# Patient Record
Sex: MALE | Race: WHITE | HISPANIC OR LATINO | Employment: OTHER | URBAN - METROPOLITAN AREA
[De-identification: names, ages, dates, MRNs, and addresses within clinical notes are randomized per-mention and may not be internally consistent; named-entity substitution may affect disease eponyms.]

---

## 2018-01-09 NOTE — PSYCH
Behavioral Health Outpatient Intake    Referred By: INPT  Intake Questions: there are no developmental disabilities  the patient does not have a hearing impairment  the patient does not have an ICM or CTT  patient is not taking injectable psychiatric medications  Employment: The patient is employed full time at self  Emergency Contact Information:   Emergency Contact: Manisha Bryant   Relationship to Patient: WIFE   Phone Number: [phone]   Previous Psychiatric Treatment:   He has not been previously seen by a therapist     History: no  service  He has not had combat service  He was not activated into federal active duty as a member of the [employer] or [employer]  Insurance Subscriber: Manisha Bryant   : [birth year]   Employer: [employer]    Primary Insurance: Sabina Finger   ID number: [id number]   Group number: [id number]         Presenting Problem (in patient's words): DEPRESSION  Substance Abuse: NONE  Previous Treatment: The patient has been seen here in the past  He was seen as an inpatient  CURRENTLY  Accepted as Patient   IR[address]  16 @ 8:00 & DR Tez Ellington 16 @ 2:00     Primary Care Physician: DR Casandra Montes   PCP Telephone Number: [phone]      Signatures   Electronically signed by : Lucy Hu, ; May  3 2016  3:22PM EST                       (Author)

## 2018-01-10 NOTE — MISCELLANEOUS
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)   6  Benign essential hypertension (401 1) (I10)    Plan  Acute renal failure    · (1) COMPREHENSIVE METABOLIC PANEL; Status:Active; Requested for:08Apr2016;    Perform:[facility]; JLZ:[id number];JGNJKDB; For:Acute renal failure; Ordered By:Dari Corbett;  Benign essential hypertension    · (1) CBC/PLT/DIFF; Status:Active; Requested for:08Apr2016;    Perform:[facility]; ZGC:69TCB4490;ICAHZWO; For:Benign essential hypertension; Ordered By:Dari Corbett;   · Follow-up visit in 2 weeks Evaluation and Treatment  Follow-up  Status: Hold For -  Scheduling  Requested for: 08Apr2016   Ordered; For: Benign essential hypertension; Ordered By: Rojelio Brennan Performed:  Due: 13Apr2016  Headache    · * MRI BRAIN WO CONTRAST; Status:Need Information - Financial Authorization; Requested for:08Apr2016;    Perform:[facility]; XHA:[id number];YPHYLBW;  For:Headache; Ordered By:Dari Corbett;  Tremor of right hand    · (1) FOLATE; Status:Active; Requested for:08Apr2016;    Perform:[facility]; HMZ:27SAI7228;HCQIDUK; For:Tremor of right hand; Ordered By:Dari Corbett;   · (1) TSH WITH FT4 REFLEX; Status:Active; Requested for:08Apr2016;    Perform:[facility]; FQY:60DJQ3987;FTXBKQA; For:Tremor of right hand; Ordered By:Dari Corbett;   · (1) VITAMIN B12; Status:Active; Requested for:08Apr2016;    Perform:[facility]; CGO:80FPM1997;KKKLEEA; For:Tremor of right hand; Ordered By:Joy Corbett;    Discussion/Summary  Discussion Summary:   Will get MRI brain for weakness, tremor, headaches  Update labs  Take Amlodipine 5 mg per day  Recheck in 2 wks for BP  He is adamant that he is not needing treatment for depression   He states he is not suicidal    Er precautions discussed  Chief Complaint  Chief Complaint Free Text Note Form: Patient is here for a follow up from [facility]  He stated that they told him he has hypertension  History of Present Illness  TCM Communication [place]: The patient is being contacted for follow-up after hospitalization  Hospital records were reviewed  He was hospitalized at [facility]  The date of admission: 4/2/2016, date of discharge: 04/5/2016  Diagnosis: poisoning/ exposed to antifreeze  He was discharged to home  Medications reviewed and updated today  Symptoms: no fever, no weakness, no dizziness, no headache, no fatigue, no cough, no shortness of breath, no chest pain, no back pain on left side, no back pain on right side, no arm pain left side, no arm pain on right side, no leg pain on left side, no leg pain on right side, no upper abdominal pain, no middle abdominal pain, no lower abdominal pain, no rash:, no anorexia, no nausea, no vomiting, no loose stools, no constipation, no pain with urinating, no incisional pain, no wound drainage and no swelling  The patient is currently asymptomatic  Communication performed and completed by   HPI: Pt was admitted to [facility] for drinking polyethylene glycol  He received urgent hemodialysis and was admitted to the ICU  He states he was working on a car and accidentally consumed the anti-freeze  He denies doing this intentionally  He denies any suicidal or homicidal thoughts currently or at that time  In the hospital he was noted to have elevated BP and was discharged on 5 mg amlodipine  He has this medication at home but he has not been taking it  Currently he complains of headaches and difficulty concentrating  He also has a tremor in the R hand and he feels weakness in his RUE  Started a couple of weeks ago        Review of Systems  Complete-Male:   Constitutional: No fever or chills, feels well, no tiredness, no recent weight gain or weight loss    Eyes: No complaints of eye pain, no red eyes, no discharge from eyes, no itchy eyes  ENT: no complaints of earache, no hearing loss, no nosebleeds, no nasal discharge, no sore throat, no hoarseness  Cardiovascular: No complaints of slow heart rate, no fast heart rate, no chest pain, no palpitations, no leg claudication, no lower extremity  Respiratory: No complaints of shortness of breath, no wheezing, no cough, no SOB on exertion, no orthopnea or PND  Gastrointestinal: No complaints of abdominal pain, no constipation, no nausea or vomiting, no diarrhea or bloody stools  Genitourinary: No complaints of dysuria, no incontinence, no hesitancy, no nocturia, no genital lesion, no testicular pain  Musculoskeletal: No complaints of arthralgia, no myalgias, no joint swelling or stiffness, no limb pain or swelling  Integumentary: No complaints of skin rash or skin lesions, no itching, no skin wound, no dry skin  Neurological: as noted in HPI  Psychiatric: not suicidal, no anxiety and no depression  Endocrine: No complaints of proptosis, no hot flashes, no muscle weakness, no erectile dysfunction, no deepening of the voice, no feelings of weakness  Hematologic/Lymphatic: No complaints of swollen glands, no swollen glands in the neck, does not bleed easily, no easy bruising  Active Problems    1  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)   2  Erectile dysfunction (607 84) (N52 9)   3  Esophageal reflux (530 81) (K21 9)   4  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)   5  S/P colostomy (V44 3) (Z93 3)   6  Screening for skin condition (V82 0) (Z13 89)   7  Stress at home (V61 9) (F43 9)   8  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)    Past Medical History    1  History of Acute viral pharyngitis (462) (J02 8,B97 89)   2  Erectile dysfunction (607 84) (N52 9)   3  Need for Tdap vaccination (V06 1) (Z23)   4  History of Postoperative examination (V67 00) (Z09)   5  History of Sciatica (724 3) (M54 30)   6   History of Seroma Complicating A Procedure (998 13)   7  History of Sore throat (462) (J02 9)   8  History of Visit for pre-operative examination (V72 84) (S90 620)    Surgical History    1  History of Colostomy   2  History of Colostomy Revision   3  History of Exploratory Laparotomy   4  History of Laparoscopic Lysis Of Intestinal Adhesions  Surgical History Reviewed: The surgical history was reviewed and updated today  Family History    1  Family history of Ischemic Stroke (V17 1)    2  Family history of Diabetes Mellitus (V18 0)    Social History    · Alcohol Use (History)   · Caffeine Use   · Stress at home (V61 9) (F43 9)   · Using Marijuana  Social History Reviewed: The social history was reviewed and updated today  Current Meds   1  AmLODIPine Besylate 5 MG Oral Tablet; Therapy: (Recorded:08Apr2016) to Recorded   2  PredniSONE 20 MG Oral Tablet; TAKE 1 TABLET DAILY; Therapy: 97DDI5748 to (Toshia Stein)  Requested for: 31HEC6555; Last   Rx:23Mar2016 Ordered   3  Viagra 100 MG Oral Tablet; TAKE AS DIRECTED; Therapy: 01SIQ1135 to (Last Rx:30Szt4520) Ordered  Medication List Reviewed: The medication list was reviewed and updated today  Allergies    1  No Known Drug Allergies    Vitals  Signs [Data Includes: Current Encounter]   Recorded: 50FDP7839 38:33LO   Systolic: 087  Diastolic: 98  Recorded: 52UPD4706 01:17PM   Temperature: 97 8 F  Heart Rate: 92  Systolic: 458  Diastolic: 98  Height: 5 ft 11 in  Weight: 258 lb 6 08 oz  BMI Calculated: 36 04  BSA Calculated: 2 35  O2 Saturation: 96    Physical Exam    Constitutional   General appearance: No acute distress, well appearing and well nourished  Ears, Nose, Mouth, and Throat   Oropharynx: Normal with no erythema, edema, exudate or lesions  Pulmonary   Respiratory effort: No increased work of breathing or signs of respiratory distress      Auscultation of lungs: Clear to auscultation, equal breath sounds bilaterally, no wheezes, no rales, no rhonci  Cardiovascular   Auscultation of heart: Normal rate and rhythm, normal S1 and S2, without murmurs  Examination of extremities for edema and/or varicosities: Normal     Musculoskeletal   Gait and station: Normal     Neurologic   Cranial nerves: Cranial nerves 2-12 intact  Reflexes: 2+ and symmetric  Deep tendon reflexes: 1+ right biceps and 1+ left biceps  Sensation: No sensory loss  Motor RUE weakness 4/5 shoulders, elbows, wrists  LUE normal strength  LOWER EXT STRENGTH 5/5 bilaterally   Psychiatric   Orientation to person, place and time: Normal     Mood and affect: Normal          Health Management  History of Colon cancer screening   COLONOSCOPY; every 5 years; Last 94AXS1898; Next Due: 96TMY2665;  Active    Future Appointments    Date/Time Provider Specialty Site   04/12/2016 03:20 PM Charmayne Siva, [facility] [address]     Signatures   Electronically signed by : Bia Marin MD; Apr 8 2016  1:42PM EST                       (Author)

## 2018-01-11 NOTE — MISCELLANEOUS
Dear Darron Machado : We missed you for your originally scheduled Neurological Consultation requested by Dr Pao Jhaveri    Please call at your earliest convenience to reschedule this appointment      Sincerely,   Severino Menezes 104     cc:  Dr Pao Jhaveri      Electronically signed by:Pamela Cheung   May  5 2016 10:37AM EST Co-author

## 2018-01-11 NOTE — MISCELLANEOUS
Provider Comments  Provider Comments:   Patient did not show for 4/12/16 appt  Signatures   Electronically signed by : Imtiaz Hansen PAC;  Apr 12 2016  4:46PM EST                       (Author)    Electronically signed by : KERA Martinez ; Apr 12 2016  5:22PM EST                       (Author)

## 2018-01-13 NOTE — MISCELLANEOUS
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  He states he is still having moderate headaches and a tremor in his hand  i advised him to see neurology  Referral placed  Plan  Tremor of right hand    · 1 Mahsa Bello MD, [facility] ([facility]  (Neurology) Physician Referral  Consult  Status: Hold For -  Scheduling  Requested for: 48KHP5471  Care Summary provided  : Yes    Results/Data  Results    * MRI BRAIN WO CONTRAST   MRI BRAIN WO CONTRAST: MRI BRAIN WITHOUT CONTRAST     INDICATION:  Headaches and tremors  COMPARISON:   None  TECHNIQUE:  Sagittal T1, axial T2, axial FLAIR, axial T1, axial [place] and axial diffusion  imaging  Sagittal FLAIR  IMAGE QUALITY:  Diagnostic  FINDINGS:     BRAIN PARENCHYMA:  Scattered small white matter lesions are noted within the frontal  lobes and to a lesser degree within the remainder of the brain parenchyma with no  associated diffusion abnormality  No abnormal signal on gradient imaging  Normal    corpus callosum and hypothalamus  Brainstem and cerebellum demonstrate normal  signal   No evidence of acute ischemia, mass or hemorrhage  VENTRICLES:  The ventricles are normal in size and contour  SELLA AND PITUITARY GLAND:  Normal      ORBITS:  Normal      PARANASAL SINUSES:  Small retention cysts within the inferior left maxillary sinus  Mild mucosal thickening of the right maxillary sinus  Frontal sinuses are hypoplastic  VASCULATURE:  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  CALVARIUM AND SKULL BASE:  Normal      EXTRACRANIAL SOFT TISSUES:  Normal        IMPRESSION:     Scattered small white matter lesions within the brain parenchyma as described above  without associated diffusion abnormality, 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

## 2018-01-15 NOTE — PROGRESS NOTES
Chief Complaint  CC: NEW PATIENT FOR SKIN CANCER SCREENING  REFERRED BY DR Farzad Vaughn FOR PROVEN ATYPICAL LESION  History of Present Illness  53-year-old male presents for further followup and management of any atypical nevus removed from his abdomen  Patient with recent history of diverticulitis and colostomy presents because of concerns regarding a mole was noted on his left abdomen patient noted that the area had become darker this was removed by Dr George Curtis who found that was called a dysplastic nevus with moderate to severe atypia recommendation for complete excision was given      Assessment  Assessed    1  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)   2  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)   3  Screening for skin condition (V82 0) (Z13 89)    Active Problems  Problems    1  Abdominal pain, LLQ (left lower quadrant) (789 04) (R10 32)   2  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)   3  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)   4  Bloody stools (578 1) (K92 1)   5  Diverticulitis of colon (562 11) (K57 32)   6  Erectile dysfunction (607 84) (N52 9)   7  Esophageal reflux (530 81) (K21 9)   8  Hyperpigmented skin lesion (709 00) (L81 9)   9  Lump in neck (784 2) (R22 1)   10  S/P colostomy (V44 3) (Z93 3)    Past Medical History  Problems    1  Acute bronchitis (466 0) (J20 9)   2  History of Acute viral pharyngitis (462) (J02 9)   3  Erectile dysfunction (607 84) (N52 9)   4  Need for Tdap vaccination (V06 1) (Z23)   5  History of Postoperative examination (V67 00) (Z09)   6  History of Sciatica (724 3) (M54 30)   7  History of Seroma Complicating A Procedure (998 13)   8  History of Sore throat (462) (J02 9)   9  History of Visit for pre-operative examination (V72 84) (U84 970)    Surgical History  Problems    1  History of Colostomy   2  History of Colostomy Revision   3  History of Exploratory Laparotomy   4   History of Laparoscopic Lysis Of Intestinal Adhesions    Surgical History reviewed      Family History  Mother 1  Family history of Ischemic Stroke (V17 1)  Family History    2  Family history of Diabetes Mellitus (V18 0)    Family History was reviewed      Social History  Problems    · Alcohol Use (History)   · Caffeine Use   · Never A Smoker   · Using Marijuana  The social history was reviewed      Current Meds   1  Omeprazole 20 MG Oral Tablet Delayed Release; Therapy: (Recorded:59Fma5130) to Recorded   2  Pepcid AC TABS; Therapy: (Recorded:65Fgv6570) to Recorded   3  Percocet TABS; Therapy: (Recorded:13Jan2016) to Recorded   4  Viagra 100 MG Oral Tablet; TAKE AS DIRECTED; Therapy: 60KYG1929 to (Last Rx:77Ibz8428) Ordered    Review of Systems    Constitutional: Denies constitutional symptoms  Eyes: Denies eye symptoms  ENT:  denies ear symptoms, nasal symptoms, mouth or throat symptoms  Cardiovascular: Denies cardiovascular symptoms  Respiratory: Denies respiratory symptoms  Gastrointestinal: Denies gastrointestinal symptoms  Musculoskeletal: Denies musculoskeletal symptoms  Integumentary: Denies symptoms other than stated above  Neurological: Denies neurologic symptoms  Psychiatric: Denies psychiatric symptoms  Endocrine: Denies endocrine symptoms  Hematologic/Lymphatic: Denies hematologic symptoms  Allergies  Medication    1  No Known Drug Allergies    Physical Exam    Constitutional   General appearance: Appears healthy and well developed  Lymphatic   No visible disturbance  Musculoskeletal   Digits and nails: No clubbing, cyanosis or edema  Cutaneous and nail exam normal     Skin   Scalp skin texture and hair distribution: Normal skin texture on scalp, normal hair distribution  Head: Normal turgor, no rashes, no lesions  Neck: Normal turgor, no rashes, no lesions  Chest: Normal turgor, no rashes, no lesions  Abdomen: Abnormal     Back: Normal turgor, no rashes, no lesions  Right upper extremity: Normal turgor, no rashes, no lesions      Left upper extremity: Normal turgor, no rashes, no lesions  Right lower extremity: Normal turgor, no rashes, no lesions  Left lower extremity: Normal turgor, no rashes, no lesions  Neuro/Psych   Alert and oriented x 3  Displays comfort and cooperation during encounterl  Affect is normal     Finding Postinflammatory hyperpigmented area noted on the left abdomen at the previous biopsy site large scar from his recent surgery numerous pigmented lesions all pretty much with regular shape and color nothing else markedly atypical noted on complete exam       Plan  Screening for skin condition    · Use a sun block product with an SPF of 15 or more ; Status:Complete;   Done:  50EJK4033 09:32AM  Suspicious nevus    · Schedule Surgery Treatment  Procedure  Status: Hold For - Scheduling  Requested for:  28Jan2016   · Atypical Nevus education given today; Status:Complete;   Done: 99NTN6789 09:32AM  Unlinked    · Omeprazole 20 MG Oral Tablet Delayed Release   · Percocet TABS (Oxycodone-Acetaminophen)    Discussion/Summary    Assessment #1: Atypical nevus  Care Plan:   We discussed the concept of dysplastic nevus since the previously biopsied lesion was moderately to severe with atypia we'll go ahead and plan complete excision in the near future advised the patient that this is not a cancer but is an indicator of someone who may have greater risk for developing melanoma  Assessment #2: Nevi  Care Plan:   Review the concept of ABCDs and ugly duckling nothing else atypical noted on exam    Assessment #3: Screening for dermatologic disorders  Care Plan:   Nothing else of concern noted exam followup in one year        Signatures   Electronically signed by : KERA Santa ; Jan 28 2016  9:35AM EST                       (Author)

## 2018-01-15 NOTE — PROCEDURES
Procedures by Maryanne Doss MD at  5/3/2016  2:40 PM      Author:  Maryanne Doss MD Service:  Neurology Author Type:  Physician     Filed:  5/3/2016  2:44 PM Date of Service:  5/3/2016  2:40 PM Status:  Signed     :  aMryanne Doss MD (Physician)            ELECTROENCEPHALOGRAM (EEG)      Patient Name:  Suad Larose  MRN: [id number]   :  1965 File #: [id number]   Age: 44 y o  Encounter #: [id number]   Date performed: 16           Report date: 5/3/2016          Study type: Routine EEG    ICD 10 diagnosis: Spells/Fit NOS R56 9    Start time: 14:25:23 End time: 15:21:47     -------------------------------------------------------------------------------------------------------------------   Patient History:  44year-old male admitted to [facility] with high blood pressure and chest pain  KOKO hernández suffered chest pain during a follow-up appointment with his nephrologist  His hypertention was accelerated and was brought to hospital via ambulance  Chest pain subsided while at hospital but koko hernández was very tremorous  Patient states he has right hand tremor episodes for hours every day which is associated with right hand weakness  KOKO hernández states he always has headaches on left side of the head which started after an incident he had when he drank anti-freeze as a dare  NO neurologic symptoms before this  EEG ordered to rule out seizures they are questioning TIA's but  are doubtful         Medications include: None    -------------------------------------------------------------------------------------------------------------------   Description of Procedure:  ·  32 channel digital recording with electrodes placed according to the International 10-20 system with additional  T1/T2 electrodes, EOG, EKG, and simultaneous  video  The recording was technically satisfactory      ------------------------------------------------------------------------------------------------------------------- Results:  Background Activity:  The recording was performed with the patient awake, drowsy, and asleep  During wakefulness, there were long runs of well regulated, mid amplitude, posteriorly  dominant, symmetric 10-11 Hz activity that  did attenuate with eye opening  Drowsiness is characterized by attenuation and irregularity of the alpha rhythm, and the development of irregular intermixed theta slowing  Stage 2 sleep is characterized by symmetric sleep spindles and K-complexes  -------------------------------------------------------------------------------------------------------------------   Activation Procedures:  Hyperventilation was not performed  Stepped photic stimulation between 1-20 cps was performed and did not induce  any changes  -------------------------------------------------------------------------------------------------------------------   Abnormal Findings:  None    -------------------------------------------------------------------------------------------------------------------  Other Findings: The single lead ECG demonstrated a regular sinus rhythm     -------------------------------------------------------------------------------------------------------------------  Events:  No patient push button events  -------------------------------------------------------------------------------------------------------------------   EEG Interpretation:  Normal 30 minute EEG  Scribe Attestation:  Documented by Fernando Meeks, acting as a scribe for Dr Kory Lee on 5/3/16 at 2:44 PM     Physician Attestation:  All documentation recorded by the scribe accurately reflects the service I personally performed and the decisions made by me  I was present during the time the encounter was recorded and have reviewed all the documentation and confirm that it is all accurate  and representative of the encounter        Cali Perez MD   Medical Director  [address] [place] [address]  [facility]  Pager # [phone]               Lori CIFUENTES    May  3 2016  2:44PM [facility] Standard Time

## 2018-01-16 NOTE — PROCEDURES
Procedures by Emilee Hicks DO  at 4/29/2016  7:09 PM      Author:  Emilee Hicks DO Service:  Neurology Author Type:  Physician     Filed:  4/29/2016  7:10 PM Date of Service:  4/29/2016  7:09 PM Status:  Signed     :  Emilee Hicks DO (Physician)         Procedures:       1  LUMBAR PUNCTURE [id number] (Custom)]                   Informed consent obtained  Area sterilized with Iodine sol  1% lidocaine sol administered for local anesthetic  L3-4 interspace identified and entered with 22G 3 5 spinal needle  6-8cc Clear CSF collected  No complications  Patient instructed to lay flat for >1hr               Received for:Provider  EPIC   May  2 2016  1:01PM [facility] Standard Time

## 2018-01-18 NOTE — RESULT NOTES
Verified Results  (1) COMPREHENSIVE METABOLIC PANEL [id number] 83:18HM Tavon Rodgers Order Number: [id number]      National Kidney Disease Education Program recommendations are as follows:  GFR calculation is accurate only with a steady state creatinine  Chronic Kidney disease less than 60 ml/min/1 73 sq  meters  Kidney failure less than 15 ml/min/1 73 sq  meters  Test Name Result Flag Reference   GLUCOSE,RANDM 91 mg/dL     If the patient is fasting, the ADA then defines impaired fasting glucose as > 100 mg/dL and diabetes as > or equal to 123 mg/dL     SODIUM 139 mmol/L  136-145   POTASSIUM 4 3 mmol/L  3 5-5 3   CHLORIDE 106 mmol/L  100-108   CARBON DIOXIDE 28 mmol/L  21-32   ANION GAP (CALC) 5 mmol/L  4-13   BLOOD UREA NITROGEN 25 mg/dL  5-25   CREATININE 0 87 mg/dL  0 60-1 30   Standardized to IDMS reference method   CALCIUM 8 6 mg/dL  8 3-10 1   BILI, TOTAL 0 31 mg/dL  0 20-1 00   ALK PHOSPHATAS 120 U/L H    ALT (SGPT) 38 U/L  12-78   AST(SGOT) 13 U/L  5-45   ALBUMIN 3 4 g/dL L 3 5-5 0   TOTAL PROTEIN 8 0 g/dL  6 4-8 2   eGFR Non-African American > ml/min/1 73sq m       (1) CBC/PLT/DIFF [id number] 04:01PM Tavon Rodgers Order Number: [id number]     Order Number: [id number]     Test Name Result Flag Reference   WBC COUNT 8 01 Thousand/uL  4 31-10 16   RBC COUNT 4 55 Million/uL  3 88-5 62   HEMOGLOBIN 13 2 g/dL  12 0-17 0   HEMATOCRIT 41 1 %  36 5-49 3   MCV 90 3 fL  82 0-98 0   MCH 29 0 pg  26 8-34 3   MCHC 32 1 g/dL  31 4-37 4   RDW 12 9 %  11 6-15 1   MPV 9 1 fL  8 9-12 7   PLATELET COUNT 446 Thousands/uL H 149-390   nRBC AUTOMATED 0 /100 WBCs     NEUTROPHILS RELATIVE PERCENT 74 %  43-75   LYMPHOCYTES RELATIVE PERCENT 17 %  14-44   MONOCYTES RELATIVE PERCENT 8 %  4-12   EOSINOPHILS RELATIVE PERCENT 1 %  0-6   BASOPHILS RELATIVE PERCENT 0 %  0-1   NEUTROPHILS ABSOLUTE COUNT 5 87 Thousands/µL  1 85-7 62   LYMPHOCYTES ABSOLUTE COUNT 1 32 Thousands/µL  0 60-4 47   MONOCYTES ABSOLUTE COUNT 0 66 Thousand/µL  0 17-1 22   EOSINOPHILS ABSOLUTE COUNT 0 11 Thousand/µL  0 00-0 61   BASOPHILS ABSOLUTE COUNT 0 03 Thousands/µL  0 00-0 10

## 2018-02-01 DIAGNOSIS — R52 MILD PAIN: Primary | ICD-10-CM

## 2018-02-01 RX ORDER — IBUPROFEN 600 MG/1
600 TABLET ORAL EVERY 6 HOURS PRN
Qty: 30 TABLET | Refills: 0 | Status: SHIPPED | OUTPATIENT
Start: 2018-02-01 | End: 2019-05-11

## 2019-01-21 ENCOUNTER — TELEPHONE (OUTPATIENT)
Dept: GASTROENTEROLOGY | Facility: CLINIC | Age: 54
End: 2019-01-21

## 2019-01-21 PROBLEM — K22.719 BARRETT'S ESOPHAGUS WITH DYSPLASIA: Status: ACTIVE | Noted: 2019-01-21

## 2019-01-21 NOTE — TELEPHONE ENCOUNTER
PT WAS DUE FOR EGD IN October   PT BOOKED FOR 3/2/19, MAILING H&P ASKED FOR IT TO BE MAILED BACK ASAP ALONG WITH INS CARD

## 2019-01-21 NOTE — TELEPHONE ENCOUNTER
Patient's wife called stating she got a recall letter from [facility] office, I checked allscripts and patient had egd/colon with Dr Romy Whaley 10/2015 with a recall of 5 years  The wife stated her  had some kind of reversal surgery and thought that was why she got the letter can you please look into this and reach out and schedule if needed  Thank you

## 2019-03-08 ENCOUNTER — ANESTHESIA EVENT (OUTPATIENT)
Dept: PERIOP | Facility: HOSPITAL | Age: 54
End: 2019-03-08
Payer: COMMERCIAL

## 2019-03-08 RX ORDER — SODIUM CHLORIDE, SODIUM LACTATE, POTASSIUM CHLORIDE, CALCIUM CHLORIDE 600; 310; 30; 20 MG/100ML; MG/100ML; MG/100ML; MG/100ML
125 INJECTION, SOLUTION INTRAVENOUS CONTINUOUS
Status: CANCELLED | OUTPATIENT
Start: 2019-03-08

## 2019-03-08 NOTE — ANESTHESIA PREPROCEDURE EVALUATION
Review of Systems/Medical History  Patient summary reviewed        Cardiovascular  Hypertension ,    Pulmonary  Negative pulmonary ROS        GI/Hepatic    GERD ,        Negative  ROS        Endo/Other  Negative endo/other ROS      GYN  Negative gynecology ROS          Hematology  Negative hematology ROS      Musculoskeletal  Negative musculoskeletal ROS        Neurology  Negative neurology ROS      Psychology   Psychiatric history, Anxiety, Depression ,     Comment: Suicidal ideation  Psychosis ([address] )  Visual hallucinations  Auditory hallucinations  Mood disorder (HCC)             Physical Exam    Airway    Mallampati score: III  TM Distance: >3 FB  Neck ROM: full     Dental       Cardiovascular  Cardiovascular exam normal    Pulmonary  Pulmonary exam normal     Other Findings        Anesthesia Plan  ASA Score- 2     Anesthesia Type- IV sedation with anesthesia with ASA Monitors  Additional Monitors:   Airway Plan:         Plan Factors-    Induction- intravenous  Postoperative Plan-     Informed Consent- Anesthetic plan and risks discussed with patient  I personally reviewed this patient with the CRNA  Discussed and agreed on the Anesthesia Plan with the CRNA  Aileen Urbina

## 2019-03-09 ENCOUNTER — ANESTHESIA (OUTPATIENT)
Dept: PERIOP | Facility: HOSPITAL | Age: 54
End: 2019-03-09
Payer: COMMERCIAL

## 2019-03-09 ENCOUNTER — HOSPITAL ENCOUNTER (OUTPATIENT)
Facility: HOSPITAL | Age: 54
Setting detail: OUTPATIENT SURGERY
Discharge: HOME | End: 2019-03-09
Attending: INTERNAL MEDICINE | Admitting: INTERNAL MEDICINE
Payer: COMMERCIAL

## 2019-03-09 VITALS
SYSTOLIC BLOOD PRESSURE: 104 MMHG | HEART RATE: 79 BPM | OXYGEN SATURATION: 97 % | BODY MASS INDEX: 38.27 KG/M2 | TEMPERATURE: 97.8 F | RESPIRATION RATE: 18 BRPM | HEIGHT: 71 IN | DIASTOLIC BLOOD PRESSURE: 75 MMHG | WEIGHT: 273.37 LBS

## 2019-03-09 DIAGNOSIS — K22.719 BARRETT'S ESOPHAGUS WITH DYSPLASIA: ICD-10-CM

## 2019-03-09 PROCEDURE — 43239 EGD BIOPSY SINGLE/MULTIPLE: CPT | Performed by: INTERNAL MEDICINE

## 2019-03-09 PROCEDURE — 88305 TISSUE EXAM BY PATHOLOGIST: CPT | Performed by: PATHOLOGY

## 2019-03-09 PROCEDURE — 45385 COLONOSCOPY W/LESION REMOVAL: CPT | Performed by: INTERNAL MEDICINE

## 2019-03-09 RX ORDER — PROPOFOL 10 MG/ML
INJECTION, EMULSION INTRAVENOUS AS NEEDED
Status: DISCONTINUED | OUTPATIENT
Start: 2019-03-09 | End: 2019-03-09 | Stop reason: SURG

## 2019-03-09 RX ORDER — SODIUM CHLORIDE, SODIUM LACTATE, POTASSIUM CHLORIDE, CALCIUM CHLORIDE 600; 310; 30; 20 MG/100ML; MG/100ML; MG/100ML; MG/100ML
INJECTION, SOLUTION INTRAVENOUS CONTINUOUS PRN
Status: DISCONTINUED | OUTPATIENT
Start: 2019-03-09 | End: 2019-03-09 | Stop reason: SURG

## 2019-03-09 RX ADMIN — PROPOFOL 100 MG: 10 INJECTION, EMULSION INTRAVENOUS at 07:19

## 2019-03-09 RX ADMIN — PROPOFOL 100 MG: 10 INJECTION, EMULSION INTRAVENOUS at 07:21

## 2019-03-09 RX ADMIN — PROPOFOL 100 MG: 10 INJECTION, EMULSION INTRAVENOUS at 07:26

## 2019-03-09 RX ADMIN — SODIUM CHLORIDE, SODIUM LACTATE, POTASSIUM CHLORIDE, AND CALCIUM CHLORIDE: .6; .31; .03; .02 INJECTION, SOLUTION INTRAVENOUS at 07:15

## 2019-03-09 RX ADMIN — PROPOFOL 50 MG: 10 INJECTION, EMULSION INTRAVENOUS at 07:31

## 2019-03-09 NOTE — DISCHARGE INSTRUCTIONS
Upper Endoscopy   WHAT YOU NEED TO KNOW:   An upper endoscopy is also called an upper gastrointestinal (GI) endoscopy, or an esophagogastroduodenoscopy (EGD)  You may feel bloated, gassy, or have some abdominal discomfort after your procedure  Your throat may be sore for 24 to 36 hours  You may burp or pass gas from air that is still inside your body  DISCHARGE INSTRUCTIONS:   Call 911 for any of the following:   · You have sudden chest pain or trouble breathing  Seek care immediately if:   · You feel dizzy or faint  · You have trouble swallowing  · Your bowel movements are very dark or black  · Your abdomen is hard and firm and you have severe pain  · You vomit blood  Contact your healthcare provider if:   · You feel full or bloated and cannot burp or pass gas  · You have not had a bowel movement for 3 days after your procedure  · You have neck pain  · You have a fever or chills  · You have nausea or are vomiting  · You have a rash or hives  · You have questions or concerns about your endoscopy  Relieve a sore throat:  Suck on throat lozenges or crushed ice  Gargle with a small amount of warm salt water  Mix 1 teaspoon of salt and 1 cup of warm water to make salt water  Relieve gas and discomfort from bloating:  Lie on your right side with a heating pad on your abdomen  Take short walks to help pass gas  Eat small meals until bloating is relieved  Rest after your procedure: You have been given medicine to relax you  Do not  drive or make important decisions until the day after your procedure  Return to your normal activity as directed  You can usually return to work the day after your procedure  Follow up with your healthcare provider as directed:  Write down your questions so you remember to ask them during your visits     © 2017 [address] is for End User's use only and may not be sold, redistributed or otherwise used for commercial purposes  All illustrations and images included in CareNotes® are the copyrighted property of [employer]  or [employer]  The above information is an  only  It is not intended as medical advice for individual conditions or treatments  Talk to your doctor, nurse or pharmacist before following any medical regimen to see if it is safe and effective for you  Colonoscopy   WHAT YOU NEED TO KNOW:   A colonoscopy is a procedure to examine the inside of your colon (intestine) with a scope  Polyps or tissue growths may have been removed during your colonoscopy  It is normal to feel bloated and to have some abdominal discomfort  You should be passing gas  If you have hemorrhoids or you had polyps removed, you may have a small amount of bleeding  DISCHARGE INSTRUCTIONS:   Seek care immediately if:   · You have a large amount of bright red blood in your bowel movements  · Your abdomen is hard and firm and you have severe pain  · You have sudden trouble breathing  Contact your healthcare provider if:   · You develop a rash or hives  · You have a fever within 24 hours of your procedure       · You have not had a bowel movement for 3 days after your procedure  · You have questions or concerns about your condition or care  Activity:   · Do not lift, strain, or run  for 3 days after your procedure  · Rest after your procedure  You have been given medicine to relax you  Do not  drive or make important decisions until the day after your procedure  Return to your normal activity as directed  · Relieve gas and discomfort from bloating  by lying on your right side with a heating pad on your abdomen  You may need to take short walks to help the gas move out  Eat small meals until bloating is relieved  If you had polyps removed: For 7 days after your procedure:  · Do not  take aspirin  · Do not  go on long car rides    Follow up with your healthcare provider as directed: Write down your questions so you remember to ask them during your visits  © 2017 [address] is for End User's use only and may not be sold, redistributed or otherwise used for commercial purposes  All illustrations and images included in CareNotes® are the copyrighted property of [employer]  or [employer]o Curry  The above information is an  only  It is not intended as medical advice for individual conditions or treatments  Talk to your doctor, nurse or pharmacist before following any medical regimen to see if it is safe and effective for you

## 2019-03-09 NOTE — ANESTHESIA POSTPROCEDURE EVALUATION
Post-Op Assessment Note    CV Status:  Stable    Pain management: adequate     Mental Status:  Arousable   Hydration Status:  Stable   PONV Controlled:  None   Airway Patency:  Patent   Post Op Vitals Reviewed: Yes      Staff: Anesthesiologist, CRNA           BP      Temp      Pulse     Resp      SpO2

## 2019-03-09 NOTE — OP NOTE
Postoperative Note  PATIENT NAME: Andrea Negro  : 1965  MRN: [id number]  [facility] 01    Surgery Date: 3/9/2019    POST-OP DIAGNOSIS: See the impression below    SEDATION: Monitored anesthesia care, check anesthesia records    PHYSICAL EXAM:  Vitals:    19 0637   BP: 127/76   Pulse: 81   Resp: 18   Temp: 97 9 °F (36 6 °C)   SpO2: 98%     Body mass index is 38 13 kg/m²  General: NAD  Heart: S1 & S2 normal, RRR  Lungs: CTA, No rales or rhonchi  Abdomen: Soft, nontender, nondistended, good bowel sounds    CONSENT:  Informed consent was obtained for the procedure, including sedation after explaining the risks and benefits of the procedure  Risks including but not limited to bleeding, perforation, infection, aspiration were discussed in detail  Also explained about less than 100%$ sensitivity with the exam and other alternatives  DESCRIPTION:   Procedure:  Esophagogastroduodenoscopy with biopsy    Indications:  52Year old male with a history of Finney's esophagus    ASA 2     Estimated blood loss: Insignificant    Premedicated with mac anesthesia    Patient is identified by me  He is examined prior to the procedure found to be in stable condition  He is attached to the cardiac monitor and pulse oximeter and placed in left lateral position  After adequate intravenous sedation the Olympus video gastroscope was inserted under direct vision into the esophagus  The esophageal mucosa is normal until the Z-line is reached at 38 cm  Here extending proximally as a single tongue of Finney's esophagus  The stomach is entered  Body and antrum normal   Pylorus is normal   Duodenum was cannulated into the 3rd portion and is normal   Retroversion reveals a normal GE junction fundus and cardia  Biopsies taken of the Finney's esophagus with excellent hemostasis  He tolerated the procedure well was stable throughout       My impression:  Short-segment Finney's esophagus, status post biopsy    RECOMMENDATIONS:  Follow up biopsy results in 1 week  Continue current medical management  Follow-up EGD in 3 years    COMPLICATIONS:  None; patient tolerated the procedure well  DISPOSITION: PACU  CONDITION: Stable    Eliana Holloway MD  3/9/2019,7:24 AM        Portions of the record may have been created with voice recognition software   Occasional wrong word or "sound a like" substitutions may have occurred due to the inherent limitations of voice recognition software   Read the chart carefully and recognize, using context, where substitutions have occurred

## 2019-03-09 NOTE — DISCHARGE INSTR - AVS FIRST PAGE
Postoperative Note  PATIENT NAME: Warden Pressley  : 1965  MRN: [id number]  [facility]    Surgery Date: 3/9/2019    POST-OP DIAGNOSIS: See the impression below    SEDATION: Monitored anesthesia care, check anesthesia records    PHYSICAL EXAM:  Vitals:    19 0637   BP: 127/76   Pulse: 81   Resp: 18   Temp: 97 9 °F (36 6 °C)   SpO2: 98%     Body mass index is 38 13 kg/m²  General: NAD  Heart: S1 & S2 normal, RRR  Lungs: CTA, No rales or rhonchi  Abdomen: Soft, nontender, nondistended, good bowel sounds    CONSENT:  Informed consent was obtained for the procedure, including sedation after explaining the risks and benefits of the procedure  Risks including but not limited to bleeding, perforation, infection, aspiration were discussed in detail  Also explained about less than 100%$ sensitivity with the exam and other alternatives  DESCRIPTION:   Procedure:  Esophagogastroduodenoscopy with biopsy    Indications:  52Year old male with a history of Finney's esophagus    ASA 2     Estimated blood loss: Insignificant    Premedicated with mac anesthesia    Patient is identified by me  He is examined prior to the procedure found to be in stable condition  He is attached to the cardiac monitor and pulse oximeter and placed in left lateral position  After adequate intravenous sedation the Olympus video gastroscope was inserted under direct vision into the esophagus  The esophageal mucosa is normal until the Z-line is reached at 38 cm  Here extending proximally as a single tongue of Finney's esophagus  The stomach is entered  Body and antrum normal   Pylorus is normal   Duodenum was cannulated into the 3rd portion and is normal   Retroversion reveals a normal GE junction fundus and cardia  Biopsies taken of the Finney's esophagus with excellent hemostasis  He tolerated the procedure well was stable throughout       My impression:  Short-segment Finney's esophagus, status post biopsy    RECOMMENDATIONS:  Follow up biopsy results in 1 week  Continue current medical management  Follow-up EGD in 3 years    COMPLICATIONS:  None; patient tolerated the procedure well  DISPOSITION: PACU  CONDITION: Stable    Rosa Ramirez MD  3/9/2019,7:24 AM        Portions of the record may have been created with voice recognition software   Occasional wrong word or "sound a like" substitutions may have occurred due to the inherent limitations of voice recognition software   Read the chart carefully and recognize, using context, where substitutions have occurred  Postoperative Note  PATIENT NAME: Sana Zhu  : 1965  MRN: [id number]  [facility]    Surgery Date: 3/9/2019    POST-OP DIAGNOSIS: See the impression below    SEDATION: Monitored anesthesia care, check anesthesia records    PHYSICAL EXAM:  Vitals:    19 0637   BP: 127/76   Pulse: 81   Resp: 18   Temp: 97 9 °F (36 6 °C)   SpO2: 98%     Body mass index is 38 13 kg/m²  General: NAD  Heart: S1 & S2 normal, RRR  Lungs: CTA, No rales or rhonchi  Abdomen: Soft, nontender, nondistended, good bowel sounds    CONSENT:  Informed consent was obtained for the procedure, including sedation after explaining the risks and benefits of the procedure  Risks including but not limited to bleeding, perforation, infection, aspiration were discussed in detail  Also explained about less than 100%$ sensitivity with the exam and other alternatives  DESCRIPTION:   Procedure:  Fiberoptic colonoscopy with hot snare polypectomy and polyp removal with hot biopsy forceps    Indications:  57-year-old male with a history of colonoscopic polypectomy  Patient had reversal of colostomy placed for acute diverticulitis    ASA 2    Estimated blood loss:  None    Premedicated with mac anesthesia    Patient is identified by me  He is examined prior to the procedure found to be in stable condition    He is attached to the cardiac monitor and pulse oximeter and placed in left lateral position  After adequate intravenous sedation the Olympus video colonoscope was inserted and passed easily to the cecum  The ileocecal valve and the appendiceal orifice are identified and the scope slowly withdrawn with excellent circumferential visualization of the mucosa  The preparation is excellent  The ascending colon there is a diminutive polyp removed with hot biopsy technique with excellent cammy  Polyp was retrieved and sent to pathology  In the midtransverse there is a diminutive polyp removed with hot biopsy technique with excellent cammy  The polyp was retrieved and sent to pathology  At the anastomosis at 20 cm there is a 7 mm sessile polyp removed with hot snare polypectomy with excellent cammy  Polyp was retrieved and sent to pathology  No other lesions are noted  Retroversion is normal   He tolerated the procedure well and was stable throughout  My impression:  1  Ascending and transverse polyps, status post hot biopsy polypectomy  2  Anastomotic polyp, status post hot snare polypectomy    RECOMMENDATIONS:  Follow up biopsy results in 1 week  Follow-up colonoscopy in 5 years    COMPLICATIONS:  None; patient tolerated the procedure well  DISPOSITION: PACU  CONDITION: Stable    Uma Huang MD  3/9/2019,7:38 AM        Portions of the record may have been created with voice recognition software   Occasional wrong word or "sound a like" substitutions may have occurred due to the inherent limitations of voice recognition software   Read the chart carefully and recognize, using context, where substitutions have occurred

## 2019-03-09 NOTE — OP NOTE
Postoperative Note  PATIENT NAME: Alanna Rodríguez  : 1965  MRN: [id number]  [facility]    Surgery Date: 3/9/2019    POST-OP DIAGNOSIS: See the impression below    SEDATION: Monitored anesthesia care, check anesthesia records    PHYSICAL EXAM:  Vitals:    19 0637   BP: 127/76   Pulse: 81   Resp: 18   Temp: 97 9 °F (36 6 °C)   SpO2: 98%     Body mass index is 38 13 kg/m²  General: NAD  Heart: S1 & S2 normal, RRR  Lungs: CTA, No rales or rhonchi  Abdomen: Soft, nontender, nondistended, good bowel sounds    CONSENT:  Informed consent was obtained for the procedure, including sedation after explaining the risks and benefits of the procedure  Risks including but not limited to bleeding, perforation, infection, aspiration were discussed in detail  Also explained about less than 100%$ sensitivity with the exam and other alternatives  DESCRIPTION:   Procedure:  Fiberoptic colonoscopy with hot snare polypectomy and polyp removal with hot biopsy forceps    Indications:  49-year-old male with a history of colonoscopic polypectomy  Patient had reversal of colostomy placed for acute diverticulitis    ASA 2    Estimated blood loss:  None    Premedicated with mac anesthesia    Patient is identified by me  He is examined prior to the procedure found to be in stable condition  He is attached to the cardiac monitor and pulse oximeter and placed in left lateral position  After adequate intravenous sedation the Olympus video colonoscope was inserted and passed easily to the cecum  The ileocecal valve and the appendiceal orifice are identified and the scope slowly withdrawn with excellent circumferential visualization of the mucosa  The preparation is excellent  The ascending colon there is a diminutive polyp removed with hot biopsy technique with excellent cammy  Polyp was retrieved and sent to pathology    In the midtransverse there is a diminutive polyp removed with hot biopsy technique with excellent cammy   The polyp was retrieved and sent to pathology  At the anastomosis at 20 cm there is a 7 mm sessile polyp removed with hot snare polypectomy with excellent cammy  Polyp was retrieved and sent to pathology  No other lesions are noted  Retroversion is normal   He tolerated the procedure well and was stable throughout  My impression:  1  Ascending and transverse polyps, status post hot biopsy polypectomy  2  Anastomotic polyp, status post hot snare polypectomy    RECOMMENDATIONS:  Follow up biopsy results in 1 week  Follow-up colonoscopy in 5 years    COMPLICATIONS:  None; patient tolerated the procedure well  DISPOSITION: PACU  CONDITION: Stable    Eleazar Ricardo MD  3/9/2019,7:38 AM        Portions of the record may have been created with voice recognition software   Occasional wrong word or "sound a like" substitutions may have occurred due to the inherent limitations of voice recognition software   Read the chart carefully and recognize, using context, where substitutions have occurred

## 2019-04-06 ENCOUNTER — HOSPITAL ENCOUNTER (EMERGENCY)
Facility: HOSPITAL | Age: 54
Discharge: HOME | End: 2019-04-06
Attending: EMERGENCY MEDICINE | Admitting: EMERGENCY MEDICINE
Payer: COMMERCIAL

## 2019-04-06 ENCOUNTER — APPOINTMENT (EMERGENCY)
Dept: RADIOLOGY | Facility: HOSPITAL | Age: 54
End: 2019-04-06
Payer: COMMERCIAL

## 2019-04-06 VITALS
BODY MASS INDEX: 40.19 KG/M2 | TEMPERATURE: 97.9 F | WEIGHT: 288.14 LBS | DIASTOLIC BLOOD PRESSURE: 79 MMHG | OXYGEN SATURATION: 97 % | HEART RATE: 99 BPM | RESPIRATION RATE: 18 BRPM | SYSTOLIC BLOOD PRESSURE: 150 MMHG

## 2019-04-06 DIAGNOSIS — M54.50 LOW BACK PAIN: ICD-10-CM

## 2019-04-06 DIAGNOSIS — S13.4XXA WHIPLASH INJURY TO NECK, INITIAL ENCOUNTER: ICD-10-CM

## 2019-04-06 DIAGNOSIS — M54.2 NECK PAIN: Primary | ICD-10-CM

## 2019-04-06 PROCEDURE — 72040 X-RAY EXAM NECK SPINE 2-3 VW: CPT

## 2019-04-06 PROCEDURE — 72100 X-RAY EXAM L-S SPINE 2/3 VWS: CPT

## 2019-04-06 PROCEDURE — 99283 EMERGENCY DEPT VISIT LOW MDM: CPT | Performed by: PHYSICIAN ASSISTANT

## 2019-04-06 PROCEDURE — 99284 EMERGENCY DEPT VISIT MOD MDM: CPT

## 2019-04-06 RX ORDER — IBUPROFEN 400 MG/1
400 TABLET ORAL EVERY 6 HOURS PRN
Qty: 30 TABLET | Refills: 0 | Status: SHIPPED | OUTPATIENT
Start: 2019-04-06 | End: 2019-05-11

## 2019-04-06 RX ORDER — OXYCODONE HYDROCHLORIDE AND ACETAMINOPHEN 5; 325 MG/1; MG/1
1 TABLET ORAL EVERY 6 HOURS PRN
Qty: 12 TABLET | Refills: 0 | Status: SHIPPED | OUTPATIENT
Start: 2019-04-06 | End: 2019-04-09

## 2019-04-06 RX ORDER — IBUPROFEN 400 MG/1
400 TABLET ORAL ONCE
Status: COMPLETED | OUTPATIENT
Start: 2019-04-06 | End: 2019-04-06

## 2019-04-06 RX ORDER — HYDROCODONE BITARTRATE AND ACETAMINOPHEN 5; 325 MG/1; MG/1
1 TABLET ORAL ONCE
Status: COMPLETED | OUTPATIENT
Start: 2019-04-06 | End: 2019-04-06

## 2019-04-06 RX ORDER — METHOCARBAMOL 500 MG/1
500 TABLET, FILM COATED ORAL ONCE
Status: COMPLETED | OUTPATIENT
Start: 2019-04-06 | End: 2019-04-06

## 2019-04-06 RX ORDER — METHOCARBAMOL 750 MG/1
750 TABLET, FILM COATED ORAL EVERY 6 HOURS PRN
Qty: 20 TABLET | Refills: 0 | Status: SHIPPED | OUTPATIENT
Start: 2019-04-06 | End: 2019-05-11

## 2019-04-06 RX ADMIN — HYDROCODONE BITARTRATE AND ACETAMINOPHEN 1 TABLET: 5; 325 TABLET ORAL at 15:21

## 2019-04-06 RX ADMIN — IBUPROFEN 400 MG: 400 TABLET ORAL at 15:21

## 2019-04-06 RX ADMIN — METHOCARBAMOL TABLETS 500 MG: 500 TABLET, COATED ORAL at 15:21

## 2019-05-10 ENCOUNTER — HOSPITAL ENCOUNTER (EMERGENCY)
Facility: HOSPITAL | Age: 54
Discharge: HOME | End: 2019-05-11
Attending: EMERGENCY MEDICINE | Admitting: EMERGENCY MEDICINE
Payer: COMMERCIAL

## 2019-05-10 DIAGNOSIS — R11.2 NAUSEA AND VOMITING: ICD-10-CM

## 2019-05-10 DIAGNOSIS — R10.9 ABDOMINAL PAIN, UNSPECIFIED ABDOMINAL LOCATION: Primary | ICD-10-CM

## 2019-05-10 LAB
BASOPHILS # BLD AUTO: 0.05 THOUSANDS/ΜL (ref 0–0.1)
BASOPHILS NFR BLD AUTO: 0 % (ref 0–1)
EOSINOPHIL # BLD AUTO: 0.13 THOUSAND/ΜL (ref 0–0.61)
EOSINOPHIL NFR BLD AUTO: 1 % (ref 0–6)
ERYTHROCYTE [DISTWIDTH] IN BLOOD BY AUTOMATED COUNT: 13.8 % (ref 11.6–15.1)
HCT VFR BLD AUTO: 52.9 % (ref 36.5–49.3)
HGB BLD-MCNC: 16.9 G/DL (ref 12–17)
IMM GRANULOCYTES # BLD AUTO: 0.05 THOUSAND/UL (ref 0–0.2)
IMM GRANULOCYTES NFR BLD AUTO: 0 % (ref 0–2)
LYMPHOCYTES # BLD AUTO: 1.89 THOUSANDS/ΜL (ref 0.6–4.47)
LYMPHOCYTES NFR BLD AUTO: 16 % (ref 14–44)
MCH RBC QN AUTO: 29.7 PG (ref 26.8–34.3)
MCHC RBC AUTO-ENTMCNC: 31.9 G/DL (ref 31.4–37.4)
MCV RBC AUTO: 93 FL (ref 82–98)
MONOCYTES # BLD AUTO: 0.81 THOUSAND/ΜL (ref 0.17–1.22)
MONOCYTES NFR BLD AUTO: 7 % (ref 4–12)
NEUTROPHILS # BLD AUTO: 8.59 THOUSANDS/ΜL (ref 1.85–7.62)
NEUTS SEG NFR BLD AUTO: 76 % (ref 43–75)
NRBC BLD AUTO-RTO: 0 /100 WBCS
PLATELET # BLD AUTO: 284 THOUSANDS/UL (ref 149–390)
PMV BLD AUTO: 10.1 FL (ref 8.9–12.7)
RBC # BLD AUTO: 5.69 MILLION/UL (ref 3.88–5.62)
WBC # BLD AUTO: 11.52 THOUSAND/UL (ref 4.31–10.16)

## 2019-05-10 PROCEDURE — 85025 COMPLETE CBC W/AUTO DIFF WBC: CPT | Performed by: EMERGENCY MEDICINE

## 2019-05-10 PROCEDURE — 99285 EMERGENCY DEPT VISIT HI MDM: CPT

## 2019-05-10 PROCEDURE — 36415 COLL VENOUS BLD VENIPUNCTURE: CPT

## 2019-05-10 RX ORDER — ONDANSETRON 2 MG/ML
1 INJECTION INTRAMUSCULAR; INTRAVENOUS ONCE
Status: COMPLETED | OUTPATIENT
Start: 2019-05-10 | End: 2019-05-10

## 2019-05-11 ENCOUNTER — APPOINTMENT (EMERGENCY)
Dept: CT IMAGING | Facility: HOSPITAL | Age: 54
End: 2019-05-11
Payer: COMMERCIAL

## 2019-05-11 VITALS
TEMPERATURE: 97.7 F | DIASTOLIC BLOOD PRESSURE: 68 MMHG | BODY MASS INDEX: 40.62 KG/M2 | SYSTOLIC BLOOD PRESSURE: 111 MMHG | HEART RATE: 87 BPM | HEIGHT: 71 IN | OXYGEN SATURATION: 99 % | WEIGHT: 290.13 LBS | RESPIRATION RATE: 19 BRPM

## 2019-05-11 LAB
ALBUMIN SERPL BCP-MCNC: 3.8 G/DL (ref 3.5–5)
ALP SERPL-CCNC: 82 U/L (ref 46–116)
ALT SERPL W P-5'-P-CCNC: 33 U/L (ref 12–78)
ANION GAP SERPL CALCULATED.3IONS-SCNC: 8 MMOL/L (ref 4–13)
AST SERPL W P-5'-P-CCNC: 18 U/L (ref 5–45)
ATRIAL RATE: 87 BPM
BACTERIA UR QL AUTO: ABNORMAL /HPF
BILIRUB SERPL-MCNC: 0.1 MG/DL (ref 0.2–1)
BILIRUB UR QL STRIP: NEGATIVE
BUN SERPL-MCNC: 23 MG/DL (ref 5–25)
CALCIUM SERPL-MCNC: 8.5 MG/DL (ref 8.3–10.1)
CHLORIDE SERPL-SCNC: 103 MMOL/L (ref 100–108)
CLARITY UR: CLEAR
CO2 SERPL-SCNC: 28 MMOL/L (ref 21–32)
COLOR UR: YELLOW
CREAT SERPL-MCNC: 1.04 MG/DL (ref 0.6–1.3)
GFR SERPL CREATININE-BSD FRML MDRD: 80 ML/MIN/1.73SQ M
GLUCOSE SERPL-MCNC: 132 MG/DL (ref 65–140)
GLUCOSE UR STRIP-MCNC: NEGATIVE MG/DL
HGB UR QL STRIP.AUTO: NEGATIVE
KETONES UR STRIP-MCNC: ABNORMAL MG/DL
LEUKOCYTE ESTERASE UR QL STRIP: NEGATIVE
LIPASE SERPL-CCNC: 111 U/L (ref 73–393)
NITRITE UR QL STRIP: NEGATIVE
NON-SQ EPI CELLS URNS QL MICRO: ABNORMAL /HPF
P AXIS: 66 DEGREES
PH UR STRIP.AUTO: 5.5 [PH]
POTASSIUM SERPL-SCNC: 3.9 MMOL/L (ref 3.5–5.3)
PR INTERVAL: 170 MS
PROT SERPL-MCNC: 7.6 G/DL (ref 6.4–8.2)
PROT UR STRIP-MCNC: ABNORMAL MG/DL
QRS AXIS: 58 DEGREES
QRSD INTERVAL: 82 MS
QT INTERVAL: 376 MS
QTC INTERVAL: 452 MS
RBC #/AREA URNS AUTO: ABNORMAL /HPF
SODIUM SERPL-SCNC: 139 MMOL/L (ref 136–145)
SP GR UR STRIP.AUTO: >=1.03 (ref 1–1.03)
T WAVE AXIS: 44 DEGREES
TROPONIN I SERPL-MCNC: <0.02 NG/ML
UROBILINOGEN UR QL STRIP.AUTO: 0.2 E.U./DL
VENTRICULAR RATE: 87 BPM
WBC #/AREA URNS AUTO: ABNORMAL /HPF

## 2019-05-11 PROCEDURE — 93010 ELECTROCARDIOGRAM REPORT: CPT | Performed by: INTERNAL MEDICINE

## 2019-05-11 PROCEDURE — 74177 CT ABD & PELVIS W/CONTRAST: CPT

## 2019-05-11 PROCEDURE — 81001 URINALYSIS AUTO W/SCOPE: CPT | Performed by: PHYSICIAN ASSISTANT

## 2019-05-11 PROCEDURE — 96374 THER/PROPH/DIAG INJ IV PUSH: CPT

## 2019-05-11 PROCEDURE — 36415 COLL VENOUS BLD VENIPUNCTURE: CPT

## 2019-05-11 PROCEDURE — 96361 HYDRATE IV INFUSION ADD-ON: CPT

## 2019-05-11 PROCEDURE — 84484 ASSAY OF TROPONIN QUANT: CPT | Performed by: PHYSICIAN ASSISTANT

## 2019-05-11 PROCEDURE — 93005 ELECTROCARDIOGRAM TRACING: CPT

## 2019-05-11 PROCEDURE — 96375 TX/PRO/DX INJ NEW DRUG ADDON: CPT

## 2019-05-11 PROCEDURE — 83690 ASSAY OF LIPASE: CPT | Performed by: EMERGENCY MEDICINE

## 2019-05-11 PROCEDURE — 99284 EMERGENCY DEPT VISIT MOD MDM: CPT | Performed by: PHYSICIAN ASSISTANT

## 2019-05-11 PROCEDURE — 80053 COMPREHEN METABOLIC PANEL: CPT | Performed by: EMERGENCY MEDICINE

## 2019-05-11 RX ORDER — FAMOTIDINE 20 MG/1
20 TABLET, FILM COATED ORAL 2 TIMES DAILY
Qty: 10 TABLET | Refills: 0 | Status: SHIPPED | OUTPATIENT
Start: 2019-05-11 | End: 2019-10-04

## 2019-05-11 RX ORDER — METOCLOPRAMIDE HYDROCHLORIDE 5 MG/ML
10 INJECTION INTRAMUSCULAR; INTRAVENOUS ONCE
Status: COMPLETED | OUTPATIENT
Start: 2019-05-11 | End: 2019-05-11

## 2019-05-11 RX ORDER — QUETIAPINE FUMARATE 200 MG/1
1 TABLET, FILM COATED ORAL
COMMUNITY
Start: 2016-07-05 | End: 2019-10-04

## 2019-05-11 RX ORDER — AMLODIPINE BESYLATE 5 MG/1
1 TABLET ORAL DAILY
COMMUNITY
End: 2019-05-13 | Stop reason: ALTCHOICE

## 2019-05-11 RX ORDER — OXYCODONE HYDROCHLORIDE AND ACETAMINOPHEN 5; 325 MG/1; MG/1
TABLET ORAL
Refills: 0 | COMMUNITY
Start: 2019-04-11 | End: 2019-05-13 | Stop reason: ALTCHOICE

## 2019-05-11 RX ORDER — ONDANSETRON 4 MG/1
4 TABLET, FILM COATED ORAL EVERY 6 HOURS
Qty: 20 TABLET | Refills: 0 | Status: SHIPPED | OUTPATIENT
Start: 2019-05-11 | End: 2019-05-13 | Stop reason: ALTCHOICE

## 2019-05-11 RX ORDER — DICYCLOMINE HCL 20 MG
20 TABLET ORAL 2 TIMES DAILY
Qty: 10 TABLET | Refills: 0 | Status: SHIPPED | OUTPATIENT
Start: 2019-05-11 | End: 2019-05-13 | Stop reason: ALTCHOICE

## 2019-05-11 RX ADMIN — SODIUM CHLORIDE 1000 ML: 0.9 INJECTION, SOLUTION INTRAVENOUS at 01:01

## 2019-05-11 RX ADMIN — METOCLOPRAMIDE 10 MG: 5 INJECTION, SOLUTION INTRAMUSCULAR; INTRAVENOUS at 01:08

## 2019-05-11 RX ADMIN — FAMOTIDINE 20 MG: 10 INJECTION, SOLUTION INTRAVENOUS at 01:08

## 2019-05-11 RX ADMIN — IOHEXOL 100 ML: 350 INJECTION, SOLUTION INTRAVENOUS at 02:20

## 2019-05-13 ENCOUNTER — OFFICE VISIT (OUTPATIENT)
Dept: FAMILY MEDICINE CLINIC | Facility: CLINIC | Age: 54
End: 2019-05-13
Payer: COMMERCIAL

## 2019-05-13 VITALS
BODY MASS INDEX: 40.74 KG/M2 | WEIGHT: 291 LBS | SYSTOLIC BLOOD PRESSURE: 134 MMHG | OXYGEN SATURATION: 96 % | HEART RATE: 84 BPM | HEIGHT: 71 IN | TEMPERATURE: 97.7 F | DIASTOLIC BLOOD PRESSURE: 84 MMHG

## 2019-05-13 DIAGNOSIS — M54.16 LUMBAR RADICULOPATHY: Primary | ICD-10-CM

## 2019-05-13 DIAGNOSIS — J02.9 ACUTE PHARYNGITIS, UNSPECIFIED ETIOLOGY: ICD-10-CM

## 2019-05-13 PROCEDURE — 99214 OFFICE O/P EST MOD 30 MIN: CPT | Performed by: FAMILY MEDICINE

## 2019-05-13 PROCEDURE — 1036F TOBACCO NON-USER: CPT | Performed by: FAMILY MEDICINE

## 2019-05-13 PROCEDURE — 3008F BODY MASS INDEX DOCD: CPT | Performed by: FAMILY MEDICINE

## 2019-05-13 RX ORDER — MELOXICAM 15 MG/1
15 TABLET ORAL DAILY
Qty: 30 TABLET | Refills: 0 | Status: SHIPPED | OUTPATIENT
Start: 2019-05-13 | End: 2019-05-13 | Stop reason: ALTCHOICE

## 2019-05-13 RX ORDER — AMOXICILLIN AND CLAVULANATE POTASSIUM 875; 125 MG/1; MG/1
1 TABLET, FILM COATED ORAL EVERY 12 HOURS SCHEDULED
Qty: 10 TABLET | Refills: 0 | Status: SHIPPED | OUTPATIENT
Start: 2019-05-13 | End: 2019-05-18

## 2019-05-13 RX ORDER — DEXTROMETHORPHAN HYDROBROMIDE AND PROMETHAZINE HYDROCHLORIDE 15; 6.25 MG/5ML; MG/5ML
5 SYRUP ORAL 4 TIMES DAILY PRN
Qty: 118 ML | Refills: 1 | Status: SHIPPED | OUTPATIENT
Start: 2019-05-13 | End: 2019-10-04

## 2019-05-13 RX ORDER — SENNOSIDES 8.6 MG
650 CAPSULE ORAL EVERY 8 HOURS PRN
Qty: 30 TABLET | Refills: 1 | Status: SHIPPED | OUTPATIENT
Start: 2019-05-13

## 2019-05-13 RX ORDER — METHOCARBAMOL 500 MG/1
500 TABLET, FILM COATED ORAL 3 TIMES DAILY
Qty: 30 TABLET | Refills: 1 | Status: SHIPPED | OUTPATIENT
Start: 2019-05-13 | End: 2020-04-02

## 2019-05-15 ENCOUNTER — TELEPHONE (OUTPATIENT)
Dept: FAMILY MEDICINE CLINIC | Facility: CLINIC | Age: 54
End: 2019-05-15

## 2019-05-15 DIAGNOSIS — J02.9 ACUTE PHARYNGITIS, UNSPECIFIED ETIOLOGY: Primary | ICD-10-CM

## 2019-05-15 RX ORDER — AZITHROMYCIN 250 MG/1
250 TABLET, FILM COATED ORAL EVERY 24 HOURS
Qty: 5 TABLET | Refills: 0 | Status: SHIPPED | OUTPATIENT
Start: 2019-05-15 | End: 2019-05-20

## 2019-10-04 ENCOUNTER — TELEPHONE (OUTPATIENT)
Dept: PHYSICAL THERAPY | Facility: OTHER | Age: 54
End: 2019-10-04

## 2019-10-04 ENCOUNTER — OFFICE VISIT (OUTPATIENT)
Dept: FAMILY MEDICINE CLINIC | Facility: CLINIC | Age: 54
End: 2019-10-04
Payer: COMMERCIAL

## 2019-10-04 VITALS
DIASTOLIC BLOOD PRESSURE: 82 MMHG | HEIGHT: 71 IN | BODY MASS INDEX: 39.76 KG/M2 | HEART RATE: 84 BPM | WEIGHT: 284 LBS | TEMPERATURE: 97.7 F | OXYGEN SATURATION: 98 % | SYSTOLIC BLOOD PRESSURE: 116 MMHG

## 2019-10-04 DIAGNOSIS — E66.9 OBESITY (BMI 30-39.9): ICD-10-CM

## 2019-10-04 DIAGNOSIS — K92.1 BLOOD IN STOOL: ICD-10-CM
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
## 2019-10-04 DIAGNOSIS — M54.16 LUMBAR RADICULOPATHY: Primary | ICD-10-CM

## 2019-10-04 DIAGNOSIS — K22.719 BARRETT'S ESOPHAGUS WITH DYSPLASIA: ICD-10-CM

## 2019-10-04 PROBLEM — J02.9 ACUTE PHARYNGITIS: Status: RESOLVED | Noted: 2019-05-13 | Resolved: 2019-10-04

## 2019-10-04 PROCEDURE — 99214 OFFICE O/P EST MOD 30 MIN: CPT | Performed by: NURSE PRACTITIONER

## 2019-10-04 PROCEDURE — 3008F BODY MASS INDEX DOCD: CPT | Performed by: NURSE PRACTITIONER

## 2019-10-04 RX ORDER — PANTOPRAZOLE SODIUM 40 MG/1
40 TABLET, DELAYED RELEASE ORAL
Qty: 30 TABLET | Refills: 5 | Status: SHIPPED | OUTPATIENT
Start: 2019-10-04 | End: 2021-01-29 | Stop reason: ALTCHOICE

## 2019-10-04 RX ORDER — GABAPENTIN 100 MG/1
100 CAPSULE ORAL 3 TIMES DAILY
Qty: 90 CAPSULE | Refills: 2 | Status: SHIPPED | OUTPATIENT
Start: 2019-10-04 | End: 2021-02-17
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
## 2019-10-10 ENCOUNTER — TELEPHONE (OUTPATIENT)
Dept: PHYSICAL THERAPY | Facility: OTHER | Age: 54
End: 2019-10-10

## 2019-10-10 NOTE — TELEPHONE ENCOUNTER
RN informed patient that their auto insurance does not allow you to participate in this program and the patient must visit your PCP for a referral    Pt was grateful for the return phone call and will be following up with his auto insurance and PCP

## 2019-10-21 ENCOUNTER — TELEPHONE (OUTPATIENT)
Dept: PHYSICAL THERAPY | Facility: OTHER | Age: 54
End: 2019-10-21

## 2019-10-21 NOTE — TELEPHONE ENCOUNTER
This RN returned the patients voice message  RN informed the patient that their auto insurance does not allow them to participate in this program and they would need to obtain a referral from their PCP  Informed the patient that the referral is already placed and that he just needs to call the PT facility and schedule an evaluation  Patient give phone number for PT facility in [place]  Patient appreciative for call and information  No further questions and/or concerns were voiced by the patient at this time

## 2019-10-28 ENCOUNTER — EVALUATION (OUTPATIENT)
Dept: PHYSICAL THERAPY | Facility: CLINIC | Age: 54
End: 2019-10-28
Payer: COMMERCIAL

## 2019-10-28 DIAGNOSIS — M54.16 LUMBAR RADICULOPATHY: Primary | ICD-10-CM

## 2019-10-28 PROCEDURE — 97162 PT EVAL MOD COMPLEX 30 MIN: CPT | Performed by: PHYSICAL THERAPIST

## 2019-10-28 PROCEDURE — 97014 ELECTRIC STIMULATION THERAPY: CPT | Performed by: PHYSICAL THERAPIST

## 2019-10-28 PROCEDURE — 97112 NEUROMUSCULAR REEDUCATION: CPT | Performed by: PHYSICAL THERAPIST
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
## 2019-10-30 ENCOUNTER — OFFICE VISIT (OUTPATIENT)
Dept: PHYSICAL THERAPY | Facility: CLINIC | Age: 54
End: 2019-10-30
Payer: COMMERCIAL

## 2019-10-30 DIAGNOSIS — M54.16 LUMBAR RADICULOPATHY: Primary | ICD-10-CM

## 2019-10-30 PROCEDURE — 97110 THERAPEUTIC EXERCISES: CPT

## 2019-10-30 PROCEDURE — 97112 NEUROMUSCULAR REEDUCATION: CPT

## 2019-10-30 NOTE — PROGRESS NOTES
Daily Note     Today's date: 10/30/2019  Patient name: Azar Stanford  : 1965  MRN: [id number]  Referring provider: OG Sandhu  Dx:   Encounter Diagnosis     ICD-10-CM    1  Lumbar radiculopathy M54 16        Start Time:   Stop Time: 430  Total time in clinic (min): 43 minutes      1 on 1 8:10am to 8:48am , 8:48am to 8:53am independent exercise  Subjective: Pt reported some pain in his lower back today currently prior to treatment session no radicular symptoms present  Pt reported he had some radicular symptoms early this morning prior to therapy that went away  Objective: See treatment diary below      Assessment:  Initiated treatment session,  Performed all exercises with verbal cues for proper form and technique to engage core to stabilize and decrease pain on his back  Pt educated he may have increased muscle sorenss post treatment session is normal due to the progressions of exercises  Pt reported pain with NIKKI, added a pillow under pelvis to neutralize spine, patient reported minimal to no difference  with the pillow  Tolerated treatment fair  Patient demonstrated fatigue post treatment, exhibited good technique with therapeutic exercises and would benefit from continued PT      Plan: Continue per plan of care  , Give patient an updated HEP along with a TB for posture and core strengthening  Precautions: Psychosis, Mood Disorder, Mild cognitive impairments, major depression, HTN, Anxiety, Alcohol Abuse    Manual   10/30           PA mobs L3-5 gentle  np                                                                    Exercise Diary  10/28 10/30           Pt education L-spine anatomy  Centralization  DOMS            Nustep with UE  L5 10 min with UE            Webslide rows H,M,L  GTB 2x 10            Standing HS stretch maday  30x 4            Supine bridges  10x with TA            TA   10x 10"            Supine PPT  10" x 10  VC           PPT with march  2x 10 ea  t-band hip abd with ab bracing  NV           Prone on elbows x5' x5' with pillow under stomach  Prone quad stretch with strap maday    NV                                                                                                                                                 Modalities  10/28            Premod e-stim right and left lower back seated with MHP x10'

## 2019-10-31 ENCOUNTER — APPOINTMENT (OUTPATIENT)
Dept: PHYSICAL THERAPY | Facility: CLINIC | Age: 54
End: 2019-10-31
Payer: COMMERCIAL

## 2019-11-04 ENCOUNTER — OFFICE VISIT (OUTPATIENT)
Dept: PHYSICAL THERAPY | Facility: CLINIC | Age: 54
End: 2019-11-04
Payer: COMMERCIAL

## 2019-11-04 DIAGNOSIS — M54.16 LUMBAR RADICULOPATHY: Primary | ICD-10-CM

## 2019-11-04 PROCEDURE — 97112 NEUROMUSCULAR REEDUCATION: CPT

## 2019-11-04 PROCEDURE — 97110 THERAPEUTIC EXERCISES: CPT

## 2019-11-04 NOTE — PROGRESS NOTES
Daily Note     Today's date: 2019  Patient name: Peewee Dueñas  : 1965  MRN: [id number]  Referring provider: OG Kevin  Dx:   Encounter Diagnosis     ICD-10-CM    1  Lumbar radiculopathy M54 16        Start Time: 845  Stop Time: 930  Total time in clinic (min): 45 minutes    Subjective: Pt reported L sided radicular symptoms down to his L knee  Pain level 5/10 this morning  Pt reported no change after first treatment session, no soreness  Objective: See treatment diary below      Assessment: Continued with treatment session activating core stabilization and strength to decrease pressure on lower back, Pt reported some discomfort in his lower back while performing PPT and march, patient educated to complete the core stabilization exercises within his pain free/ pain tolerated ranges of motion to not force the motion into pain  Pt highly encouraged to continue to perform the exercises on a daily basis at home as well as in therapy  Tolerated treatment fair  Patient demonstrated fatigue post treatment, exhibited good technique with therapeutic exercises and would benefit from continued PT  Post treatment session patient reported no pain in his LLE and only in his back  Plan: Continue per plan of care  Precautions: Psychosis, Mood Disorder, Mild cognitive impairments, major depression, HTN, Anxiety, Alcohol Abuse    Manual   10/30 11/4          PA mobs L3-5 gentle  np                                                                    Exercise Diary  10/28 10/30 11/4          Pt education L-spine anatomy  Centralization  DOMS  DOMS and soreness          Nustep with UE  L5 10 min with UE  L5 10 min with UE           TB anti- rotation     10" x 10 GTB           Webslide rows H,M,L  GTB 2x 10  BTB 2x 10           Standing HS stretch maday    30"x 4  30" x 4           Supine bridges  10x with TA  10x TA          TA   10x 10"  10" x 10           Supine PPT  10" x 10  VC 10" x 10 T ball press down crunch in supine    NV Trial today ? PPT with march  2x 10 ea  2x 10 ea            t-band hip abd with ab bracing  NV hooklying 2x 10 GTB           Prone on elbows x5' x5' with pillow under stomach  resume NV           Prone quad stretch with strap maday    NV Trial NV                                                                                                                                                 Modalities  10/28            Premod e-stim right and left lower back seated with MHP x10'

## 2019-11-07 ENCOUNTER — OFFICE VISIT (OUTPATIENT)
Dept: PHYSICAL THERAPY | Facility: CLINIC | Age: 54
End: 2019-11-07
Payer: COMMERCIAL

## 2019-11-07 DIAGNOSIS — M54.16 LUMBAR RADICULOPATHY: Primary | ICD-10-CM

## 2019-11-07 PROCEDURE — 97110 THERAPEUTIC EXERCISES: CPT

## 2019-11-07 PROCEDURE — 97112 NEUROMUSCULAR REEDUCATION: CPT

## 2019-11-07 NOTE — PROGRESS NOTES
Daily Note     Today's date: 2019  Patient name: Ally Chaudhry  : 1965  MRN: [id number]  Referring provider: OG Ramírez  Dx:   Encounter Diagnosis     ICD-10-CM    1  Lumbar radiculopathy M54 16                   Subjective: Patient states he is feeling moderate 7/10 pain arriving to therapy, which he states is the norm  He also reports soreness from last visit  Objective: See treatment diary below      Assessment: Tolerated treatment well  Continued with current POC due to progressions made last visit and soreness/pain today  Provided postural cues for correct technique with theraband  Complained of pain into bridge and PPT, but was able to complete  Resume with prone on elbows next visit  Patient demonstrated fatigue post treatment, exhibited good technique with therapeutic exercises and would benefit from continued PT      Plan: Continue per plan of care  Progress treatment as tolerated  Precautions: Psychosis, Mood Disorder, Mild cognitive impairments, major depression, HTN, Anxiety, Alcohol Abuse    Manual   10/30 11/4 11/7         PA mobs L3-5 gentle  np                                                                    Exercise Diary  10/28 10/30 11/4 11/7         Pt education L-spine anatomy  Centralization  DOMS  DOMS and soreness          Nustep with UE  L5 10 min with UE  L5 10 min with UE  L5 10 min  With UE         TB anti- rotation     10" x 10 GTB  10x10"  GTB         Webslide rows H,M,L  GTB 2x 10  BTB 2x 10  BTB 2x10         Standing HS stretch maday  30"x 4  30" x 4  30"x4  ea         Supine bridges  10x with TA  10x TA 10x         TA   10x 10"  10" x 10  10x10"         Supine PPT  10" x 10  VC 10" x 10  10"x10         T ball press down crunch in supine    NV          PPT with march  2x 10 ea  2x 10 ea  2x10  ea         t-band hip abd with ab bracing  NV hooklying 2x 10 GTB  Hook  2x10  gtb         Prone on elbows x5' x5' with pillow under stomach   resume NV NV         Prone quad stretch with strap maday    NV Trial NV  NV                                                                                                                                               Modalities  10/28            Premod e-stim right and left lower back seated with MHP x10'

## 2019-11-11 ENCOUNTER — OFFICE VISIT (OUTPATIENT)
Dept: PHYSICAL THERAPY | Facility: CLINIC | Age: 54
End: 2019-11-11
Payer: COMMERCIAL

## 2019-11-11 DIAGNOSIS — M54.16 LUMBAR RADICULOPATHY: Primary | ICD-10-CM

## 2019-11-11 PROCEDURE — 97110 THERAPEUTIC EXERCISES: CPT | Performed by: PHYSICAL THERAPIST

## 2019-11-11 PROCEDURE — 97112 NEUROMUSCULAR REEDUCATION: CPT | Performed by: PHYSICAL THERAPIST

## 2019-11-11 NOTE — PROGRESS NOTES
Daily Note     Today's date: 2019  Patient name: Issa Harper  : 1965  MRN: [id number]  Referring provider: OG Pimentel  Dx:   Encounter Diagnosis     ICD-10-CM    1  Lumbar radiculopathy M54 16                   Subjective: Patient reports no increase in pain or adverse reactions following previous session  Pt states his radicular symptoms are coming on slightly less frequently since initiating PT; however, he does not notice a large improvement since starting PT  Pt reports compliance with HEP  Objective: See treatment diary below      Assessment: Progressed exercises this session as charted  Pt was able to tolerate exercise progressions with no reports of increase in pain/discomfort  Tolerated treatment well  Patient demonstrated fatigue post treatment, exhibited good technique with therapeutic exercises and would benefit from continued PT      Plan: Progress treatment as tolerated  Precautions: Psychosis, Mood Disorder, Mild cognitive impairments, major depression, HTN, Anxiety, Alcohol Abuse    Manual   10/30 11/4 11/7 11-11        PA mobs L3-5 gentle  np    JG Grade II                                                                Exercise Diary  10/28 10/30 11/4 11/7 11-11        Pt education L-spine anatomy  Centralization  DOMS  DOMS and soreness          Nustep with UE  L5 10 min with UE  L5 10 min with UE  L5 10 min  With UE L5 10 min  With UE        TB anti- rotation     10" x 10 GTB  10x10"  GTB 10x10"  GTB        Webslide rows H,M,L  GTB 2x 10  BTB 2x 10  BTB 2x10 BTB 3 x10        Standing HS stretch maday  30"x 4  30" x 4  30"x4  ea 30" x 4 ea        Supine bridges  10x with TA  10x TA 10x         TA   10x 10"  10" x 10  10x10"         Supine PPT  10" x 10  VC 10" x 10  10"x10         T ball press down crunch in supine    NV  10 x 5"        PPT with march  2x 10 ea  2x 10 ea    2x10  ea         t-band hip abd with ab bracing  NV hooklying 2x 10 GTB Hook  2x10  gtb         Prone on elbows x5' x5' with pillow under stomach  resume NV  NV         Prone quad stretch with strap maday    NV Trial NV  NV 2 x 30" ea                                                                                                                                              Modalities  10/28            Premod e-stim right and left lower back seated with MHP x10'

## 2019-11-14 ENCOUNTER — APPOINTMENT (OUTPATIENT)
Dept: PHYSICAL THERAPY | Facility: CLINIC | Age: 54
End: 2019-11-14
Payer: COMMERCIAL

## 2019-11-18 ENCOUNTER — OFFICE VISIT (OUTPATIENT)
Dept: PHYSICAL THERAPY | Facility: CLINIC | Age: 54
End: 2019-11-18
Payer: COMMERCIAL

## 2019-11-18 DIAGNOSIS — M54.16 LUMBAR RADICULOPATHY: Primary | ICD-10-CM

## 2019-11-18 PROCEDURE — 97140 MANUAL THERAPY 1/> REGIONS: CPT | Performed by: PHYSICAL THERAPIST

## 2019-11-18 PROCEDURE — 97112 NEUROMUSCULAR REEDUCATION: CPT | Performed by: PHYSICAL THERAPIST

## 2019-11-18 PROCEDURE — 97110 THERAPEUTIC EXERCISES: CPT | Performed by: PHYSICAL THERAPIST

## 2019-11-18 NOTE — PROGRESS NOTES
Daily Note     Today's date: 2019  Patient name: Doris Logan  : 1965  MRN: [id number]  Referring provider: OG Emreson  Dx:   Encounter Diagnosis     ICD-10-CM    1  Lumbar radiculopathy M54 16                   Subjective: Patient reports increased muscle soreness for a few days after last session as well as increased pain in his lower back  Objective: See treatment diary below      Assessment: Held on joint mobilization techniques this date as they may have been the source of the increase in pain following previous session  Initiated STM this date due to increased tension noted to palpation  Pt reported decreased pain following STM  Pt required minimal verbal cues with TB exercises this date avoid lumbar extension and maintain neutral spine posture  Tolerated treatment well  Patient would benefit from continued PT      Plan: Progress treatment as tolerated  Precautions: Psychosis, Mood Disorder, Mild cognitive impairments, major depression, HTN, Anxiety, Alcohol Abuse    Manual   10/30 11/4 11/7 11-11 11-18       PA mobs L3-5 gentle  np    JG Grade II        STM To B lumbar PVMs      JG                                                  Exercise Diary  10/28 10/30 11/4 11/7 11-11 11-18       Pt education L-spine anatomy  Centralization  DOMS  DOMS and soreness          Nustep with UE  L5 10 min with UE  L5 10 min with UE  L5 10 min  With UE L5 10 min  With UE L5 10 min  With UE       TB anti- rotation     10" x 10 GTB  10x10"  GTB 10x10"  GTB 10x10" ea  GTB       Webslide rows H,M,L  GTB 2x 10  BTB 2x 10  BTB 2x10 BTB 3 x10 BTB 3 x10       Standing HS stretch maday  30"x 4  30" x 4  30"x4  ea 30" x 4 ea 30" x 4 ea       Supine bridges  10x with TA  10x TA 10x         TA   10x 10"  10" x 10  10x10"         Supine PPT  10" x 10  VC 10" x 10  10"x10         T ball press down crunch in supine    NV  10 x 5" 10 x 10"       PPT with march  2x 10 ea  2x 10 ea    2x10  ea t-band hip abd with ab bracing  NV hooklying 2x 10 GTB  Hook  2x10  gtb         Prone on elbows x5' x5' with pillow under stomach  resume NV  NV         Prone quad stretch with strap maday    NV Trial NV  NV 2 x 30" ea 2 x 30" ea                                                                                                                                             Modalities  10/28            Premod e-stim right and left lower back seated with MHP x10'

## 2019-11-21 ENCOUNTER — OFFICE VISIT (OUTPATIENT)
Dept: PHYSICAL THERAPY | Facility: CLINIC | Age: 54
End: 2019-11-21
Payer: COMMERCIAL

## 2019-11-21 DIAGNOSIS — M54.16 LUMBAR RADICULOPATHY: Primary | ICD-10-CM

## 2019-11-21 PROCEDURE — 97112 NEUROMUSCULAR REEDUCATION: CPT

## 2019-11-21 PROCEDURE — 97110 THERAPEUTIC EXERCISES: CPT

## 2019-11-21 NOTE — PROGRESS NOTES
Daily Note     Today's date: 2019  Patient name: Sherice Hathaway  : 1965  MRN: [id number]  Referring provider: OG Mathews  Dx:   Encounter Diagnosis     ICD-10-CM    1  Lumbar radiculopathy M54 16        Start Time: 0800  Stop Time: 0845  Total time in clinic (min): 45 minutes    Subjective: Pt reported that has he only has pain in his lower back today 5-6/10 pain in his back, patient reported getting out of the car this morning he felt a lot of the pain  Pt reported radicular symptoms have been away for about 3 days  Patient reported sometimes throughout the day he extends his back and it helps release some of the pressure that he has in his back  Objective: See treatment diary below      Assessment:  Continued with treatment session, progressed TB with PTB and reported no increase in pain and some difficulty, trailed some Damon extension techniques while performing prone press ups patient reported no change in symptoms in back, but reported that he has more pain with standing extensions held after 4 reps  Tolerated treatment well  Patient demonstrated fatigue post treatment, exhibited good technique with therapeutic exercises and would benefit from continued PT  Post treatment session patient educated he may have some increase in muscle soreness  Post treatment patient reported about a 6/10 only in his lower back no radicular symptoms present  Plan: Continue per plan of care  Precautions: Psychosis, Mood Disorder, Mild cognitive impairments, major depression, HTN, Anxiety, Alcohol Abuse    Manual   10/30 11/4 11/7 11-11 11-18 11/21   PA mobs L3-5 gentle  np    JG Grade II     STM To B lumbar PVMs      JG                                      Exercise Diary  10/28 10/30 11/4 11/7 11-11 11-18 11/21   Pt education L-spine anatomy  Centralization   DOMS  DOMS and soreness       Nustep with UE  L5 10 min with UE  L5 10 min with UE  L5 10 min  With UE L5 10 min  With UE L5 10 min  With UE L2 10 min with UE's    TB anti- rotation     10" x 10 GTB  10x10"  GTB 10x10"  GTB 10x10" ea  GTB 10" 10 PTB    Webslide rows H,M,L  GTB 2x 10  BTB 2x 10  BTB 2x10 BTB 3 x10 BTB 3 x10 PTB 3x 10    Standing HS stretch maday  30"x 4  30" x 4  30"x4  ea 30" x 4 ea 30" x 4 ea 30" x 4    Supine bridges  10x with TA  10x TA 10x      TA   10x 10"  10" x 10  10x10"   10" x 10    Supine PPT  10" x 10  VC 10" x 10  10"x10      T ball press down crunch in supine    NV  10 x 5" 10 x 10" 10" x10    PPT with march  2x 10 ea  2x 10 ea  2x10  ea      t-band hip abd with ab bracing  NV hooklying 2x 10 GTB  Hook  2x10  gtb      Prone on elbows x5' x5' with pillow under stomach  resume NV  NV   np   Prone quad stretch with strap maday  NV Trial NV  NV 2 x 30" ea 2 x 30" ea 2x 30'    Prone press ups        2x 5,    Standing extension        4x increase pain held    Hip flexor stetch                               Education        education on centeralizing symptoms is good                                                  Modalities  10/28            Premod e-stim right and left lower back seated with MHP x10'

## 2019-11-25 ENCOUNTER — OFFICE VISIT (OUTPATIENT)
Dept: PHYSICAL THERAPY | Facility: CLINIC | Age: 54
End: 2019-11-25
Payer: COMMERCIAL

## 2019-11-25 DIAGNOSIS — M54.16 LUMBAR RADICULOPATHY: Primary | ICD-10-CM

## 2019-11-25 PROCEDURE — 97112 NEUROMUSCULAR REEDUCATION: CPT | Performed by: PHYSICAL THERAPIST

## 2019-11-25 PROCEDURE — 97110 THERAPEUTIC EXERCISES: CPT | Performed by: PHYSICAL THERAPIST

## 2019-11-25 NOTE — PROGRESS NOTES
Daily Note     Today's date: 2019  Patient name: Meri Smith  : 1965  MRN: [id number]  Referring provider: OG Lake  Dx:   Encounter Diagnosis     ICD-10-CM    1  Lumbar radiculopathy M54 16                   Subjective: Patient states he still has pain, but it is slightly less intense than when he first started PT  Objective: See treatment diary below      Assessment: Tolerated treatment fair  Patient would benefit from continued PT  Increased pain after session today  No significant changes in pain levels since starting PT  Plan: Continue per plan of care  Precautions: Psychosis, Mood Disorder, Mild cognitive impairments, major depression, HTN, Anxiety, Alcohol Abuse    Manual  11/25 10/30 11/4 11/7 11-11 11-18 11/21   PA mobs L3-5 gentle JF np    JG Grade II     STM To B lumbar PVMs      JG                                      Exercise Diary  11/25 10/30 11/4 11/7 11-11 11-18 11/21   Pt education L-spine anatomy  Centralization  DOMS  DOMS and soreness       Nustep with UE UBE stand L1x10' L5 10 min with UE  L5 10 min with UE  L5 10 min  With UE L5 10 min  With UE L5 10 min  With UE L2 10 min with UE's    TB anti- rotation   10" 10 PTB   10" x 10 GTB  10x10"  GTB 10x10"  GTB 10x10" ea  GTB 10" 10 PTB    Webslide rows H,M,L PTB 3x 10  GTB 2x 10  BTB 2x 10  BTB 2x10 BTB 3 x10 BTB 3 x10 PTB 3x 10    Standing HS stretch maday  4x30" 30"x 4  30" x 4  30"x4  ea 30" x 4 ea 30" x 4 ea 30" x 4    Supine bridges  10x with TA  10x TA 10x      TA  10" x 10  10x 10"  10" x 10  10x10"   10" x 10    Supine PPT  10" x 10  VC 10" x 10  10"x10      T ball press down crunch in supine  10" x10   NV  10 x 5" 10 x 10" 10" x10    PPT with march  2x 10 ea  2x 10 ea  2x10  ea      t-band hip abd with ab bracing  NV hooklying 2x 10 GTB  Hook  2x10  gtb      Prone on elbows x5' x5' with pillow under stomach  resume NV  NV   np   Prone quad stretch with strap maday   3x30" NV Trial NV  NV 2 x 30" ea 2 x 30" ea 2x 30'    Prone press ups  np      2x 5,    Standing extension        4x increase pain held    Hip flexor stetch                               Education        education on centeralizing symptoms is good      Victor Hugo Wilkins 55 feet on ball 5"x 20         Clamshells sidelying GTB not tolerated                                 Modalities  10/28            Premod e-stim right and left lower back seated with MHP x10'

## 2019-11-27 ENCOUNTER — APPOINTMENT (OUTPATIENT)
Dept: PHYSICAL THERAPY | Facility: CLINIC | Age: 54
End: 2019-11-27
Payer: COMMERCIAL

## 2019-12-02 ENCOUNTER — EVALUATION (OUTPATIENT)
Dept: PHYSICAL THERAPY | Facility: CLINIC | Age: 54
End: 2019-12-02
Payer: COMMERCIAL

## 2019-12-02 DIAGNOSIS — M54.16 LUMBAR RADICULOPATHY: Primary | ICD-10-CM

## 2019-12-02 PROCEDURE — 97112 NEUROMUSCULAR REEDUCATION: CPT | Performed by: PHYSICAL THERAPIST

## 2019-12-02 PROCEDURE — 97110 THERAPEUTIC EXERCISES: CPT | Performed by: PHYSICAL THERAPIST
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
## 2020-02-07 ENCOUNTER — OFFICE VISIT (OUTPATIENT)
Dept: URGENT CARE | Facility: CLINIC | Age: 55
End: 2020-02-07
Payer: COMMERCIAL

## 2020-02-07 VITALS
BODY MASS INDEX: 39.2 KG/M2 | HEART RATE: 95 BPM | HEIGHT: 71 IN | RESPIRATION RATE: 18 BRPM | TEMPERATURE: 97.6 F | SYSTOLIC BLOOD PRESSURE: 128 MMHG | OXYGEN SATURATION: 97 % | WEIGHT: 280 LBS | DIASTOLIC BLOOD PRESSURE: 78 MMHG

## 2020-02-07 DIAGNOSIS — M54.40 ACUTE BILATERAL LOW BACK PAIN WITH SCIATICA, SCIATICA LATERALITY UNSPECIFIED: Primary | ICD-10-CM

## 2020-02-07 PROCEDURE — 96372 THER/PROPH/DIAG INJ SC/IM: CPT | Performed by: PHYSICIAN ASSISTANT

## 2020-02-07 PROCEDURE — G0382 LEV 3 HOSP TYPE B ED VISIT: HCPCS | Performed by: PHYSICIAN ASSISTANT

## 2020-02-07 RX ORDER — KETOROLAC TROMETHAMINE 30 MG/ML
30 INJECTION, SOLUTION INTRAMUSCULAR; INTRAVENOUS ONCE
Status: COMPLETED | OUTPATIENT
Start: 2020-02-07 | End: 2020-02-07

## 2020-02-07 RX ORDER — IBUPROFEN 200 MG
TABLET ORAL EVERY 6 HOURS PRN
COMMUNITY

## 2020-02-07 RX ORDER — METHYLPREDNISOLONE 4 MG/1
TABLET ORAL
Qty: 1 EACH | Refills: 0 | Status: SHIPPED | OUTPATIENT
Start: 2020-02-07 | End: 2020-05-22 | Stop reason: ALTCHOICE

## 2020-02-07 RX ORDER — METHOCARBAMOL 500 MG/1
500 TABLET, FILM COATED ORAL 4 TIMES DAILY
Qty: 21 TABLET | Refills: 0 | Status: SHIPPED | OUTPATIENT
Start: 2020-02-07 | End: 2021-01-29 | Stop reason: ALTCHOICE

## 2020-02-07 RX ADMIN — KETOROLAC TROMETHAMINE 30 MG: 30 INJECTION, SOLUTION INTRAMUSCULAR; INTRAVENOUS at 12:16
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
## 2020-02-07 NOTE — PATIENT INSTRUCTIONS
No ibuprofen products for the next 8 hours  Follow up with comprehensive spine  Do not take muscle relaxer while driving

## 2020-02-10 ENCOUNTER — NURSE TRIAGE (OUTPATIENT)
Dept: PHYSICAL THERAPY | Facility: OTHER | Age: 55
End: 2020-02-10

## 2020-02-10 DIAGNOSIS — M54.50 ACUTE EXACERBATION OF CHRONIC LOW BACK PAIN: Primary | ICD-10-CM

## 2020-02-10 DIAGNOSIS — G89.29 ACUTE EXACERBATION OF CHRONIC LOW BACK PAIN: Primary | ICD-10-CM
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
## 2020-02-12 ENCOUNTER — OFFICE VISIT (OUTPATIENT)
Dept: PAIN MEDICINE | Facility: CLINIC | Age: 55
End: 2020-02-12
Payer: COMMERCIAL

## 2020-02-12 VITALS
WEIGHT: 303 LBS | SYSTOLIC BLOOD PRESSURE: 150 MMHG | DIASTOLIC BLOOD PRESSURE: 98 MMHG | HEIGHT: 71 IN | HEART RATE: 80 BPM | BODY MASS INDEX: 42.42 KG/M2

## 2020-02-12 DIAGNOSIS — G89.29 CHRONIC BILATERAL LOW BACK PAIN WITH BILATERAL SCIATICA: ICD-10-CM

## 2020-02-12 DIAGNOSIS — M54.16 LUMBAR RADICULOPATHY: Primary | ICD-10-CM

## 2020-02-12 DIAGNOSIS — M54.41 CHRONIC BILATERAL LOW BACK PAIN WITH BILATERAL SCIATICA: ICD-10-CM

## 2020-02-12 DIAGNOSIS — M54.42 CHRONIC BILATERAL LOW BACK PAIN WITH BILATERAL SCIATICA: ICD-10-CM

## 2020-02-12 PROCEDURE — 3080F DIAST BP >= 90 MM HG: CPT | Performed by: PHYSICIAN ASSISTANT

## 2020-02-12 PROCEDURE — 3077F SYST BP >= 140 MM HG: CPT | Performed by: PHYSICIAN ASSISTANT

## 2020-02-12 PROCEDURE — 3008F BODY MASS INDEX DOCD: CPT | Performed by: PHYSICIAN ASSISTANT

## 2020-02-12 PROCEDURE — 99204 OFFICE O/P NEW MOD 45 MIN: CPT | Performed by: PHYSICIAN ASSISTANT

## 2020-02-12 PROCEDURE — 1036F TOBACCO NON-USER: CPT | Performed by: PHYSICIAN ASSISTANT

## 2020-02-24 ENCOUNTER — HOSPITAL ENCOUNTER (OUTPATIENT)
Dept: MRI IMAGING | Facility: HOSPITAL | Age: 55
Discharge: HOME | End: 2020-02-24
Payer: COMMERCIAL

## 2020-02-24 DIAGNOSIS — M54.41 CHRONIC BILATERAL LOW BACK PAIN WITH BILATERAL SCIATICA: ICD-10-CM

## 2020-02-24 DIAGNOSIS — M54.16 LUMBAR RADICULOPATHY: ICD-10-CM

## 2020-02-24 DIAGNOSIS — G89.29 CHRONIC BILATERAL LOW BACK PAIN WITH BILATERAL SCIATICA: ICD-10-CM

## 2020-02-24 DIAGNOSIS — M54.42 CHRONIC BILATERAL LOW BACK PAIN WITH BILATERAL SCIATICA: ICD-10-CM

## 2020-02-24 PROCEDURE — 72148 MRI LUMBAR SPINE W/O DYE: CPT

## 2020-02-27 DIAGNOSIS — M54.42 CHRONIC BILATERAL LOW BACK PAIN WITH BILATERAL SCIATICA: Primary | ICD-10-CM

## 2020-02-27 DIAGNOSIS — M54.16 LUMBAR RADICULOPATHY: ICD-10-CM

## 2020-02-27 DIAGNOSIS — M54.41 CHRONIC BILATERAL LOW BACK PAIN WITH BILATERAL SCIATICA: Primary | ICD-10-CM

## 2020-02-27 DIAGNOSIS — G89.29 CHRONIC BILATERAL LOW BACK PAIN WITH BILATERAL SCIATICA: Primary | ICD-10-CM

## 2020-04-02 ENCOUNTER — CONSULT (OUTPATIENT)
Dept: PAIN MEDICINE | Facility: CLINIC | Age: 55
End: 2020-04-02
Payer: COMMERCIAL

## 2020-04-02 VITALS
BODY MASS INDEX: 42.11 KG/M2 | RESPIRATION RATE: 18 BRPM | HEART RATE: 80 BPM | HEIGHT: 71 IN | DIASTOLIC BLOOD PRESSURE: 96 MMHG | WEIGHT: 300.8 LBS | SYSTOLIC BLOOD PRESSURE: 141 MMHG

## 2020-04-02 DIAGNOSIS — M51.16 LUMBAR DISC DISEASE WITH RADICULOPATHY: ICD-10-CM

## 2020-04-02 DIAGNOSIS — M54.16 LUMBAR RADICULOPATHY: Primary | ICD-10-CM

## 2020-04-02 PROCEDURE — 99244 OFF/OP CNSLTJ NEW/EST MOD 40: CPT | Performed by: ANESTHESIOLOGY

## 2020-05-19 ENCOUNTER — APPOINTMENT (OUTPATIENT)
Dept: LAB | Facility: HOSPITAL | Age: 55
End: 2020-05-19
Payer: COMMERCIAL

## 2020-05-19 DIAGNOSIS — K22.719 BARRETT'S ESOPHAGUS WITH DYSPLASIA: ICD-10-CM

## 2020-05-19 DIAGNOSIS — K92.1 BLOOD IN STOOL: ICD-10-CM

## 2020-05-19 LAB
ALBUMIN SERPL BCP-MCNC: 3.9 G/DL (ref 3.5–5)
ALP SERPL-CCNC: 72 U/L (ref 46–116)
ALT SERPL W P-5'-P-CCNC: 39 U/L (ref 12–78)
ANION GAP SERPL CALCULATED.3IONS-SCNC: 8 MMOL/L (ref 4–13)
AST SERPL W P-5'-P-CCNC: 18 U/L (ref 5–45)
BASOPHILS # BLD AUTO: 0.03 THOUSANDS/ΜL (ref 0–0.1)
BASOPHILS NFR BLD AUTO: 1 % (ref 0–1)
BILIRUB SERPL-MCNC: 0.4 MG/DL (ref 0.2–1)
BUN SERPL-MCNC: 23 MG/DL (ref 5–25)
CALCIUM SERPL-MCNC: 8.9 MG/DL (ref 8.3–10.1)
CHLORIDE SERPL-SCNC: 106 MMOL/L (ref 100–108)
CO2 SERPL-SCNC: 26 MMOL/L (ref 21–32)
CREAT SERPL-MCNC: 0.92 MG/DL (ref 0.6–1.3)
EOSINOPHIL # BLD AUTO: 0.19 THOUSAND/ΜL (ref 0–0.61)
EOSINOPHIL NFR BLD AUTO: 4 % (ref 0–6)
ERYTHROCYTE [DISTWIDTH] IN BLOOD BY AUTOMATED COUNT: 13.3 % (ref 11.6–15.1)
GFR SERPL CREATININE-BSD FRML MDRD: 92 ML/MIN/1.73SQ M
GLUCOSE P FAST SERPL-MCNC: 85 MG/DL (ref 65–99)
HCT VFR BLD AUTO: 50.2 % (ref 36.5–49.3)
HGB BLD-MCNC: 15.8 G/DL (ref 12–17)
IMM GRANULOCYTES # BLD AUTO: 0.02 THOUSAND/UL (ref 0–0.2)
IMM GRANULOCYTES NFR BLD AUTO: 0 % (ref 0–2)
LYMPHOCYTES # BLD AUTO: 1.36 THOUSANDS/ΜL (ref 0.6–4.47)
LYMPHOCYTES NFR BLD AUTO: 26 % (ref 14–44)
MCH RBC QN AUTO: 28.3 PG (ref 26.8–34.3)
MCHC RBC AUTO-ENTMCNC: 31.5 G/DL (ref 31.4–37.4)
MCV RBC AUTO: 90 FL (ref 82–98)
MONOCYTES # BLD AUTO: 0.52 THOUSAND/ΜL (ref 0.17–1.22)
MONOCYTES NFR BLD AUTO: 10 % (ref 4–12)
NEUTROPHILS # BLD AUTO: 3.07 THOUSANDS/ΜL (ref 1.85–7.62)
NEUTS SEG NFR BLD AUTO: 59 % (ref 43–75)
NRBC BLD AUTO-RTO: 0 /100 WBCS
PLATELET # BLD AUTO: 276 THOUSANDS/UL (ref 149–390)
PMV BLD AUTO: 9.9 FL (ref 8.9–12.7)
POTASSIUM SERPL-SCNC: 4.5 MMOL/L (ref 3.5–5.3)
PROT SERPL-MCNC: 7.6 G/DL (ref 6.4–8.2)
RBC # BLD AUTO: 5.58 MILLION/UL (ref 3.88–5.62)
SODIUM SERPL-SCNC: 140 MMOL/L (ref 136–145)
WBC # BLD AUTO: 5.19 THOUSAND/UL (ref 4.31–10.16)

## 2020-05-19 PROCEDURE — 85025 COMPLETE CBC W/AUTO DIFF WBC: CPT

## 2020-05-19 PROCEDURE — 80053 COMPREHEN METABOLIC PANEL: CPT

## 2020-05-19 PROCEDURE — 36415 COLL VENOUS BLD VENIPUNCTURE: CPT

## 2020-05-22 ENCOUNTER — OFFICE VISIT (OUTPATIENT)
Dept: FAMILY MEDICINE CLINIC | Facility: CLINIC | Age: 55
End: 2020-05-22
Payer: COMMERCIAL

## 2020-05-22 VITALS
OXYGEN SATURATION: 98 % | BODY MASS INDEX: 41.86 KG/M2 | WEIGHT: 299 LBS | TEMPERATURE: 98.2 F | DIASTOLIC BLOOD PRESSURE: 88 MMHG | SYSTOLIC BLOOD PRESSURE: 138 MMHG | HEART RATE: 88 BPM | HEIGHT: 71 IN

## 2020-05-22 DIAGNOSIS — I10 ESSENTIAL HYPERTENSION: Primary | ICD-10-CM

## 2020-05-22 DIAGNOSIS — H25.012 CORTICAL AGE-RELATED CATARACT OF LEFT EYE: ICD-10-CM

## 2020-05-22 PROBLEM — K92.1 BLOOD IN STOOL: Status: RESOLVED | Noted: 2019-10-04 | Resolved: 2020-05-22
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
## 2020-05-22 PROCEDURE — 3008F BODY MASS INDEX DOCD: CPT | Performed by: NURSE PRACTITIONER

## 2020-05-22 PROCEDURE — 1036F TOBACCO NON-USER: CPT | Performed by: NURSE PRACTITIONER

## 2020-05-22 PROCEDURE — 3075F SYST BP GE 130 - 139MM HG: CPT | Performed by: NURSE PRACTITIONER

## 2020-05-22 PROCEDURE — 99213 OFFICE O/P EST LOW 20 MIN: CPT | Performed by: NURSE PRACTITIONER

## 2020-05-22 PROCEDURE — 3079F DIAST BP 80-89 MM HG: CPT | Performed by: NURSE PRACTITIONER

## 2020-06-04 ENCOUNTER — HOSPITAL ENCOUNTER (OUTPATIENT)
Dept: RADIOLOGY | Facility: CLINIC | Age: 55
Discharge: HOME | End: 2020-06-04
Attending: ANESTHESIOLOGY | Admitting: ANESTHESIOLOGY
Payer: COMMERCIAL

## 2020-06-04 VITALS
SYSTOLIC BLOOD PRESSURE: 120 MMHG | TEMPERATURE: 97.6 F | OXYGEN SATURATION: 96 % | RESPIRATION RATE: 20 BRPM | DIASTOLIC BLOOD PRESSURE: 87 MMHG | HEART RATE: 88 BPM

## 2020-06-04 DIAGNOSIS — M51.16 LUMBAR DISC DISEASE WITH RADICULOPATHY: ICD-10-CM

## 2020-06-04 DIAGNOSIS — M54.16 LUMBAR RADICULOPATHY: ICD-10-CM

## 2020-06-04 PROCEDURE — 62323 NJX INTERLAMINAR LMBR/SAC: CPT | Performed by: ANESTHESIOLOGY

## 2020-06-04 RX ORDER — 0.9 % SODIUM CHLORIDE 0.9 %
5 VIAL (ML) INJECTION ONCE
Status: COMPLETED | OUTPATIENT
Start: 2020-06-04 | End: 2020-06-04

## 2020-06-04 RX ORDER — METHYLPREDNISOLONE ACETATE 80 MG/ML
80 INJECTION, SUSPENSION INTRA-ARTICULAR; INTRALESIONAL; INTRAMUSCULAR; PARENTERAL; SOFT TISSUE ONCE
Status: COMPLETED | OUTPATIENT
Start: 2020-06-04 | End: 2020-06-04

## 2020-06-04 RX ORDER — LIDOCAINE HYDROCHLORIDE 10 MG/ML
5 INJECTION, SOLUTION EPIDURAL; INFILTRATION; INTRACAUDAL; PERINEURAL ONCE
Status: COMPLETED | OUTPATIENT
Start: 2020-06-04 | End: 2020-06-04

## 2020-06-04 RX ADMIN — IOHEXOL 1 ML: 300 INJECTION, SOLUTION INTRAVENOUS at 09:50

## 2020-06-04 RX ADMIN — LIDOCAINE HYDROCHLORIDE 5 ML: 10 INJECTION, SOLUTION EPIDURAL; INFILTRATION; INTRACAUDAL; PERINEURAL at 09:51

## 2020-06-04 RX ADMIN — METHYLPREDNISOLONE ACETATE 80 MG: 80 INJECTION, SUSPENSION INTRA-ARTICULAR; INTRALESIONAL; INTRAMUSCULAR; PARENTERAL; SOFT TISSUE at 09:51

## 2020-06-04 RX ADMIN — Medication 5 ML: at 09:50

## 2020-06-11 ENCOUNTER — TELEPHONE (OUTPATIENT)
Dept: PAIN MEDICINE | Facility: CLINIC | Age: 55
End: 2020-06-11

## 2020-06-29 ENCOUNTER — TELEPHONE (OUTPATIENT)
Dept: FAMILY MEDICINE CLINIC | Facility: CLINIC | Age: 55
End: 2020-06-29

## 2020-06-30 ENCOUNTER — OFFICE VISIT (OUTPATIENT)
Dept: FAMILY MEDICINE CLINIC | Facility: CLINIC | Age: 55
End: 2020-06-30
Payer: COMMERCIAL

## 2020-06-30 VITALS
BODY MASS INDEX: 41.83 KG/M2 | SYSTOLIC BLOOD PRESSURE: 124 MMHG | TEMPERATURE: 97.1 F | DIASTOLIC BLOOD PRESSURE: 82 MMHG | HEIGHT: 71 IN | WEIGHT: 298.8 LBS | HEART RATE: 86 BPM | OXYGEN SATURATION: 97 %

## 2020-06-30 DIAGNOSIS — Z01.818 PRE-OPERATIVE EXAMINATION: ICD-10-CM

## 2020-06-30 DIAGNOSIS — H25.012 CORTICAL AGE-RELATED CATARACT OF LEFT EYE: Primary | ICD-10-CM

## 2020-06-30 PROCEDURE — 99244 OFF/OP CNSLTJ NEW/EST MOD 40: CPT | Performed by: PHYSICIAN ASSISTANT

## 2020-09-24 ENCOUNTER — TELEPHONE (OUTPATIENT)
Dept: GASTROENTEROLOGY | Facility: CLINIC | Age: 55
End: 2020-09-24

## 2021-01-19 PROBLEM — Z01.818 PRE-OPERATIVE EXAMINATION: Status: RESOLVED | Noted: 2020-06-30 | Resolved: 2021-01-19

## 2021-01-19 PROBLEM — H25.012 CORTICAL AGE-RELATED CATARACT OF LEFT EYE: Status: RESOLVED | Noted: 2020-05-22 | Resolved: 2021-01-19

## 2021-01-29 ENCOUNTER — OFFICE VISIT (OUTPATIENT)
Dept: FAMILY MEDICINE CLINIC | Facility: CLINIC | Age: 56
End: 2021-01-29
Payer: COMMERCIAL

## 2021-01-29 DIAGNOSIS — B34.9 VIRAL INFECTION, UNSPECIFIED: ICD-10-CM

## 2021-01-29 DIAGNOSIS — Z03.818 ENCOUNTER FOR OBSERVATION FOR SUSPECTED EXPOSURE TO OTHER BIOLOGICAL AGENTS RULED OUT: ICD-10-CM

## 2021-01-29 DIAGNOSIS — M54.16 LUMBAR RADICULOPATHY: ICD-10-CM

## 2021-01-29 DIAGNOSIS — B34.9 VIRAL INFECTION, UNSPECIFIED: Primary | ICD-10-CM

## 2021-01-29 PROCEDURE — 99214 OFFICE O/P EST MOD 30 MIN: CPT | Performed by: NURSE PRACTITIONER

## 2021-01-29 PROCEDURE — 87635 SARS-COV-2 COVID-19 AMP PRB: CPT | Performed by: NURSE PRACTITIONER

## 2021-01-29 NOTE — PROGRESS NOTES
COVID-19 Virtual Visit     Assessment/Plan:    Problem List Items Addressed This Visit        Nervous and Auditory    Lumbar radiculopathy    Relevant Orders    Ambulatory referral to Pain Management       Other    Viral infection, unspecified - Primary    Relevant Orders    Novel Coronavirus (Covid-19),PCR [facility] - Collected at [facility] or [facility]    Encounter for observation for suspected exposure to other biological agents ruled out    Relevant Orders    Novel Coronavirus (Covid-19),PCR [facility] - Collected at [facility] or [facility]         Disposition:     I referred patient to one of our centralized sites for a COVID-19 swab  I have spent 10 minutes directly with the patient  Greater than 50% of this time was spent in counseling/coordination of care regarding: risks and benefits of treatment options, instructions for management, patient and family education, importance of treatment compliance and risk factor reductions  Encounter provider OG Khan    Provider located at [place] [address]  [address] [place] [id number]    Recent Visits  No visits were found meeting these conditions  Showing recent visits within past 7 days and meeting all other requirements     Today's Visits  Date Type Provider Dept   01/29/21 Office Visit OG Khan [facility]   Showing today's visits and meeting all other requirements     Future Appointments  No visits were found meeting these conditions  Showing future appointments within next 150 days and meeting all other requirements      This virtual check-in was done via [employer] and patient was informed that this is not a secure, HIPAA-compliant platform  He agrees to proceed  Patient agrees to participate in a virtual check in via telephone or video visit instead of presenting to the office to address urgent/immediate medical needs  Patient is aware this is a billable service      After connecting through [facility], the patient was identified by name and date of birth  Alek Mcqueen was informed that this was a telemedicine visit and that the exam was being conducted confidentially over secure lines  My office door was closed  No one else was in the room  Alek Mcqueen acknowledged consent and understanding of privacy and security of the telemedicine visit  I informed the patient that I have reviewed his record in Epic and presented the opportunity for him to ask any questions regarding the visit today  The patient agreed to participate  Subjective:   Alek Mcqueen is a 60 y o  male who is concerned about COVID-19  Patient's symptoms include fever, chills, fatigue, nasal congestion, cough, chest tightness, diarrhea and headache  Patient denies malaise, rhinorrhea, sore throat, anosmia, loss of taste, shortness of breath, abdominal pain, nausea, vomiting and myalgias       Date of symptom onset: 1/27/2021    Exposure:   Contact with a person who is under investigation (PUI) for or who is positive for COVID-19 within the last 14 days?: No    Hospitalized recently for fever and/or lower respiratory symptoms?: No      Currently a healthcare worker that is involved in direct patient care?: No      Works in a special setting where the risk of COVID-19 transmission may be high? (this may include long-term care, correctional and [facility] facilities; homeless shelters; assisted-living facilities and group homes ): No      Resident in a special setting where the risk of COVID-19 transmission may be high? (this may include long-term care, correctional and [facility] facilities; homeless shelters; assisted-living facilities and group homes ): No      Patient report that he returned from [place] 1/25/2021 and reports that his symptoms started and wife is also working at [facility]     No results found for: Seda Curry, [address] [facility], [address], [place] [address]  Past Medical History:   Diagnosis Date    Acute kidney injury (nontraumatic) (HCC)     Alcohol abuse     Anxiety     Finney esophagus     Benign essential hypertension     Diverticulitis     Diverticulitis     Erectile dysfunction     Esophageal reflux     Essential hypertension     Lumbar radiculopathy 5/13/2019    Major depression     Mild cognitive disorder 5/6/2016    Mood disorder ([address] ) 5/2/2016    Multiple nevi     Psychosis (HCC)     Suspicious nevus     Tremor of right hand     Weakness of right upper extremity      Past Surgical History:   Procedure Laterality Date    COLONOSCOPY N/A 3/9/2019    Procedure: COLONOSCOPY;  Surgeon: Breanna Dejesus MD;  [facility]; Service: Gastroenterology    LIPOMA RESECTION      back    RI ESOPHAGOGASTRODUODENOSCOPY TRANSORAL DIAGNOSTIC N/A 3/9/2019    Procedure: ESOPHAGOGASTRODUODENOSCOPY (EGD); Surgeon: Breanna Dejesus MD;  [facility]; Service: Gastroenterology    REVISION COLOSTOMY       Current Outpatient Medications   Medication Sig Dispense Refill    ibuprofen (MOTRIN) 200 mg tablet Take by mouth every 6 (six) hours as needed for mild pain      acetaminophen (TYLENOL) 650 mg CR tablet Take 1 tablet (650 mg total) by mouth every 8 (eight) hours as needed for mild pain (Patient not taking: Reported on 1/29/2021) 30 tablet 1    gabapentin (NEURONTIN) 100 mg capsule Take 1 capsule (100 mg total) by mouth 3 (three) times a day (Patient not taking: Reported on 1/29/2021) 90 capsule 2    Naproxen Sodium (ALEVE PO) Take by mouth       No current facility-administered medications for this visit  Allergies   Allergen Reactions    Amoxicillin GI Intolerance       Review of Systems   Constitutional: Positive for chills, fatigue and fever  HENT: Positive for congestion  Negative for rhinorrhea and sore throat  Respiratory: Positive for cough and chest tightness  Negative for shortness of breath  Gastrointestinal: Positive for diarrhea   Negative for abdominal pain, nausea and vomiting  Musculoskeletal: Negative for myalgias  Neurological: Positive for headaches  Objective:    Vitals:       Physical Exam  Constitutional:       Appearance: Normal appearance  HENT:      Head: Normocephalic and atraumatic  Nose: Congestion present  Mouth/Throat:      Mouth: Mucous membranes are moist    Neck:      Musculoskeletal: Normal range of motion  Pulmonary:      Effort: Pulmonary effort is normal       Breath sounds: Normal breath sounds  Abdominal:      Palpations: Abdomen is soft  Skin:     General: Skin is warm and dry  Neurological:      General: No focal deficit present  Mental Status: He is alert  Psychiatric:         Mood and Affect: Mood normal        VIRTUAL VISIT DISCLAIMER    Josselyn Santacruz acknowledges that he has consented to an online visit or consultation  He understands that the online visit is based solely on information provided by him, and that, in the absence of a face-to-face physical evaluation by the physician, the diagnosis he receives is both limited and provisional in terms of accuracy and completeness  This is not intended to replace a full medical face-to-face evaluation by the physician  Josselyn Santacruz understands and accepts these terms

## 2021-01-30 LAB — SARS-COV-2 RNA RESP QL NAA+PROBE: NEGATIVE

## 2021-02-17 ENCOUNTER — OFFICE VISIT (OUTPATIENT)
Dept: PAIN MEDICINE | Facility: CLINIC | Age: 56
End: 2021-02-17
Payer: COMMERCIAL

## 2021-02-17 VITALS
WEIGHT: 299 LBS | HEART RATE: 98 BPM | HEIGHT: 71 IN | RESPIRATION RATE: 18 BRPM | BODY MASS INDEX: 41.86 KG/M2 | DIASTOLIC BLOOD PRESSURE: 89 MMHG | SYSTOLIC BLOOD PRESSURE: 133 MMHG

## 2021-02-17 DIAGNOSIS — M51.16 LUMBAR DISC HERNIATION WITH RADICULOPATHY: ICD-10-CM

## 2021-02-17 DIAGNOSIS — G89.29 CHRONIC RIGHT-SIDED LOW BACK PAIN WITH RIGHT-SIDED SCIATICA: ICD-10-CM

## 2021-02-17 DIAGNOSIS — M54.41 CHRONIC RIGHT-SIDED LOW BACK PAIN WITH RIGHT-SIDED SCIATICA: ICD-10-CM

## 2021-02-17 DIAGNOSIS — G89.4 CHRONIC PAIN SYNDROME: Primary | ICD-10-CM

## 2021-02-17 PROCEDURE — 99214 OFFICE O/P EST MOD 30 MIN: CPT | Performed by: ANESTHESIOLOGY

## 2021-02-17 RX ORDER — GABAPENTIN 300 MG/1
300 CAPSULE ORAL 3 TIMES DAILY
Qty: 90 CAPSULE | Refills: 0 | Status: SHIPPED | OUTPATIENT
Start: 2021-02-17 | End: 2021-05-04 | Stop reason: ALTCHOICE

## 2021-02-17 NOTE — H&P (VIEW-ONLY)
Pain Medicine Follow-Up Note    Assessment:  1  Chronic pain syndrome    2  Chronic right-sided low back pain with right-sided sciatica    3  Lumbar disc herniation with radiculopathy        Plan:  Orders Placed This Encounter   Procedures    FL spine and pain procedure     4 week follow-up after injection     Standing Status:   Future     Standing Expiration Date:   2/17/2025     Order Specific Question:   Reason for Exam:     Answer:   Right L2 and L3 transforaminal epidural steroid injection ([id number])     Order Specific Question:   Anticoagulant hold needed? Answer:   unknown       New Medications Ordered This Visit   Medications    gabapentin (NEURONTIN) 300 mg capsule     Sig: Take 1 capsule (300 mg total) by mouth 3 (three) times a day     Dispense:  90 capsule     Refill:  0     My impressions and treatment recommendations were discussed in detail with the patient who verbalized understanding and had no further questions  The patient is reporting 1 week worth of relief following the L5-S1 lumbar epidural steroid injection performed on June 4, 2020 by Dr Lg Stringer  His symptoms appear to be primarily in the low back and right lower extremity in what appears to be the right L2 and L3 distribution  He does have a lumbar disc herniation at this level based on his previous MRI of the lumbar spine  At this point, I do feel it reasonable to have the patient undergo a right L2 and L3 transforaminal epidural steroid injection since this could be potentially therapeutic  The procedures, its risks, and benefits were explained in detail to the patient  Risks include but are not limited to bleeding, infection, hematoma formation, abscess formation, weakness, headache, failure the pain to improve, nerve irritation or damage, and potential worsening of the pain  The patient verbalized understanding and wished to proceed with the procedure      Follow-up is planned in   4 weeks time or sooner as warranted  Discharge instructions were provided  I personally saw and examined the patient and I agree with the above discussed plan of care  History of Present Illness:    Lashell Almazan is a 60 y o  male who presents to [facility] for interval re-evaluation of the above stated pain complaints  The patient has a past medical and chronic pain history as outlined in the assessment section  He was last seen on  June 4, 2020 at which time he underwent a L5-S1 lumbar epidural steroid injection  At today's office visit, the patient's pain score is 10/10 on the verbal numerical pain rating scale  The patient states that his pain is primarily in the low back and right lower extremity in what appears to be the right L2 and L3 distribution  He describes his pain as worse in the morning, evening, and night  His pain is intermittent in nature and reports the quality of his pain as pressure-like  He does state that his symptoms are primarily in the low back and right lower extremity  He did state that the L5-S1 lumbar epidural steroid injection on June 4, 2020 performed by Dr Vicki Gonzalez gave him about 1 weeks worth of relief  He did not return to follow-up after that injection until now due to [place]  Other than as stated above, the patient denies any interval changes in medications, medical condition, mental condition, symptoms, or allergies since the last office visit  Review of Systems:    Review of Systems   Respiratory: Negative for shortness of breath  Cardiovascular: Negative for chest pain  Gastrointestinal: Negative for constipation, diarrhea, nausea and vomiting  Musculoskeletal: Positive for gait problem  Negative for arthralgias, joint swelling and myalgias  Skin: Negative for rash  Neurological: Negative for dizziness, seizures and weakness  All other systems reviewed and are negative          Patient Active Problem List   Diagnosis    Major depression    Essential hypertension    Mood disorder (HCC)    Mild cognitive disorder    Finney's esophagus with dysplasia    Lumbar radiculopathy    Obesity (BMI 30-39  9)    Viral infection, unspecified    Encounter for observation for suspected exposure to other biological agents ruled out    Chronic pain syndrome    Chronic right-sided low back pain with right-sided sciatica       Past Medical History:   Diagnosis Date    Acute kidney injury (nontraumatic) (HCC)     Alcohol abuse     Anxiety     Finney esophagus     Benign essential hypertension     Chronic pain syndrome 2021    Diverticulitis     Diverticulitis     Erectile dysfunction     Esophageal reflux     Essential hypertension     Lumbar radiculopathy 2019    Major depression     Mild cognitive disorder 2016    Mood disorder ([facility] [address] ) 2016    Multiple nevi     Psychosis ([facility] )     Suspicious nevus     Tremor of right hand     Weakness of right upper extremity        Past Surgical History:   Procedure Laterality Date    COLONOSCOPY N/A 3/9/2019    Procedure: COLONOSCOPY;  Surgeon: Ana Montano MD;  Location: [facility]; Service: Gastroenterology    LIPOMA RESECTION      back    RI ESOPHAGOGASTRODUODENOSCOPY TRANSORAL DIAGNOSTIC N/A 3/9/2019    Procedure: ESOPHAGOGASTRODUODENOSCOPY (EGD); Surgeon: Ana Montano MD;  Location: [facility]; Service: Gastroenterology    REVISION COLOSTOMY         Family History   Problem Relation Age of Onset    Stroke Mother     Heart disease Father        Social History     Occupational History    Occupation: CONSTRUCTION   Tobacco Use    Smoking status: Former Smoker     Types: Cigarettes     Quit date: 2015     Years since quittin 8    Smokeless tobacco: Never Used   Substance and Sexual Activity    Alcohol use:  Yes     Alcohol/week: 12 0 standard drinks     Types: 12 Cans of beer per week     Frequency: 2-3 times a week     Drinks per session: 1 or 2     Comment: drinks a 6pack 2 x weekly    Drug use: No    Sexual activity: Yes     Partners: Female         Current Outpatient Medications:     acetaminophen (TYLENOL) 650 mg CR tablet, Take 1 tablet (650 mg total) by mouth every 8 (eight) hours as needed for mild pain, Disp: 30 tablet, Rfl: 1    ibuprofen (MOTRIN) 200 mg tablet, Take by mouth every 6 (six) hours as needed for mild pain, Disp: , Rfl:     Naproxen Sodium (ALEVE PO), Take by mouth, Disp: , Rfl:     gabapentin (NEURONTIN) 300 mg capsule, Take 1 capsule (300 mg total) by mouth 3 (three) times a day, Disp: 90 capsule, Rfl: 0    Allergies   Allergen Reactions    Amoxicillin GI Intolerance       Physical Exam:    /89   Pulse 98   Resp 18   Ht 5' 11" (1 803 m)   Wt 136 kg (299 lb)   BMI 41 70 kg/m²     Constitutional:obese  Eyes:anicteric  HEENT:grossly intact  Neck:supple, symmetric, trachea midline and no masses   Pulmonary:even and unlabored  Cardiovascular:No edema or pitting edema present  Skin:Normal without rashes or lesions and well hydrated  Psychiatric:Mood and affect appropriate  Neurologic:Cranial Nerves II-XII grossly intact  Musculoskeletal:antalgic     Lumbar Spine Exam    Appearance:  Normal lordosis  Palpation/Tenderness:  no tenderness or spasm  Sensory:  no sensory deficits noted  Range of Motion:  Flexion:   Moderately limited  with pain  Extension:  Moderately limited  with pain  Lateral Flexion - Left:  Moderately limited  with pain  Lateral Flexion - Right:  Moderately limited  with pain  Rotation - Left:  Moderately limited  with pain  Rotation - Right:  Moderately limited  with pain     Lumbar facet loading is negative bilaterally  Motor Strength:  Left hip flexion:  5/5  Left hip extension:  5/5  Right hip flexion:  5/5  Right hip extension:  5/5  Left knee flexion:  5/5  Left knee extension:  5/5  Right knee flexion:  5/5  Right knee extension:  5/5  Left foot dorsiflexion:  5/5  Left foot plantar flexion: 5/5  Right foot dorsiflexion:  5/5  Right foot plantar flexion:  5/5  Reflexes:  Left Patellar:  2+   Right Patellar:  2+   Left Achilles:  2+   Right Achilles:  2+   Special Tests:  Left Straight Leg Test:  negative  Right Straight Leg Test:  negative  Left Robin's Maneuver:  negative  Right Robin's Maneuver:  negative      Imaging    MRI LUMBAR SPINE WITHOUT CONTRAST     INDICATION: M54 16: Radiculopathy, lumbar region  M54 42: Lumbago with sciatica, left side  M54 41: Lumbago with sciatica, right side  G89 29: Other chronic pain      COMPARISON:  None      TECHNIQUE:  Sagittal T1, sagittal T2, sagittal inversion recovery, axial T1 and axial T2, coronal T2     IMAGE QUALITY:  Diagnostic     FINDINGS:     VERTEBRAL BODIES:  There are 5 lumbar type vertebral bodies  Normal alignment of the lumbar spine  No spondylolysis or spondylolisthesis  No scoliosis  No compression fracture  There is a small chronic Schmorl's node within the superior endplate of   L5  No associated marrow edema      SACRUM:  Normal signal within the sacrum  No evidence of insufficiency or stress fracture      DISTAL CORD AND CONUS:  Normal size and signal within the distal cord and conus      PARASPINAL SOFT TISSUES:  Paraspinal soft tissues are unremarkable      LOWER THORACIC DISC SPACES:  Normal disc height and signal   No disc herniation, canal stenosis or foraminal narrowing      LUMBAR DISC SPACES:     L1-L2:  Normal      L2-L3:  Disc desiccation and loss of disc height is mild  Mild annular bulging with a broad-based left foraminal and extraforaminal disc protrusion  No canal stenosis  Mild left foraminal narrowing      L3-L4:  Disc desiccation and loss of disc height with mild diffuse annular bulging  No canal stenosis  Mild foraminal narrowing without nerve impingement      L4-L5:  Slight disc desiccation and loss of disc height with mild annular bulging  No canal stenosis    No foraminal nerve impingement      L5-S1: Normal disc height and signal   Mild facet degenerative change    No canal stenosis or foraminal narrowing      IMPRESSION:     Mild lumbar degenerative disc disease including a broad-based left foraminal and extraforaminal disc protrusion at the L2-3 level resulting in mild foraminal narrowing      FL spine and pain procedure    (Results Pending)         Orders Placed This Encounter   Procedures    FL spine and pain procedure

## 2021-02-17 NOTE — PROGRESS NOTES
Pain Medicine Follow-Up Note    Assessment:  1  Chronic pain syndrome    2  Chronic right-sided low back pain with right-sided sciatica    3  Lumbar disc herniation with radiculopathy        Plan:  Orders Placed This Encounter   Procedures    FL spine and pain procedure     4 week follow-up after injection     Standing Status:   Future     Standing Expiration Date:   2/17/2025     Order Specific Question:   Reason for Exam:     Answer:   Right L2 and L3 transforaminal epidural steroid injection ([id number])     Order Specific Question:   Anticoagulant hold needed? Answer:   unknown       New Medications Ordered This Visit   Medications    gabapentin (NEURONTIN) 300 mg capsule     Sig: Take 1 capsule (300 mg total) by mouth 3 (three) times a day     Dispense:  90 capsule     Refill:  0     My impressions and treatment recommendations were discussed in detail with the patient who verbalized understanding and had no further questions  The patient is reporting 1 week worth of relief following the L5-S1 lumbar epidural steroid injection performed on June 4, 2020 by Dr Vita Thomas  His symptoms appear to be primarily in the low back and right lower extremity in what appears to be the right L2 and L3 distribution  He does have a lumbar disc herniation at this level based on his previous MRI of the lumbar spine  At this point, I do feel it reasonable to have the patient undergo a right L2 and L3 transforaminal epidural steroid injection since this could be potentially therapeutic  The procedures, its risks, and benefits were explained in detail to the patient  Risks include but are not limited to bleeding, infection, hematoma formation, abscess formation, weakness, headache, failure the pain to improve, nerve irritation or damage, and potential worsening of the pain  The patient verbalized understanding and wished to proceed with the procedure      Follow-up is planned in   4 weeks time or sooner as warranted  Discharge instructions were provided  I personally saw and examined the patient and I agree with the above discussed plan of care  History of Present Illness:    Ally Chaudhry is a 60 y o  male who presents to [facility] for interval re-evaluation of the above stated pain complaints  The patient has a past medical and chronic pain history as outlined in the assessment section  He was last seen on  June 4, 2020 at which time he underwent a L5-S1 lumbar epidural steroid injection  At today's office visit, the patient's pain score is 10/10 on the verbal numerical pain rating scale  The patient states that his pain is primarily in the low back and right lower extremity in what appears to be the right L2 and L3 distribution  He describes his pain as worse in the morning, evening, and night  His pain is intermittent in nature and reports the quality of his pain as pressure-like  He does state that his symptoms are primarily in the low back and right lower extremity  He did state that the L5-S1 lumbar epidural steroid injection on June 4, 2020 performed by Dr Bob Liu gave him about 1 weeks worth of relief  He did not return to follow-up after that injection until now due to [place]  Other than as stated above, the patient denies any interval changes in medications, medical condition, mental condition, symptoms, or allergies since the last office visit  Review of Systems:    Review of Systems   Respiratory: Negative for shortness of breath  Cardiovascular: Negative for chest pain  Gastrointestinal: Negative for constipation, diarrhea, nausea and vomiting  Musculoskeletal: Positive for gait problem  Negative for arthralgias, joint swelling and myalgias  Skin: Negative for rash  Neurological: Negative for dizziness, seizures and weakness  All other systems reviewed and are negative          Patient Active Problem List   Diagnosis    Major depression    Essential hypertension    Mood disorder (HCC)    Mild cognitive disorder    Finney's esophagus with dysplasia    Lumbar radiculopathy    Obesity (BMI 30-39  9)    Viral infection, unspecified    Encounter for observation for suspected exposure to other biological agents ruled out    Chronic pain syndrome    Chronic right-sided low back pain with right-sided sciatica       Past Medical History:   Diagnosis Date    Acute kidney injury (nontraumatic) (HCC)     Alcohol abuse     Anxiety     Finney esophagus     Benign essential hypertension     Chronic pain syndrome 2021    Diverticulitis     Diverticulitis     Erectile dysfunction     Esophageal reflux     Essential hypertension     Lumbar radiculopathy 2019    Major depression     Mild cognitive disorder 2016    Mood disorder ([facility] [address] ) 2016    Multiple nevi     [facility]     Suspicious nevus     Tremor of right hand     Weakness of right upper extremity        Past Surgical History:   Procedure Laterality Date    COLONOSCOPY N/A 3/9/2019    Procedure: COLONOSCOPY;  Surgeon: Eliceo Denny MD;  Location: [facility]; Service: Gastroenterology    LIPOMA RESECTION      back    FL ESOPHAGOGASTRODUODENOSCOPY TRANSORAL DIAGNOSTIC N/A 3/9/2019    Procedure: ESOPHAGOGASTRODUODENOSCOPY (EGD); Surgeon: Eliceo Denny MD;  Location: [facility]; Service: Gastroenterology    REVISION COLOSTOMY         Family History   Problem Relation Age of Onset    Stroke Mother     Heart disease Father        Social History     Occupational History    Occupation: CONSTRUCTION   Tobacco Use    Smoking status: Former Smoker     Types: Cigarettes     Quit date: 2015     Years since quittin 8    Smokeless tobacco: Never Used   Substance and Sexual Activity    Alcohol use:  Yes     Alcohol/week: 12 0 standard drinks     Types: 12 Cans of beer per week     Frequency: 2-3 times a week     Drinks per session: 1 or 2     Comment: drinks a 6pack 2 x weekly    Drug use: No    Sexual activity: Yes     Partners: Female         Current Outpatient Medications:     acetaminophen (TYLENOL) 650 mg CR tablet, Take 1 tablet (650 mg total) by mouth every 8 (eight) hours as needed for mild pain, Disp: 30 tablet, Rfl: 1    ibuprofen (MOTRIN) 200 mg tablet, Take by mouth every 6 (six) hours as needed for mild pain, Disp: , Rfl:     Naproxen Sodium (ALEVE PO), Take by mouth, Disp: , Rfl:     gabapentin (NEURONTIN) 300 mg capsule, Take 1 capsule (300 mg total) by mouth 3 (three) times a day, Disp: 90 capsule, Rfl: 0    Allergies   Allergen Reactions    Amoxicillin GI Intolerance       Physical Exam:    /89   Pulse 98   Resp 18   Ht 5' 11" (1 803 m)   Wt 136 kg (299 lb)   BMI 41 70 kg/m²     Constitutional:obese  Eyes:anicteric  HEENT:grossly intact  Neck:supple, symmetric, trachea midline and no masses   Pulmonary:even and unlabored  Cardiovascular:No edema or pitting edema present  Skin:Normal without rashes or lesions and well hydrated  Psychiatric:Mood and affect appropriate  Neurologic:Cranial Nerves II-XII grossly intact  Musculoskeletal:antalgic     Lumbar Spine Exam    Appearance:  Normal lordosis  Palpation/Tenderness:  no tenderness or spasm  Sensory:  no sensory deficits noted  Range of Motion:  Flexion:   Moderately limited  with pain  Extension:  Moderately limited  with pain  Lateral Flexion - Left:  Moderately limited  with pain  Lateral Flexion - Right:  Moderately limited  with pain  Rotation - Left:  Moderately limited  with pain  Rotation - Right:  Moderately limited  with pain     Lumbar facet loading is negative bilaterally  Motor Strength:  Left hip flexion:  5/5  Left hip extension:  5/5  Right hip flexion:  5/5  Right hip extension:  5/5  Left knee flexion:  5/5  Left knee extension:  5/5  Right knee flexion:  5/5  Right knee extension:  5/5  Left foot dorsiflexion:  5/5  Left foot plantar flexion: 5/5  Right foot dorsiflexion:  5/5  Right foot plantar flexion:  5/5  Reflexes:  Left Patellar:  2+   Right Patellar:  2+   Left Achilles:  2+   Right Achilles:  2+   Special Tests:  Left Straight Leg Test:  negative  Right Straight Leg Test:  negative  Left Robin's Maneuver:  negative  Right Robin's Maneuver:  negative      Imaging    MRI LUMBAR SPINE WITHOUT CONTRAST     INDICATION: M54 16: Radiculopathy, lumbar region  M54 42: Lumbago with sciatica, left side  M54 41: Lumbago with sciatica, right side  G89 29: Other chronic pain      COMPARISON:  None      TECHNIQUE:  Sagittal T1, sagittal T2, sagittal inversion recovery, axial T1 and axial T2, coronal T2     IMAGE QUALITY:  Diagnostic     FINDINGS:     VERTEBRAL BODIES:  There are 5 lumbar type vertebral bodies  Normal alignment of the lumbar spine  No spondylolysis or spondylolisthesis  No scoliosis  No compression fracture  There is a small chronic Schmorl's node within the superior endplate of   L5  No associated marrow edema      SACRUM:  Normal signal within the sacrum  No evidence of insufficiency or stress fracture      DISTAL CORD AND CONUS:  Normal size and signal within the distal cord and conus      PARASPINAL SOFT TISSUES:  Paraspinal soft tissues are unremarkable      LOWER THORACIC DISC SPACES:  Normal disc height and signal   No disc herniation, canal stenosis or foraminal narrowing      LUMBAR DISC SPACES:     L1-L2:  Normal      L2-L3:  Disc desiccation and loss of disc height is mild  Mild annular bulging with a broad-based left foraminal and extraforaminal disc protrusion  No canal stenosis  Mild left foraminal narrowing      L3-L4:  Disc desiccation and loss of disc height with mild diffuse annular bulging  No canal stenosis  Mild foraminal narrowing without nerve impingement      L4-L5:  Slight disc desiccation and loss of disc height with mild annular bulging  No canal stenosis    No foraminal nerve impingement      L5-S1: Normal disc height and signal   Mild facet degenerative change    No canal stenosis or foraminal narrowing      IMPRESSION:     Mild lumbar degenerative disc disease including a broad-based left foraminal and extraforaminal disc protrusion at the L2-3 level resulting in mild foraminal narrowing      FL spine and pain procedure    (Results Pending)         Orders Placed This Encounter   Procedures    FL spine and pain procedure

## 2021-03-03 ENCOUNTER — HOSPITAL ENCOUNTER (OUTPATIENT)
Dept: RADIOLOGY | Facility: CLINIC | Age: 56
Discharge: HOME | End: 2021-03-03
Attending: ANESTHESIOLOGY
Payer: COMMERCIAL

## 2021-03-03 VITALS
OXYGEN SATURATION: 97 % | TEMPERATURE: 96.7 F | RESPIRATION RATE: 20 BRPM | DIASTOLIC BLOOD PRESSURE: 91 MMHG | HEART RATE: 81 BPM | SYSTOLIC BLOOD PRESSURE: 141 MMHG

## 2021-03-03 DIAGNOSIS — G89.4 CHRONIC PAIN SYNDROME: ICD-10-CM

## 2021-03-03 DIAGNOSIS — M54.41 CHRONIC RIGHT-SIDED LOW BACK PAIN WITH RIGHT-SIDED SCIATICA: ICD-10-CM

## 2021-03-03 DIAGNOSIS — M51.16 LUMBAR DISC HERNIATION WITH RADICULOPATHY: ICD-10-CM

## 2021-03-03 DIAGNOSIS — G89.29 CHRONIC RIGHT-SIDED LOW BACK PAIN WITH RIGHT-SIDED SCIATICA: ICD-10-CM

## 2021-03-03 PROCEDURE — 64484 NJX AA&/STRD TFRM EPI L/S EA: CPT | Performed by: ANESTHESIOLOGY

## 2021-03-03 PROCEDURE — 64483 NJX AA&/STRD TFRM EPI L/S 1: CPT | Performed by: ANESTHESIOLOGY

## 2021-03-03 RX ORDER — METHYLPREDNISOLONE ACETATE 80 MG/ML
80 INJECTION, SUSPENSION INTRA-ARTICULAR; INTRALESIONAL; INTRAMUSCULAR; PARENTERAL; SOFT TISSUE ONCE
Status: COMPLETED | OUTPATIENT
Start: 2021-03-03 | End: 2021-03-03

## 2021-03-03 RX ORDER — BUPIVACAINE HCL/PF 2.5 MG/ML
5 VIAL (ML) INJECTION ONCE
Status: COMPLETED | OUTPATIENT
Start: 2021-03-03 | End: 2021-03-03

## 2021-03-03 RX ORDER — LIDOCAINE HYDROCHLORIDE 10 MG/ML
5 INJECTION, SOLUTION EPIDURAL; INFILTRATION; INTRACAUDAL; PERINEURAL ONCE
Status: COMPLETED | OUTPATIENT
Start: 2021-03-03 | End: 2021-03-03

## 2021-03-03 RX ADMIN — METHYLPREDNISOLONE ACETATE 80 MG: 80 INJECTION, SUSPENSION INTRA-ARTICULAR; INTRALESIONAL; INTRAMUSCULAR; PARENTERAL; SOFT TISSUE at 09:25

## 2021-03-03 RX ADMIN — Medication 3 ML: at 09:25

## 2021-03-03 RX ADMIN — IOHEXOL 2 ML: 300 INJECTION, SOLUTION INTRAVENOUS at 09:25

## 2021-03-03 RX ADMIN — LIDOCAINE HYDROCHLORIDE 5 ML: 10 INJECTION, SOLUTION EPIDURAL; INFILTRATION; INTRACAUDAL; PERINEURAL at 09:23

## 2021-03-03 NOTE — DISCHARGE INSTR - LAB
Epidural Steroid Injection   WHAT YOU NEED TO KNOW:   An epidural steroid injection (PARAS) is a procedure to inject steroid medicine into the epidural space  The epidural space is between your spinal cord and vertebrae  Steroids reduce inflammation and fluid buildup in your spine that may be causing pain  You may be given pain medicine along with the steroids  ACTIVITY  · Do not drive or operate machinery today  · No strenuous activity today - bending, lifting, etc   · You may resume normal activites starting tomorrow - start slowly and as tolerated  · You may shower today, but no tub baths or hot tubs  · You may have numbness for several hours from the local anesthetic  Please use caution and common sense, especially with weight-bearing activities  CARE OF THE INJECTION SITE  · If you have soreness or pain, apply ice to the area today (20 minutes on/20 minutes off)  · Starting tomorrow, you may use warm, moist heat or ice if needed  · You may have an increase or change in your discomfort for 36-48 hours after your treatment  · Apply ice and continue with any pain medication you have been prescribed  · Notify [facility] if you have any of the following: redness, drainage, swelling, headache, stiff neck or fever above 100°F     SPECIAL INSTRUCTIONS  · Our office will contact you in approximately 7 days for a progress report  MEDICATIONS  · Continue to take all routine medications  · Our office may have instructed you to hold some medications  If you have a problem specifically related to your procedure, please call our office at [phone]  Problems not related to your procedure should be directed to your primary care physician

## 2021-03-03 NOTE — INTERVAL H&P NOTE
Update: (This section must be completed if the H&P was completed greater than 24 hrs to procedure or admission)    H&P reviewed  After examining the patient, I find no changed to the H&P since it had been written  Patient re-evaluated   Accept as history and physical     Ifrah Monroe MD/March 3, 2021/9:14 AM

## 2021-03-10 ENCOUNTER — TELEPHONE (OUTPATIENT)
Dept: PAIN MEDICINE | Facility: CLINIC | Age: 56
End: 2021-03-10

## 2021-03-18 ENCOUNTER — TRANSCRIBE ORDERS (OUTPATIENT)
Dept: ADMINISTRATIVE | Facility: HOSPITAL | Age: 56
End: 2021-03-18

## 2021-03-18 ENCOUNTER — APPOINTMENT (OUTPATIENT)
Dept: LAB | Facility: HOSPITAL | Age: 56
End: 2021-03-18
Payer: COMMERCIAL

## 2021-03-18 DIAGNOSIS — H47.10 EDEMA OF OPTIC NERVE: Primary | ICD-10-CM

## 2021-03-18 DIAGNOSIS — H47.10 EDEMA OF OPTIC NERVE: ICD-10-CM

## 2021-03-18 LAB
BASOPHILS # BLD AUTO: 0.03 THOUSANDS/ΜL (ref 0–0.1)
BASOPHILS NFR BLD AUTO: 1 % (ref 0–1)
EOSINOPHIL # BLD AUTO: 0.14 THOUSAND/ΜL (ref 0–0.61)
EOSINOPHIL NFR BLD AUTO: 3 % (ref 0–6)
ERYTHROCYTE [DISTWIDTH] IN BLOOD BY AUTOMATED COUNT: 13.2 % (ref 11.6–15.1)
ERYTHROCYTE [SEDIMENTATION RATE] IN BLOOD: 32 MM/HOUR (ref 0–19)
FOLATE SERPL-MCNC: 18.7 NG/ML (ref 3.1–17.5)
HCT VFR BLD AUTO: 50.6 % (ref 36.5–49.3)
HGB BLD-MCNC: 16.4 G/DL (ref 12–17)
IMM GRANULOCYTES # BLD AUTO: 0.01 THOUSAND/UL (ref 0–0.2)
IMM GRANULOCYTES NFR BLD AUTO: 0 % (ref 0–2)
LYMPHOCYTES # BLD AUTO: 1.11 THOUSANDS/ΜL (ref 0.6–4.47)
LYMPHOCYTES NFR BLD AUTO: 21 % (ref 14–44)
MCH RBC QN AUTO: 28.6 PG (ref 26.8–34.3)
MCHC RBC AUTO-ENTMCNC: 32.4 G/DL (ref 31.4–37.4)
MCV RBC AUTO: 88 FL (ref 82–98)
MONOCYTES # BLD AUTO: 0.43 THOUSAND/ΜL (ref 0.17–1.22)
MONOCYTES NFR BLD AUTO: 8 % (ref 4–12)
NEUTROPHILS # BLD AUTO: 3.57 THOUSANDS/ΜL (ref 1.85–7.62)
NEUTS SEG NFR BLD AUTO: 67 % (ref 43–75)
NRBC BLD AUTO-RTO: 0 /100 WBCS
PLATELET # BLD AUTO: 269 THOUSANDS/UL (ref 149–390)
PMV BLD AUTO: 9.3 FL (ref 8.9–12.7)
RBC # BLD AUTO: 5.74 MILLION/UL (ref 3.88–5.62)
WBC # BLD AUTO: 5.29 THOUSAND/UL (ref 4.31–10.16)

## 2021-03-18 PROCEDURE — 86255 FLUORESCENT ANTIBODY SCREEN: CPT

## 2021-03-18 PROCEDURE — 36415 COLL VENOUS BLD VENIPUNCTURE: CPT

## 2021-03-18 PROCEDURE — 85652 RBC SED RATE AUTOMATED: CPT

## 2021-03-18 PROCEDURE — 86038 ANTINUCLEAR ANTIBODIES: CPT

## 2021-03-18 PROCEDURE — 86592 SYPHILIS TEST NON-TREP QUAL: CPT

## 2021-03-18 PROCEDURE — 85549 MURAMIDASE: CPT

## 2021-03-18 PROCEDURE — 82746 ASSAY OF FOLIC ACID SERUM: CPT

## 2021-03-18 PROCEDURE — 86618 LYME DISEASE ANTIBODY: CPT

## 2021-03-18 PROCEDURE — 86480 TB TEST CELL IMMUN MEASURE: CPT

## 2021-03-18 PROCEDURE — 82164 ANGIOTENSIN I ENZYME TEST: CPT

## 2021-03-18 PROCEDURE — 86430 RHEUMATOID FACTOR TEST QUAL: CPT

## 2021-03-18 PROCEDURE — 85025 COMPLETE CBC W/AUTO DIFF WBC: CPT

## 2021-03-18 PROCEDURE — 86617 LYME DISEASE ANTIBODY: CPT

## 2021-03-18 PROCEDURE — 86780 TREPONEMA PALLIDUM: CPT

## 2021-03-19 LAB
ACE SERPL-CCNC: 28 U/L (ref 14–82)
B BURGDOR IGG+IGM SER-ACNC: 144
RHEUMATOID FACT SER QL LA: NEGATIVE
RPR SER QL: NORMAL
RYE IGE QN: NEGATIVE
T PALLIDUM AB SER QL IF: NON REACTIVE

## 2021-03-22 LAB
C-ANCA TITR SER IF: NORMAL TITER
GAMMA INTERFERON BACKGROUND BLD IA-ACNC: 0.03 IU/ML
LYSOZYME SERPL-MCNC: 5.9 UG/ML (ref 3–12.8)
M TB IFN-G BLD-IMP: NEGATIVE
M TB IFN-G CD4+ BCKGRND COR BLD-ACNC: 0 IU/ML
M TB IFN-G CD4+ BCKGRND COR BLD-ACNC: 0 IU/ML
MITOGEN IGNF BCKGRD COR BLD-ACNC: >10 IU/ML
MYELOPEROXIDASE AB SER IA-ACNC: <9 U/ML (ref 0–9)
P-ANCA ATYPICAL TITR SER IF: NORMAL TITER
P-ANCA TITR SER IF: NORMAL TITER
PROTEINASE3 AB SER IA-ACNC: <3.5 U/ML (ref 0–3.5)

## 2021-03-29 DIAGNOSIS — Z20.822 EXPOSURE TO COVID-19 VIRUS: ICD-10-CM

## 2021-03-29 DIAGNOSIS — B34.9 VIRAL INFECTION, UNSPECIFIED: ICD-10-CM

## 2021-03-29 PROCEDURE — U0003 INFECTIOUS AGENT DETECTION BY NUCLEIC ACID (DNA OR RNA); SEVERE ACUTE RESPIRATORY SYNDROME CORONAVIRUS 2 (SARS-COV-2) (CORONAVIRUS DISEASE [COVID-19]), AMPLIFIED PROBE TECHNIQUE, MAKING USE OF HIGH THROUGHPUT TECHNOLOGIES AS DESCRIBED BY CMS-2020-01-R: HCPCS | Performed by: NURSE PRACTITIONER

## 2021-03-29 PROCEDURE — U0005 INFEC AGEN DETEC AMPLI PROBE: HCPCS | Performed by: NURSE PRACTITIONER

## 2021-03-30 LAB — SARS-COV-2 RNA RESP QL NAA+PROBE: NEGATIVE

## 2021-04-02 ENCOUNTER — TRANSCRIBE ORDERS (OUTPATIENT)
Dept: ADMINISTRATIVE | Facility: HOSPITAL | Age: 56
End: 2021-04-02

## 2021-04-02 DIAGNOSIS — H47.10 OPTIC NERVE EDEMA: Primary | ICD-10-CM

## 2021-04-12 ENCOUNTER — TELEMEDICINE (OUTPATIENT)
Dept: FAMILY MEDICINE CLINIC | Facility: CLINIC | Age: 56
End: 2021-04-12
Payer: COMMERCIAL

## 2021-04-12 DIAGNOSIS — H47.10 OPTIC NERVE EDEMA: Primary | ICD-10-CM

## 2021-04-12 PROCEDURE — 99213 OFFICE O/P EST LOW 20 MIN: CPT | Performed by: PHYSICIAN ASSISTANT

## 2021-04-12 NOTE — PROGRESS NOTES
Virtual Regular Visit      Assessment/Plan:    Problem List Items Addressed This Visit        Other    Optic nerve edema - Primary      outstanding lyme western blot, no overt sx of lyme and no known bites, will await results for any tx  Reason for visit is   Chief Complaint   Patient presents with    Virtual Brief Visit    Review Labs    Virtual Regular Visit        Encounter provider Jovana Lang PA-C    Provider located at [place] [address]  [address] [place] [id number]      Recent Visits  No visits were found meeting these conditions  Showing recent visits within past 7 days and meeting all other requirements     Today's Visits  Date Type Provider Dept   04/12/21 Telemedicine Leeann Murcia PA-C [facility]   Showing today's visits and meeting all other requirements     Future Appointments  No visits were found meeting these conditions  Showing future appointments within next 150 days and meeting all other requirements        The patient was identified by name and date of birth  Sana Zhu was informed that this is a telemedicine visit and that the visit is being conducted through [address] [place] [address] and patient was informed that this is not a secure, HIPAA-compliant platform  He agrees to proceed     My office door was closed  No one else was in the room  He acknowledged consent and understanding of privacy and security of the video platform  The patient has agreed to participate and understands they can discontinue the visit at any time  Patient is aware this is a billable service  Subjective  Sana Zhu is a 60 y o  male    Pt presents for evaluation of possible lyme disease  He has a dx of left optic nerve edema and follows with ophthalmology  As a part of the workup a lyme test was ordered  Currently, the antibody was borderline and the WB is not yet resulted   Pt denies any bullseye appearing rashes, known tick bites, fevers, joint swelling  Past Medical History:   Diagnosis Date    Acute kidney injury (nontraumatic) (HCC)     Alcohol abuse     Anxiety     Finney esophagus     Benign essential hypertension     Chronic pain syndrome 2/17/2021    Diverticulitis     Diverticulitis     Erectile dysfunction     Esophageal reflux     Essential hypertension     Lumbar radiculopathy 5/13/2019    Major depression     Mild cognitive disorder 5/6/2016    Mood disorder ([address] ) 5/2/2016    Multiple nevi     Psychosis (HCC)     Suspicious nevus     Tremor of right hand     Weakness of right upper extremity        Past Surgical History:   Procedure Laterality Date    COLONOSCOPY N/A 3/9/2019    Procedure: COLONOSCOPY;  Surgeon: Ciarra Miranda MD;  [facility]; Service: Gastroenterology    LIPOMA RESECTION      back    [place] ESOPHAGOGASTRODUODENOSCOPY TRANSORAL DIAGNOSTIC N/A 3/9/2019    Procedure: ESOPHAGOGASTRODUODENOSCOPY (EGD); Surgeon: Ciarra Miranda MD;  [facility]; Service: Gastroenterology    REVISION COLOSTOMY         Current Outpatient Medications   Medication Sig Dispense Refill    acetaminophen (TYLENOL) 650 mg CR tablet Take 1 tablet (650 mg total) by mouth every 8 (eight) hours as needed for mild pain 30 tablet 1    gabapentin (NEURONTIN) 300 mg capsule Take 1 capsule (300 mg total) by mouth 3 (three) times a day 90 capsule 0    ibuprofen (MOTRIN) 200 mg tablet Take by mouth every 6 (six) hours as needed for mild pain      Naproxen Sodium (ALEVE PO) Take by mouth       No current facility-administered medications for this visit  Allergies   Allergen Reactions    Amoxicillin GI Intolerance       Review of Systems   Constitutional: Negative for chills, fatigue and fever  HENT: Negative for congestion, ear pain, hearing loss, nosebleeds, postnasal drip, rhinorrhea, sinus pressure, sinus pain, sneezing and sore throat      Eyes: Negative for pain, discharge, itching and visual disturbance  Respiratory: Negative for cough, chest tightness, shortness of breath and wheezing  Cardiovascular: Negative for chest pain, palpitations and leg swelling  Gastrointestinal: Negative for abdominal pain, blood in stool, constipation, diarrhea, nausea and vomiting  Genitourinary: Negative for frequency and urgency  Musculoskeletal: Negative  Skin: Negative  Neurological: Negative for dizziness, light-headedness and numbness  Video Exam    There were no vitals filed for this visit  Physical Exam  Vitals signs and nursing note reviewed  Constitutional:       General: He is not in acute distress  Pulmonary:      Effort: Pulmonary effort is normal  No respiratory distress  Neurological:      Mental Status: He is alert and oriented to person, place, and time  Psychiatric:         Mood and Affect: Mood normal           I spent 10 minutes directly with the patient during this visit      VIRTUAL VISIT Asad Ng acknowledges that he has consented to an online visit or consultation  He understands that the online visit is based solely on information provided by him, and that, in the absence of a face-to-face physical evaluation by the physician, the diagnosis he receives is both limited and provisional in terms of accuracy and completeness  This is not intended to replace a full medical face-to-face evaluation by the physician  Obed Goodell understands and accepts these terms

## 2021-04-20 ENCOUNTER — HOSPITAL ENCOUNTER (OUTPATIENT)
Dept: MRI IMAGING | Facility: HOSPITAL | Age: 56
Discharge: HOME | End: 2021-04-20
Payer: COMMERCIAL

## 2021-04-20 DIAGNOSIS — H47.10 OPTIC NERVE EDEMA: ICD-10-CM

## 2021-04-20 PROCEDURE — 70543 MRI ORBT/FAC/NCK W/O &W/DYE: CPT

## 2021-04-20 PROCEDURE — A9585 GADOBUTROL INJECTION: HCPCS | Performed by: RADIOLOGY

## 2021-04-20 PROCEDURE — 70553 MRI BRAIN STEM W/O & W/DYE: CPT

## 2021-04-20 RX ADMIN — GADOBUTROL 12 ML: 604.72 INJECTION INTRAVENOUS at 09:51

## 2021-05-04 ENCOUNTER — OFFICE VISIT (OUTPATIENT)
Dept: FAMILY MEDICINE CLINIC | Facility: CLINIC | Age: 56
End: 2021-05-04
Payer: COMMERCIAL

## 2021-05-04 VITALS
TEMPERATURE: 98.5 F | HEART RATE: 88 BPM | WEIGHT: 302.4 LBS | DIASTOLIC BLOOD PRESSURE: 82 MMHG | BODY MASS INDEX: 42.34 KG/M2 | SYSTOLIC BLOOD PRESSURE: 130 MMHG | HEIGHT: 71 IN | OXYGEN SATURATION: 97 %

## 2021-05-04 DIAGNOSIS — F09 MILD COGNITIVE DISORDER: ICD-10-CM

## 2021-05-04 DIAGNOSIS — R06.83 SNORING: ICD-10-CM

## 2021-05-04 DIAGNOSIS — Z13.220 SCREENING FOR LIPID DISORDERS: ICD-10-CM

## 2021-05-04 DIAGNOSIS — Z11.4 SCREENING FOR HIV (HUMAN IMMUNODEFICIENCY VIRUS): ICD-10-CM

## 2021-05-04 DIAGNOSIS — I10 ESSENTIAL HYPERTENSION: ICD-10-CM

## 2021-05-04 DIAGNOSIS — Z12.5 SCREENING FOR PROSTATE CANCER: ICD-10-CM

## 2021-05-04 DIAGNOSIS — Z00.00 ANNUAL PHYSICAL EXAM: Primary | ICD-10-CM

## 2021-05-04 DIAGNOSIS — H47.10 OPTIC NERVE EDEMA: ICD-10-CM

## 2021-05-04 DIAGNOSIS — E66.01 CLASS 3 SEVERE OBESITY DUE TO EXCESS CALORIES WITHOUT SERIOUS COMORBIDITY WITH BODY MASS INDEX (BMI) OF 40.0 TO 44.9 IN ADULT (HCC): ICD-10-CM

## 2021-05-04 DIAGNOSIS — Z11.59 ENCOUNTER FOR HEPATITIS C SCREENING TEST FOR LOW RISK PATIENT: ICD-10-CM

## 2021-05-04 PROBLEM — E66.813 CLASS 3 SEVERE OBESITY DUE TO EXCESS CALORIES WITHOUT SERIOUS COMORBIDITY WITH BODY MASS INDEX (BMI) OF 40.0 TO 44.9 IN ADULT (HCC): Status: ACTIVE | Noted: 2019-10-04

## 2021-05-04 PROBLEM — B34.9 VIRAL INFECTION, UNSPECIFIED: Status: RESOLVED | Noted: 2021-01-29 | Resolved: 2021-05-04

## 2021-05-04 PROBLEM — Z03.818 ENCOUNTER FOR OBSERVATION FOR SUSPECTED EXPOSURE TO OTHER BIOLOGICAL AGENTS RULED OUT: Status: RESOLVED | Noted: 2021-01-29 | Resolved: 2021-05-04

## 2021-05-04 PROCEDURE — 99396 PREV VISIT EST AGE 40-64: CPT | Performed by: NURSE PRACTITIONER

## 2021-05-04 NOTE — PROGRESS NOTES
ADULT ANNUAL [facility]    NAME: Ross Chowdary  AGE: 60 y o  SEX: male  : 1965     DATE: 2021     Assessment and Plan:     Problem List Items Addressed This Visit        Cardiovascular and Mediastinum    Essential hypertension    Relevant Orders    Comprehensive metabolic panel       Nervous and Auditory    Mild cognitive disorder    Relevant Orders    Lyme Antibody Profile with reflex to WB       Other    Class 3 severe obesity due to excess calories without serious comorbidity with body mass index (BMI) of 40 0 to 44 9 in adult [facility])    Relevant Orders    Ambulatory referral to Weight Management    Optic nerve edema    Relevant Orders    Lyme Antibody Profile with reflex to WB    Annual physical exam - Primary    Snoring    Relevant Orders    Ambulatory referral to Sleep Medicine    Encounter for hepatitis C screening test for low risk patient    Relevant Orders    Hepatitis C antibody    Screening for prostate cancer    Relevant Orders    PSA, Total Screen    Screening for lipid disorders    Relevant Orders    Lipid Panel with Direct LDL reflex    Screening for HIV (human immunodeficiency virus)    Relevant Orders    HIV 1/2 ANTIGEN/ANTIBODY (4TH GENERATION) W REFLEX [facility]          Immunizations and preventive care screenings were discussed with patient today  Appropriate education was printed on patient's after visit summary  Counseling:  Injury prevention: discussed safety/seat belts, safety helmets, smoke detectors, carbon dioxide detectors, and smoking near bedding or upholstery  · Exercise: the importance of regular exercise/physical activity was discussed  Recommend exercise 3-5 times per week for at least 30 minutes  BMI Counseling: Body mass index is 42 18 kg/m²   The BMI is above normal  Nutrition recommendations include decreasing portion sizes, encouraging healthy choices of fruits and vegetables, decreasing fast food intake, consuming healthier snacks, limiting drinks that contain sugar, moderation in carbohydrate intake, increasing intake of lean protein, reducing intake of saturated and trans fat and reducing intake of cholesterol  Exercise recommendations include moderate physical activity 150 minutes/week  Patient referred to PCP due to patient being overweight  Return in 1 year (on 2022)  Chief Complaint:     Chief Complaint   Patient presents with    Annual Exam    Snoring     would like a sleep study done      History of Present Illness:     Adult Annual Physical   Patient here for a comprehensive physical exam  The patient reports following with opthamology for his eye edema and is waiting on his lyme testing to be back and is not available   Diet and Physical Activity  · Diet/Nutrition: poor diet  · Exercise: walking  Depression Screening  PHQ-9 Depression Screening    PHQ-9:   Frequency of the following problems over the past two weeks:      Little interest or pleasure in doing things: 0 - not at all  Feeling down, depressed, or hopeless: 0 - not at all  Trouble falling or staying asleep, or sleeping too much: 3 - nearly every day  Feeling tired or having little energy: 0 - not at all  Poor appetite or overeatin - not at all  Feeling bad about yourself - or that you are a failure or have let yourself or your family down: 0 - not at all  Trouble concentrating on things, such as reading the newspaper or watching television: 0 - not at all  Moving or speaking so slowly that other people could have noticed  Or the opposite - being so fidgety or restless that you have been moving around a lot more than usual: 0 - not at all  Thoughts that you would be better off dead, or of hurting yourself in some way: 0 - not at all  PHQ-2 Score: 0  PHQ-9 Score: 3       General Health  · Sleep: gets 7-8 hours of sleep on average, snores loudly and unrefreshing sleep     · Hearing: normal - bilateral   · Vision: goes for regular eye exams, most recent eye exam <1 year ago and wears glasses  · Dental: regular dental visits, brushes teeth twice daily and flosses teeth occasionally   Health  · Symptoms include: none     Review of Systems:     Review of Systems   Constitutional: Negative for activity change, appetite change, chills, diaphoresis, fatigue, fever and unexpected weight change  HENT: Negative for congestion, ear pain, hearing loss, postnasal drip, sinus pressure, sinus pain, sneezing and sore throat  Eyes: Negative for pain, redness and visual disturbance  Respiratory: Negative for cough and shortness of breath  Cardiovascular: Negative for chest pain and leg swelling  Gastrointestinal: Negative for abdominal pain, diarrhea, nausea and vomiting  Endocrine: Negative  Genitourinary: Negative  Musculoskeletal: Negative for arthralgias  Skin: Negative  Allergic/Immunologic: Negative  Neurological: Negative for dizziness and light-headedness  Hematological: Negative  Psychiatric/Behavioral: Negative for behavioral problems and dysphoric mood  Past Medical History:     Past Medical History:   Diagnosis Date    [facility])     Alcohol abuse     Anxiety     Finney esophagus     Benign essential hypertension     Chronic pain syndrome 2/17/2021    Diverticulitis     Diverticulitis     Erectile dysfunction     Esophageal reflux     Essential hypertension     Lumbar radiculopathy 5/13/2019    Major depression     Mild cognitive disorder 5/6/2016    Mood disorder ([address] ) 5/2/2016    Multiple nevi     Psychosis ([facility])     Suspicious nevus     Tremor of right hand     Weakness of right upper extremity       Past Surgical History:     Past Surgical History:   Procedure Laterality Date    COLONOSCOPY N/A 3/9/2019    Procedure: COLONOSCOPY;  Surgeon: Christopher Oliver MD;  [facility];   Service: Gastroenterology    LIPOMA RESECTION      back    [place] ESOPHAGOGASTRODUODENOSCOPY TRANSORAL DIAGNOSTIC N/A 3/9/2019    Procedure: ESOPHAGOGASTRODUODENOSCOPY (EGD); Surgeon: Suri Rhodes MD;  [facility]; Service: Gastroenterology    REVISION COLOSTOMY        Family History:     Family History   Problem Relation Age of Onset    Stroke Mother     Heart disease Father       Social History:     E-Cigarette/Vaping    E-Cigarette Use Former User      E-Cigarette/Vaping Substances    Nicotine No     THC No     CBD No     Flavoring No     Other No     Unknown No      Social History     Socioeconomic History    Marital status: /Civil Union     Spouse name: None    Number of children: None    Years of education: None    Highest education level: None   Occupational History    Occupation: CONSTRUCTION   Social Needs    Financial resource strain: None    Food insecurity     Worry: None     Inability: None    Transportation needs     Medical: None     Non-medical: None   Tobacco Use    Smoking status: Former Smoker     Types: Cigarettes     Quit date: 2015     Years since quittin 0    Smokeless tobacco: Never Used   Substance and Sexual Activity    Alcohol use:  Yes     Alcohol/week: 12 0 standard drinks     Types: 12 Cans of beer per week     Frequency: 2-3 times a week     Drinks per session: 1 or 2     Comment: drinks a 6pack 2 x weekly    Drug use: Yes     Types: Marijuana     Comment: Medical Card    Sexual activity: Yes     Partners: Female   Lifestyle    Physical activity     Days per week: None     Minutes per session: None    Stress: None   Relationships    Social connections     Talks on phone: None     Gets together: None     Attends Nondenominational service: None     Active member of club or organization: None     Attends meetings of clubs or organizations: None     Relationship status: None    Intimate partner violence     Fear of current or ex partner: None Emotionally abused: None     Physically abused: None     Forced sexual activity: None   Other Topics Concern    None   Social History Narrative    None      Current Medications:     Current Outpatient Medications   Medication Sig Dispense Refill    acetaminophen (TYLENOL) 650 mg CR tablet Take 1 tablet (650 mg total) by mouth every 8 (eight) hours as needed for mild pain 30 tablet 1    ibuprofen (MOTRIN) 200 mg tablet Take by mouth every 6 (six) hours as needed for mild pain      Naproxen Sodium (ALEVE PO) Take by mouth       No current facility-administered medications for this visit  Allergies: Allergies   Allergen Reactions    Sulfa Antibiotics GI Intolerance     HA, vomiting, sweating     Amoxicillin GI Intolerance      Physical Exam:     /82 (BP Location: Left arm, Patient Position: Sitting, Cuff Size: Large)   Pulse 88   Temp 98 5 °F (36 9 °C)   Ht 5' 11" (1 803 m)   Wt (!) 137 kg (302 lb 6 4 oz)   SpO2 97%   BMI 42 18 kg/m²     Physical Exam  Vitals signs and nursing note reviewed  Constitutional:       Appearance: He is well-developed  HENT:      Head: Normocephalic and atraumatic  Eyes:      Conjunctiva/sclera: Conjunctivae normal    Neck:      Musculoskeletal: Neck supple  Cardiovascular:      Rate and Rhythm: Normal rate and regular rhythm  Heart sounds: No murmur  Pulmonary:      Effort: Pulmonary effort is normal  No respiratory distress  Breath sounds: Normal breath sounds  Abdominal:      Palpations: Abdomen is soft  Tenderness: There is no abdominal tenderness  Skin:     General: Skin is warm and dry  Capillary Refill: Capillary refill takes less than 2 seconds  Neurological:      General: No focal deficit present  Mental Status: He is alert and oriented to person, place, and time  Psychiatric:         Mood and Affect: Mood normal          Behavior: Behavior normal          Thought Content:  Thought content normal  Judgment: Judgment normal           Malissa Mercedes, [address]

## 2021-05-04 NOTE — PATIENT INSTRUCTIONS

## 2021-05-11 ENCOUNTER — CONSULT (OUTPATIENT)
Dept: PULMONOLOGY | Facility: CLINIC | Age: 56
End: 2021-05-11
Payer: COMMERCIAL

## 2021-05-11 VITALS
TEMPERATURE: 97.2 F | HEART RATE: 82 BPM | WEIGHT: 300.5 LBS | SYSTOLIC BLOOD PRESSURE: 126 MMHG | DIASTOLIC BLOOD PRESSURE: 80 MMHG | OXYGEN SATURATION: 97 % | BODY MASS INDEX: 42.07 KG/M2 | HEIGHT: 71 IN

## 2021-05-11 DIAGNOSIS — E66.01 MORBID OBESITY (HCC): ICD-10-CM

## 2021-05-11 DIAGNOSIS — R06.83 SNORING: ICD-10-CM

## 2021-05-11 DIAGNOSIS — G47.19 EXCESSIVE DAYTIME SLEEPINESS: ICD-10-CM

## 2021-05-11 DIAGNOSIS — G47.33 OSA (OBSTRUCTIVE SLEEP APNEA): Primary | ICD-10-CM

## 2021-05-11 PROCEDURE — 99244 OFF/OP CNSLTJ NEW/EST MOD 40: CPT | Performed by: INTERNAL MEDICINE

## 2021-05-11 NOTE — PROGRESS NOTES
Assessment/Plan:     Diagnoses and all orders for this visit:    ALEXANDRE (obstructive sleep apnea)  -     Diagnostic Sleep Study; Future    Morbid obesity (HCC)    Snoring  -     Ambulatory referral to Sleep Medicine    Excessive daytime sleepiness      obstructive sleep apnea Etiology pathogenesis discussed with the patient in detail  Patient has signs and symptoms of obstructive sleep apnea  His excessive daytime sleepiness likely secondary to his obstructive sleep apnea  He will undergo an all night polysomnogram and if there is evidence of abnormal nocturnal breathing he will undergo a CPAP titration study for maximal benefit  Consequences of untreated sleep apnea including excessive daytime sleepiness and increased risk for myocardial infarction stroke atrial fibrillation discussed with the patient  Testing procedure was discussed in detail  Cautioned against driving when sleepy  Recommend weight loss  Follow-up after the above testing  Return in about 3 months (around 8/11/2021)  All questions are answered to the patient's satisfaction and understanding  He verbalizes understanding  He is encouraged to call with any further questions or concerns  Portions of the record may have been created with voice recognition software  Occasional wrong word or "sound a like" substitutions may have occurred due to the inherent limitations of voice recognition software  Read the chart carefully and recognize, using context, where substitutions have occurred  a    Electronically Signed by Glory Mcburney, MD    ______________________________________________________________________    Chief Complaint:   Chief Complaint   Patient presents with    Sleep Apnea        Patient ID: Judie Cai is a 60 y o  y o  male has a past medical history of Acute kidney injury (nontraumatic) ([facility] ), Alcohol abuse, Anxiety, Finney esophagus, Benign essential hypertension, Chronic pain syndrome (2/17/2021), Diverticulitis, Diverticulitis, Erectile dysfunction, Esophageal reflux, Essential hypertension, Lumbar radiculopathy (5/13/2019), Major depression, Mild cognitive disorder (5/6/2016), [facility] disorder ([facility] ) (5/2/2016), Multiple nevi, ALEXANDRE (obstructive sleep apnea), Psychosis ([facility] ), Snoring, Suspicious nevus, Tremor of right hand, and Weakness of right upper extremity  5/11/2021  Patient presents today for initial visit  Joey Sears is a very pleasant 76-year-old gentleman, who is currently retired states he is here for evaluation for obstructive sleep apnea  He states his wife has complained about very loud snoring witnessed apneic spells and choking and gasping for air  He states he goes to bed at around 10:30 p m  Falls asleep right away and is out of bed at 6:00 a m  Has 3-4 nocturnal awakenings for nocturia, when he is out of the bed he still tired and fatigued and takes a nap during the daytime for at least about an hour and feels well rested  He does have occasional early morning headaches no symptoms related to restless legs  He states he has never smoked cigarettes occasionally does smoke marijuana      Occupational/Exposure history:  Currently retired  Pets/Enviroment:  None  Travel history:  None  Review of Systems   Constitutional: Positive for fatigue  HENT: Negative  Eyes: Negative  Respiratory: Negative  Loud snoring witnessed apneic spells and excessive daytime sleepiness   Cardiovascular: Negative  Gastrointestinal: Negative  Endocrine: Negative  Genitourinary: Negative  Musculoskeletal: Negative  Allergic/Immunologic: Negative  Neurological: Negative  Hematological: Negative  Psychiatric/Behavioral: Positive for sleep disturbance  Social history: He reports that he has been smoking cigarettes  He has never used smokeless tobacco  He reports current alcohol use of about 12 0 standard drinks of alcohol per week  He reports current drug use  Drug: Marijuana      Past surgical history: Past Surgical History:   Procedure Laterality Date    COLONOSCOPY N/A 3/9/2019    Procedure: COLONOSCOPY;  Surgeon: Lin Noland MD;  [facility]; Service: Gastroenterology    LIPOMA RESECTION      back    [place] ESOPHAGOGASTRODUODENOSCOPY TRANSORAL DIAGNOSTIC N/A 3/9/2019    Procedure: ESOPHAGOGASTRODUODENOSCOPY (EGD); Surgeon: Lin Noland MD;  [facility]; Service: Gastroenterology    REVISION COLOSTOMY       Family history:   Family History   Problem Relation Age of Onset    Stroke Mother     Heart disease Father        Immunization History   Administered Date(s) Administered    Tdap 12/16/2015     Current Outpatient Medications   Medication Sig Dispense Refill    acetaminophen (TYLENOL) 650 mg CR tablet Take 1 tablet (650 mg total) by mouth every 8 (eight) hours as needed for mild pain (Patient not taking: Reported on 5/11/2021) 30 tablet 1    ibuprofen (MOTRIN) 200 mg tablet Take by mouth every 6 (six) hours as needed for mild pain      Naproxen Sodium (ALEVE PO) Take by mouth       No current facility-administered medications for this visit  Allergies: Sulfa antibiotics and Amoxicillin    Objective:  Vitals:    05/11/21 1119   BP: 126/80   Pulse: 82   Temp: (!) 97 2 °F (36 2 °C)   SpO2: 97%   Weight: (!) 136 kg (300 lb 8 oz)   Height: 5' 11" (1 803 m)   Oxygen Therapy  SpO2: 97 %    Wt Readings from Last 3 Encounters:   05/11/21 (!) 136 kg (300 lb 8 oz)   05/04/21 (!) 137 kg (302 lb 6 4 oz)   02/17/21 136 kg (299 lb)     Body mass index is 41 91 kg/m²  Physical Exam  Constitutional:       Appearance: He is well-developed  HENT:      Head: Normocephalic and atraumatic  Eyes:      Pupils: Pupils are equal, round, and reactive to light  Neck:      Musculoskeletal: Normal range of motion and neck supple  Comments: Short and wide neck  Cardiovascular:      Rate and Rhythm: Normal rate and regular rhythm  Heart sounds: Normal heart sounds     Pulmonary: Effort: Pulmonary effort is normal       Breath sounds: Normal breath sounds  Abdominal:      General: Bowel sounds are normal       Palpations: Abdomen is soft  Musculoskeletal: Normal range of motion  Skin:     General: Skin is warm and dry  Neurological:      Mental Status: He is alert and oriented to person, place, and time  Psychiatric:         Behavior: Behavior normal              Diagnostics:  I have personally reviewed pertinent reports           ESS: Total score: 18

## 2021-05-12 ENCOUNTER — IMMUNIZATIONS (OUTPATIENT)
Dept: FAMILY MEDICINE CLINIC | Facility: HOSPITAL | Age: 56
End: 2021-05-12

## 2021-05-12 DIAGNOSIS — Z23 ENCOUNTER FOR IMMUNIZATION: Primary | ICD-10-CM

## 2021-05-12 PROCEDURE — 0001A SARS-COV-2 / COVID-19 MRNA VACCINE (PFIZER-BIONTECH) 30 MCG: CPT

## 2021-05-12 PROCEDURE — 91300 SARS-COV-2 / COVID-19 MRNA VACCINE (PFIZER-BIONTECH) 30 MCG: CPT

## 2021-06-01 ENCOUNTER — TELEPHONE (OUTPATIENT)
Dept: FAMILY MEDICINE CLINIC | Facility: CLINIC | Age: 56
End: 2021-06-01

## 2021-06-01 DIAGNOSIS — Z11.52 ENCOUNTER FOR SCREENING FOR COVID-19: Primary | ICD-10-CM

## 2021-06-01 NOTE — TELEPHONE ENCOUNTER
Pt is traveling on plane Friday  Needs to have a negative  covid test  He has no symptoms - has both his vaccines   I offered an appt for swabbing here but he would like an order to be placed so he can go upfront to the drive up at [facility] now, please place order - call pt only if a problem    Thanks rb

## 2021-06-02 ENCOUNTER — IMMUNIZATIONS (OUTPATIENT)
Dept: FAMILY MEDICINE CLINIC | Facility: HOSPITAL | Age: 56
End: 2021-06-02
Payer: COMMERCIAL

## 2021-06-02 DIAGNOSIS — Z11.52 ENCOUNTER FOR SCREENING FOR COVID-19: ICD-10-CM

## 2021-06-02 DIAGNOSIS — Z23 ENCOUNTER FOR IMMUNIZATION: Primary | ICD-10-CM

## 2021-06-02 PROCEDURE — U0005 INFEC AGEN DETEC AMPLI PROBE: HCPCS | Performed by: NURSE PRACTITIONER

## 2021-06-02 PROCEDURE — 0002A SARS-COV-2 / COVID-19 MRNA VACCINE (PFIZER-BIONTECH) 30 MCG: CPT

## 2021-06-02 PROCEDURE — 91300 SARS-COV-2 / COVID-19 MRNA VACCINE (PFIZER-BIONTECH) 30 MCG: CPT

## 2021-06-02 PROCEDURE — U0003 INFECTIOUS AGENT DETECTION BY NUCLEIC ACID (DNA OR RNA); SEVERE ACUTE RESPIRATORY SYNDROME CORONAVIRUS 2 (SARS-COV-2) (CORONAVIRUS DISEASE [COVID-19]), AMPLIFIED PROBE TECHNIQUE, MAKING USE OF HIGH THROUGHPUT TECHNOLOGIES AS DESCRIBED BY CMS-2020-01-R: HCPCS | Performed by: NURSE PRACTITIONER

## 2021-06-03 LAB — SARS-COV-2 RNA RESP QL NAA+PROBE: NEGATIVE

## 2021-07-10 ENCOUNTER — APPOINTMENT (EMERGENCY)
Dept: CT IMAGING | Facility: HOSPITAL | Age: 56
End: 2021-07-10
Payer: COMMERCIAL

## 2021-07-10 ENCOUNTER — APPOINTMENT (EMERGENCY)
Dept: RADIOLOGY | Facility: HOSPITAL | Age: 56
End: 2021-07-10
Payer: COMMERCIAL

## 2021-07-10 ENCOUNTER — HOSPITAL ENCOUNTER (EMERGENCY)
Facility: HOSPITAL | Age: 56
Discharge: HOME | End: 2021-07-10
Attending: EMERGENCY MEDICINE
Payer: COMMERCIAL

## 2021-07-10 VITALS
TEMPERATURE: 98.6 F | HEART RATE: 78 BPM | WEIGHT: 285 LBS | SYSTOLIC BLOOD PRESSURE: 174 MMHG | OXYGEN SATURATION: 95 % | BODY MASS INDEX: 39.75 KG/M2 | RESPIRATION RATE: 18 BRPM | DIASTOLIC BLOOD PRESSURE: 89 MMHG

## 2021-07-10 DIAGNOSIS — R07.9 CHEST PAIN: Primary | ICD-10-CM

## 2021-07-10 DIAGNOSIS — I10 HYPERTENSION: ICD-10-CM

## 2021-07-10 DIAGNOSIS — R10.9 ABDOMINAL PAIN: ICD-10-CM

## 2021-07-10 LAB
ALBUMIN SERPL BCP-MCNC: 4.1 G/DL (ref 3.5–5)
ALP SERPL-CCNC: 82 U/L (ref 46–116)
ALT SERPL W P-5'-P-CCNC: 33 U/L (ref 12–78)
ANION GAP SERPL CALCULATED.3IONS-SCNC: 11 MMOL/L (ref 4–13)
AST SERPL W P-5'-P-CCNC: 24 U/L (ref 5–45)
BASOPHILS # BLD AUTO: 0.02 THOUSANDS/ΜL (ref 0–0.1)
BASOPHILS NFR BLD AUTO: 0 % (ref 0–1)
BILIRUB SERPL-MCNC: 0.6 MG/DL (ref 0.2–1)
BUN SERPL-MCNC: 26 MG/DL (ref 5–25)
CALCIUM SERPL-MCNC: 9.1 MG/DL (ref 8.3–10.1)
CHLORIDE SERPL-SCNC: 106 MMOL/L (ref 100–108)
CO2 SERPL-SCNC: 23 MMOL/L (ref 21–32)
CREAT SERPL-MCNC: 0.91 MG/DL (ref 0.6–1.3)
EOSINOPHIL # BLD AUTO: 0.06 THOUSAND/ΜL (ref 0–0.61)
EOSINOPHIL NFR BLD AUTO: 1 % (ref 0–6)
ERYTHROCYTE [DISTWIDTH] IN BLOOD BY AUTOMATED COUNT: 13.5 % (ref 11.6–15.1)
GFR SERPL CREATININE-BSD FRML MDRD: 93 ML/MIN/1.73SQ M
GLUCOSE SERPL-MCNC: 102 MG/DL (ref 65–140)
HCT VFR BLD AUTO: 50.6 % (ref 36.5–49.3)
HGB BLD-MCNC: 16.4 G/DL (ref 12–17)
IMM GRANULOCYTES # BLD AUTO: 0.01 THOUSAND/UL (ref 0–0.2)
IMM GRANULOCYTES NFR BLD AUTO: 0 % (ref 0–2)
LIPASE SERPL-CCNC: 114 U/L (ref 73–393)
LYMPHOCYTES # BLD AUTO: 1.12 THOUSANDS/ΜL (ref 0.6–4.47)
LYMPHOCYTES NFR BLD AUTO: 17 % (ref 14–44)
MCH RBC QN AUTO: 29.4 PG (ref 26.8–34.3)
MCHC RBC AUTO-ENTMCNC: 32.4 G/DL (ref 31.4–37.4)
MCV RBC AUTO: 91 FL (ref 82–98)
MONOCYTES # BLD AUTO: 0.44 THOUSAND/ΜL (ref 0.17–1.22)
MONOCYTES NFR BLD AUTO: 7 % (ref 4–12)
NEUTROPHILS # BLD AUTO: 4.97 THOUSANDS/ΜL (ref 1.85–7.62)
NEUTS SEG NFR BLD AUTO: 75 % (ref 43–75)
NRBC BLD AUTO-RTO: 0 /100 WBCS
PLATELET # BLD AUTO: 284 THOUSANDS/UL (ref 149–390)
PMV BLD AUTO: 9.9 FL (ref 8.9–12.7)
POTASSIUM SERPL-SCNC: 4.4 MMOL/L (ref 3.5–5.3)
PROT SERPL-MCNC: 8.4 G/DL (ref 6.4–8.2)
RBC # BLD AUTO: 5.57 MILLION/UL (ref 3.88–5.62)
SODIUM SERPL-SCNC: 140 MMOL/L (ref 136–145)
TROPONIN I SERPL-MCNC: <0.02 NG/ML
TROPONIN I SERPL-MCNC: <0.02 NG/ML
WBC # BLD AUTO: 6.62 THOUSAND/UL (ref 4.31–10.16)

## 2021-07-10 PROCEDURE — 99284 EMERGENCY DEPT VISIT MOD MDM: CPT | Performed by: EMERGENCY MEDICINE

## 2021-07-10 PROCEDURE — 93005 ELECTROCARDIOGRAM TRACING: CPT

## 2021-07-10 PROCEDURE — 96374 THER/PROPH/DIAG INJ IV PUSH: CPT

## 2021-07-10 PROCEDURE — 74177 CT ABD & PELVIS W/CONTRAST: CPT

## 2021-07-10 PROCEDURE — 36415 COLL VENOUS BLD VENIPUNCTURE: CPT

## 2021-07-10 PROCEDURE — 84484 ASSAY OF TROPONIN QUANT: CPT | Performed by: EMERGENCY MEDICINE

## 2021-07-10 PROCEDURE — 80053 COMPREHEN METABOLIC PANEL: CPT | Performed by: EMERGENCY MEDICINE

## 2021-07-10 PROCEDURE — G1004 CDSM NDSC: HCPCS

## 2021-07-10 PROCEDURE — 99285 EMERGENCY DEPT VISIT HI MDM: CPT

## 2021-07-10 PROCEDURE — 83690 ASSAY OF LIPASE: CPT | Performed by: EMERGENCY MEDICINE

## 2021-07-10 PROCEDURE — 85025 COMPLETE CBC W/AUTO DIFF WBC: CPT | Performed by: EMERGENCY MEDICINE

## 2021-07-10 PROCEDURE — 71045 X-RAY EXAM CHEST 1 VIEW: CPT

## 2021-07-10 PROCEDURE — 96375 TX/PRO/DX INJ NEW DRUG ADDON: CPT

## 2021-07-10 RX ORDER — KETOROLAC TROMETHAMINE 30 MG/ML
15 INJECTION, SOLUTION INTRAMUSCULAR; INTRAVENOUS ONCE
Status: COMPLETED | OUTPATIENT
Start: 2021-07-10 | End: 2021-07-10

## 2021-07-10 RX ORDER — LABETALOL 20 MG/4 ML (5 MG/ML) INTRAVENOUS SYRINGE
20 ONCE
Status: COMPLETED | OUTPATIENT
Start: 2021-07-10 | End: 2021-07-10

## 2021-07-10 RX ORDER — AMLODIPINE BESYLATE 5 MG/1
5 TABLET ORAL DAILY
Qty: 30 TABLET | Refills: 0 | Status: SHIPPED | OUTPATIENT
Start: 2021-07-10 | End: 2021-08-23

## 2021-07-10 RX ORDER — LABETALOL 20 MG/4 ML (5 MG/ML) INTRAVENOUS SYRINGE
10 ONCE
Status: DISCONTINUED | OUTPATIENT
Start: 2021-07-10 | End: 2021-07-10

## 2021-07-10 RX ORDER — ONDANSETRON 2 MG/ML
4 INJECTION INTRAMUSCULAR; INTRAVENOUS ONCE
Status: COMPLETED | OUTPATIENT
Start: 2021-07-10 | End: 2021-07-10

## 2021-07-10 RX ADMIN — LABETALOL 20 MG/4 ML (5 MG/ML) INTRAVENOUS SYRINGE 20 MG: at 18:49

## 2021-07-10 RX ADMIN — FAMOTIDINE 20 MG: 10 INJECTION, SOLUTION INTRAVENOUS at 16:27

## 2021-07-10 RX ADMIN — ONDANSETRON 4 MG: 2 INJECTION INTRAMUSCULAR; INTRAVENOUS at 16:27

## 2021-07-10 RX ADMIN — KETOROLAC TROMETHAMINE 15 MG: 30 INJECTION, SOLUTION INTRAMUSCULAR at 16:27

## 2021-07-10 RX ADMIN — IOHEXOL 100 ML: 350 INJECTION, SOLUTION INTRAVENOUS at 16:39

## 2021-07-10 NOTE — ED NOTES
Patient transported to [address] [place] [address], [address] [place] [address]  07/10/21 3095

## 2021-07-10 NOTE — ED PROVIDER NOTES
History  Chief Complaint   Patient presents with    Chest Pain     cp started this morning; LLQ pain, diarrhea, sob     57-year-old gentleman comes in for evaluation of chest pain and left lower quadrant abdominal pain  Patient states this morning he began to have left-sided chest pain describes it as a squeezing  Patient states he also feels short of breath  Patient states he tried to wait it out and see if it would go away but is remained constant  Patient has some nausea but no vomiting  Patient does have a history of GERD  Patient also complains of left lower quadrant abdominal pain x1 week  Patient had a previous surgery and he is to have a colostomy  Patient states this was reversed knee was doing fine up until approximately a week ago where he states he gets pain then that area any feels like something is leaking  Patient does have a history of diverticulitis        History provided by:  Patient   used: No    Chest Pain  Pain location:  L chest  Pain quality: tightness    Pain quality comment:  Squeezing  Pain radiates to:  Does not radiate  Pain radiates to the back: no    Pain severity:  Moderate  Onset quality:  Sudden  Duration:  8 hours  Timing:  Constant  Progression:  Waxing and waning  Chronicity:  New  Context: at rest    Ineffective treatments:  Certain positions  Associated symptoms: abdominal pain and shortness of breath    Associated symptoms: no anorexia, no anxiety, no cough, no dizziness, no fever, no headache and not vomiting    Abdominal pain:     Location:  LLQ    Quality:  Gnawing    Severity:  Moderate    Onset quality:  Gradual    Duration:  1 week    Timing:  Intermittent    Progression:  Waxing and waning    Chronicity:  New  Shortness of breath:     Severity:  Mild    Onset quality:  Sudden    Duration:  8 hours    Timing:  Intermittent    Progression:  Waxing and waning  Risk factors: hypertension        Prior to Admission Medications   Prescriptions Last Dose Informant Patient Reported? Taking? Naproxen Sodium (ALEVE PO) Not Taking at Unknown time Self Yes No   Sig: Take by mouth   Patient not taking: Reported on 7/10/2021   acetaminophen (TYLENOL) 650 mg CR tablet Not Taking at Unknown time Self No No   Sig: Take 1 tablet (650 mg total) by mouth every 8 (eight) hours as needed for mild pain   Patient not taking: Reported on 5/11/2021   ibuprofen (MOTRIN) 200 mg tablet Not Taking at Unknown time Self Yes No   Sig: Take by mouth every 6 (six) hours as needed for mild pain   Patient not taking: Reported on 7/10/2021      Facility-Administered Medications: None       Past Medical History:   Diagnosis Date    [facility]ra[facility])     Alcohol abuse     Anxiety     Finney esophagus     Benign essential hypertension     Chronic pain syndrome 2/17/2021    Diverticulitis     Diverticulitis     Erectile dysfunction     Esophageal reflux     Essential hypertension     Lumbar radiculopathy 5/13/2019    Major depression     Mild cognitive disorder 5/6/2016    Mood disorder ([address] ) 5/2/2016    Multiple nevi     Obesity     ALEXANDRE (obstructive sleep apnea)     Psychosis (HCC)     Snoring     Suspicious nevus     Tremor of right hand     Weakness of right upper extremity        Past Surgical History:   Procedure Laterality Date    COLONOSCOPY N/A 3/9/2019    Procedure: COLONOSCOPY;  Surgeon: Tremaine Costa MD;  Location: [facility]; Service: Gastroenterology    LIPOMA RESECTION      back    ME ESOPHAGOGASTRODUODENOSCOPY TRANSORAL DIAGNOSTIC N/A 3/9/2019    Procedure: ESOPHAGOGASTRODUODENOSCOPY (EGD); Surgeon: Tremaine Costa MD;  Location: [facility]; Service: Gastroenterology    REVISION COLOSTOMY         Family History   Problem Relation Age of Onset    Stroke Mother     Heart disease Father      I have reviewed and agree with the history as documented      E-Cigarette/Vaping    E-Cigarette Use Former User E-Cigarette/Vaping Substances    Nicotine No     THC No     CBD No     Flavoring No     Other No     Unknown No      Social History     Tobacco Use    Smoking status: Current Every Day Smoker     Types: Cigarettes    Smokeless tobacco: Never Used   Vaping Use    Vaping Use: Former   Substance Use Topics    Alcohol use: Yes     Alcohol/week: 12 0 standard drinks     Types: 12 Cans of beer per week     Comment: drinks a 6pack 2 x weekly    Drug use: Yes     Types: Marijuana     Comment: Medical Card       Review of Systems   Constitutional: Negative for activity change, appetite change and fever  HENT: Negative for congestion and facial swelling  Eyes: Negative for discharge and redness  Respiratory: Positive for shortness of breath  Negative for cough and wheezing  Cardiovascular: Positive for chest pain  Negative for leg swelling  Gastrointestinal: Positive for abdominal pain  Negative for abdominal distention, anorexia, blood in stool and vomiting  Endocrine: Negative for cold intolerance and polydipsia  Genitourinary: Negative for difficulty urinating and hematuria  Musculoskeletal: Negative for arthralgias and gait problem  Skin: Negative for color change and rash  Allergic/Immunologic: Negative for food allergies and immunocompromised state  Neurological: Negative for dizziness, seizures and headaches  Hematological: Negative for adenopathy  Does not bruise/bleed easily  Psychiatric/Behavioral: Negative for agitation, confusion and decreased concentration  All other systems reviewed and are negative  Physical Exam  Physical Exam  Vitals and nursing note reviewed  Constitutional:       Appearance: He is well-developed  He is not toxic-appearing  HENT:      Head: Normocephalic and atraumatic        Right Ear: Tympanic membrane normal       Left Ear: Tympanic membrane normal       Nose: Nose normal    Eyes:      General: Lids are normal       Conjunctiva/sclera: Conjunctivae normal       Pupils: Pupils are equal, round, and reactive to light  Neck:      Vascular: No carotid bruit or JVD  Trachea: Trachea normal    Cardiovascular:      Rate and Rhythm: Normal rate and regular rhythm  No extrasystoles are present  Heart sounds: Normal heart sounds  Pulmonary:      Effort: Pulmonary effort is normal       Breath sounds: No decreased breath sounds, wheezing, rhonchi or rales  Chest:      Chest wall: No deformity or tenderness  Abdominal:      General: Bowel sounds are normal       Palpations: Abdomen is soft  Tenderness: There is abdominal tenderness in the left lower quadrant  There is no guarding or rebound  Comments: Multiple well-healing abdominal scars from previous surgery   Musculoskeletal:      Right shoulder: No swelling, deformity or tenderness  Normal range of motion  Cervical back: Normal range of motion and neck supple  No deformity, tenderness or bony tenderness  Lymphadenopathy:      Cervical: No cervical adenopathy  Skin:     General: Skin is warm and dry  Neurological:      Mental Status: He is alert and oriented to person, place, and time  Cranial Nerves: No cranial nerve deficit  Sensory: No sensory deficit  Deep Tendon Reflexes: Reflexes are normal and symmetric  Psychiatric:         Speech: Speech normal          Behavior: Behavior normal          Thought Content:  Thought content normal          Judgment: Judgment normal          Vital Signs  ED Triage Vitals   Temperature Pulse Respirations Blood Pressure SpO2   07/10/21 1454 07/10/21 1454 07/10/21 1454 07/10/21 1454 07/10/21 1454   98 6 °F (37 °C) 72 20 (!) 203/99 99 %      Temp Source Heart Rate Source Patient Position - Orthostatic VS BP Location FiO2 (%)   07/10/21 1454 07/10/21 1454 07/10/21 1616 07/10/21 1616 --   Oral Monitor Lying Right arm       Pain Score       07/10/21 1454       5           Vitals:    07/10/21 1454 07/10/21 1616 07/10/21 1700 07/10/21 1800   BP: (!) 203/99 (!) 179/94 (!) 191/104 (!) 205/149   Pulse: 72 91 80 80   Patient Position - Orthostatic VS:  Lying Lying Lying         Visual Acuity  Visual Acuity      Most Recent Value   L Pupil Size (mm)  3   R Pupil Size (mm)  3          ED Medications  Medications   Labetalol HCl (NORMODYNE) injection 20 mg (has no administration in time range)   ondansetron (ZOFRAN) injection 4 mg (4 mg Intravenous Given 7/10/21 1627)   ketorolac (TORADOL) injection 15 mg (15 mg Intravenous Given 7/10/21 1627)   famotidine (PEPCID) injection 20 mg (20 mg Intravenous Given 7/10/21 1627)   iohexol (OMNIPAQUE) 350 MG/ML injection (SINGLE-DOSE) 100 mL (100 mL Intravenous Given 7/10/21 1639)       Diagnostic Studies  Results Reviewed     Procedure Component Value Units Date/Time    Troponin I [[id number]]  (Normal) Collected: 07/10/21 1757    Lab Status: Final result Specimen: Blood from Arm, Left Updated: 07/10/21 1824     Troponin I <0 02 ng/mL     [place] draw [[id number]] Collected: 07/10/21 1500    Lab Status: In process Specimen: Blood from Arm, Right Updated: 07/10/21 1701    Narrative: The following orders were created for panel order [place] draw  Procedure                               Abnormality         Status                     ---------                               -----------         ------                     Devonda Hardik Top on NDNT[[id number]]                           Final result               Gold top on JCNI[[id number]]                                 Final result               Lavender Top 7ml on IIYT[[id number]]                         In process                   Please view results for these tests on the individual orders      Lipase [[id number]]  (Normal) Collected: 07/10/21 1500    Lab Status: Final result Specimen: Blood from Arm, Right Updated: 07/10/21 1631     Lipase 114 u/L     Troponin I [[id number]]  (Normal) Collected: 07/10/21 1500    Lab Status: Final result Specimen: Blood from Arm, Right Updated: 07/10/21 1527     Troponin I <0 02 ng/mL     Comprehensive metabolic panel [[id number]]  (Abnormal) Collected: 07/10/21 1500    Lab Status: Final result Specimen: Blood from Arm, Right Updated: 07/10/21 1525     Sodium 140 mmol/L      Potassium 4 4 mmol/L      Chloride 106 mmol/L      CO2 23 mmol/L      ANION GAP 11 mmol/L      BUN 26 mg/dL      Creatinine 0 91 mg/dL      Glucose 102 mg/dL      Calcium 9 1 mg/dL      AST 24 U/L      ALT 33 U/L      Alkaline Phosphatase 82 U/L      Total Protein 8 4 g/dL      Albumin 4 1 g/dL      Total Bilirubin 0 60 mg/dL      eGFR 93 ml/min/1 73sq m     Narrative:      [place] guidelines for Chronic Kidney Disease (CKD):     Stage 1 with normal or high GFR (GFR > 90 mL/min/1 73 square meters)    Stage 2 Mild CKD (GFR = 60-89 mL/min/1 73 square meters)    Stage 3A Moderate CKD (GFR = 45-59 mL/min/1 73 square meters)    Stage 3B Moderate CKD (GFR = 30-44 mL/min/1 73 square meters)    Stage 4 Severe CKD (GFR = 15-29 mL/min/1 73 square meters)    Stage 5 End Stage CKD (GFR <15 mL/min/1 73 square meters)  Note: GFR calculation is accurate only with a steady state creatinine    CBC and differential [[id number]]  (Abnormal) Collected: 07/10/21 1500    Lab Status: Final result Specimen: Blood from Arm, Right Updated: 07/10/21 1507     WBC 6 62 Thousand/uL      RBC 5 57 Million/uL      Hemoglobin 16 4 g/dL      Hematocrit 50 6 %      MCV 91 fL      MCH 29 4 pg      MCHC 32 4 g/dL      RDW 13 5 %      MPV 9 9 fL      Platelets 906 Thousands/uL      nRBC 0 /100 WBCs      Neutrophils Relative 75 %      Immat GRANS % 0 %      Lymphocytes Relative 17 %      Monocytes Relative 7 %      Eosinophils Relative 1 %      Basophils Relative 0 %      Neutrophils Absolute 4 97 Thousands/µL      Immature Grans Absolute 0 01 Thousand/uL      Lymphocytes Absolute 1 12 Thousands/µL      Monocytes Absolute 0 44 Thousand/µL      Eosinophils Absolute 0 06 Thousand/µL      Basophils Absolute 0 02 Thousands/µL                  CT abdomen pelvis with contrast   Final Result by Michelle Kam MD (07/10 1732)   No acute findings  Workstation performed: [id number]         XR chest 1 view portable    (Results Pending)              Procedures  Procedures         ED Course             HEART Risk Score      Most Recent Value   Heart Score Risk Calculator   History  1 Filed at: 07/10/2021 1837   ECG  0 Filed at: 07/10/2021 1837   Age  1 Filed at: 07/10/2021 1837   Risk Factors  2 Filed at: 07/10/2021 1837   Troponin  0 Filed at: 07/10/2021 1837   HEART Score  4 Filed at: 07/10/2021 1837                      SBIRT 20yo+      Most Recent Value   SBIRT (25 yo +)   In order to provide better care to our patients, we are screening all of our patients for alcohol and drug use  Would it be okay to ask you these screening questions? Yes Filed at: 07/10/2021 1613   Initial Alcohol Screen: US AUDIT-C    1  How often do you have a drink containing alcohol? 3 Filed at: 07/10/2021 1613   2  How many drinks containing alcohol do you have on a typical day you are drinking? 4 Filed at: 07/10/2021 1613   3a  Male UNDER 65: How often do you have five or more drinks on one occasion? 0 Filed at: 07/10/2021 1613   3b  FEMALE Any Age, or MALE 65+: How often do you have 4 or more drinks on one occassion? 0 Filed at: 07/10/2021 1613   Audit-C Score  7 Filed at: 07/10/2021 1613   Full Alcohol Screen: US AUDIT   4  How often during the last year have you found that you were not able to stop drinking once you had started? 0 Filed at: 07/10/2021 1613   5  How often during past year have you failed to do what was normally expected of you because of drinking? 0 Filed at: 07/10/2021 1613   6  How often in past year have you needed a first drink in the morning to get yourself going after a heavy drinking session? 0 Filed at: 07/10/2021 1613   7   How often in past year have you had feeling of guilt or remorse after drinking? 0 Filed at: 07/10/2021 1613   8  How often in past year have you been unable to remember what happened night before because you had been drinking? 0 Filed at: 07/10/2021 1613   9  Have you or someone else been injured as a result of your drinking? 0 Filed at: 07/10/2021 1613   10  Has a relative, friend, doctor or other health worker been concerned about your drinking and suggested you cut down?   0 Filed at: 07/10/2021 1613   AUDIT Total Score  7 Filed at: 07/10/2021 1613   JODEE: How many times in the past year have you    Used an illegal drug or used a prescription medication for non-medical reasons? Never Filed at: 07/10/2021 1613                    MDM  Number of Diagnoses or Management Options  Hypertension: new and requires workup     Amount and/or Complexity of Data Reviewed  Clinical lab tests: ordered and reviewed  Tests in the radiology section of CPT®: ordered and reviewed  Tests in the medicine section of CPT®: ordered and reviewed  Independent visualization of images, tracings, or specimens: yes    Patient Progress  Patient progress: stable      Disposition  Final diagnoses:   Chest pain   Abdominal pain   Hypertension     Time reflects when diagnosis was documented in both MDM as applicable and the Disposition within this note     Time User Action Codes Description Comment    7/10/2021  6:38 PM Isadore Saliva K Add [R07 9] Chest pain     7/10/2021  6:38 PM Isadore Saliva K Add [R10 9] Abdominal pain     7/10/2021  6:38 PM Elle Falling Add [I10] Hypertension       ED Disposition     ED Disposition Condition Date/Time Comment    Discharge Stable Sat Jul 10, 2021  6:38 PM [facility] discharge to home/self care              Follow-up Information     Follow up With Specialties Details Why Contact Info    Freddy Cheney, [address] Nurse Practitioner Schedule an appointment as soon as possible for a visit   21 [phone]  [address]            Patient's Medications   Discharge Prescriptions    AMLODIPINE (NORVASC) 5 MG TABLET    Take 1 tablet (5 mg total) by mouth daily       Start Date: 7/10/2021 End Date: 8/9/2021       Order Dose: 5 mg       Quantity: 30 tablet    Refills: 0     No discharge procedures on file      PDMP Review     None          ED Provider  Electronically Signed by           Loc Torrez DO  07/10/21 4081

## 2021-07-11 ENCOUNTER — HOSPITAL ENCOUNTER (EMERGENCY)
Facility: HOSPITAL | Age: 56
Discharge: HOME | End: 2021-07-12
Attending: EMERGENCY MEDICINE | Admitting: EMERGENCY MEDICINE
Payer: COMMERCIAL

## 2021-07-11 ENCOUNTER — APPOINTMENT (EMERGENCY)
Dept: RADIOLOGY | Facility: HOSPITAL | Age: 56
End: 2021-07-11
Payer: COMMERCIAL

## 2021-07-11 DIAGNOSIS — R07.9 CHEST PAIN, UNSPECIFIED TYPE: Primary | ICD-10-CM

## 2021-07-11 LAB
ALBUMIN SERPL BCP-MCNC: 4.4 G/DL (ref 3.5–5)
ALP SERPL-CCNC: 77 U/L (ref 46–116)
ALT SERPL W P-5'-P-CCNC: 36 U/L (ref 12–78)
ANION GAP SERPL CALCULATED.3IONS-SCNC: 10 MMOL/L (ref 4–13)
AST SERPL W P-5'-P-CCNC: 19 U/L (ref 5–45)
ATRIAL RATE: 77 BPM
ATRIAL RATE: 88 BPM
BASOPHILS # BLD AUTO: 0.03 THOUSANDS/ΜL (ref 0–0.1)
BASOPHILS NFR BLD AUTO: 1 % (ref 0–1)
BILIRUB SERPL-MCNC: 0.66 MG/DL (ref 0.2–1)
BUN SERPL-MCNC: 24 MG/DL (ref 5–25)
CALCIUM SERPL-MCNC: 9.4 MG/DL (ref 8.3–10.1)
CHLORIDE SERPL-SCNC: 105 MMOL/L (ref 100–108)
CO2 SERPL-SCNC: 25 MMOL/L (ref 21–32)
CREAT SERPL-MCNC: 1.01 MG/DL (ref 0.6–1.3)
EOSINOPHIL # BLD AUTO: 0.07 THOUSAND/ΜL (ref 0–0.61)
EOSINOPHIL NFR BLD AUTO: 1 % (ref 0–6)
ERYTHROCYTE [DISTWIDTH] IN BLOOD BY AUTOMATED COUNT: 13.4 % (ref 11.6–15.1)
GFR SERPL CREATININE-BSD FRML MDRD: 82 ML/MIN/1.73SQ M
GLUCOSE SERPL-MCNC: 98 MG/DL (ref 65–140)
HCT VFR BLD AUTO: 48.6 % (ref 36.5–49.3)
HGB BLD-MCNC: 15.9 G/DL (ref 12–17)
IMM GRANULOCYTES # BLD AUTO: 0.01 THOUSAND/UL (ref 0–0.2)
IMM GRANULOCYTES NFR BLD AUTO: 0 % (ref 0–2)
LYMPHOCYTES # BLD AUTO: 1.35 THOUSANDS/ΜL (ref 0.6–4.47)
LYMPHOCYTES NFR BLD AUTO: 23 % (ref 14–44)
MCH RBC QN AUTO: 28.9 PG (ref 26.8–34.3)
MCHC RBC AUTO-ENTMCNC: 32.7 G/DL (ref 31.4–37.4)
MCV RBC AUTO: 88 FL (ref 82–98)
MONOCYTES # BLD AUTO: 0.54 THOUSAND/ΜL (ref 0.17–1.22)
MONOCYTES NFR BLD AUTO: 9 % (ref 4–12)
NEUTROPHILS # BLD AUTO: 3.99 THOUSANDS/ΜL (ref 1.85–7.62)
NEUTS SEG NFR BLD AUTO: 66 % (ref 43–75)
NRBC BLD AUTO-RTO: 0 /100 WBCS
NT-PROBNP SERPL-MCNC: 64 PG/ML
P AXIS: 62 DEGREES
P AXIS: 68 DEGREES
PLATELET # BLD AUTO: 276 THOUSANDS/UL (ref 149–390)
PMV BLD AUTO: 9.6 FL (ref 8.9–12.7)
POTASSIUM SERPL-SCNC: 3.7 MMOL/L (ref 3.5–5.3)
PR INTERVAL: 172 MS
PR INTERVAL: 174 MS
PROT SERPL-MCNC: 8.3 G/DL (ref 6.4–8.2)
QRS AXIS: 33 DEGREES
QRS AXIS: 50 DEGREES
QRSD INTERVAL: 76 MS
QRSD INTERVAL: 82 MS
QT INTERVAL: 366 MS
QT INTERVAL: 368 MS
QTC INTERVAL: 409 MS
QTC INTERVAL: 442 MS
RBC # BLD AUTO: 5.5 MILLION/UL (ref 3.88–5.62)
SODIUM SERPL-SCNC: 140 MMOL/L (ref 136–145)
T WAVE AXIS: 40 DEGREES
T WAVE AXIS: 51 DEGREES
TROPONIN I SERPL-MCNC: <0.02 NG/ML
TROPONIN I SERPL-MCNC: <0.02 NG/ML
VENTRICULAR RATE: 74 BPM
VENTRICULAR RATE: 88 BPM
WBC # BLD AUTO: 5.99 THOUSAND/UL (ref 4.31–10.16)

## 2021-07-11 PROCEDURE — 93010 ELECTROCARDIOGRAM REPORT: CPT | Performed by: INTERNAL MEDICINE

## 2021-07-11 PROCEDURE — 83880 ASSAY OF NATRIURETIC PEPTIDE: CPT | Performed by: EMERGENCY MEDICINE

## 2021-07-11 PROCEDURE — 93005 ELECTROCARDIOGRAM TRACING: CPT

## 2021-07-11 PROCEDURE — 99285 EMERGENCY DEPT VISIT HI MDM: CPT | Performed by: EMERGENCY MEDICINE

## 2021-07-11 PROCEDURE — 71045 X-RAY EXAM CHEST 1 VIEW: CPT

## 2021-07-11 PROCEDURE — 85025 COMPLETE CBC W/AUTO DIFF WBC: CPT | Performed by: EMERGENCY MEDICINE

## 2021-07-11 PROCEDURE — 99285 EMERGENCY DEPT VISIT HI MDM: CPT

## 2021-07-11 PROCEDURE — 84484 ASSAY OF TROPONIN QUANT: CPT | Performed by: EMERGENCY MEDICINE

## 2021-07-11 PROCEDURE — 36415 COLL VENOUS BLD VENIPUNCTURE: CPT | Performed by: EMERGENCY MEDICINE

## 2021-07-11 PROCEDURE — 80053 COMPREHEN METABOLIC PANEL: CPT | Performed by: EMERGENCY MEDICINE

## 2021-07-11 RX ORDER — ASPIRIN 81 MG/1
324 TABLET, CHEWABLE ORAL ONCE
Status: DISCONTINUED | OUTPATIENT
Start: 2021-07-11 | End: 2021-07-12 | Stop reason: HOSPADM

## 2021-07-11 RX ADMIN — NITROGLYCERIN 1 INCH: 20 OINTMENT TOPICAL at 20:48

## 2021-07-12 ENCOUNTER — OFFICE VISIT (OUTPATIENT)
Dept: CARDIOLOGY CLINIC | Facility: CLINIC | Age: 56
End: 2021-07-12
Payer: COMMERCIAL

## 2021-07-12 VITALS
OXYGEN SATURATION: 98 % | HEART RATE: 66 BPM | WEIGHT: 280 LBS | DIASTOLIC BLOOD PRESSURE: 90 MMHG | BODY MASS INDEX: 39.2 KG/M2 | HEIGHT: 71 IN | SYSTOLIC BLOOD PRESSURE: 164 MMHG

## 2021-07-12 VITALS
OXYGEN SATURATION: 97 % | WEIGHT: 290 LBS | SYSTOLIC BLOOD PRESSURE: 125 MMHG | RESPIRATION RATE: 14 BRPM | BODY MASS INDEX: 40.45 KG/M2 | HEART RATE: 66 BPM | DIASTOLIC BLOOD PRESSURE: 72 MMHG | TEMPERATURE: 97.7 F

## 2021-07-12 DIAGNOSIS — R07.9 CHEST PAIN, UNSPECIFIED TYPE: Primary | ICD-10-CM

## 2021-07-12 PROCEDURE — 99204 OFFICE O/P NEW MOD 45 MIN: CPT | Performed by: INTERNAL MEDICINE

## 2021-07-12 PROCEDURE — 93000 ELECTROCARDIOGRAM COMPLETE: CPT | Performed by: INTERNAL MEDICINE

## 2021-07-12 RX ORDER — ASPIRIN 81 MG/1
81 TABLET ORAL DAILY
Qty: 30 TABLET | Refills: 3 | Status: SHIPPED | OUTPATIENT
Start: 2021-07-12 | End: 2021-07-13 | Stop reason: SDUPTHER

## 2021-07-12 RX ORDER — ISOSORBIDE MONONITRATE 30 MG/1
30 TABLET, EXTENDED RELEASE ORAL DAILY
Qty: 30 TABLET | Refills: 3 | Status: SHIPPED | OUTPATIENT
Start: 2021-07-12 | End: 2021-07-13 | Stop reason: SDUPTHER

## 2021-07-12 NOTE — ED PROVIDER NOTES
Pt Name: Casie Long  MRN: [id number]  [place] 1965  Age/Sex: 60 y o  male  Date of evaluation: 7/11/2021  PCP: Cali Shetty [address] [place] [address]    Chief Complaint   Patient presents with    Chest Pain     Patient reports chest pain for the last several days intermittently  Patient d/c from ED yesterday and pain came back a few hours ago         HPI    60 y o  male presenting with chest pain  Patient states he has had intermittent chest pain over the last several days, was worked up in [place] [id number] yesterday for same  He states that approximately 2 hours prior to arrival was sitting watching TV when the pain recurred  The pain is dull, pressure, in the center of the chest, radiating throughout the chest, improved with walking around the room  He denies fever, nausea, vomiting, diarrhea, shortness of breath, trauma, other symptoms  HPI      Past Medical and Surgical History    Past Medical History:   Diagnosis Date    Acute kidney injury (nontraumatic) (HCC)     Alcohol abuse     Anxiety     Finney esophagus     Benign essential hypertension     Chronic pain syndrome 2/17/2021    Diverticulitis     Diverticulitis     Erectile dysfunction     Esophageal reflux     Essential hypertension     Lumbar radiculopathy 5/13/2019    Major depression     Mild cognitive disorder 5/6/2016    Mood disorder ([address] ) 5/2/2016    Multiple nevi     Obesity     ALEXANDRE (obstructive sleep apnea)     Psychosis (HCC)     Snoring     Suspicious nevus     Tremor of right hand     Weakness of right upper extremity        Past Surgical History:   Procedure Laterality Date    COLONOSCOPY N/A 3/9/2019    Procedure: COLONOSCOPY;  Surgeon: Gabriela Martinez MD;  Location: [facility]; Service: Gastroenterology    LIPOMA RESECTION      back    VT ESOPHAGOGASTRODUODENOSCOPY TRANSORAL DIAGNOSTIC N/A 3/9/2019    Procedure: ESOPHAGOGASTRODUODENOSCOPY (EGD);   Surgeon: Gabriela Martinez MD;  Location: [facility]; Service: Gastroenterology    REVISION COLOSTOMY         Family History   Problem Relation Age of Onset    Stroke Mother     Heart disease Father        Social History     Tobacco Use    Smoking status: Current Every Day Smoker     Types: Cigarettes    Smokeless tobacco: Never Used   Vaping Use    Vaping Use: Former   Substance Use Topics    Alcohol use: Yes     Alcohol/week: 12 0 standard drinks     Types: 12 Cans of beer per week     Comment: drinks a 6pack 2 x weekly    Drug use: Yes     Types: Marijuana     Comment: Medical Card           Allergies    Allergies   Allergen Reactions    Sulfa Antibiotics GI Intolerance     HA, vomiting, sweating     Amoxicillin GI Intolerance       Home Medications    Prior to Admission medications    Medication Sig Start Date End Date Taking? Authorizing Provider   acetaminophen (TYLENOL) 650 mg CR tablet Take 1 tablet (650 mg total) by mouth every 8 (eight) hours as needed for mild pain  Patient not taking: Reported on 5/11/2021 5/13/19   Linh Silva MD   amLODIPine (NORVASC) 5 mg tablet Take 1 tablet (5 mg total) by mouth daily 7/10/21 8/9/21  Seble Flatten Turock, DO   ibuprofen (MOTRIN) 200 mg tablet Take by mouth every 6 (six) hours as needed for mild pain  Patient not taking: Reported on 7/10/2021    Historical Provider, MD   Naproxen Sodium (ALEVE PO) Take by mouth  Patient not taking: Reported on 7/10/2021    Historical Provider, MD           Review of Systems    Review of Systems   Constitutional: Negative for appetite change, chills and diaphoresis  HENT: Negative for drooling, facial swelling, trouble swallowing and voice change  Respiratory: Negative for apnea, shortness of breath and wheezing  Cardiovascular: Positive for chest pain  Negative for leg swelling  Gastrointestinal: Negative for abdominal distention, abdominal pain, diarrhea, nausea and vomiting  Genitourinary: Negative for dysuria and urgency     Musculoskeletal: Negative for arthralgias, back pain, gait problem and neck pain  Skin: Negative for color change, rash and wound  Neurological: Negative for seizures, speech difficulty, weakness and headaches  Psychiatric/Behavioral: Negative for agitation, behavioral problems and dysphoric mood  The patient is not nervous/anxious  All other systems reviewed and negative  Physical Exam      ED Triage Vitals [07/11/21 1935]   Temperature Pulse Respirations Blood Pressure SpO2   97 7 °F (36 5 °C) 83 18 (!) 160/102 96 %      Temp Source Heart Rate Source Patient Position - Orthostatic VS BP Location FiO2 (%)   Temporal Monitor Sitting Left arm --      Pain Score       6               Physical Exam  Vitals and nursing note reviewed  Constitutional:       Appearance: He is well-developed  HENT:      Head: Normocephalic and atraumatic  Right Ear: External ear normal       Left Ear: External ear normal    Eyes:      Conjunctiva/sclera: Conjunctivae normal       Pupils: Pupils are equal, round, and reactive to light  Neck:      Trachea: No tracheal deviation  Cardiovascular:      Rate and Rhythm: Normal rate and regular rhythm  Heart sounds: Normal heart sounds  No murmur heard  Pulmonary:      Effort: Pulmonary effort is normal  No respiratory distress  Breath sounds: Normal breath sounds  No stridor  No wheezing or rales  Abdominal:      General: There is no distension  Palpations: Abdomen is soft  Tenderness: There is no abdominal tenderness  There is no guarding or rebound  Musculoskeletal:         General: No deformity  Normal range of motion  Cervical back: Normal range of motion and neck supple  Skin:     General: Skin is warm and dry  Findings: No rash  Neurological:      Mental Status: He is alert and oriented to person, place, and time  Psychiatric:         Behavior: Behavior normal          Thought Content:  Thought content normal          Judgment: Judgment normal  Diagnostic Results  EKG Interpretation    Rate:  88  BPM  Rhythm:  Normal Sinus Rhythm   Axis:  Normal   Intervals: Normal, no blocks, QTc  442 ms  Q waves:  No pathologic Q waves   T waves:  Normal   ST segments:  No significant elevations or depressions     Impression:  Normal sinus rhythm without evidence of acute ischemia or significant arrhythmia      EKG for comparison:  EKG dated 10 July 2029 similar in character no major changes  EKG interpreted by me         Labs:    Results Reviewed     Procedure Component Value Units Date/Time    Troponin I repeat in 3hrs [[id number]] Updated: 07/11/21 2248    Lab Status: No result Specimen: Blood from Arm, Left     NT-BNP PRO [[id number]]  (Normal) Collected: 07/11/21 2053    Lab Status: Final result Specimen: Blood from Arm, Left Updated: 07/11/21 2120     NT-proBNP 64 pg/mL     Troponin I [[id number]]  (Normal) Collected: 07/11/21 2052    Lab Status: Final result Specimen: Blood from Arm, Left Updated: 07/11/21 2115     Troponin I <0 02 ng/mL     Comprehensive metabolic panel [[id number]]  (Abnormal) Collected: 07/11/21 2053    Lab Status: Final result Specimen: Blood from Arm, Left Updated: 07/11/21 2113     Sodium 140 mmol/L      Potassium 3 7 mmol/L      Chloride 105 mmol/L      CO2 25 mmol/L      ANION GAP 10 mmol/L      BUN 24 mg/dL      Creatinine 1 01 mg/dL      Glucose 98 mg/dL      Calcium 9 4 mg/dL      AST 19 U/L      ALT 36 U/L      Alkaline Phosphatase 77 U/L      Total Protein 8 3 g/dL      Albumin 4 4 g/dL      Total Bilirubin 0 66 mg/dL      eGFR 82 ml/min/1 73sq m     Narrative:      Sammi guidelines for Chronic Kidney Disease (CKD):     Stage 1 with normal or high GFR (GFR > 90 mL/min/1 73 square meters)    Stage 2 Mild CKD (GFR = 60-89 mL/min/1 73 square meters)    Stage 3A Moderate CKD (GFR = 45-59 mL/min/1 73 square meters)    Stage 3B Moderate CKD (GFR = 30-44 mL/min/1 73 square meters)    Stage 4 Severe CKD (GFR = 15-29 mL/min/1 73 square meters)    Stage 5 End Stage CKD (GFR <15 mL/min/1 73 square meters)  Note: GFR calculation is accurate only with a steady state creatinine    CBC and differential [[id number]] Collected: 07/11/21 2052    Lab Status: Final result Specimen: Blood from Arm, Left Updated: 07/11/21 2059     WBC 5 99 Thousand/uL      RBC 5 50 Million/uL      Hemoglobin 15 9 g/dL      Hematocrit 48 6 %      MCV 88 fL      MCH 28 9 pg      MCHC 32 7 g/dL      RDW 13 4 %      MPV 9 6 fL      Platelets 182 Thousands/uL      nRBC 0 /100 WBCs      Neutrophils Relative 66 %      Immat GRANS % 0 %      Lymphocytes Relative 23 %      Monocytes Relative 9 %      Eosinophils Relative 1 %      Basophils Relative 1 %      Neutrophils Absolute 3 99 Thousands/µL      Immature Grans Absolute 0 01 Thousand/uL      Lymphocytes Absolute 1 35 Thousands/µL      Monocytes Absolute 0 54 Thousand/µL      Eosinophils Absolute 0 07 Thousand/µL      Basophils Absolute 0 03 Thousands/µL           All labs reviewed and utilized in the medical decision making process    Radiology:    XR chest 1 view portable    (Results Pending)       All radiology studies independently viewed by me and interpreted by the radiologist     Procedure    Procedures        ED Course of Care and Re-Assessments      Symptoms resolved with nitroglycerin, patient refused aspirin, stating it causes him shortness of breath  Medications   aspirin chewable tablet 324 mg (324 mg Oral Not Given 7/11/21 2047)   nitroglycerin (NITRO-BID) 2 % TD ointment 1 inch (1 inch Topical Given 7/11/21 2048)           FINAL IMPRESSION    Final diagnoses:   Chest pain, unspecified type         DISPOSITION/PLAN    Chest pain as above  Vital signs reassuring, examination nonspecific  EKG also reassuring  Patient has several cardiac risk factors but an atypical story, heart score calculated to be 3   Per chest pain pathway, 3 hour delta trop ordered, pending that test and reassessment  If patient remains pain-free and has negative troponin, anticipate discharge per chest pain pathway, if change in clinical picture or elevated troponin, anticipate admission  Hemodynamically stable and comfortable at time of sign out to Dr Sarah Lipscomb at time of shift change, pending repeat troponin  Time reflects when diagnosis was documented in both MDM as applicable and the Disposition within this note     Time User Action Codes Description Comment    7/11/2021 11:01 PM Brain Melvin Add [R07 9] Chest pain, unspecified type       ED Disposition     None      Follow-up Information    None           PATIENT REFERRED TO:    No follow-up provider specified  DISCHARGE MEDICATIONS:    Patient's Medications   Discharge Prescriptions    No medications on file       No discharge procedures on file           MD Angel Zhao MD  07/11/21 0384
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
## 2021-07-13 DIAGNOSIS — R07.9 CHEST PAIN, UNSPECIFIED TYPE: ICD-10-CM

## 2021-07-13 RX ORDER — ISOSORBIDE MONONITRATE 30 MG/1
30 TABLET, EXTENDED RELEASE ORAL DAILY
Qty: 30 TABLET | Refills: 3 | Status: CANCELLED | OUTPATIENT
Start: 2021-07-13

## 2021-07-14 NOTE — ED NOTES
Opened chart for clarification for discharge phone call      [facility] include 234 beds at the [facility]  07/14/21 1139

## 2021-07-21 ENCOUNTER — HOSPITAL ENCOUNTER (OUTPATIENT)
Dept: SLEEP CENTER | Facility: CLINIC | Age: 56
Discharge: HOME | End: 2021-07-21
Payer: COMMERCIAL

## 2021-07-21 DIAGNOSIS — G47.33 OSA (OBSTRUCTIVE SLEEP APNEA): ICD-10-CM

## 2021-07-21 PROCEDURE — 95810 POLYSOM 6/> YRS 4/> PARAM: CPT | Performed by: INTERNAL MEDICINE

## 2021-07-21 PROCEDURE — 95810 POLYSOM 6/> YRS 4/> PARAM: CPT

## 2021-07-22 NOTE — PROGRESS NOTES
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   Caffeine use:NO    Alcohol:Dayshift workers after 5PM: Alcohol use:YES-Beer 1 serving    Typical day for patient:YES       Study Documentation    Sleep Study Indications: snoring, nocturnal snoring, BMI>30, witnessed apneas, unrefreshing sleep    Sleep Study: Diagnostic   Snore: Moderate  Supplemental O2: no    O2 flow rate (L/min) range n/a  O2 flow rate (L/min) final n/a  Minimum SaO2 84%  Baseline SaO2 97%        Mode of Therapy: n/a    EKG abnormalities: no     EEG abnormalities: no    Sleep Study Recorded < 2 hours: N/A    Sleep Study Recorded > 2 hours but incomplete study: N/A    Sleep Study Recorded 6 hours but no sleep obtained: NO    Patient classification: retired       Post-Sleep Study    Medication used at bedtime or during sleep study:NO    Patient reports time it took to fall asleep:20 to 30 minutes    Patient reports waking up during study:3 or more times  Patient reports returning to sleep in greater than 30 minutes  Patient reports sleeping 4 to 6 hours with dreaming  Patient reports sleep during study:typical    Patient rated sleepiness: Not sleepy or tired    PAP treatment:no

## 2021-07-23 ENCOUNTER — HOSPITAL ENCOUNTER (OUTPATIENT)
Dept: NON INVASIVE DIAGNOSTICS | Facility: CLINIC | Age: 56
Discharge: HOME | End: 2021-07-23

## 2021-07-23 DIAGNOSIS — R07.9 CHEST PAIN, UNSPECIFIED TYPE: ICD-10-CM

## 2021-07-27 ENCOUNTER — TELEPHONE (OUTPATIENT)
Dept: SLEEP CENTER | Facility: CLINIC | Age: 56
End: 2021-07-27

## 2021-07-27 NOTE — TELEPHONE ENCOUNTER
Spoke with patients wife, advised sleep study resulted and will be discussed by Dr Guanako Lehman, phone # provided

## 2021-07-30 ENCOUNTER — HOSPITAL ENCOUNTER (OUTPATIENT)
Dept: NON INVASIVE DIAGNOSTICS | Facility: CLINIC | Age: 56
Discharge: HOME | End: 2021-07-30
Payer: COMMERCIAL

## 2021-07-30 PROCEDURE — 78452 HT MUSCLE IMAGE SPECT MULT: CPT | Performed by: INTERNAL MEDICINE

## 2021-07-30 PROCEDURE — A9502 TC99M TETROFOSMIN: HCPCS

## 2021-07-30 PROCEDURE — 93017 CV STRESS TEST TRACING ONLY: CPT

## 2021-07-30 PROCEDURE — 93016 CV STRESS TEST SUPVJ ONLY: CPT | Performed by: INTERNAL MEDICINE

## 2021-07-30 PROCEDURE — 78452 HT MUSCLE IMAGE SPECT MULT: CPT

## 2021-07-30 PROCEDURE — 93018 CV STRESS TEST I&R ONLY: CPT | Performed by: INTERNAL MEDICINE

## 2021-08-04 ENCOUNTER — OFFICE VISIT (OUTPATIENT)
Dept: BARIATRICS | Facility: CLINIC | Age: 56
End: 2021-08-04
Payer: COMMERCIAL

## 2021-08-04 VITALS
BODY MASS INDEX: 39.49 KG/M2 | HEART RATE: 91 BPM | HEIGHT: 71 IN | SYSTOLIC BLOOD PRESSURE: 116 MMHG | WEIGHT: 282.1 LBS | DIASTOLIC BLOOD PRESSURE: 66 MMHG | RESPIRATION RATE: 12 BRPM | TEMPERATURE: 97.5 F

## 2021-08-04 DIAGNOSIS — I10 ESSENTIAL HYPERTENSION: ICD-10-CM

## 2021-08-04 DIAGNOSIS — G47.33 OSA (OBSTRUCTIVE SLEEP APNEA): ICD-10-CM

## 2021-08-04 DIAGNOSIS — E66.01 SEVERE OBESITY (BMI 35.0-39.9) WITH COMORBIDITY (HCC): Primary | ICD-10-CM

## 2021-08-04 DIAGNOSIS — G89.4 CHRONIC PAIN SYNDROME: ICD-10-CM

## 2021-08-04 DIAGNOSIS — K22.719 BARRETT'S ESOPHAGUS WITH DYSPLASIA: ICD-10-CM

## 2021-08-04 DIAGNOSIS — E78.6 LOW HDL (UNDER 40): ICD-10-CM

## 2021-08-04 DIAGNOSIS — R63.5 ABNORMAL WEIGHT GAIN: ICD-10-CM

## 2021-08-04 PROCEDURE — 99245 OFF/OP CONSLTJ NEW/EST HI 55: CPT | Performed by: INTERNAL MEDICINE

## 2021-08-04 NOTE — PROGRESS NOTES
Assessment/Plan:  Sharon Christopher was seen today for consult  Diagnoses and all orders for this visit:    Severe obesity (BMI 35 0-39  9) with comorbidity ([facility], [place] [address] )  -     Discontinue: Semaglutide-Weight Management (WEGOVY) 0 25 MG/0 5ML; Inject 0 5 mL (0 25 mg total) under the skin once a week for 4 doses  -     liraglutide (SAXENDA) injection; Inject under the skin (rotate between abdomen, upper arms, and thighs) Week 1- Inject 0 6mg once daily; Week 2- inject 1 2mg once daily; Week 3- inject 1 8mg once daily; Week 4- Inject 2 4mg once daily; Week 5 and on- inject 3 0mg once daily  -     Insulin Pen Needle 32G X 4 MM MISC; Use as directed with liraglutide    ALEXANDRE (obstructive sleep apnea)  -     Discontinue: Semaglutide-Weight Management (WEGOVY) 0 25 MG/0 5ML; Inject 0 5 mL (0 25 mg total) under the skin once a week for 4 doses    Chronic pain syndrome  -     Discontinue: Semaglutide-Weight Management (WEGOVY) 0 25 MG/0 5ML; Inject 0 5 mL (0 25 mg total) under the skin once a week for 4 doses    Finney's esophagus with dysplasia  -     Discontinue: Semaglutide-Weight Management (WEGOVY) 0 25 MG/0 5ML; Inject 0 5 mL (0 25 mg total) under the skin once a week for 4 doses    Essential hypertension  -     Discontinue: Semaglutide-Weight Management (WEGOVY) 0 25 MG/0 5ML; Inject 0 5 mL (0 25 mg total) under the skin once a week for 4 doses    Abnormal weight gain  -     Discontinue: Semaglutide-Weight Management (WEGOVY) 0 25 MG/0 5ML; Inject 0 5 mL (0 25 mg total) under the skin once a week for 4 doses  -     liraglutide (SAXENDA) injection; Inject under the skin (rotate between abdomen, upper arms, and thighs) Week 1- Inject 0 6mg once daily; Week 2- inject 1 2mg once daily; Week 3- inject 1 8mg once daily; Week 4- Inject 2 4mg once daily;   Week 5 and on- inject 3 0mg once daily  -     Insulin Pen Needle 32G X 4 MM MISC; Use as directed with liraglutide    Low HDL (under 40)         - Discussed options of HealthyCORE-Intensive Lifestyle Intervention Program, Very Low Calorie Diet-VLCD and Conservative Program and the role of weight loss medications  - Explained the importance of making lifestyle changes first before starting any anti-obesity medications  Patient should demonstrate lifestyle changes first before anti-obesity medication can be initiated  - Patient is interested in pursuing Conservative Program  - Initial weight loss goal of 5-10% weight loss for improved health  - Weight loss can improve patient's co-morbid conditions and/or prevent weight-related complications  Pt has made good lifestyle changes  Continue with increasing activity level to goal 150 min/wk  Start counting calories  Addendum: wegovy not covered by insurance unfortunately  Will trial saxenda instead  Goals:  Do not skip any meals! Food log (ie ) www myfitnesspal com,sparkpeople  com,loseit com,calorieking  com,etc  baritastic (use skinnytaste  com or smartphone omari Wiser (formerly WisePricer) for recipes)  No sugary beverages  At least 64oz of water daily  Increase physical activity by 10 minutes daily  Gradually increase physical activity to a goal of 5 days per week for 30 minutes of MODERATE intensity PLUS 2 days per week of FULL BODY resistance training (use smartphone apps Edusoft, Home Workout, etc )  Goal protein 100g per day  0527-8252 calories    30 minute visit, >50% face-to-face time spent counseling patient on nonsurgical interventions for the treatment of excess weight  Discussed in detail nonsurgical options including intensive lifestyle intervention program, very low-calorie diet program and conservative program   Discussed the role of weight loss medications  Counseled patient on diet behavior and exercise modification for weight loss  Follow up in approximately 6 weeks with Non-Surgical Physician/Advanced Practitioner      Subjective:   Chief Complaint   Patient presents with    Consult     initial visit with Chelsey Kellogg Patient ID: Nigel Cee  is a 60 y o  male with excess weight/obesity here to pursue weight management  Past Medical History:   Diagnosis Date    Acute kidney injury (nontraumatic) (HCC)     Alcohol abuse     Anxiety     Finney esophagus     Benign essential hypertension     Chronic pain syndrome 02/17/2021    Diverticulitis     Diverticulitis     Erectile dysfunction     Esophageal reflux     Essential hypertension     Lumbar radiculopathy 05/13/2019    Major depression     Mild cognitive disorder 05/06/2016    Mood disorder ([address] ) 05/02/2016    Multiple nevi     Obesity     Obesity (BMI 30-39  9)     ALEXANDRE (obstructive sleep apnea)     Psychosis (HCC)     Snoring     Suspicious nevus     Tremor of right hand     Weakness of right upper extremity      Past Surgical History:   Procedure Laterality Date    COLONOSCOPY N/A 3/9/2019    Procedure: COLONOSCOPY;  Surgeon: Jennifer Nunez MD;  Location: [facility]; Service: Gastroenterology    LIPOMA RESECTION      back    SD ESOPHAGOGASTRODUODENOSCOPY TRANSORAL DIAGNOSTIC N/A 3/9/2019    Procedure: ESOPHAGOGASTRODUODENOSCOPY (EGD); Surgeon: Jennifer Nunez MD;  Location: [facility];   Service: Gastroenterology    REVISION COLOSTOMY         HPI:  Wt Readings from Last 20 Encounters:   08/04/21 128 kg (282 lb 1 6 oz)   07/12/21 127 kg (280 lb)   07/11/21 132 kg (290 lb)   07/10/21 129 kg (285 lb)   05/11/21 (!) 136 kg (300 lb 8 oz)   05/04/21 (!) 137 kg (302 lb 6 4 oz)   02/17/21 136 kg (299 lb)   06/30/20 136 kg (298 lb 12 8 oz)   05/22/20 136 kg (299 lb)   04/02/20 (!) 136 kg (300 lb 12 8 oz)   02/12/20 (!) 137 kg (303 lb)   02/07/20 127 kg (280 lb)   10/04/19 129 kg (284 lb)   05/13/19 132 kg (291 lb)   05/10/19 132 kg (290 lb 2 oz)   04/06/19 131 kg (288 lb 2 3 oz)   03/09/19 124 kg (273 lb 5 9 oz)   04/29/16 114 kg (251 lb 8 7 oz)   04/26/16 113 kg (248 lb 3 8 oz)   04/26/16 112 kg (246 lb)       Severity: Severe  Onset: over the years  Modifiers: Diet and Exercise  Contributing factors: Poor Food Choices and Insufficient Physical Activity  Associated symptoms: comorbid conditions  Goal weight loss: 5-10% STG  Cut down on his portion sizes over the past 2 months  Started walking daily  B- toast, eggs   S-  L- protein/veggies  S-  D- protein/veggies  S-    Hydration: 64ozwater  Alcohol: no  Smoking: no  Exercise: walking daily  Sleep: 7hours  STOP bang: +ALEXANDRE getting CPAP study    The following portions of the patient's history were reviewed and updated as appropriate: allergies, current medications, past family history, past medical history, past social history, past surgical history, and problem list     Review of Systems   Constitutional: Negative for appetite change, chills and fever  HENT: Negative for rhinorrhea and sore throat  Respiratory: Negative for cough, chest tightness and shortness of breath  Cardiovascular: Negative for chest pain and leg swelling  Gastrointestinal: Negative for abdominal pain, constipation, diarrhea, nausea and vomiting  Endocrine: Negative for cold intolerance and heat intolerance  Genitourinary: Negative for difficulty urinating  Musculoskeletal: Negative for arthralgias  Skin: Negative for color change  Neurological: Negative for dizziness, numbness and headaches  Psychiatric/Behavioral: Negative for sleep disturbance  The patient is not nervous/anxious  All other systems reviewed and are negative  Objective:  /66   Pulse 91   Temp 97 5 °F (36 4 °C)   Resp 12   Ht 5' 11" (1 803 m)   Wt 128 kg (282 lb 1 6 oz)   BMI 39 34 kg/m²   Constitutional: Well-developed, well-nourished and obese Body mass index is 39 34 kg/m²  [place] HEENT: No conjunctival pallor or jaundice  Pulmonary: No increased work of breathing or signs of respiratory distress  CV: Normal rate, well-perfused  GI: Obese  Non-distended  MSK: No edema   Neuro: Oriented to person, place and time   Normal Speech  Normal gait  Psych: Normal affect and mood       Labs and Imaging  Lab Results   Component Value Date    WBC 5 99 07/11/2021    HGB 15 9 07/11/2021    HCT 48 6 07/11/2021    MCV 88 07/11/2021     07/11/2021     Lab Results   Component Value Date     01/06/2016    SODIUM 140 07/11/2021    K 3 7 07/11/2021     07/11/2021    CO2 25 07/11/2021    ANIONGAP 10 01/06/2016    AGAP 10 07/11/2021    BUN 24 07/11/2021    CREATININE 1 01 07/11/2021    GLUC 98 07/11/2021    GLUF 85 05/19/2020    CALCIUM 9 4 07/11/2021    AST 19 07/11/2021    ALT 36 07/11/2021    ALKPHOS 77 07/11/2021    PROT 7 8 12/24/2015    TP 8 3 (H) 07/11/2021    BILITOT 0 20 12/24/2015    TBILI 0 66 07/11/2021    EGFR 82 07/11/2021     Lab Results   Component Value Date    HGBA1C 5 3 04/30/2016     Lab Results   Component Value Date    WEH7PIXUQKXQ 2 290 05/03/2016     Lab Results   Component Value Date    CHOLESTEROL 136 04/30/2016     Lab Results   Component Value Date    HDL 31 (L) 04/30/2016     Lab Results   Component Value Date    TRIG 150 04/30/2016     No results found for: LDLDIRECT

## 2021-08-05 ENCOUNTER — TELEPHONE (OUTPATIENT)
Dept: BARIATRICS | Facility: CLINIC | Age: 56
End: 2021-08-05

## 2021-08-12 ENCOUNTER — TELEPHONE (OUTPATIENT)
Dept: PULMONOLOGY | Facility: CLINIC | Age: 56
End: 2021-08-12

## 2021-08-12 ENCOUNTER — OFFICE VISIT (OUTPATIENT)
Dept: PULMONOLOGY | Facility: CLINIC | Age: 56
End: 2021-08-12
Payer: COMMERCIAL

## 2021-08-12 VITALS
DIASTOLIC BLOOD PRESSURE: 84 MMHG | HEIGHT: 71 IN | OXYGEN SATURATION: 94 % | BODY MASS INDEX: 39.76 KG/M2 | WEIGHT: 284 LBS | HEART RATE: 75 BPM | TEMPERATURE: 95 F | SYSTOLIC BLOOD PRESSURE: 124 MMHG

## 2021-08-12 DIAGNOSIS — E66.01 MORBID OBESITY (HCC): ICD-10-CM

## 2021-08-12 DIAGNOSIS — G47.33 OSA (OBSTRUCTIVE SLEEP APNEA): Primary | ICD-10-CM

## 2021-08-12 DIAGNOSIS — I10 ESSENTIAL HYPERTENSION: ICD-10-CM

## 2021-08-12 PROCEDURE — 99214 OFFICE O/P EST MOD 30 MIN: CPT | Performed by: INTERNAL MEDICINE

## 2021-08-12 NOTE — PROGRESS NOTES
Assessment/Plan:   Diagnoses and all orders for this visit:    ALEXANDRE (obstructive sleep apnea)  -     CPAP Auto New DME    Essential hypertension    Morbid obesity (HCC)        Mild ALEXANDRE with AHI of 7 1 , etiology pathogenesis of ALEXANDRE discussed   Various treatment options discussed with Mandible advancing appliance and uvulopharyngoplasty and CPAP titration discussed  Given his underlying HTN and mood disorder and discussed PAP machine with autocpap, need for compliance discussed  Will help set up and any problems after starting the cpap he will give us a call and   Recommend weight loss  Follow up in 3 months with CPAP download complaince     Return in about 3 months (around 11/12/2021)  All questions are answered to the patient's satisfaction and understanding  He verbalizes understanding  He is encouraged to call with any further questions or concerns  Portions of the record may have been created with voice recognition software  Occasional wrong word or "sound a like" substitutions may have occurred due to the inherent limitations of voice recognition software  Read the chart carefully and recognize, using context, where substitutions have occurred      Electronically Signed by Hany Harkins MD    ______________________________________________________________________    Chief Complaint:   Chief Complaint   Patient presents with    Follow-up       Patient ID: Emily Nolasco is a 60 y o  y o  male has a past medical history of Acute kidney injury (nontraumatic) ([facility] [address] ), Alcohol abuse, Anxiety, Ifnney esophagus, Benign essential hypertension, Chronic pain syndrome (02/17/2021), Diverticulitis, Diverticulitis, Erectile dysfunction, Esophageal reflux, Essential hypertension, Lumbar radiculopathy (05/13/2019), Major depression, Mild cognitive disorder (05/06/2016), Mood disorder ([address] ) (05/02/2016), Multiple nevi, Obesity, Obesity (BMI 30-39 9), ALEXANDRE (obstructive sleep apnea), Psychosis ([facility] [address] ), Snoring, Suspicious nevus, Tremor of right hand, and Weakness of right upper extremity  8/12/2021  Patient presents today for follow-up visit  Patient is here for fololow up of his diagnostic sleep study results  No change in his sleep schedule patient is heresi for follow-up of hisn diagnosticce sleep study Last visit             Review of Systems   Constitutional: Negative  HENT: Negative  Eyes: Negative  Respiratory: Negative  Cardiovascular: Negative  Gastrointestinal: Negative  Endocrine: Negative  Genitourinary: Negative  Musculoskeletal: Negative  Allergic/Immunologic: Negative  Neurological: Negative  Hematological: Negative  Psychiatric/Behavioral: Negative  Smoking history: He reports that he has been smoking cigarettes  He has never used smokeless tobacco     The following portions of the patient's history were reviewed and updated as appropriate: allergies, current medications, past family history, past medical history, past social history, past surgical history and problem list     Immunization History   Administered Date(s) Administered    SARS-CoV-2 / COVID-19 mRNA IM (Pfizer-BioNTech) 05/12/2021, 06/02/2021    Tdap 12/16/2015     Current Outpatient Medications   Medication Sig Dispense Refill    acetaminophen (TYLENOL) 650 mg CR tablet Take 1 tablet (650 mg total) by mouth every 8 (eight) hours as needed for mild pain 30 tablet 1    aspirin (ECOTRIN LOW STRENGTH) 81 mg EC tablet Take 1 tablet (81 mg total) by mouth daily 30 tablet 3    Insulin Pen Needle 32G X 4 MM MISC Use as directed with liraglutide 90 each 1    liraglutide (SAXENDA) injection Inject under the skin (rotate between abdomen, upper arms, and thighs) Week 1- Inject 0 6mg once daily; Week 2- inject 1 2mg once daily; Week 3- inject 1 8mg once daily; Week 4- Inject 2 4mg once daily;   Week 5 and on- inject 3 0mg once daily 9 mL 3    amLODIPine (NORVASC) 5 mg tablet Take 1 tablet (5 mg total) by mouth daily 30 tablet 0    ibuprofen (MOTRIN) 200 mg tablet Take by mouth every 6 (six) hours as needed for mild pain (Patient not taking: Reported on 8/12/2021)      isosorbide mononitrate (IMDUR) 30 mg 24 hr tablet Take 1 tablet (30 mg total) by mouth daily (Patient not taking: Reported on 8/4/2021) 30 tablet 3    Naproxen Sodium (ALEVE PO) Take by mouth (Patient not taking: Reported on 7/10/2021)       No current facility-administered medications for this visit  Allergies: Sulfa antibiotics and Amoxicillin    Objective:  Vitals:    08/12/21 0916   BP: 124/84   Pulse: 75   Temp: (!) 95 °F (35 °C)   SpO2: 94%   Weight: 129 kg (284 lb)   Height: 5' 11" (1 803 m)   Oxygen Therapy  SpO2: 94 %    Wt Readings from Last 3 Encounters:   08/12/21 129 kg (284 lb)   08/04/21 128 kg (282 lb 1 6 oz)   07/12/21 127 kg (280 lb)     Body mass index is 39 61 kg/m²  Physical Exam  Constitutional:       Appearance: He is well-developed  HENT:      Head: Normocephalic and atraumatic  Eyes:      Pupils: Pupils are equal, round, and reactive to light  Neck:      Comments: Short and wide neck  Cardiovascular:      Rate and Rhythm: Normal rate and regular rhythm  Heart sounds: Normal heart sounds  Pulmonary:      Effort: Pulmonary effort is normal       Breath sounds: Normal breath sounds  Musculoskeletal:         General: Normal range of motion  Cervical back: Normal range of motion and neck supple  Skin:     General: Skin is warm and dry  Neurological:      Mental Status: He is alert and oriented to person, place, and time  Psychiatric:         Behavior: Behavior normal            Diagnostics:  I have personally reviewed pertinent reports  ESS: Total score: 1  NM myocardial perfusion spect (stress and/or rest)    Result Date: 7/30/2021  Narrative:  [address] [place] [id number], [address] [place] [address] [phone] Rest/Stress Gated SPECT Myocardial Perfusion Imaging After Exercise Patient: TAB ANTONINO FEDE MR number: [id number] Account number: [de-identified] : 1965 Age: 60 years Gender: Male Status: Outpatient Location: [facility] Height: 71 in Weight: 280 lb BP: 122/ 74 mmHg Allergies: SULFA ANTIBIOTICS, AMOXICILLIN Diagnosis: R07 9 - Chest pain, unspecified Primary Physician:  Michelle Flood Referring Physician:  Debbie Kendall Technician:  Maria Teresa Saucedo BS, BA, AART(N) Group:  [facility] Other:  Kalee Ku MS, CCT Interpreting Physician:  Lillian Lindquist MD INDICATIONS: Coronary artery disease  HISTORY: The patient is a 60year old  male  Chest pain status: chest pain  Coronary artery disease risk factors: hypertension and smoking (marijuana)  Cardiovascular history: none significant  Co-morbidity: history of alcohol abuse and obesity  Anxiety, GERD/Finney's esophagus Medications: a nitrate, a calcium channel blocker, and aspirin  PHYSICAL EXAM: Baseline physical exam screening: normal  REST ECG: ST segments and T waves were normal  PROCEDURE: The study was performed in the [address]  Treadmill exercise testing was performed, using the Michele protocol  Gated SPECT myocardial perfusion imaging was performed after stress and at rest  Systolic blood pressure was 122 mmHg, at the start of the study  Diastolic blood pressure was 74 mmHg, at the start of the study  The heart rate was 73 bpm, at the start of the study   MICHELE PROTOCOL: HR bpm SBP mmHg DBP mmHg Symptoms Rhythm/conduct Baseline 73 122 74 none NSR, no ectopy Stage 1 113 172 88 mild dyspnea sinus tach Stage 2 136 198 76 moderate dyspnea, moderate fatigue sinus tach, rare PAC's Stage 3 153 -- -- severe dyspnea, severe fatigue sinus tach Immediate 155 -- -- same as above sinus tach Recovery 1 142 226 92 subsiding same as above Recovery 2 125 -- -- subsiding same as above Recovery 3 113 162 86 none same as above Recovery 5 103 142 76 none same as above STRESS SUMMARY: Duration of exercise was 7 min  The patient exercised to protocol stage 3  Maximal work rate was 10 1 METs  Maximal heart rate during stress was 155 bpm ( 96 % of maximal predicted heart rate)  The rate-pressure product for the peak heart rate and blood pressure was 56443  There was no chest pain during stress  The stress test was terminated due to achievement of maximal (symptom limited) exercise and achievement of target heart rate  Pre oxygen saturation: 100 %  Peak oxygen saturation: 99 %  The stress ECG was negative for ischemia and normal  There were no stress arrhythmias or conduction abnormalities  ISOTOPE ADMINISTRATION: Resting isotope administration Stress isotope administration Agent Tetrofosmin Tetrofosmin Dose 15 92 mCi 32 3 mCi Date 07/30/2021 07/30/2021 Injection-image interval 30 min 45 min The radiopharmaceutical was injected one minute before the end of exercise  MYOCARDIAL PERFUSION IMAGING: The image quality was excellent  GATED SPECT: The calculated left ventricular ejection fraction was 54 %  There was no left ventricular regional abnormality  SUMMARY: -  Stress results: Duration of exercise was 7 min  Target heart rate was achieved  There was no chest pain during stress  -  ECG conclusions: The stress ECG was negative for ischemia and normal  -  Gated SPECT: The calculated left ventricular ejection fraction was 54 %  There was no left ventricular regional abnormality  IMPRESSIONS: 1  Good exercise tolerance 2  EKG negative for ischemia 3   Normal myocardial perfusion-no ischemia Prepared and signed by Justus James MD Signed 07/30/2021 13:19:59

## 2021-08-20 ENCOUNTER — HOSPITAL ENCOUNTER (OUTPATIENT)
Dept: NON INVASIVE DIAGNOSTICS | Facility: CLINIC | Age: 56
Discharge: HOME | End: 2021-08-20
Payer: COMMERCIAL

## 2021-08-20 DIAGNOSIS — R07.9 CHEST PAIN, UNSPECIFIED TYPE: ICD-10-CM

## 2021-08-20 PROCEDURE — 93306 TTE W/DOPPLER COMPLETE: CPT

## 2021-08-20 PROCEDURE — 93306 TTE W/DOPPLER COMPLETE: CPT | Performed by: INTERNAL MEDICINE

## 2021-08-23 ENCOUNTER — OFFICE VISIT (OUTPATIENT)
Dept: CARDIOLOGY CLINIC | Facility: CLINIC | Age: 56
End: 2021-08-23
Payer: COMMERCIAL

## 2021-08-23 VITALS
HEIGHT: 71 IN | OXYGEN SATURATION: 98 % | HEART RATE: 79 BPM | WEIGHT: 278 LBS | DIASTOLIC BLOOD PRESSURE: 82 MMHG | BODY MASS INDEX: 38.92 KG/M2 | SYSTOLIC BLOOD PRESSURE: 128 MMHG

## 2021-08-23 DIAGNOSIS — I10 HYPERTENSION: ICD-10-CM

## 2021-08-23 DIAGNOSIS — I10 ESSENTIAL HYPERTENSION: Primary | ICD-10-CM

## 2021-08-23 PROCEDURE — 99214 OFFICE O/P EST MOD 30 MIN: CPT | Performed by: INTERNAL MEDICINE
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
## 2021-08-24 ENCOUNTER — TELEPHONE (OUTPATIENT)
Dept: PULMONOLOGY | Facility: CLINIC | Age: 56
End: 2021-08-24

## 2021-09-14 ENCOUNTER — APPOINTMENT (OUTPATIENT)
Dept: LAB | Facility: HOSPITAL | Age: 56
End: 2021-09-14

## 2021-09-14 DIAGNOSIS — Z00.8 HEALTH EXAMINATION IN POPULATION SURVEY: ICD-10-CM

## 2021-09-14 LAB
CHOLEST SERPL-MCNC: 182 MG/DL (ref 50–200)
EST. AVERAGE GLUCOSE BLD GHB EST-MCNC: 108 MG/DL
HBA1C MFR BLD: 5.4 %
HDLC SERPL-MCNC: 42 MG/DL
LDLC SERPL CALC-MCNC: 120 MG/DL (ref 0–100)
NONHDLC SERPL-MCNC: 140 MG/DL
TRIGL SERPL-MCNC: 100 MG/DL

## 2021-09-14 PROCEDURE — 80061 LIPID PANEL: CPT

## 2021-09-14 PROCEDURE — 83036 HEMOGLOBIN GLYCOSYLATED A1C: CPT

## 2021-09-14 PROCEDURE — 36415 COLL VENOUS BLD VENIPUNCTURE: CPT

## 2021-10-12 ENCOUNTER — OFFICE VISIT (OUTPATIENT)
Dept: BARIATRICS | Facility: CLINIC | Age: 56
End: 2021-10-12
Payer: COMMERCIAL

## 2021-10-12 VITALS
HEIGHT: 71 IN | SYSTOLIC BLOOD PRESSURE: 134 MMHG | DIASTOLIC BLOOD PRESSURE: 92 MMHG | BODY MASS INDEX: 38.15 KG/M2 | HEART RATE: 93 BPM | WEIGHT: 272.5 LBS

## 2021-10-12 DIAGNOSIS — K22.719 BARRETT'S ESOPHAGUS WITH DYSPLASIA: ICD-10-CM

## 2021-10-12 DIAGNOSIS — E66.01 SEVERE OBESITY (BMI 35.0-39.9) WITH COMORBIDITY (HCC): Primary | ICD-10-CM

## 2021-10-12 DIAGNOSIS — R63.5 ABNORMAL WEIGHT GAIN: ICD-10-CM

## 2021-10-12 DIAGNOSIS — G47.33 OSA (OBSTRUCTIVE SLEEP APNEA): ICD-10-CM

## 2021-10-12 DIAGNOSIS — I10 ESSENTIAL HYPERTENSION: ICD-10-CM

## 2021-10-12 PROCEDURE — 99214 OFFICE O/P EST MOD 30 MIN: CPT | Performed by: INTERNAL MEDICINE

## 2021-11-12 ENCOUNTER — HOSPITAL ENCOUNTER (OUTPATIENT)
Dept: RADIOLOGY | Facility: HOSPITAL | Age: 56
Discharge: HOME | End: 2021-11-12
Payer: COMMERCIAL

## 2021-11-12 ENCOUNTER — OFFICE VISIT (OUTPATIENT)
Dept: PULMONOLOGY | Facility: CLINIC | Age: 56
End: 2021-11-12
Payer: COMMERCIAL

## 2021-11-12 ENCOUNTER — APPOINTMENT (OUTPATIENT)
Dept: LAB | Facility: HOSPITAL | Age: 56
End: 2021-11-12
Payer: COMMERCIAL

## 2021-11-12 VITALS
HEART RATE: 90 BPM | TEMPERATURE: 97 F | WEIGHT: 271 LBS | HEIGHT: 71 IN | SYSTOLIC BLOOD PRESSURE: 130 MMHG | DIASTOLIC BLOOD PRESSURE: 94 MMHG | OXYGEN SATURATION: 98 % | BODY MASS INDEX: 37.94 KG/M2

## 2021-11-12 DIAGNOSIS — E66.01 MORBID OBESITY (HCC): ICD-10-CM

## 2021-11-12 DIAGNOSIS — R06.02 SOB (SHORTNESS OF BREATH): ICD-10-CM

## 2021-11-12 DIAGNOSIS — G47.33 OSA (OBSTRUCTIVE SLEEP APNEA): ICD-10-CM

## 2021-11-12 DIAGNOSIS — I10 ESSENTIAL HYPERTENSION: ICD-10-CM

## 2021-11-12 DIAGNOSIS — R06.02 SOB (SHORTNESS OF BREATH): Primary | ICD-10-CM

## 2021-11-12 DIAGNOSIS — F17.200 CURRENT EVERY DAY SMOKER: ICD-10-CM

## 2021-11-12 PROCEDURE — 86671 FUNGUS NES ANTIBODY: CPT

## 2021-11-12 PROCEDURE — 86003 ALLG SPEC IGE CRUDE XTRC EA: CPT

## 2021-11-12 PROCEDURE — 82785 ASSAY OF IGE: CPT

## 2021-11-12 PROCEDURE — 36415 COLL VENOUS BLD VENIPUNCTURE: CPT

## 2021-11-12 PROCEDURE — 86606 ASPERGILLUS ANTIBODY: CPT

## 2021-11-12 PROCEDURE — 86602 ANTINOMYCES ANTIBODY: CPT

## 2021-11-12 PROCEDURE — 99214 OFFICE O/P EST MOD 30 MIN: CPT | Performed by: INTERNAL MEDICINE

## 2021-11-12 PROCEDURE — 71046 X-RAY EXAM CHEST 2 VIEWS: CPT

## 2021-11-12 PROCEDURE — 86331 IMMUNODIFFUSION OUCHTERLONY: CPT

## 2021-11-13 ENCOUNTER — HOSPITAL ENCOUNTER (OUTPATIENT)
Facility: HOSPITAL | Age: 56
Setting detail: OBSERVATION
Discharge: HOME | End: 2021-11-14
Attending: EMERGENCY MEDICINE | Admitting: INTERNAL MEDICINE
Payer: COMMERCIAL

## 2021-11-13 ENCOUNTER — APPOINTMENT (EMERGENCY)
Dept: CT IMAGING | Facility: HOSPITAL | Age: 56
End: 2021-11-13
Payer: COMMERCIAL

## 2021-11-13 DIAGNOSIS — R07.89 CHEST PRESSURE: Primary | ICD-10-CM

## 2021-11-13 LAB
2HR DELTA HS TROPONIN: 0 NG/L
4HR DELTA HS TROPONIN: 0 NG/L
ALBUMIN SERPL BCP-MCNC: 4.3 G/DL (ref 3.5–5)
ALP SERPL-CCNC: 80 U/L (ref 46–116)
ALT SERPL W P-5'-P-CCNC: 33 U/L (ref 12–78)
ANION GAP SERPL CALCULATED.3IONS-SCNC: 11 MMOL/L (ref 4–13)
AST SERPL W P-5'-P-CCNC: 17 U/L (ref 5–45)
BASOPHILS # BLD AUTO: 0.02 THOUSANDS/ΜL (ref 0–0.1)
BASOPHILS NFR BLD AUTO: 0 % (ref 0–1)
BILIRUB SERPL-MCNC: 0.53 MG/DL (ref 0.2–1)
BUN SERPL-MCNC: 24 MG/DL (ref 5–25)
CALCIUM SERPL-MCNC: 9.2 MG/DL (ref 8.3–10.1)
CARDIAC TROPONIN I PNL SERPL HS: 2 NG/L
CHLORIDE SERPL-SCNC: 103 MMOL/L (ref 100–108)
CO2 SERPL-SCNC: 24 MMOL/L (ref 21–32)
CREAT SERPL-MCNC: 0.91 MG/DL (ref 0.6–1.3)
EOSINOPHIL # BLD AUTO: 0.06 THOUSAND/ΜL (ref 0–0.61)
EOSINOPHIL NFR BLD AUTO: 1 % (ref 0–6)
ERYTHROCYTE [DISTWIDTH] IN BLOOD BY AUTOMATED COUNT: 13.7 % (ref 11.6–15.1)
GFR SERPL CREATININE-BSD FRML MDRD: 93 ML/MIN/1.73SQ M
GLUCOSE SERPL-MCNC: 98 MG/DL (ref 65–140)
HCT VFR BLD AUTO: 51 % (ref 36.5–49.3)
HGB BLD-MCNC: 16.2 G/DL (ref 12–17)
IMM GRANULOCYTES # BLD AUTO: 0.02 THOUSAND/UL (ref 0–0.2)
IMM GRANULOCYTES NFR BLD AUTO: 0 % (ref 0–2)
LIPASE SERPL-CCNC: 78 U/L (ref 73–393)
LYMPHOCYTES # BLD AUTO: 1.04 THOUSANDS/ΜL (ref 0.6–4.47)
LYMPHOCYTES NFR BLD AUTO: 18 % (ref 14–44)
MCH RBC QN AUTO: 28.6 PG (ref 26.8–34.3)
MCHC RBC AUTO-ENTMCNC: 31.8 G/DL (ref 31.4–37.4)
MCV RBC AUTO: 90 FL (ref 82–98)
MONOCYTES # BLD AUTO: 0.42 THOUSAND/ΜL (ref 0.17–1.22)
MONOCYTES NFR BLD AUTO: 7 % (ref 4–12)
NEUTROPHILS # BLD AUTO: 4.36 THOUSANDS/ΜL (ref 1.85–7.62)
NEUTS SEG NFR BLD AUTO: 74 % (ref 43–75)
NRBC BLD AUTO-RTO: 0 /100 WBCS
PLATELET # BLD AUTO: 288 THOUSANDS/UL (ref 149–390)
PMV BLD AUTO: 9.5 FL (ref 8.9–12.7)
POTASSIUM SERPL-SCNC: 3.9 MMOL/L (ref 3.5–5.3)
PROT SERPL-MCNC: 8.1 G/DL (ref 6.4–8.2)
RBC # BLD AUTO: 5.66 MILLION/UL (ref 3.88–5.62)
SODIUM SERPL-SCNC: 138 MMOL/L (ref 136–145)
WBC # BLD AUTO: 5.92 THOUSAND/UL (ref 4.31–10.16)

## 2021-11-13 PROCEDURE — G1004 CDSM NDSC: HCPCS

## 2021-11-13 PROCEDURE — 84484 ASSAY OF TROPONIN QUANT: CPT | Performed by: INTERNAL MEDICINE

## 2021-11-13 PROCEDURE — 99285 EMERGENCY DEPT VISIT HI MDM: CPT

## 2021-11-13 PROCEDURE — 93005 ELECTROCARDIOGRAM TRACING: CPT

## 2021-11-13 PROCEDURE — 74174 CTA ABD&PLVS W/CONTRAST: CPT

## 2021-11-13 PROCEDURE — 83690 ASSAY OF LIPASE: CPT | Performed by: EMERGENCY MEDICINE

## 2021-11-13 PROCEDURE — 80053 COMPREHEN METABOLIC PANEL: CPT | Performed by: EMERGENCY MEDICINE

## 2021-11-13 PROCEDURE — 99285 EMERGENCY DEPT VISIT HI MDM: CPT | Performed by: EMERGENCY MEDICINE

## 2021-11-13 PROCEDURE — 36415 COLL VENOUS BLD VENIPUNCTURE: CPT | Performed by: EMERGENCY MEDICINE

## 2021-11-13 PROCEDURE — 71275 CT ANGIOGRAPHY CHEST: CPT

## 2021-11-13 PROCEDURE — 85025 COMPLETE CBC W/AUTO DIFF WBC: CPT | Performed by: EMERGENCY MEDICINE

## 2021-11-13 PROCEDURE — 99220 PR INITIAL OBSERVATION CARE/DAY 70 MINUTES: CPT | Performed by: INTERNAL MEDICINE

## 2021-11-13 PROCEDURE — 96374 THER/PROPH/DIAG INJ IV PUSH: CPT

## 2021-11-13 PROCEDURE — 84484 ASSAY OF TROPONIN QUANT: CPT | Performed by: EMERGENCY MEDICINE

## 2021-11-13 RX ORDER — NICOTINE 21 MG/24HR
1 PATCH, TRANSDERMAL 24 HOURS TRANSDERMAL DAILY
Status: DISCONTINUED | OUTPATIENT
Start: 2021-11-13 | End: 2021-11-14 | Stop reason: HOSPADM

## 2021-11-13 RX ORDER — IBUPROFEN 200 MG
200 TABLET ORAL EVERY 6 HOURS PRN
Status: DISCONTINUED | OUTPATIENT
Start: 2021-11-13 | End: 2021-11-14 | Stop reason: HOSPADM

## 2021-11-13 RX ORDER — ACETAMINOPHEN 325 MG/1
650 TABLET ORAL EVERY 4 HOURS PRN
Status: DISCONTINUED | OUTPATIENT
Start: 2021-11-13 | End: 2021-11-14 | Stop reason: HOSPADM

## 2021-11-13 RX ORDER — ASPIRIN 81 MG/1
324 TABLET, CHEWABLE ORAL ONCE
Status: COMPLETED | OUTPATIENT
Start: 2021-11-13 | End: 2021-11-13

## 2021-11-13 RX ORDER — MORPHINE SULFATE 4 MG/ML
4 INJECTION, SOLUTION INTRAMUSCULAR; INTRAVENOUS ONCE
Status: COMPLETED | OUTPATIENT
Start: 2021-11-13 | End: 2021-11-13

## 2021-11-13 RX ORDER — ASPIRIN 81 MG/1
81 TABLET ORAL DAILY
Status: DISCONTINUED | OUTPATIENT
Start: 2021-11-13 | End: 2021-11-14 | Stop reason: HOSPADM

## 2021-11-13 RX ADMIN — MORPHINE SULFATE 4 MG: 4 INJECTION INTRAVENOUS at 11:00

## 2021-11-13 RX ADMIN — ASPIRIN 324 MG: 81 TABLET, CHEWABLE ORAL at 15:16

## 2021-11-13 RX ADMIN — IOHEXOL 100 ML: 350 INJECTION, SOLUTION INTRAVENOUS at 12:18

## 2021-11-14 VITALS
TEMPERATURE: 97.6 F | DIASTOLIC BLOOD PRESSURE: 92 MMHG | SYSTOLIC BLOOD PRESSURE: 121 MMHG | HEART RATE: 77 BPM | RESPIRATION RATE: 18 BRPM | OXYGEN SATURATION: 91 %

## 2021-11-14 LAB
ANION GAP SERPL CALCULATED.3IONS-SCNC: 8 MMOL/L (ref 4–13)
BASOPHILS # BLD MANUAL: 0 THOUSAND/UL (ref 0–0.1)
BASOPHILS NFR MAR MANUAL: 0 % (ref 0–1)
BUN SERPL-MCNC: 27 MG/DL (ref 5–25)
CALCIUM SERPL-MCNC: 8.8 MG/DL (ref 8.3–10.1)
CHLORIDE SERPL-SCNC: 105 MMOL/L (ref 100–108)
CO2 SERPL-SCNC: 25 MMOL/L (ref 21–32)
CREAT SERPL-MCNC: 0.99 MG/DL (ref 0.6–1.3)
EOSINOPHIL # BLD MANUAL: 0.28 THOUSAND/UL (ref 0–0.4)
EOSINOPHIL NFR BLD MANUAL: 5 % (ref 0–6)
ERYTHROCYTE [DISTWIDTH] IN BLOOD BY AUTOMATED COUNT: 13.7 % (ref 11.6–15.1)
GFR SERPL CREATININE-BSD FRML MDRD: 84 ML/MIN/1.73SQ M
GLUCOSE P FAST SERPL-MCNC: 93 MG/DL (ref 65–99)
GLUCOSE SERPL-MCNC: 93 MG/DL (ref 65–140)
HCT VFR BLD AUTO: 48.1 % (ref 36.5–49.3)
HGB BLD-MCNC: 15.4 G/DL (ref 12–17)
LYMPHOCYTES # BLD AUTO: 1.51 THOUSAND/UL (ref 0.6–4.47)
LYMPHOCYTES # BLD AUTO: 27 % (ref 14–44)
MACROCYTES BLD QL AUTO: PRESENT
MCH RBC QN AUTO: 29 PG (ref 26.8–34.3)
MCHC RBC AUTO-ENTMCNC: 32 G/DL (ref 31.4–37.4)
MCV RBC AUTO: 91 FL (ref 82–98)
MONOCYTES # BLD AUTO: 0.22 THOUSAND/UL (ref 0–1.22)
MONOCYTES NFR BLD: 4 % (ref 4–12)
NEUTROPHILS # BLD MANUAL: 3.41 THOUSAND/UL (ref 1.85–7.62)
NEUTS SEG NFR BLD AUTO: 61 % (ref 43–75)
PLATELET # BLD AUTO: 285 THOUSANDS/UL (ref 149–390)
PLATELET BLD QL SMEAR: ADEQUATE
PMV BLD AUTO: 9.7 FL (ref 8.9–12.7)
POTASSIUM SERPL-SCNC: 4.2 MMOL/L (ref 3.5–5.3)
RBC # BLD AUTO: 5.31 MILLION/UL (ref 3.88–5.62)
SODIUM SERPL-SCNC: 138 MMOL/L (ref 136–145)
VARIANT LYMPHS # BLD AUTO: 3 %
WBC # BLD AUTO: 5.59 THOUSAND/UL (ref 4.31–10.16)

## 2021-11-14 PROCEDURE — 36415 COLL VENOUS BLD VENIPUNCTURE: CPT | Performed by: INTERNAL MEDICINE

## 2021-11-14 PROCEDURE — 99217 PR OBSERVATION CARE DISCHARGE MANAGEMENT: CPT | Performed by: PHYSICIAN ASSISTANT

## 2021-11-14 PROCEDURE — 85007 BL SMEAR W/DIFF WBC COUNT: CPT | Performed by: INTERNAL MEDICINE

## 2021-11-14 PROCEDURE — 85027 COMPLETE CBC AUTOMATED: CPT | Performed by: INTERNAL MEDICINE

## 2021-11-14 PROCEDURE — 80048 BASIC METABOLIC PNL TOTAL CA: CPT | Performed by: INTERNAL MEDICINE

## 2021-11-14 RX ADMIN — IBUPROFEN 200 MG: 200 TABLET, FILM COATED ORAL at 02:46

## 2021-11-15 ENCOUNTER — TRANSITIONAL CARE MANAGEMENT (OUTPATIENT)
Dept: FAMILY MEDICINE CLINIC | Facility: CLINIC | Age: 56
End: 2021-11-15

## 2021-11-15 LAB
A ALTERNATA IGE QN: <0.1 KUA/I
A FUMIGATUS IGE QN: <0.1 KUA/I
ALLERGEN COMMENT: ABNORMAL
BERMUDA GRASS IGE QN: <0.1 KUA/I
BOXELDER IGE QN: <0.1 KUA/I
C HERBARUM IGE QN: <0.1 KUA/I
CAT DANDER IGE QN: 2.27 KUA/I
CMN PIGWEED IGE QN: <0.1 KUA/I
COMMON RAGWEED IGE QN: <0.1 KUA/I
COTTONWOOD IGE QN: <0.1 KUA/I
D FARINAE IGE QN: <0.1 KUA/I
D PTERONYSS IGE QN: 0.13 KUA/I
DOG DANDER IGE QN: 0.23 KUA/I
LONDON PLANE IGE QN: <0.1 KUA/I
MOUSE URINE PROT IGE QN: <0.1 KUA/I
MT JUNIPER IGE QN: <0.1 KUA/I
MUGWORT IGE QN: <0.1 KUA/I
P NOTATUM IGE QN: <0.1 KUA/I
ROACH IGE QN: 0.19 KUA/I
SHEEP SORREL IGE QN: <0.1 KUA/I
SILVER BIRCH IGE QN: <0.1 KUA/I
TIMOTHY IGE QN: <0.1 KUA/I
TOTAL IGE SMQN RAST: 358 KU/L (ref 0–113)
WALNUT IGE QN: <0.1 KUA/I
WHITE ASH IGE QN: <0.1 KUA/I
WHITE ELM IGE QN: <0.1 KUA/I
WHITE MULBERRY IGE QN: <0.1 KUA/I
WHITE OAK IGE QN: <0.1 KUA/I

## 2021-11-16 LAB
ATRIAL RATE: 75 BPM
P AXIS: 58 DEGREES
PR INTERVAL: 172 MS
QRS AXIS: 32 DEGREES
QRSD INTERVAL: 82 MS
QT INTERVAL: 376 MS
QTC INTERVAL: 412 MS
T WAVE AXIS: 17 DEGREES
VENTRICULAR RATE: 72 BPM

## 2021-11-16 PROCEDURE — 93010 ELECTROCARDIOGRAM REPORT: CPT | Performed by: INTERNAL MEDICINE

## 2021-11-17 LAB
A FUMIGATUS1 AB SER QL ID: NEGATIVE
A PULLULANS AB SER QL: NEGATIVE
LACEYELLA SACCHARI AB SER QL: NEGATIVE
PIGEON SERUM AB QL ID: NEGATIVE
S RECTIVIRGULA AB SER QL ID: NEGATIVE
T VULGARIS AB SER QL ID: NEGATIVE

## 2021-11-18 ENCOUNTER — TELEPHONE (OUTPATIENT)
Dept: PULMONOLOGY | Facility: CLINIC | Age: 56
End: 2021-11-18

## 2022-01-20 ENCOUNTER — TELEPHONE (OUTPATIENT)
Dept: FAMILY MEDICINE CLINIC | Facility: CLINIC | Age: 57
End: 2022-01-20

## 2022-01-20 DIAGNOSIS — B34.9 VIRAL INFECTION, UNSPECIFIED: ICD-10-CM

## 2022-01-20 DIAGNOSIS — Z11.59 SCREENING FOR VIRAL DISEASE: Primary | ICD-10-CM

## 2022-01-20 DIAGNOSIS — Z20.822 EXPOSURE TO COVID-19 VIRUS: ICD-10-CM

## 2022-01-20 PROCEDURE — U0003 INFECTIOUS AGENT DETECTION BY NUCLEIC ACID (DNA OR RNA); SEVERE ACUTE RESPIRATORY SYNDROME CORONAVIRUS 2 (SARS-COV-2) (CORONAVIRUS DISEASE [COVID-19]), AMPLIFIED PROBE TECHNIQUE, MAKING USE OF HIGH THROUGHPUT TECHNOLOGIES AS DESCRIBED BY CMS-2020-01-R: HCPCS | Performed by: NURSE PRACTITIONER

## 2022-01-20 PROCEDURE — U0005 INFEC AGEN DETEC AMPLI PROBE: HCPCS | Performed by: NURSE PRACTITIONER
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
## 2022-01-31 ENCOUNTER — HOSPITAL ENCOUNTER (EMERGENCY)
Facility: HOSPITAL | Age: 57
Discharge: HOME | End: 2022-01-31
Attending: EMERGENCY MEDICINE
Payer: COMMERCIAL

## 2022-01-31 ENCOUNTER — APPOINTMENT (EMERGENCY)
Dept: CT IMAGING | Facility: HOSPITAL | Age: 57
End: 2022-01-31
Payer: COMMERCIAL

## 2022-01-31 VITALS
DIASTOLIC BLOOD PRESSURE: 92 MMHG | TEMPERATURE: 97.9 F | OXYGEN SATURATION: 98 % | RESPIRATION RATE: 18 BRPM | HEART RATE: 87 BPM | SYSTOLIC BLOOD PRESSURE: 155 MMHG

## 2022-01-31 DIAGNOSIS — K43.9 VENTRAL HERNIA WITHOUT OBSTRUCTION OR GANGRENE: ICD-10-CM

## 2022-01-31 DIAGNOSIS — R10.9 ABDOMINAL PAIN: Primary | ICD-10-CM

## 2022-01-31 LAB
ALBUMIN SERPL BCP-MCNC: 4.2 G/DL (ref 3.5–5)
ALP SERPL-CCNC: 79 U/L (ref 46–116)
ALT SERPL W P-5'-P-CCNC: 27 U/L (ref 12–78)
ANION GAP SERPL CALCULATED.3IONS-SCNC: 6 MMOL/L (ref 4–13)
AST SERPL W P-5'-P-CCNC: 17 U/L (ref 5–45)
BACTERIA UR QL AUTO: NORMAL /HPF
BASOPHILS # BLD AUTO: 0.03 THOUSANDS/ΜL (ref 0–0.1)
BASOPHILS NFR BLD AUTO: 1 % (ref 0–1)
BILIRUB SERPL-MCNC: 0.41 MG/DL (ref 0.2–1)
BILIRUB UR QL STRIP: NEGATIVE
BUN SERPL-MCNC: 23 MG/DL (ref 5–25)
CALCIUM SERPL-MCNC: 9.3 MG/DL (ref 8.3–10.1)
CHLORIDE SERPL-SCNC: 105 MMOL/L (ref 100–108)
CLARITY UR: CLEAR
CO2 SERPL-SCNC: 27 MMOL/L (ref 21–32)
COLOR UR: YELLOW
CREAT SERPL-MCNC: 0.93 MG/DL (ref 0.6–1.3)
EOSINOPHIL # BLD AUTO: 0.13 THOUSAND/ΜL (ref 0–0.61)
EOSINOPHIL NFR BLD AUTO: 3 % (ref 0–6)
ERYTHROCYTE [DISTWIDTH] IN BLOOD BY AUTOMATED COUNT: 13.6 % (ref 11.6–15.1)
GFR SERPL CREATININE-BSD FRML MDRD: 90 ML/MIN/1.73SQ M
GLUCOSE SERPL-MCNC: 105 MG/DL (ref 65–140)
GLUCOSE UR STRIP-MCNC: NEGATIVE MG/DL
HCT VFR BLD AUTO: 50.1 % (ref 36.5–49.3)
HGB BLD-MCNC: 16.5 G/DL (ref 12–17)
HGB UR QL STRIP.AUTO: NEGATIVE
IMM GRANULOCYTES # BLD AUTO: 0.01 THOUSAND/UL (ref 0–0.2)
IMM GRANULOCYTES NFR BLD AUTO: 0 % (ref 0–2)
KETONES UR STRIP-MCNC: NEGATIVE MG/DL
LACTATE SERPL-SCNC: 1.1 MMOL/L (ref 0.5–2)
LEUKOCYTE ESTERASE UR QL STRIP: NEGATIVE
LIPASE SERPL-CCNC: 89 U/L (ref 73–393)
LYMPHOCYTES # BLD AUTO: 1.32 THOUSANDS/ΜL (ref 0.6–4.47)
LYMPHOCYTES NFR BLD AUTO: 25 % (ref 14–44)
MCH RBC QN AUTO: 29.5 PG (ref 26.8–34.3)
MCHC RBC AUTO-ENTMCNC: 32.9 G/DL (ref 31.4–37.4)
MCV RBC AUTO: 90 FL (ref 82–98)
MONOCYTES # BLD AUTO: 0.39 THOUSAND/ΜL (ref 0.17–1.22)
MONOCYTES NFR BLD AUTO: 7 % (ref 4–12)
NEUTROPHILS # BLD AUTO: 3.41 THOUSANDS/ΜL (ref 1.85–7.62)
NEUTS SEG NFR BLD AUTO: 64 % (ref 43–75)
NITRITE UR QL STRIP: NEGATIVE
NON-SQ EPI CELLS URNS QL MICRO: NORMAL /HPF
NRBC BLD AUTO-RTO: 0 /100 WBCS
PH UR STRIP.AUTO: 7 [PH] (ref 4.5–8)
PLATELET # BLD AUTO: 299 THOUSANDS/UL (ref 149–390)
PMV BLD AUTO: 9.5 FL (ref 8.9–12.7)
POTASSIUM SERPL-SCNC: 4.3 MMOL/L (ref 3.5–5.3)
PROT SERPL-MCNC: 7.9 G/DL (ref 6.4–8.2)
PROT UR STRIP-MCNC: ABNORMAL MG/DL
RBC # BLD AUTO: 5.6 MILLION/UL (ref 3.88–5.62)
RBC #/AREA URNS AUTO: NORMAL /HPF
SODIUM SERPL-SCNC: 138 MMOL/L (ref 136–145)
SP GR UR STRIP.AUTO: 1.01 (ref 1–1.03)
UROBILINOGEN UR QL STRIP.AUTO: 0.2 E.U./DL
WBC # BLD AUTO: 5.29 THOUSAND/UL (ref 4.31–10.16)
WBC #/AREA URNS AUTO: NORMAL /HPF

## 2022-01-31 PROCEDURE — 80053 COMPREHEN METABOLIC PANEL: CPT | Performed by: EMERGENCY MEDICINE

## 2022-01-31 PROCEDURE — 36415 COLL VENOUS BLD VENIPUNCTURE: CPT | Performed by: EMERGENCY MEDICINE

## 2022-01-31 PROCEDURE — 83605 ASSAY OF LACTIC ACID: CPT | Performed by: EMERGENCY MEDICINE

## 2022-01-31 PROCEDURE — 83690 ASSAY OF LIPASE: CPT | Performed by: EMERGENCY MEDICINE

## 2022-01-31 PROCEDURE — 74177 CT ABD & PELVIS W/CONTRAST: CPT

## 2022-01-31 PROCEDURE — G1004 CDSM NDSC: HCPCS

## 2022-01-31 PROCEDURE — 85025 COMPLETE CBC W/AUTO DIFF WBC: CPT | Performed by: EMERGENCY MEDICINE

## 2022-01-31 PROCEDURE — 99284 EMERGENCY DEPT VISIT MOD MDM: CPT

## 2022-01-31 PROCEDURE — 99285 EMERGENCY DEPT VISIT HI MDM: CPT | Performed by: EMERGENCY MEDICINE

## 2022-01-31 PROCEDURE — 96374 THER/PROPH/DIAG INJ IV PUSH: CPT

## 2022-01-31 PROCEDURE — 81001 URINALYSIS AUTO W/SCOPE: CPT

## 2022-01-31 RX ORDER — HYDROMORPHONE HCL/PF 1 MG/ML
0.5 SYRINGE (ML) INJECTION ONCE
Status: COMPLETED | OUTPATIENT
Start: 2022-01-31 | End: 2022-01-31

## 2022-01-31 RX ORDER — HYDROCODONE BITARTRATE AND ACETAMINOPHEN 5; 325 MG/1; MG/1
1 TABLET ORAL EVERY 6 HOURS PRN
Qty: 10 TABLET | Refills: 0 | Status: SHIPPED | OUTPATIENT
Start: 2022-01-31 | End: 2022-02-10

## 2022-01-31 RX ADMIN — IOHEXOL 100 ML: 350 INJECTION, SOLUTION INTRAVENOUS at 11:55

## 2022-01-31 RX ADMIN — HYDROMORPHONE HYDROCHLORIDE 0.5 MG: 1 INJECTION, SOLUTION INTRAMUSCULAR; INTRAVENOUS; SUBCUTANEOUS at 11:05

## 2022-01-31 NOTE — ED PROVIDER NOTES
History  Chief Complaint   Patient presents with    Groin Pain     Patient c/o left groin pain extending to testicle since Saturday  Also reports LLQ abdominal pain where previous colostomy was  [facility] n/v/d       17-year-old male presents the emergency department for evaluation of abdominal pain  Patient states that he has been having left upper abdominal discomfort intermittently for quite some time  He describes it as dull and constant and sometimes changes with position  The pain is localized to where he had a previous colostomy  Over the past 2 days he has been experiencing left lower quadrant abdominal pain  Patient states that this is more sharp in nature and at times radiate briefly toward the left groin and testicle  Patient states the pain is intermittent and is unrelated to eating  He denies testicular pain  He has had no nausea, vomiting, diarrhea  He reports normal bowel movements  No fevers or chills  He reports normal appetite  No dysuria or hematuria  Patient had a previous bowel resection for severe diverticulitis  He had a colostomy for 3 years after his surgery before he had a reanastomosis of his colon  Patient states that he has not had flares of diverticulitis since his surgery  History provided by:  Patient  Abdominal Pain  Pain location:  LUQ and LLQ  Pain quality: aching, dull, sharp and stabbing    Pain radiates to:  Groin  Onset quality:  Gradual  Duration:  2 days  Timing:  Intermittent  Progression:  Unchanged  Chronicity:  New  Context: previous surgery    Context: not awakening from sleep, not eating, not sick contacts, not suspicious food intake and not trauma    Relieved by:  Nothing  Worsened by:   Movement and palpation  Ineffective treatments:  None tried  Associated symptoms: no anorexia, no chest pain, no constipation, no cough, no diarrhea, no dysuria, no fever, no nausea and no vomiting    Risk factors: multiple surgeries    Risk factors: no recent hospitalization        Prior to Admission Medications   Prescriptions Last Dose Informant Patient Reported? Taking? Insulin Pen Needle 32G X 4 MM MISC  Self No No   Sig: Use as directed with liraglutide   Naproxen Sodium (ALEVE PO)  Self Yes No   Sig: Take by mouth   Patient not taking: Reported on 7/10/2021   acetaminophen (TYLENOL) 650 mg CR tablet  Self No No   Sig: Take 1 tablet (650 mg total) by mouth every 8 (eight) hours as needed for mild pain   aspirin (ECOTRIN LOW STRENGTH) 81 mg EC tablet  Self No No   Sig: Take 1 tablet (81 mg total) by mouth daily   ibuprofen (MOTRIN) 200 mg tablet  Self Yes No   Sig: Take by mouth every 6 (six) hours as needed for mild pain    liraglutide (SAXENDA) injection  Self No No   Sig: Inject under the skin (rotate between abdomen, upper arms, and thighs) Week 1- Inject 0 6mg once daily; Week 2- inject 1 2mg once daily; Week 3- inject 1 8mg once daily; Week 4- Inject 2 4mg once daily; Week 5 and on- inject 3 0mg once daily   Patient not taking: Reported on 10/12/2021      Facility-Administered Medications: None       Past Medical History:   Diagnosis Date    Acute kidney injury (nontraumatic) (HCC)     Alcohol abuse     Anxiety     Finney esophagus     Benign essential hypertension     Chronic pain syndrome 02/17/2021    Diverticulitis     Diverticulitis     Erectile dysfunction     Esophageal reflux     Essential hypertension     Lumbar radiculopathy 05/13/2019    Major depression     Mild cognitive disorder 05/06/2016    Mood disorder ([address] ) 05/02/2016    Multiple nevi     Obesity     Obesity (BMI 30-39  9)     ALEXANDRE (obstructive sleep apnea)     Psychosis (HCC)     Snoring     Suspicious nevus     Tremor of right hand     Weakness of right upper extremity        Past Surgical History:   Procedure Laterality Date    COLONOSCOPY N/A 3/9/2019    Procedure: COLONOSCOPY;  Surgeon: Nilesh Aiken MD;  Location: [facility];   Service: Gastroenterology   Akash De La Paz LIPOMA RESECTION      back    MD ESOPHAGOGASTRODUODENOSCOPY TRANSORAL DIAGNOSTIC N/A 3/9/2019    Procedure: ESOPHAGOGASTRODUODENOSCOPY (EGD); Surgeon: Jessica Fink MD;  Location: [facility]; Service: Gastroenterology    REVISION COLOSTOMY         Family History   Problem Relation Age of Onset    Stroke Mother     Heart disease Father      I have reviewed and agree with the history as documented  E-Cigarette/Vaping    E-Cigarette Use Former User      E-Cigarette/Vaping Substances    Nicotine No     THC No     CBD No     Flavoring No     Other No     Unknown No      Social History     Tobacco Use    Smoking status: Current Every Day Smoker     Types: Cigarettes     Start date: 0    Smokeless tobacco: Never Used   Vaping Use    Vaping Use: Former   Substance Use Topics    Alcohol use: Yes     Alcohol/week: 12 0 standard drinks     Types: 12 Cans of beer per week     Comment: drinks a 6pack 2 x weekly    Drug use: Yes     Types: Marijuana     Comment: Medical Card       Review of Systems   Constitutional: Negative for appetite change and fever  Respiratory: Negative for cough  Cardiovascular: Negative for chest pain  Gastrointestinal: Positive for abdominal pain  Negative for anorexia, blood in stool, constipation, diarrhea, nausea and vomiting  Genitourinary: Negative for dysuria, flank pain, scrotal swelling and testicular pain  Musculoskeletal: Negative for back pain  Neurological: Negative for weakness  All other systems reviewed and are negative  Physical Exam  Physical Exam  Vitals reviewed  Constitutional:       General: He is not in acute distress  Appearance: Normal appearance  He is well-developed  He is not ill-appearing  HENT:      Head: Normocephalic and atraumatic  Right Ear: External ear normal       Left Ear: External ear normal       Mouth/Throat:      Mouth: Mucous membranes are moist    Eyes:      General: No scleral icterus  Conjunctiva/sclera: Conjunctivae normal       Pupils: Pupils are equal, round, and reactive to light  Cardiovascular:      Rate and Rhythm: Normal rate and regular rhythm  Heart sounds: Normal heart sounds  No murmur heard  Pulmonary:      Effort: Pulmonary effort is normal  No accessory muscle usage or respiratory distress  Breath sounds: Normal breath sounds  Chest:      Chest wall: No tenderness  Abdominal:      General: Bowel sounds are normal  There is no distension  Palpations: Abdomen is soft  Tenderness: There is abdominal tenderness in the left upper quadrant and left lower quadrant  There is no right CVA tenderness, left CVA tenderness, guarding or rebound  Hernia: A hernia is present  Hernia is present in the ventral area  There is no hernia in the left inguinal area or right inguinal area  Musculoskeletal:         General: No tenderness or deformity  Normal range of motion  Cervical back: Normal range of motion and neck supple  Lymphadenopathy:      Cervical: No cervical adenopathy  Skin:     General: Skin is warm and dry  Findings: No rash  Neurological:      Mental Status: He is alert and oriented to person, place, and time  Coordination: Coordination normal       Deep Tendon Reflexes: Reflexes are normal and symmetric  Psychiatric:         Behavior: Behavior normal          Thought Content:  Thought content normal          Judgment: Judgment normal          Vital Signs  ED Triage Vitals [01/31/22 1021]   Temperature Pulse Respirations Blood Pressure SpO2   97 9 °F (36 6 °C) 87 18 155/92 98 %      Temp Source Heart Rate Source Patient Position - Orthostatic VS BP Location FiO2 (%)   Oral Monitor Lying Right arm --      Pain Score       --           Vitals:    01/31/22 1021   BP: 155/92   Pulse: 87   Patient Position - Orthostatic VS: Lying         Visual Acuity      ED Medications  Medications   HYDROmorphone (DILAUDID) injection 0 5 mg (0 5 mg Intravenous Given 1/31/22 1105)   iohexol (OMNIPAQUE) 350 MG/ML injection (SINGLE-DOSE) 100 mL (100 mL Intravenous Given 1/31/22 1155)       Diagnostic Studies  Results Reviewed     Procedure Component Value Units Date/Time    Urine Microscopic [[id number]]  (Normal) Collected: 01/31/22 1328    Lab Status: Final result Specimen: Urine, Clean Catch Updated: 01/31/22 1359     RBC, UA None Seen /hpf      WBC, UA 0-1 /hpf      Epithelial Cells None Seen /hpf      Bacteria, UA None Seen /hpf     Urine Macroscopic, POC [[id number]]  (Abnormal) Collected: 01/31/22 1328    Lab Status: Final result Specimen: Urine Updated: 01/31/22 1334     Color, UA Yellow     Clarity, UA Clear     pH, UA 7 0     Leukocytes, UA Negative     Nitrite, UA Negative     Protein, UA Trace mg/dl      Glucose, UA Negative mg/dl      Ketones, UA Negative mg/dl      Urobilinogen, UA 0 2 E U /dl      Bilirubin, UA Negative     Blood, UA Negative     Specific Gravity, UA 1 015    Narrative:      CLINITEK RESULT    Lactic acid [[id number]]  (Normal) Collected: 01/31/22 1037    Lab Status: Final result Specimen: Blood from Arm, Right Updated: 01/31/22 1112     LACTIC ACID 1 1 mmol/L     Narrative:      Result may be elevated if tourniquet was used during collection      Comprehensive metabolic panel [[id number]] Collected: 01/31/22 1037    Lab Status: Final result Specimen: Blood from Arm, Right Updated: 01/31/22 1110     Sodium 138 mmol/L      Potassium 4 3 mmol/L      Chloride 105 mmol/L      CO2 27 mmol/L      ANION GAP 6 mmol/L      BUN 23 mg/dL      Creatinine 0 93 mg/dL      Glucose 105 mg/dL      Calcium 9 3 mg/dL      AST 17 U/L      ALT 27 U/L      Alkaline Phosphatase 79 U/L      Total Protein 7 9 g/dL      Albumin 4 2 g/dL      Total Bilirubin 0 41 mg/dL      eGFR 90 ml/min/1 73sq m     Narrative:      [place] guidelines for Chronic Kidney Disease (CKD):     Stage 1 with normal or high GFR (GFR > 90 mL/min/1 73 square meters)    Stage 2 Mild CKD (GFR = 60-89 mL/min/1 73 square meters)    Stage 3A Moderate CKD (GFR = 45-59 mL/min/1 73 square meters)    Stage 3B Moderate CKD (GFR = 30-44 mL/min/1 73 square meters)    Stage 4 Severe CKD (GFR = 15-29 mL/min/1 73 square meters)    Stage 5 End Stage CKD (GFR <15 mL/min/1 73 square meters)  Note: GFR calculation is accurate only with a steady state creatinine    Lipase [[id number]]  (Normal) Collected: 01/31/22 1037    Lab Status: Final result Specimen: Blood from Arm, Right Updated: 01/31/22 1110     Lipase 89 u/L     CBC and differential [[id number]]  (Abnormal) Collected: 01/31/22 1037    Lab Status: Final result Specimen: Blood from Arm, Right Updated: 01/31/22 1054     WBC 5 29 Thousand/uL      RBC 5 60 Million/uL      Hemoglobin 16 5 g/dL      Hematocrit 50 1 %      MCV 90 fL      MCH 29 5 pg      MCHC 32 9 g/dL      RDW 13 6 %      MPV 9 5 fL      Platelets 569 Thousands/uL      nRBC 0 /100 WBCs      Neutrophils Relative 64 %      Immat GRANS % 0 %      Lymphocytes Relative 25 %      Monocytes Relative 7 %      Eosinophils Relative 3 %      Basophils Relative 1 %      Neutrophils Absolute 3 41 Thousands/µL      Immature Grans Absolute 0 01 Thousand/uL      Lymphocytes Absolute 1 32 Thousands/µL      Monocytes Absolute 0 39 Thousand/µL      Eosinophils Absolute 0 13 Thousand/µL      Basophils Absolute 0 03 Thousands/µL                  CT abdomen pelvis with contrast   Final Result by Davide Sweeney MD (01/31 1210)      No acute CT findings in the abdomen or pelvis  Broad-based ventral hernia containing small bowel without evidence for obstruction  Colonic diverticulosis without convincing diverticulitis  Normal appendix  Stable left adrenal gland nodule and tiny right renal cyst       Stable 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 fascia oliva              Workstation performed: [id number]                    Procedures  Procedures         ED Course MDM  Number of Diagnoses or Management Options  Abdominal pain: new and requires workup  Ventral hernia without obstruction or gangrene: new and requires workup     Amount and/or Complexity of Data Reviewed  Clinical lab tests: ordered and reviewed  Tests in the radiology section of CPT®: ordered and reviewed  Decide to obtain previous medical records or to obtain history from someone other than the patient: yes  Independent visualization of images, tracings, or specimens: yes    Risk of Complications, Morbidity, and/or Mortality  General comments: 27-year-old male presents with chronic left upper quadrant abdominal discomfort as well as more acute left lower quadrant abdominal pain  I suspect the left upper quadrant discomfort is secondary to scar tissue from previous surgery and colostomy status post reversal   Patient's CT is not demonstrating any acute pathology  Patient does have a ventral hernia, diverticula and other incidental findings that we discussed  Patient's blood work is reassuring  No leukocytosis or other red flags  Patient is without signs of peritonitis on abdominal exam   Patient's pain is currently well controlled  Will have patient follow-up with surgery for further evaluation of ventral hernia  Discussed signs and symptoms return to the emergency department      Patient Progress  Patient progress: stable      Disposition  Final diagnoses:   Abdominal pain   Ventral hernia without obstruction or gangrene     Time reflects when diagnosis was documented in both MDM as applicable and the Disposition within this note     Time User Action Codes Description Comment    1/31/2022  1:29 PM Marcelle Givens Add [R10 9] Abdominal pain     1/31/2022  1:30 PM Marcelle Givens Add [K43 9] Ventral hernia without obstruction or gangrene       ED Disposition     ED Disposition Condition Date/Time Comment    Discharge Stable Mon Jan 31, 2022  1:29 PM Danny Lew discharge to home/self care  Follow-up Information     Follow up With Specialties Details [address], [address] [place], Nurse Practitioner   [phone] [address] [facility] 16  [phone]            Discharge Medication List as of 1/31/2022  1:45 PM      START taking these medications    Details   HYDROcodone-acetaminophen (Norco) 5-325 mg per tablet Take 1 tablet by mouth every 6 (six) hours as needed for pain for up to 10 days Max Daily Amount: 4 tablets, Starting Mon 1/31/2022, Until Thu 2/10/2022 at 2359, Normal         CONTINUE these medications which have NOT CHANGED    Details   acetaminophen (TYLENOL) 650 mg CR tablet Take 1 tablet (650 mg total) by mouth every 8 (eight) hours as needed for mild pain, Starting Mon 5/13/2019, Normal      aspirin (ECOTRIN LOW STRENGTH) 81 mg EC tablet Take 1 tablet (81 mg total) by mouth daily, Starting Tue 7/13/2021, Normal      ibuprofen (MOTRIN) 200 mg tablet Take by mouth every 6 (six) hours as needed for mild pain , Historical Med      Insulin Pen Needle 32G X 4 MM MISC Use as directed with liraglutide, Normal      liraglutide (SAXENDA) injection Inject under the skin (rotate between abdomen, upper arms, and thighs) Week 1- Inject 0 6mg once daily; Week 2- inject 1 2mg once daily; Week 3- inject 1 8mg once daily; Week 4- Inject 2 4mg once daily; Week 5 and on- inject 3 0mg once daily, Normal      Naproxen Sodium (ALEVE PO) Take by mouth, Historical Med             No discharge procedures on file      PDMP Review       Value Time User    PDMP Reviewed  Yes 1/31/2022  1:30 PM Vanessa Díaz DO          ED Provider  Electronically Signed by           Vanessa íDaz DO  01/31/22 8411

## 2022-02-01 ENCOUNTER — OFFICE VISIT (OUTPATIENT)
Dept: PAIN MEDICINE | Facility: CLINIC | Age: 57
End: 2022-02-01
Payer: COMMERCIAL

## 2022-02-01 VITALS
HEIGHT: 71 IN | WEIGHT: 268 LBS | BODY MASS INDEX: 37.52 KG/M2 | HEART RATE: 76 BPM | DIASTOLIC BLOOD PRESSURE: 80 MMHG | RESPIRATION RATE: 18 BRPM | SYSTOLIC BLOOD PRESSURE: 126 MMHG

## 2022-02-01 DIAGNOSIS — G89.29 CHRONIC RIGHT-SIDED LOW BACK PAIN WITH RIGHT-SIDED SCIATICA: ICD-10-CM

## 2022-02-01 DIAGNOSIS — G89.4 CHRONIC PAIN SYNDROME: Primary | ICD-10-CM

## 2022-02-01 DIAGNOSIS — M51.16 LUMBAR DISC HERNIATION WITH RADICULOPATHY: ICD-10-CM

## 2022-02-01 DIAGNOSIS — M54.41 CHRONIC RIGHT-SIDED LOW BACK PAIN WITH RIGHT-SIDED SCIATICA: ICD-10-CM

## 2022-02-01 PROCEDURE — 99214 OFFICE O/P EST MOD 30 MIN: CPT | Performed by: ANESTHESIOLOGY

## 2022-02-01 NOTE — PROGRESS NOTES
Pain Medicine Follow-Up Note    Assessment:  1  Chronic pain syndrome    2  Chronic right-sided low back pain with right-sided sciatica    3  Lumbar disc herniation with radiculopathy        Plan:  Orders Placed This Encounter   Procedures    FL spine and pain procedure     4 week follow-up after injection     Standing Status:   Future     Standing Expiration Date:   2/1/2026     Order Specific Question:   Reason for Exam:     Answer:   Right L2 and L3 transforaminal epidural steroid injection     Order Specific Question:   Anticoagulant hold needed? Answer:   Unknown     My impressions and treatment recommendations were discussed in detail with the patient who verbalized understanding and had no further questions  The patient is reporting pain primarily in his low back and right lower extremity in what appears to be the right L2 and L3 distribution  He reports that the right L2 and L3 transforaminal epidural steroid injection that was performed on March 3, 2021 gave him about 6 months worth of relief  His pain has been slowly returning since that time  Has increase the use of ibuprofen, but notes that he would like to undergo repeat interventional pain procedures since he gets several months of excellent pain relief out of these  As such, I felt a reasonable to offer him a repeat right L2 and L3 transforaminal epidural steroid injection  The procedures, its risks, and benefits were explained in detail to the patient  Risks include but are not limited to bleeding, infection, hematoma formation, abscess formation, weakness, headache, failure the pain to improve, nerve irritation or damage, and potential worsening of the pain  The patient verbalized understanding and wished to proceed with the procedure  Follow-up is planned in 4 weeks time or sooner as warranted  Discharge instructions were provided   I personally saw and examined the patient and I agree with the above discussed plan of care     History of Present Illness:    Barron Sacks is a 60 y o  male who presents to [facility] for interval re-evaluation of the above stated pain complaints  The patient has a past medical and chronic pain history as outlined in the assessment section  He was last seen on March 3, 2021  At today's office visit, the patient's pain score is 7/10 on the verbal numerical pain rating scale  The patient states that his symptoms are primarily in his low back and right lower extremity  He describes his pain as worse in the morning  His pain is constant in nature  He reports the quality of his pain as sharp and shooting  He states that he had about 6 months worth of relief following the right L2 and L3 transforaminal epidural steroid injection performed on March 3, 2021  He states that his pain has been slowly returning and he would like to undergo a repeat injection at this time  Other than as stated above, the patient denies any interval changes in medications, medical condition, mental condition, symptoms, or allergies since the last office visit  Review of Systems:    Review of Systems   Respiratory: Negative for shortness of breath  Cardiovascular: Negative for chest pain  Gastrointestinal: Negative for constipation, diarrhea, nausea and vomiting  Musculoskeletal: Negative for arthralgias, gait problem, joint swelling and myalgias  Skin: Negative for rash  Neurological: Negative for dizziness, seizures and weakness  All other systems reviewed and are negative          Patient Active Problem List   Diagnosis    Essential hypertension    Chest pain    Mood disorder (HCC)    Mild cognitive disorder    Finney's esophagus with dysplasia    Lumbar radiculopathy    Class 3 severe obesity due to excess calories without serious comorbidity with body mass index (BMI) of 40 0 to 44 9 in adult [facility])    Chronic pain syndrome    Chronic right-sided low back pain with right-sided sciatica    Optic nerve edema    Annual physical exam    Snoring    Encounter for hepatitis C screening test for low risk patient    Screening for prostate cancer    Screening for lipid disorders    Screening for HIV (human immunodeficiency virus)    ALEXANDRE (obstructive sleep apnea)       Past Medical History:   Diagnosis Date    Acute kidney injury (nontraumatic) (HCC)     Alcohol abuse     Anxiety     Finney esophagus     Benign essential hypertension     Chronic pain syndrome 02/17/2021    Diverticulitis     Diverticulitis     Erectile dysfunction     Esophageal reflux     Essential hypertension     Lumbar radiculopathy 05/13/2019    Major depression     Mild cognitive disorder 05/06/2016    Mood disorder ([address] ) 05/02/2016    Multiple nevi     Obesity     Obesity (BMI 30-39  9)     ALEXANDRE (obstructive sleep apnea)     Psychosis (HCC)     Snoring     Suspicious nevus     Tremor of right hand     Weakness of right upper extremity        Past Surgical History:   Procedure Laterality Date    COLONOSCOPY N/A 3/9/2019    Procedure: COLONOSCOPY;  Surgeon: Neena Frederick MD;  Location: [facility]; Service: Gastroenterology    LIPOMA RESECTION      back    [place] ESOPHAGOGASTRODUODENOSCOPY TRANSORAL DIAGNOSTIC N/A 3/9/2019    Procedure: ESOPHAGOGASTRODUODENOSCOPY (EGD); Surgeon: Neena Frederick MD;  Location: [facility];   Service: Gastroenterology    REVISION COLOSTOMY         Family History   Problem Relation Age of Onset    Stroke Mother     Heart disease Father        Social History     Occupational History    Occupation: CONSTRUCTION   Tobacco Use    Smoking status: Former Smoker     Types: Cigarettes     Start date: 1981    Smokeless tobacco: Never Used   Vaping Use    Vaping Use: Former   Substance and Sexual Activity    Alcohol use: Not Currently     Comment: Sober 5 moths    Drug use: Yes     Types: Marijuana     Comment: Medical Card    Sexual activity: Yes     Partners: Female         Current Outpatient Medications:     acetaminophen (TYLENOL) 650 mg CR tablet, Take 1 tablet (650 mg total) by mouth every 8 (eight) hours as needed for mild pain, Disp: 30 tablet, Rfl: 1    aspirin (ECOTRIN LOW STRENGTH) 81 mg EC tablet, Take 1 tablet (81 mg total) by mouth daily, Disp: 30 tablet, Rfl: 3    HYDROcodone-acetaminophen (Norco) 5-325 mg per tablet, Take 1 tablet by mouth every 6 (six) hours as needed for pain for up to 10 days Max Daily Amount: 4 tablets, Disp: 10 tablet, Rfl: 0    ibuprofen (MOTRIN) 200 mg tablet, Take by mouth every 6 (six) hours as needed for mild pain , Disp: , Rfl:     Insulin Pen Needle 32G X 4 MM MISC, Use as directed with liraglutide, Disp: 90 each, Rfl: 1    liraglutide (SAXENDA) injection, Inject under the skin (rotate between abdomen, upper arms, and thighs) Week 1- Inject 0 6mg once daily; Week 2- inject 1 2mg once daily; Week 3- inject 1 8mg once daily; Week 4- Inject 2 4mg once daily; Week 5 and on- inject 3 0mg once daily, Disp: 9 mL, Rfl: 3    Naproxen Sodium (ALEVE PO), Take by mouth  , Disp: , Rfl:   No current facility-administered medications for this visit      Allergies   Allergen Reactions    Sulfa Antibiotics GI Intolerance     HA, vomiting, sweating     Amoxicillin GI Intolerance       Physical Exam:    /80   Pulse 76   Resp 18   Ht 5' 11" (1 803 m)   Wt 122 kg (268 lb)   BMI 37 38 kg/m²     Constitutional:obese  Eyes:anicteric  HEENT:grossly intact  Neck:supple, symmetric, trachea midline and no masses   Pulmonary:even and unlabored  Cardiovascular:No edema or pitting edema present  Skin:Normal without rashes or lesions and well hydrated  Psychiatric:Mood and affect appropriate  Neurologic:Cranial Nerves II-XII grossly intact  Musculoskeletal:antalgic      Imaging  FL spine and pain procedure    (Results Pending)         Orders Placed This Encounter   Procedures    FL spine and pain procedure

## 2022-02-23 ENCOUNTER — TELEPHONE (OUTPATIENT)
Dept: PAIN MEDICINE | Facility: CLINIC | Age: 57
End: 2022-02-23

## 2022-02-23 NOTE — TELEPHONE ENCOUNTER
Patient   [phone]  Dr Sania Rosa     Please reach out for rescheduling,he mixed his days up thank you

## 2022-02-25 ENCOUNTER — APPOINTMENT (EMERGENCY)
Dept: CT IMAGING | Facility: HOSPITAL | Age: 57
End: 2022-02-25
Payer: COMMERCIAL

## 2022-02-25 ENCOUNTER — HOSPITAL ENCOUNTER (EMERGENCY)
Facility: HOSPITAL | Age: 57
Discharge: HOME | End: 2022-02-25
Attending: EMERGENCY MEDICINE | Admitting: EMERGENCY MEDICINE
Payer: COMMERCIAL

## 2022-02-25 VITALS
HEART RATE: 93 BPM | WEIGHT: 268 LBS | TEMPERATURE: 97.4 F | DIASTOLIC BLOOD PRESSURE: 83 MMHG | SYSTOLIC BLOOD PRESSURE: 134 MMHG | OXYGEN SATURATION: 96 % | RESPIRATION RATE: 19 BRPM | BODY MASS INDEX: 37.38 KG/M2

## 2022-02-25 DIAGNOSIS — R10.9 NONSPECIFIC ABDOMINAL PAIN: Primary | ICD-10-CM

## 2022-02-25 DIAGNOSIS — K65.4 SCLEROSING MESENTERITIS (HCC): ICD-10-CM

## 2022-02-25 LAB
ALBUMIN SERPL BCP-MCNC: 4.2 G/DL (ref 3.5–5)
ALP SERPL-CCNC: 85 U/L (ref 46–116)
ALT SERPL W P-5'-P-CCNC: 26 U/L (ref 12–78)
ANION GAP SERPL CALCULATED.3IONS-SCNC: 14 MMOL/L (ref 4–13)
AST SERPL W P-5'-P-CCNC: 13 U/L (ref 5–45)
ATRIAL RATE: 89 BPM
BASOPHILS # BLD AUTO: 0.02 THOUSANDS/ΜL (ref 0–0.1)
BASOPHILS NFR BLD AUTO: 0 % (ref 0–1)
BILIRUB DIRECT SERPL-MCNC: 0.09 MG/DL (ref 0–0.2)
BILIRUB SERPL-MCNC: 0.46 MG/DL (ref 0.2–1)
BUN SERPL-MCNC: 28 MG/DL (ref 5–25)
CALCIUM SERPL-MCNC: 9.4 MG/DL (ref 8.3–10.1)
CARDIAC TROPONIN I PNL SERPL HS: <2 NG/L
CHLORIDE SERPL-SCNC: 104 MMOL/L (ref 100–108)
CO2 SERPL-SCNC: 23 MMOL/L (ref 21–32)
CREAT SERPL-MCNC: 0.93 MG/DL (ref 0.6–1.3)
EOSINOPHIL # BLD AUTO: 0 THOUSAND/ΜL (ref 0–0.61)
EOSINOPHIL NFR BLD AUTO: 0 % (ref 0–6)
ERYTHROCYTE [DISTWIDTH] IN BLOOD BY AUTOMATED COUNT: 13.8 % (ref 11.6–15.1)
GFR SERPL CREATININE-BSD FRML MDRD: 90 ML/MIN/1.73SQ M
GLUCOSE SERPL-MCNC: 135 MG/DL (ref 65–140)
HCT VFR BLD AUTO: 50.9 % (ref 36.5–49.3)
HGB BLD-MCNC: 17.1 G/DL (ref 12–17)
IMM GRANULOCYTES # BLD AUTO: 0.04 THOUSAND/UL (ref 0–0.2)
IMM GRANULOCYTES NFR BLD AUTO: 0 % (ref 0–2)
LACTATE SERPL-SCNC: 3.1 MMOL/L (ref 0.5–2)
LIPASE SERPL-CCNC: 58 U/L (ref 73–393)
LYMPHOCYTES # BLD AUTO: 0.31 THOUSANDS/ΜL (ref 0.6–4.47)
LYMPHOCYTES NFR BLD AUTO: 3 % (ref 14–44)
MCH RBC QN AUTO: 29.1 PG (ref 26.8–34.3)
MCHC RBC AUTO-ENTMCNC: 33.6 G/DL (ref 31.4–37.4)
MCV RBC AUTO: 87 FL (ref 82–98)
MONOCYTES # BLD AUTO: 0.26 THOUSAND/ΜL (ref 0.17–1.22)
MONOCYTES NFR BLD AUTO: 2 % (ref 4–12)
NEUTROPHILS # BLD AUTO: 11.17 THOUSANDS/ΜL (ref 1.85–7.62)
NEUTS SEG NFR BLD AUTO: 95 % (ref 43–75)
NRBC BLD AUTO-RTO: 0 /100 WBCS
P AXIS: 58 DEGREES
PLATELET # BLD AUTO: 290 THOUSANDS/UL (ref 149–390)
PMV BLD AUTO: 9.9 FL (ref 8.9–12.7)
POTASSIUM SERPL-SCNC: 3.8 MMOL/L (ref 3.5–5.3)
PR INTERVAL: 170 MS
PROT SERPL-MCNC: 8.2 G/DL (ref 6.4–8.2)
QRS AXIS: 50 DEGREES
QRSD INTERVAL: 80 MS
QT INTERVAL: 378 MS
QTC INTERVAL: 459 MS
RBC # BLD AUTO: 5.87 MILLION/UL (ref 3.88–5.62)
SODIUM SERPL-SCNC: 141 MMOL/L (ref 136–145)
T WAVE AXIS: 41 DEGREES
VENTRICULAR RATE: 89 BPM
WBC # BLD AUTO: 11.8 THOUSAND/UL (ref 4.31–10.16)

## 2022-02-25 PROCEDURE — 96375 TX/PRO/DX INJ NEW DRUG ADDON: CPT

## 2022-02-25 PROCEDURE — 84484 ASSAY OF TROPONIN QUANT: CPT | Performed by: EMERGENCY MEDICINE

## 2022-02-25 PROCEDURE — G1004 CDSM NDSC: HCPCS

## 2022-02-25 PROCEDURE — 83605 ASSAY OF LACTIC ACID: CPT | Performed by: EMERGENCY MEDICINE

## 2022-02-25 PROCEDURE — 99284 EMERGENCY DEPT VISIT MOD MDM: CPT

## 2022-02-25 PROCEDURE — 96376 TX/PRO/DX INJ SAME DRUG ADON: CPT

## 2022-02-25 PROCEDURE — 85025 COMPLETE CBC W/AUTO DIFF WBC: CPT | Performed by: EMERGENCY MEDICINE

## 2022-02-25 PROCEDURE — 74177 CT ABD & PELVIS W/CONTRAST: CPT

## 2022-02-25 PROCEDURE — 36415 COLL VENOUS BLD VENIPUNCTURE: CPT | Performed by: EMERGENCY MEDICINE

## 2022-02-25 PROCEDURE — 93005 ELECTROCARDIOGRAM TRACING: CPT

## 2022-02-25 PROCEDURE — 93010 ELECTROCARDIOGRAM REPORT: CPT | Performed by: INTERNAL MEDICINE

## 2022-02-25 PROCEDURE — 80048 BASIC METABOLIC PNL TOTAL CA: CPT | Performed by: EMERGENCY MEDICINE

## 2022-02-25 PROCEDURE — 83690 ASSAY OF LIPASE: CPT | Performed by: EMERGENCY MEDICINE

## 2022-02-25 PROCEDURE — 99285 EMERGENCY DEPT VISIT HI MDM: CPT | Performed by: EMERGENCY MEDICINE

## 2022-02-25 PROCEDURE — 80076 HEPATIC FUNCTION PANEL: CPT | Performed by: EMERGENCY MEDICINE

## 2022-02-25 PROCEDURE — 96361 HYDRATE IV INFUSION ADD-ON: CPT

## 2022-02-25 PROCEDURE — 96374 THER/PROPH/DIAG INJ IV PUSH: CPT

## 2022-02-25 RX ORDER — MORPHINE SULFATE 10 MG/ML
6 INJECTION, SOLUTION INTRAMUSCULAR; INTRAVENOUS ONCE
Status: COMPLETED | OUTPATIENT
Start: 2022-02-25 | End: 2022-02-25

## 2022-02-25 RX ORDER — ONDANSETRON 2 MG/ML
4 INJECTION INTRAMUSCULAR; INTRAVENOUS ONCE
Status: COMPLETED | OUTPATIENT
Start: 2022-02-25 | End: 2022-02-25

## 2022-02-25 RX ADMIN — MORPHINE SULFATE 6 MG: 10 INJECTION INTRAVENOUS at 16:21

## 2022-02-25 RX ADMIN — IOHEXOL 100 ML: 350 INJECTION, SOLUTION INTRAVENOUS at 17:33

## 2022-02-25 RX ADMIN — MORPHINE SULFATE 6 MG: 10 INJECTION INTRAVENOUS at 17:41

## 2022-02-25 RX ADMIN — SODIUM CHLORIDE 1000 ML: 0.9 INJECTION, SOLUTION INTRAVENOUS at 16:34

## 2022-02-25 RX ADMIN — ONDANSETRON 4 MG: 2 INJECTION INTRAMUSCULAR; INTRAVENOUS at 16:39

## 2022-02-25 NOTE — ED PROVIDER NOTES
History  Chief Complaint   Patient presents with    Abdominal Pain     Mid sternal to abdominal pain, hx of diverticulitis, colostomy, reversal surgery  10/10 pain and cold sweats     HPI patient is a 58-year-old male, history of diverticulitis with colostomy and colostomy reversal, presents with diffuse abdominal pain that started last night  Patient reports the pain is all over his abdomen generalized  He reports similar to pain he has had in the past   Patient denies any pain in his groin  Past medical history of alcohol abuse, acute kidney injury, Finney's esophagitis, diverticulitis, colostomy  Family history noncontributory  Social history former smoker no history of drug abuse    Prior to Admission Medications   Prescriptions Last Dose Informant Patient Reported? Taking? Insulin Pen Needle 32G X 4 MM MISC  Self No No   Sig: Use as directed with liraglutide   Naproxen Sodium (ALEVE PO)  Self Yes No   Sig: Take by mouth     acetaminophen (TYLENOL) 650 mg CR tablet  Self No No   Sig: Take 1 tablet (650 mg total) by mouth every 8 (eight) hours as needed for mild pain   aspirin (ECOTRIN LOW STRENGTH) 81 mg EC tablet  Self No No   Sig: Take 1 tablet (81 mg total) by mouth daily   ibuprofen (MOTRIN) 200 mg tablet  Self Yes No   Sig: Take by mouth every 6 (six) hours as needed for mild pain    liraglutide (SAXENDA) injection  Self No No   Sig: Inject under the skin (rotate between abdomen, upper arms, and thighs) Week 1- Inject 0 6mg once daily; Week 2- inject 1 2mg once daily; Week 3- inject 1 8mg once daily; Week 4- Inject 2 4mg once daily;   Week 5 and on- inject 3 0mg once daily      Facility-Administered Medications: None       Past Medical History:   Diagnosis Date    Acute kidney injury (nontraumatic) (HCC)     Alcohol abuse     Anxiety     Finney esophagus     Benign essential hypertension     Chronic pain syndrome 02/17/2021    Diverticulitis     Diverticulitis     Erectile dysfunction     Esophageal reflux     Essential hypertension     Lumbar radiculopathy 05/13/2019    Major depression     Mild cognitive disorder 05/06/2016    Mood disorder ([facility] [address] ) 05/02/2016    Multiple nevi     Obesity     Obesity (BMI 30-39  9)     ALEXANDRE (obstructive sleep apnea)     Psychosis (HCC)     Snoring     Suspicious nevus     Tremor of right hand     Weakness of right upper extremity        Past Surgical History:   Procedure Laterality Date    COLONOSCOPY N/A 3/9/2019    Procedure: COLONOSCOPY;  Surgeon: Latia Weber MD;  Location: [facility]; Service: Gastroenterology    LIPOMA RESECTION      back    FL ESOPHAGOGASTRODUODENOSCOPY TRANSORAL DIAGNOSTIC N/A 3/9/2019    Procedure: ESOPHAGOGASTRODUODENOSCOPY (EGD); Surgeon: Ltaia Weber MD;  Location: [facility]; Service: Gastroenterology    REVISION COLOSTOMY         Family History   Problem Relation Age of Onset    Stroke Mother     Heart disease Father      I have reviewed and agree with the history as documented  E-Cigarette/Vaping    E-Cigarette Use Former User      E-Cigarette/Vaping Substances    Nicotine No     THC No     CBD No     Flavoring No     Other No     Unknown No      Social History     Tobacco Use    Smoking status: Former Smoker     Types: Cigarettes     Start date: 1981    Smokeless tobacco: Never Used   Vaping Use    Vaping Use: Former   Substance Use Topics    Alcohol use: Not Currently     Comment: Sober 5 moths    Drug use: Yes     Types: Marijuana     Comment: Medical Card       Review of Systems   Constitutional: Negative for diaphoresis, fatigue and fever  HENT: Negative for congestion, ear pain, nosebleeds and sore throat  Eyes: Negative for photophobia, pain, discharge and visual disturbance  Respiratory: Negative for cough, choking, chest tightness, shortness of breath and wheezing  Cardiovascular: Negative for chest pain and palpitations  Gastrointestinal: Positive for abdominal pain  Negative for abdominal distention, diarrhea and vomiting  Genitourinary: Negative for dysuria, flank pain and frequency  Musculoskeletal: Negative for back pain, gait problem and joint swelling  Skin: Negative for color change and rash  Neurological: Negative for dizziness, syncope and headaches  Psychiatric/Behavioral: Negative for behavioral problems and confusion  The patient is not nervous/anxious  All other systems reviewed and are negative  Physical Exam  Physical Exam  Vitals and nursing note reviewed  Constitutional:       Appearance: He is well-developed  HENT:      Head: Normocephalic  Right Ear: External ear normal       Left Ear: External ear normal       Nose: Nose normal    Eyes:      General: Lids are normal       Pupils: Pupils are equal, round, and reactive to light  Cardiovascular:      Rate and Rhythm: Normal rate and regular rhythm  Pulses: Normal pulses  Heart sounds: Normal heart sounds  Pulmonary:      Effort: Pulmonary effort is normal  No respiratory distress  Breath sounds: Normal breath sounds  Abdominal:      General: Abdomen is flat  Bowel sounds are normal       Tenderness: There is abdominal tenderness  Comments: Multiple surgical scars, diffuse tenderness without rebound or guarding   Musculoskeletal:         General: No deformity  Normal range of motion  Cervical back: Normal range of motion and neck supple  Skin:     General: Skin is warm and dry  Neurological:      Mental Status: He is alert and oriented to person, place, and time       Pulse oximetry 99% on room air adequate oxygenation, there is no hypoxia    Vital Signs  ED Triage Vitals   Temperature Pulse Respirations Blood Pressure SpO2   02/25/22 1531 02/25/22 1531 02/25/22 1531 02/25/22 1531 02/25/22 1531   97 7 °F (36 5 °C) 86 20 143/92 99 %      Temp Source Heart Rate Source Patient Position - Orthostatic VS BP Location FiO2 (%)   02/25/22 1630 02/25/22 1531 02/25/22 1531 02/25/22 1531 --   Oral Monitor Sitting Left arm       Pain Score       02/25/22 1630       10 - Worst Possible Pain           Vitals:    02/25/22 1531 02/25/22 1630 02/25/22 1743 02/25/22 1815   BP: 143/92 146/83 134/83    Pulse: 86 66 84 93   Patient Position - Orthostatic VS: Sitting Lying Lying          Visual Acuity      ED Medications  Medications   morphine (PF) 10 mg/mL injection 6 mg (6 mg Intravenous Given 2/25/22 1621)   ondansetron (ZOFRAN) injection 4 mg (4 mg Intravenous Given 2/25/22 1639)   sodium chloride 0 9 % bolus 1,000 mL (0 mL Intravenous Stopped 2/25/22 1734)   morphine (PF) 10 mg/mL injection 6 mg (6 mg Intravenous Given 2/25/22 1741)   iohexol (OMNIPAQUE) 350 MG/ML injection (SINGLE-DOSE) 100 mL (100 mL Intravenous Given 2/25/22 1733)       Diagnostic Studies  Results Reviewed     Procedure Component Value Units Date/Time    CBC and differential [[id number]]  (Abnormal) Collected: 02/25/22 1624    Lab Status: Final result Specimen: Blood from Hand, Right Updated: 02/25/22 1720     WBC 11 80 Thousand/uL      RBC 5 87 Million/uL      Hemoglobin 17 1 g/dL      Hematocrit 50 9 %      MCV 87 fL      MCH 29 1 pg      MCHC 33 6 g/dL      RDW 13 8 %      MPV 9 9 fL      Platelets 287 Thousands/uL      nRBC 0 /100 WBCs      Neutrophils Relative 95 %      Immat GRANS % 0 %      Lymphocytes Relative 3 %      Monocytes Relative 2 %      Eosinophils Relative 0 %      Basophils Relative 0 %      Neutrophils Absolute 11 17 Thousands/µL      Immature Grans Absolute 0 04 Thousand/uL      Lymphocytes Absolute 0 31 Thousands/µL      Monocytes Absolute 0 26 Thousand/µL      Eosinophils Absolute 0 00 Thousand/µL      Basophils Absolute 0 02 Thousands/µL     Narrative: This is an appended report  These results have been appended to a previously verified report      HS Troponin 0hr (reflex protocol) [[id number]]  (Normal) Collected: 02/25/22 1651    Lab Status: Final result Specimen: Blood from Hand, Left Updated: 02/25/22 1719     hs TnI 0hr <2 ng/L     Basic metabolic panel [[id number]]  (Abnormal) Collected: 02/25/22 1651    Lab Status: Final result Specimen: Blood from Hand, Left Updated: 02/25/22 1713     Sodium 141 mmol/L      Potassium 3 8 mmol/L      Chloride 104 mmol/L      CO2 23 mmol/L      ANION GAP 14 mmol/L      BUN 28 mg/dL      Creatinine 0 93 mg/dL      Glucose 135 mg/dL      Calcium 9 4 mg/dL      eGFR 90 ml/min/1 73sq m     Narrative:      [place] guidelines for Chronic Kidney Disease (CKD):     Stage 1 with normal or high GFR (GFR > 90 mL/min/1 73 square meters)    Stage 2 Mild CKD (GFR = 60-89 mL/min/1 73 square meters)    Stage 3A Moderate CKD (GFR = 45-59 mL/min/1 73 square meters)    Stage 3B Moderate CKD (GFR = 30-44 mL/min/1 73 square meters)    Stage 4 Severe CKD (GFR = 15-29 mL/min/1 73 square meters)    Stage 5 End Stage CKD (GFR <15 mL/min/1 73 square meters)  Note: GFR calculation is accurate only with a steady state creatinine    Hepatic function panel [[id number]]  (Normal) Collected: 02/25/22 1651    Lab Status: Final result Specimen: Blood from Hand, Left Updated: 02/25/22 1713     Total Bilirubin 0 46 mg/dL      Bilirubin, Direct 0 09 mg/dL      Alkaline Phosphatase 85 U/L      AST 13 U/L      ALT 26 U/L      Total Protein 8 2 g/dL      Albumin 4 2 g/dL     Lipase [[id number]]  (Abnormal) Collected: 02/25/22 1651    Lab Status: Final result Specimen: Blood from Hand, Left Updated: 02/25/22 1713     Lipase 58 u/L     Lactic acid, plasma [[id number]]  (Abnormal) Collected: 02/25/22 1624    Lab Status: Final result Specimen: Blood from Hand, Right Updated: 02/25/22 1657     LACTIC ACID 3 1 mmol/L     Narrative:      Result may be elevated if tourniquet was used during collection                   CT abdomen pelvis with contrast   Final Result by Veronica Rodriguez MD (02/25 1853)      Hazy appearance to the mesentery, consistent with sclerosing mesenteritis, this is slowly progressive over multiple recent prior studies  While malignant etiologies are also in the differential, these are felt to be less likely  Recommend follow-up CT    abdomen and pelvis in 6 months  Workstation performed: [id number]                    Procedures  ECG 12 Lead Documentation Only    Date/Time: 2/25/2022 3:48 PM  Performed by: Martínez Reece MD  Authorized by: Martínez Reece MD     Indications / Diagnosis:  Abdominal pain  ECG reviewed by me, the ED Provider: yes    Patient location:  ED  Previous ECG:     Previous ECG:  Compared to current    Comparison ECG info:  November 13, 2021    Similarity:  Changes noted  Interpretation:     Interpretation: non-specific    Quality:     Tracing quality:  Limited by artifact  Rate:     ECG rate:  Eighty-nine    ECG rate assessment: normal    Rhythm:     Rhythm: sinus rhythm    Comments:      Normal sinus rhythm no acute ST-T wave changes             ED Course        diagnostic studies:   Patient white count of 11 8 slightly elevated, hemoglobin was 17, normal,     Cardiac troponin was negative, EKG showed no sign of cardiac ischemia  Electrolytes showed a BUN of 28 a creatinine is 0 93 mild dehydration, increased anion gap, no sign of severe metabolic acidosis  lactate elevated at 3 consistent with some mild dehydration,   Lipase was normal no sign of pancreatitis  CT scan abdomen pelvis showed a hazy appearance to the mesentery consistent with sclerosing mesenteritis this is slowly progressive over multiple prior scans according to Radiology  I gave the patient a copy the CT report I gave him a copy of the up-to-date information on sclerosing mesenteritis    We discussed outpatient follow-up with his gastroenterologist Dr Sherryle Perna making 57-year-old male presents emergency department with abdominal pain, laboratory testing is within normal limits, some mild dehydration  Patient had pain improved with morphine here  On my re-evaluation his abdomen was soft and he had resolution of his pain  CT was consistent with sclerosing mesenteritis  I reviewed with the patient gave him a copy the CT report and gave him a copy of the up-to-date information on this entity  We discussed outpatient treatment follow-up we discussed to return for increasing pain fever vomiting worsening symptoms or any problems  The patient will follow-up with his gastroenterologist   Patient did not require any medications for home I offered home analgesics with narcotic but he refused at this time  Again strongly advised return for worsening symptoms    Disposition  Final diagnoses:   Nonspecific abdominal pain   Sclerosing mesenteritis ([facility] [address] ) - On CT scan     Time reflects when diagnosis was documented in both MDM as applicable and the Disposition within this note     Time User Action Codes Description Comment    2/25/2022  7:04 PM Coleman Guevara Add [R10 9] Nonspecific abdominal pain     2/25/2022  7:05 PM Coleman Guevara Add [K65 4] [facility]     2/25/2022  7:05 PM Marine Bales [K65 4] Sclerosing mesenteritis [facility]) On CT scan      ED Disposition     ED Disposition Condition Date/Time Comment    Discharge Stable Fri Feb 25, 2022  7:04 PM Venessa Rolling discharge to home/self care              Follow-up Information    None         Discharge Medication List as of 2/25/2022  7:05 PM      CONTINUE these medications which have NOT CHANGED    Details   acetaminophen (TYLENOL) 650 mg CR tablet Take 1 tablet (650 mg total) by mouth every 8 (eight) hours as needed for mild pain, Starting Mon 5/13/2019, Normal      aspirin (ECOTRIN LOW STRENGTH) 81 mg EC tablet Take 1 tablet (81 mg total) by mouth daily, Starting Tue 7/13/2021, Normal      ibuprofen (MOTRIN) 200 mg tablet Take by mouth every 6 (six) hours as needed for mild pain , Historical Med      Insulin Pen Needle 32G X 4 MM MISC Use as directed with liraglutide, Normal      liraglutide (SAXENDA) injection Inject under the skin (rotate between abdomen, upper arms, and thighs) Week 1- Inject 0 6mg once daily; Week 2- inject 1 2mg once daily; Week 3- inject 1 8mg once daily; Week 4- Inject 2 4mg once daily; Week 5 and on- inject 3 0mg once daily, Normal      Naproxen Sodium (ALEVE PO) Take by mouth  , Historical Med             No discharge procedures on file      PDMP Review       Value Time User    PDMP Reviewed  Yes 1/31/2022  1:30 PM Delvin Santana DO          ED Provider  Electronically Signed by           Reg Pickett MD  02/26/22 5747

## 2022-02-28 NOTE — PATIENT INSTRUCTIONS
Goals:  Do not skip any meals! Food log (ie ) www myfitnesspal com,sparkpeople  com,loseit com,calorieking  com,etc  baritastic (use skinnytaste  com or smartphone omari [employer] for recipes)  No sugary beverages  At least 64oz of water daily  Increase physical activity by 10 minutes daily  Gradually increase physical activity to a goal of 5 days per week for 30 minutes of MODERATE intensity PLUS 2 days per week of FULL BODY resistance training (use smartphone apps [employer], Home Workout, etc )  Goal protein 100g per day  2565-9351 calories  Premier Protein shake     Semaglutide (Wegovy or Ozempic), a GLP-1 agonist, is administered as a once weekly subcutaneous injection  It has demonstrated efficacy in weight reduction, as well improvement in glycemia and lipids  Although available and approved for the treatment of type 2 diabetes, it has not yet been approved for the treatment of obesity  Similar to liraglutide, the FDA-approved dose for type 2 diabetes has also demonstrated cardiovascular benefits  Most common side effects are nausea, vomiting, diarrhea, constipation, hypoglycemia in patients with type 2 diabetes, injection site reactions, increased heart rate  Rare side effects include pancreatitis, gallbladder disease, renal impairment, suicidal thoughts, and a possible increase in medullary thyroid cancer risk 
Health Care Proxy (HCP)

## 2022-03-01 ENCOUNTER — HOSPITAL ENCOUNTER (OUTPATIENT)
Dept: RADIOLOGY | Facility: HOSPITAL | Age: 57
Discharge: HOME | End: 2022-03-01
Payer: COMMERCIAL

## 2022-03-01 ENCOUNTER — TELEPHONE (OUTPATIENT)
Dept: FAMILY MEDICINE CLINIC | Facility: CLINIC | Age: 57
End: 2022-03-01

## 2022-03-01 ENCOUNTER — OFFICE VISIT (OUTPATIENT)
Dept: CARDIOLOGY CLINIC | Facility: CLINIC | Age: 57
End: 2022-03-01
Payer: COMMERCIAL

## 2022-03-01 VITALS
RESPIRATION RATE: 16 BRPM | WEIGHT: 269 LBS | OXYGEN SATURATION: 98 % | BODY MASS INDEX: 37.66 KG/M2 | HEIGHT: 71 IN | DIASTOLIC BLOOD PRESSURE: 86 MMHG | SYSTOLIC BLOOD PRESSURE: 120 MMHG | HEART RATE: 94 BPM

## 2022-03-01 DIAGNOSIS — M25.512 ACUTE PAIN OF LEFT SHOULDER: ICD-10-CM
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
## 2022-03-01 DIAGNOSIS — R07.89 CHEST PRESSURE: ICD-10-CM
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
## 2022-03-01 PROBLEM — Z12.5 SCREENING FOR PROSTATE CANCER: Status: RESOLVED | Noted: 2021-05-04 | Resolved: 2022-03-01

## 2022-03-01 PROBLEM — Z13.220 SCREENING FOR LIPID DISORDERS: Status: RESOLVED | Noted: 2021-05-04 | Resolved: 2022-03-01

## 2022-03-01 PROBLEM — Z00.00 ANNUAL PHYSICAL EXAM: Status: RESOLVED | Noted: 2021-05-04 | Resolved: 2022-03-01

## 2022-03-01 PROBLEM — M54.16 LUMBAR RADICULOPATHY: Status: RESOLVED | Noted: 2019-05-13 | Resolved: 2022-03-01

## 2022-03-01 PROBLEM — Z11.4 SCREENING FOR HIV (HUMAN IMMUNODEFICIENCY VIRUS): Status: RESOLVED | Noted: 2021-05-04 | Resolved: 2022-03-01

## 2022-03-01 PROBLEM — Z11.59 ENCOUNTER FOR HEPATITIS C SCREENING TEST FOR LOW RISK PATIENT: Status: RESOLVED | Noted: 2021-05-04 | Resolved: 2022-03-01

## 2022-03-01 PROBLEM — R06.83 SNORING: Status: RESOLVED | Noted: 2021-05-04 | Resolved: 2022-03-01

## 2022-03-01 PROCEDURE — 99214 OFFICE O/P EST MOD 30 MIN: CPT | Performed by: INTERNAL MEDICINE

## 2022-03-01 PROCEDURE — 73030 X-RAY EXAM OF SHOULDER: CPT
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
## 2022-03-02 ENCOUNTER — OFFICE VISIT (OUTPATIENT)
Dept: GASTROENTEROLOGY | Facility: CLINIC | Age: 57
End: 2022-03-02
Payer: COMMERCIAL

## 2022-03-02 VITALS
OXYGEN SATURATION: 98 % | SYSTOLIC BLOOD PRESSURE: 142 MMHG | HEIGHT: 71 IN | BODY MASS INDEX: 37.94 KG/M2 | WEIGHT: 271 LBS | HEART RATE: 88 BPM | DIASTOLIC BLOOD PRESSURE: 80 MMHG

## 2022-03-02 DIAGNOSIS — K65.4 MESENTERIC PANNICULITIS (HCC): ICD-10-CM

## 2022-03-02 DIAGNOSIS — K22.70 BARRETT'S ESOPHAGUS WITHOUT DYSPLASIA: Primary | ICD-10-CM

## 2022-03-02 DIAGNOSIS — Z86.010 HISTORY OF COLON POLYPS: ICD-10-CM

## 2022-03-02 PROCEDURE — 99214 OFFICE O/P EST MOD 30 MIN: CPT | Performed by: PHYSICIAN ASSISTANT

## 2022-03-02 RX ORDER — OMEPRAZOLE 20 MG/1
20 CAPSULE, DELAYED RELEASE ORAL DAILY
Qty: 30 CAPSULE | Refills: 0
Start: 2022-03-02 | End: 2022-05-12

## 2022-03-02 NOTE — PROGRESS NOTES
[facility] - Outpatient Follow-up Note  Yessica Maloney 60 y o  male MRN: [id number]  Encounter: [id number]          ASSESSMENT AND PLAN:      1  Mesenteric panniculitis ([address] )  2  Finney's esophagus without dysplasia  3  History of colon polyps    Patient recently went to the ER with LLQ pain and had a CT Scan A/P which suggested a hazy appearance of the mesentery suggestive of sclerosing mesenteritis  Repeat CT A/P in 6 months recommended  He reports his LLQ pain has resolved  He reports a history of occasional abdominal discomfort when he eats large amounts of food but otherwise feels well  He has a history of diverticulitis s/p colostomy and reversal years ago  He also has a history of Finney's esophagus and adenomatous colon polyps  His last EGD/colonoscopy was 3 years ago  He takes Prilosec OTC  Will plan for EGD with biopsies of his Finney's esophagus for surveillance and colonoscopy to investigate  Continue Prilosec  Will plan for repeat CT Scan A/P in 6 months to monitor  He does not appear to be symptomatic from the mesenteric panniculitis at this time (no treatment recommended)  He was instructed to call if any changes  ______________________________________________________________________    SUBJECTIVE:  Patient is a pleasant 60year old male who presents to the office for follow up from his ER visit  Patient recently went to the ER with LLQ pain and had a CT Scan A/P which suggested a hazy appearance of the mesentery suggestive of sclerosing mesenteritis  Repeat CT A/P in 6 months recommended  He reports his LLQ pain has resolved  He reports a history of occasional abdominal discomfort when he eats large amounts of food but otherwise feels well  No constipation or diarrhea  No blood in the stool  He has a history of diverticulitis s/p colostomy and reversal years ago  He also has a history of Finney's esophagus and adenomatous colon polyps    His last EGD/colonoscopy was 3 years ago  He takes Prilosec OTC  No unintentional weight loss  REVIEW OF SYSTEMS IS OTHERWISE NEGATIVE  Historical Information   Past Medical History:   Diagnosis Date    Acute kidney injury (nontraumatic) (HCC)     Alcohol abuse     Anxiety     Finney esophagus     Benign essential hypertension     Chronic pain syndrome 02/17/2021    Diverticulitis     Diverticulitis     Erectile dysfunction     Esophageal reflux     Essential hypertension     Lumbar radiculopathy 05/13/2019    Major depression     Mild cognitive disorder 05/06/2016    Mood disorder ([address] ) 05/02/2016    Multiple nevi     Obesity     Obesity (BMI 30-39  9)     ALEXANDRE (obstructive sleep apnea)     Psychosis (HCC)     Snoring     Suspicious nevus     Tremor of right hand     Weakness of right upper extremity      Past Surgical History:   Procedure Laterality Date    COLONOSCOPY N/A 3/9/2019    Procedure: COLONOSCOPY;  Surgeon: Osei Burgess MD;  Location: [facility]; Service: Gastroenterology    LIPOMA RESECTION      back    CA ESOPHAGOGASTRODUODENOSCOPY TRANSORAL DIAGNOSTIC N/A 3/9/2019    Procedure: ESOPHAGOGASTRODUODENOSCOPY (EGD); Surgeon: Osei Burgess MD;  Location: [facility];   Service: Gastroenterology    REVISION COLOSTOMY       Social History   Social History     Substance and Sexual Activity   Alcohol Use Not Currently    Comment: Sober 5 moths     Social History     Substance and Sexual Activity   Drug Use Yes    Types: Marijuana    Comment: Medical Card     Social History     Tobacco Use   Smoking Status Former Smoker    Types: Cigarettes    Start date: 1981   Smokeless Tobacco Never Used     Family History   Problem Relation Age of Onset    Stroke Mother     Heart disease Father        Meds/Allergies       Current Outpatient Medications:     acetaminophen (TYLENOL) 650 mg CR tablet    aspirin (ECOTRIN LOW STRENGTH) 81 mg EC tablet    ibuprofen (MOTRIN) 200 mg tablet    Naproxen Sodium (ALEVE PO)    omeprazole (PriLOSEC) 20 mg delayed release capsule    Allergies   Allergen Reactions    Sulfa Antibiotics GI Intolerance     HA, vomiting, sweating     Amoxicillin GI Intolerance           Objective     Blood pressure 142/80, pulse 88, height 5' 11" (1 803 m), weight 123 kg (271 lb), SpO2 98 %  Body mass index is 37 8 kg/m²  PHYSICAL EXAM:      General Appearance:   Alert, cooperative, no distress   HEENT:   Normocephalic, atraumatic, anicteric      Neck:  Supple, symmetrical, trachea midline   Lungs:   Clear to auscultation bilaterally; no rales, rhonchi or wheezing; respirations unlabored    Heart[de-identified]   Regular rate and rhythm; no murmur, rub, or gallop  Abdomen:   Soft, non-tender, non-distended; normal bowel sounds; no masses, no organomegaly    Genitalia:   Deferred    Rectal:   Deferred    Extremities:  No cyanosis, clubbing or edema    Pulses:  2+ and symmetric    Skin:  No jaundice, rashes, or lesions    Lymph nodes:  No palpable cervical lymphadenopathy        Lab Results:   No visits with results within 1 Day(s) from this visit     Latest known visit with results is:   Admission on 02/25/2022, Discharged on 02/25/2022   Component Date Value    WBC 02/25/2022 11 80*    RBC 02/25/2022 5 87*    Hemoglobin 02/25/2022 17 1*    Hematocrit 02/25/2022 50 9*    MCV 02/25/2022 87     MCH 02/25/2022 29 1     MCHC 02/25/2022 33 6     RDW 02/25/2022 13 8     MPV 02/25/2022 9 9     Platelets 90/73/6795 290     nRBC 02/25/2022 0     Neutrophils Relative 02/25/2022 95*    Immat GRANS % 02/25/2022 0     Lymphocytes Relative 02/25/2022 3*    Monocytes Relative 02/25/2022 2*    Eosinophils Relative 02/25/2022 0     Basophils Relative 02/25/2022 0     Neutrophils Absolute 02/25/2022 11 17*    Immature Grans Absolute 02/25/2022 0 04     Lymphocytes Absolute 02/25/2022 0 31*    Monocytes Absolute 02/25/2022 0 26     Eosinophils Absolute 02/25/2022 0 00     Basophils Absolute 02/25/2022 0 02     Sodium 02/25/2022 141     Potassium 02/25/2022 3 8     Chloride 02/25/2022 104     CO2 02/25/2022 23     ANION GAP 02/25/2022 14*    BUN 02/25/2022 28*    Creatinine 02/25/2022 0 93     Glucose 02/25/2022 135     Calcium 02/25/2022 9 4     eGFR 02/25/2022 90     hs TnI 0hr 02/25/2022 <2     LACTIC ACID 02/25/2022 3 1*    Total Bilirubin 02/25/2022 0 46     Bilirubin, Direct 02/25/2022 0 09     Alkaline Phosphatase 02/25/2022 85     AST 02/25/2022 13     ALT 02/25/2022 26     Total Protein 02/25/2022 8 2     Albumin 02/25/2022 4 2     Lipase 02/25/2022 58*    Ventricular Rate 02/25/2022 89     Atrial Rate 02/25/2022 89     OH Interval 02/25/2022 170     QRSD Interval 02/25/2022 80     QT Interval 02/25/2022 378     QTC Interval 02/25/2022 459     P Axis 02/25/2022 58     QRS Axis 02/25/2022 50     T Wave Axis 02/25/2022 41          Radiology Results:   CT abdomen pelvis with contrast    Result Date: 2/25/2022  Narrative: CT ABDOMEN AND PELVIS WITH IV CONTRAST INDICATION:   Abdominal pain, acute, nonlocalized Diffuse abdominal pain history abdominal surgery  WBCs ~12 Lactate = 3 1 COMPARISON:  CT 1/31/22 TECHNIQUE:  CT examination of the abdomen and pelvis was performed  Axial, sagittal, and coronal 2D reformatted images were created from the source data and submitted for interpretation  Radiation dose length product (DLP) for this visit:   mGy-cm   This examination, like all CT scans performed in the [facility], was performed utilizing techniques to minimize radiation dose exposure, including the use of iterative reconstruction and automated exposure control  IV Contrast:  100 mL of iohexol (OMNIPAQUE) Enteric Contrast:  Enteric contrast was not administered  FINDINGS: ABDOMEN LOWER CHEST:  No clinically significant abnormality identified in the visualized lower chest  LIVER/BILIARY TREE:  Unremarkable   GALLBLADDER: No calcified gallstones  No pericholecystic inflammatory change  SPLEEN:  Unremarkable  PANCREAS:  Unremarkable  ADRENAL GLANDS:  1 3 cm left adrenal nodule, stable KIDNEYS/URETERS:  Subcentimeter right upper pole angiomyolipoma STOMACH AND BOWEL:  Colonic diverticulosis with anastomotic suture in the sigmoid colon and the distal small bowel  No obstruction  APPENDIX:  No findings to suggest appendicitis  ABDOMINOPELVIC CAVITY:  Stable trace stranding within the mesentery  VESSELS:  Unremarkable for patient's age  PELVIS REPRODUCTIVE ORGANS:  Unremarkable for patient's age  URINARY BLADDER:  Unremarkable  ABDOMINAL WALL/INGUINAL REGIONS:  Stable left tensor fascia oliva lipoma OSSEOUS STRUCTURES:  No acute fracture or destructive osseous lesion  Impression: Hazy appearance to the mesentery, consistent with sclerosing mesenteritis, this is slowly progressive over multiple recent prior studies  While malignant etiologies are also in the differential, these are felt to be less likely  Recommend follow-up CT abdomen and pelvis in 6 months  Workstation performed: [id number]     CT abdomen pelvis with contrast    Result Date: 1/31/2022  Narrative: CT ABDOMEN AND PELVIS WITH IV CONTRAST INDICATION:   LLQ abdominal pain LLQ pain  COMPARISON:  11/13/2021  TECHNIQUE:  CT examination of the abdomen and pelvis was performed  Axial, sagittal, and coronal 2D reformatted images were created from the source data and submitted for interpretation  Radiation dose length product (DLP) for this visit:  1426 mGy-cm   This examination, like all CT scans performed in the [facility], was performed utilizing techniques to minimize radiation dose exposure, including the use of iterative reconstruction and automated exposure control  IV Contrast:  100 mL of iohexol (OMNIPAQUE) Enteric Contrast:  Enteric contrast was not administered   FINDINGS: ABDOMEN LOWER CHEST:  Minimal dependent atelectasis in the visualized lower lobes   There is a tiny 2 mm subpleural nodule laterally in the left lower lobe on series 2, image 6, stable  LIVER/BILIARY TREE:  Unremarkable  GALLBLADDER:  Faint gallstones suspected  No pericholecystic inflammation  SPLEEN:  Unremarkable  PANCREAS:  Unremarkable  ADRENAL GLANDS:  No change in left adrenal gland nodule  KIDNEYS/URETERS:  Tiny right renal cyst   No hydronephrosis  STOMACH AND BOWEL:  Tiny hiatal hernia  No bowel obstruction  Broad-based ventral hernia containing a loop of small bowel without obstruction  Anastomotic sutures in the sigmoid colon  There is colonic diverticulosis but no convincing focal diverticulitis seen  APPENDIX:  A normal appendix was visualized  ABDOMINOPELVIC CAVITY:  No ascites  No pneumoperitoneum  No lymphadenopathy  VESSELS:  Scattered abdominal aortic calcifications  The portal vein is patent  No retroperitoneal hematoma  PELVIS REPRODUCTIVE ORGANS:  The prostate is mildly enlarged as before  URINARY BLADDER:  Not well-distended limiting evaluation  ABDOMINAL WALL/INGUINAL REGIONS:  Broad-based small ventral hernia containing a loop of small bowel without obstruction  Scarring in the mid anterior abdominal wall  Stable 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 fascia oliva  OSSEOUS STRUCTURES:  No acute fracture or destructive osseous lesion  Degenerative changes in the thoracolumbar spine  Mild osteoarthrosis of the hips bilaterally  Impression: No acute CT findings in the abdomen or pelvis  Broad-based ventral hernia containing small bowel without evidence for obstruction  Colonic diverticulosis without convincing diverticulitis  Normal appendix  Stable left adrenal gland nodule and tiny right renal cyst  Stable 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 fascia oliva   Workstation performed: [id number]

## 2022-03-02 NOTE — H&P (VIEW-ONLY)
[facility] Outpatient Follow-up [facility]  Idalmis Rogers 60 y o  male MRN: [id number]  Encounter: [id number]          ASSESSMENT AND PLAN:      1  Mesenteric panniculitis ([address] )  2  Finney's esophagus without dysplasia  3  History of colon polyps    Patient recently went to the ER with LLQ pain and had a CT Scan A/P which suggested a hazy appearance of the mesentery suggestive of sclerosing mesenteritis  Repeat CT A/P in 6 months recommended  He reports his LLQ pain has resolved  He reports a history of occasional abdominal discomfort when he eats large amounts of food but otherwise feels well  He has a history of diverticulitis s/p colostomy and reversal years ago  He also has a history of Finney's esophagus and adenomatous colon polyps  His last EGD/colonoscopy was 3 years ago  He takes Prilosec OTC  Will plan for EGD with biopsies of his Finney's esophagus for surveillance and colonoscopy to investigate  Continue Prilosec  Will plan for repeat CT Scan A/P in 6 months to monitor  He does not appear to be symptomatic from the mesenteric panniculitis at this time (no treatment recommended)  He was instructed to call if any changes  ______________________________________________________________________    SUBJECTIVE:  Patient is a pleasant 60year old male who presents to the office for follow up from his ER visit  Patient recently went to the ER with LLQ pain and had a CT Scan A/P which suggested a hazy appearance of the mesentery suggestive of sclerosing mesenteritis  Repeat CT A/P in 6 months recommended  He reports his LLQ pain has resolved  He reports a history of occasional abdominal discomfort when he eats large amounts of food but otherwise feels well  No constipation or diarrhea  No blood in the stool  He has a history of diverticulitis s/p colostomy and reversal years ago  He also has a history of Finney's esophagus and adenomatous colon polyps    His last EGD/colonoscopy was 3 years ago  He takes Prilosec OTC  No unintentional weight loss  REVIEW OF SYSTEMS IS OTHERWISE NEGATIVE  Historical Information   Past Medical History:   Diagnosis Date    Acute kidney injury (nontraumatic) (HCC)     Alcohol abuse     Anxiety     Finney esophagus     Benign essential hypertension     Chronic pain syndrome 02/17/2021    Diverticulitis     Diverticulitis     Erectile dysfunction     Esophageal reflux     Essential hypertension     Lumbar radiculopathy 05/13/2019    Major depression     Mild cognitive disorder 05/06/2016    Mood disorder ([address] ) 05/02/2016    Multiple nevi     Obesity     Obesity (BMI 30-39  9)     ALEXANDRE (obstructive sleep apnea)     Psychosis (HCC)     Snoring     Suspicious nevus     Tremor of right hand     Weakness of right upper extremity      Past Surgical History:   Procedure Laterality Date    COLONOSCOPY N/A 3/9/2019    Procedure: COLONOSCOPY;  Surgeon: Jessica Fink MD;  [facility]; Service: Gastroenterology    LIPOMA RESECTION      back    [place] ESOPHAGOGASTRODUODENOSCOPY TRANSORAL DIAGNOSTIC N/A 3/9/2019    Procedure: ESOPHAGOGASTRODUODENOSCOPY (EGD); Surgeon: Jessica Fink MD;  [facility];   Service: Gastroenterology    REVISION COLOSTOMY       Social History   Social History     Substance and Sexual Activity   Alcohol Use Not Currently    Comment: Sober 5 moths     Social History     Substance and Sexual Activity   Drug Use Yes    Types: Marijuana    Comment: Medical Card     Social History     Tobacco Use   Smoking Status Former Smoker    Types: Cigarettes    Start date: 1981   Smokeless Tobacco Never Used     Family History   Problem Relation Age of Onset    Stroke Mother     Heart disease Father        Meds/Allergies       Current Outpatient Medications:     acetaminophen (TYLENOL) 650 mg CR tablet    aspirin (ECOTRIN LOW STRENGTH) 81 mg EC tablet    ibuprofen (MOTRIN) 200 mg tablet    Naproxen Sodium (ALEVE PO)    omeprazole (PriLOSEC) 20 mg delayed release capsule    Allergies   Allergen Reactions    Sulfa Antibiotics GI Intolerance     HA, vomiting, sweating     Amoxicillin GI Intolerance           Objective     Blood pressure 142/80, pulse 88, height 5' 11" (1 803 m), weight 123 kg (271 lb), SpO2 98 %  Body mass index is 37 8 kg/m²  PHYSICAL EXAM:      General Appearance:   Alert, cooperative, no distress   HEENT:   Normocephalic, atraumatic, anicteric      Neck:  Supple, symmetrical, trachea midline   Lungs:   Clear to auscultation bilaterally; no rales, rhonchi or wheezing; respirations unlabored    Heart[de-identified]   Regular rate and rhythm; no murmur, rub, or gallop  Abdomen:   Soft, non-tender, non-distended; normal bowel sounds; no masses, no organomegaly    Genitalia:   Deferred    Rectal:   Deferred    Extremities:  No cyanosis, clubbing or edema    Pulses:  2+ and symmetric    Skin:  No jaundice, rashes, or lesions    Lymph nodes:  No palpable cervical lymphadenopathy        Lab Results:   No visits with results within 1 Day(s) from this visit     Latest known visit with results is:   Admission on 02/25/2022, Discharged on 02/25/2022   Component Date Value    WBC 02/25/2022 11 80*    RBC 02/25/2022 5 87*    Hemoglobin 02/25/2022 17 1*    Hematocrit 02/25/2022 50 9*    MCV 02/25/2022 87     MCH 02/25/2022 29 1     MCHC 02/25/2022 33 6     RDW 02/25/2022 13 8     MPV 02/25/2022 9 9     Platelets 75/77/5219 290     nRBC 02/25/2022 0     Neutrophils Relative 02/25/2022 95*    Immat GRANS % 02/25/2022 0     Lymphocytes Relative 02/25/2022 3*    Monocytes Relative 02/25/2022 2*    Eosinophils Relative 02/25/2022 0     Basophils Relative 02/25/2022 0     Neutrophils Absolute 02/25/2022 11 17*    Immature Grans Absolute 02/25/2022 0 04     Lymphocytes Absolute 02/25/2022 0 31*    Monocytes Absolute 02/25/2022 0 26     Eosinophils Absolute 02/25/2022 0 00     Basophils Absolute 02/25/2022 0 02     Sodium 02/25/2022 141     Potassium 02/25/2022 3 8     Chloride 02/25/2022 104     CO2 02/25/2022 23     ANION GAP 02/25/2022 14*    BUN 02/25/2022 28*    Creatinine 02/25/2022 0 93     Glucose 02/25/2022 135     Calcium 02/25/2022 9 4     eGFR 02/25/2022 90     hs TnI 0hr 02/25/2022 <2     LACTIC ACID 02/25/2022 3 1*    Total Bilirubin 02/25/2022 0 46     Bilirubin, Direct 02/25/2022 0 09     Alkaline Phosphatase 02/25/2022 85     AST 02/25/2022 13     ALT 02/25/2022 26     Total Protein 02/25/2022 8 2     Albumin 02/25/2022 4 2     Lipase 02/25/2022 58*    Ventricular Rate 02/25/2022 89     Atrial Rate 02/25/2022 89     NM Interval 02/25/2022 170     QRSD Interval 02/25/2022 80     QT Interval 02/25/2022 378     QTC Interval 02/25/2022 459     P Axis 02/25/2022 58     QRS Axis 02/25/2022 50     T Wave Axis 02/25/2022 41          Radiology Results:   CT abdomen pelvis with contrast    Result Date: 2/25/2022  Narrative: CT ABDOMEN AND PELVIS WITH IV CONTRAST INDICATION:   Abdominal pain, acute, nonlocalized Diffuse abdominal pain history abdominal surgery  WBCs ~12 Lactate = 3 1 COMPARISON:  CT 1/31/22 TECHNIQUE:  CT examination of the abdomen and pelvis was performed  Axial, sagittal, and coronal 2D reformatted images were created from the source data and submitted for interpretation  Radiation dose length product (DLP) for this visit:   mGy-cm   This examination, like all CT scans performed in the [facility], was performed utilizing techniques to minimize radiation dose exposure, including the use of iterative reconstruction and automated exposure control  IV Contrast:  100 mL of iohexol (OMNIPAQUE) Enteric Contrast:  Enteric contrast was not administered  FINDINGS: ABDOMEN LOWER CHEST:  No clinically significant abnormality identified in the visualized lower chest  LIVER/BILIARY TREE:  Unremarkable   GALLBLADDER: No calcified gallstones  No pericholecystic inflammatory change  SPLEEN:  Unremarkable  PANCREAS:  Unremarkable  ADRENAL GLANDS:  1 3 cm left adrenal nodule, stable KIDNEYS/URETERS:  Subcentimeter right upper pole angiomyolipoma STOMACH AND BOWEL:  Colonic diverticulosis with anastomotic suture in the sigmoid colon and the distal small bowel  No obstruction  APPENDIX:  No findings to suggest appendicitis  ABDOMINOPELVIC CAVITY:  Stable trace stranding within the mesentery  VESSELS:  Unremarkable for patient's age  PELVIS REPRODUCTIVE ORGANS:  Unremarkable for patient's age  URINARY BLADDER:  Unremarkable  ABDOMINAL WALL/INGUINAL REGIONS:  Stable left tensor fascia oliva lipoma OSSEOUS STRUCTURES:  No acute fracture or destructive osseous lesion  Impression: Hazy appearance to the mesentery, consistent with sclerosing mesenteritis, this is slowly progressive over multiple recent prior studies  While malignant etiologies are also in the differential, these are felt to be less likely  Recommend follow-up CT abdomen and pelvis in 6 months  Workstation performed: [id number]     CT abdomen pelvis with contrast    Result Date: 1/31/2022  Narrative: CT ABDOMEN AND PELVIS WITH IV CONTRAST INDICATION:   LLQ abdominal pain LLQ pain  COMPARISON:  11/13/2021  TECHNIQUE:  CT examination of the abdomen and pelvis was performed  Axial, sagittal, and coronal 2D reformatted images were created from the source data and submitted for interpretation  Radiation dose length product (DLP) for this visit:  1426 mGy-cm   This examination, like all CT scans performed in the [facility], was performed utilizing techniques to minimize radiation dose exposure, including the use of iterative reconstruction and automated exposure control  IV Contrast:  100 mL of iohexol (OMNIPAQUE) Enteric Contrast:  Enteric contrast was not administered   FINDINGS: ABDOMEN LOWER CHEST:  Minimal dependent atelectasis in the visualized lower lobes   There is a tiny 2 mm subpleural nodule laterally in the left lower lobe on series 2, image 6, stable  LIVER/BILIARY TREE:  Unremarkable  GALLBLADDER:  Faint gallstones suspected  No pericholecystic inflammation  SPLEEN:  Unremarkable  PANCREAS:  Unremarkable  ADRENAL GLANDS:  No change in left adrenal gland nodule  KIDNEYS/URETERS:  Tiny right renal cyst   No hydronephrosis  STOMACH AND BOWEL:  Tiny hiatal hernia  No bowel obstruction  Broad-based ventral hernia containing a loop of small bowel without obstruction  Anastomotic sutures in the sigmoid colon  There is colonic diverticulosis but no convincing focal diverticulitis seen  APPENDIX:  A normal appendix was visualized  ABDOMINOPELVIC CAVITY:  No ascites  No pneumoperitoneum  No lymphadenopathy  VESSELS:  Scattered abdominal aortic calcifications  The portal vein is patent  No retroperitoneal hematoma  PELVIS REPRODUCTIVE ORGANS:  The prostate is mildly enlarged as before  URINARY BLADDER:  Not well-distended limiting evaluation  ABDOMINAL WALL/INGUINAL REGIONS:  Broad-based small ventral hernia containing a loop of small bowel without obstruction  Scarring in the mid anterior abdominal wall  Stable 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 fascia oliva  OSSEOUS STRUCTURES:  No acute fracture or destructive osseous lesion  Degenerative changes in the thoracolumbar spine  Mild osteoarthrosis of the hips bilaterally  Impression: No acute CT findings in the abdomen or pelvis  Broad-based ventral hernia containing small bowel without evidence for obstruction  Colonic diverticulosis without convincing diverticulitis  Normal appendix  Stable left adrenal gland nodule and tiny right renal cyst  Stable 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 fascia oliva   Workstation performed: [id number]

## 2022-03-02 NOTE — PATIENT INSTRUCTIONS
Scheduled date of EGD/colonoscopy (as of today): 3/5  Physician performing EGD/colonoscopy: EVANS  Location of EGD/colonoscopy: [address] [facility]  Desired bowel prep reviewed with patient: EL KNOWLES [facility]  Instructions reviewed with patient by: Giovanny Romero  Clearances:

## 2022-03-04 RX ORDER — SODIUM CHLORIDE, SODIUM LACTATE, POTASSIUM CHLORIDE, CALCIUM CHLORIDE 600; 310; 30; 20 MG/100ML; MG/100ML; MG/100ML; MG/100ML
125 INJECTION, SOLUTION INTRAVENOUS CONTINUOUS
Status: CANCELLED | OUTPATIENT
Start: 2022-03-04

## 2022-03-05 ENCOUNTER — ANESTHESIA (OUTPATIENT)
Dept: GASTROENTEROLOGY | Facility: HOSPITAL | Age: 57
End: 2022-03-05

## 2022-03-05 ENCOUNTER — ANESTHESIA EVENT (OUTPATIENT)
Dept: GASTROENTEROLOGY | Facility: HOSPITAL | Age: 57
End: 2022-03-05

## 2022-03-05 ENCOUNTER — HOSPITAL ENCOUNTER (OUTPATIENT)
Dept: GASTROENTEROLOGY | Facility: HOSPITAL | Age: 57
Setting detail: OUTPATIENT SURGERY
Discharge: HOME | End: 2022-03-05
Attending: INTERNAL MEDICINE
Payer: COMMERCIAL

## 2022-03-05 VITALS
HEART RATE: 87 BPM | OXYGEN SATURATION: 100 % | SYSTOLIC BLOOD PRESSURE: 128 MMHG | RESPIRATION RATE: 18 BRPM | DIASTOLIC BLOOD PRESSURE: 87 MMHG | WEIGHT: 261.47 LBS | HEIGHT: 71 IN | BODY MASS INDEX: 36.6 KG/M2 | TEMPERATURE: 97.7 F

## 2022-03-05 DIAGNOSIS — Z86.010 HISTORY OF COLON POLYPS: ICD-10-CM

## 2022-03-05 DIAGNOSIS — K22.70 BARRETT'S ESOPHAGUS WITHOUT DYSPLASIA: ICD-10-CM

## 2022-03-05 DIAGNOSIS — K65.4 MESENTERIC PANNICULITIS (HCC): ICD-10-CM

## 2022-03-05 PROCEDURE — 88305 TISSUE EXAM BY PATHOLOGIST: CPT | Performed by: PATHOLOGY

## 2022-03-05 PROCEDURE — 45380 COLONOSCOPY AND BIOPSY: CPT | Performed by: INTERNAL MEDICINE

## 2022-03-05 PROCEDURE — 43239 EGD BIOPSY SINGLE/MULTIPLE: CPT | Performed by: INTERNAL MEDICINE

## 2022-03-05 RX ORDER — SODIUM CHLORIDE, SODIUM LACTATE, POTASSIUM CHLORIDE, CALCIUM CHLORIDE 600; 310; 30; 20 MG/100ML; MG/100ML; MG/100ML; MG/100ML
125 INJECTION, SOLUTION INTRAVENOUS CONTINUOUS
Status: DISCONTINUED | OUTPATIENT
Start: 2022-03-05 | End: 2022-03-09 | Stop reason: HOSPADM

## 2022-03-05 RX ORDER — PROPOFOL 10 MG/ML
INJECTION, EMULSION INTRAVENOUS AS NEEDED
Status: DISCONTINUED | OUTPATIENT
Start: 2022-03-05 | End: 2022-03-05

## 2022-03-05 RX ORDER — KETAMINE HCL IN NACL, ISO-OSM 100MG/10ML
SYRINGE (ML) INJECTION AS NEEDED
Status: DISCONTINUED | OUTPATIENT
Start: 2022-03-05 | End: 2022-03-05

## 2022-03-05 RX ORDER — GLYCOPYRROLATE 0.2 MG/ML
INJECTION INTRAMUSCULAR; INTRAVENOUS AS NEEDED
Status: DISCONTINUED | OUTPATIENT
Start: 2022-03-05 | End: 2022-03-05

## 2022-03-05 RX ORDER — SODIUM CHLORIDE, SODIUM LACTATE, POTASSIUM CHLORIDE, CALCIUM CHLORIDE 600; 310; 30; 20 MG/100ML; MG/100ML; MG/100ML; MG/100ML
125 INJECTION, SOLUTION INTRAVENOUS CONTINUOUS
Status: CANCELLED | OUTPATIENT
Start: 2022-03-05

## 2022-03-05 RX ORDER — LIDOCAINE HYDROCHLORIDE 20 MG/ML
INJECTION, SOLUTION EPIDURAL; INFILTRATION; INTRACAUDAL; PERINEURAL AS NEEDED
Status: DISCONTINUED | OUTPATIENT
Start: 2022-03-05 | End: 2022-03-05

## 2022-03-05 RX ORDER — SODIUM CHLORIDE, SODIUM LACTATE, POTASSIUM CHLORIDE, CALCIUM CHLORIDE 600; 310; 30; 20 MG/100ML; MG/100ML; MG/100ML; MG/100ML
INJECTION, SOLUTION INTRAVENOUS CONTINUOUS PRN
Status: DISCONTINUED | OUTPATIENT
Start: 2022-03-05 | End: 2022-03-05

## 2022-03-05 RX ADMIN — GLYCOPYRROLATE 0.1 MG: 0.2 INJECTION, SOLUTION INTRAMUSCULAR; INTRAVENOUS at 07:11

## 2022-03-05 RX ADMIN — PROPOFOL 50 MG: 10 INJECTION, EMULSION INTRAVENOUS at 07:20

## 2022-03-05 RX ADMIN — Medication 20 MG: at 07:13

## 2022-03-05 RX ADMIN — PROPOFOL 50 MG: 10 INJECTION, EMULSION INTRAVENOUS at 07:25

## 2022-03-05 RX ADMIN — PROPOFOL 150 MG: 10 INJECTION, EMULSION INTRAVENOUS at 07:16

## 2022-03-05 RX ADMIN — LIDOCAINE HYDROCHLORIDE 100 MG: 20 INJECTION, SOLUTION EPIDURAL; INFILTRATION; INTRACAUDAL; PERINEURAL at 07:13

## 2022-03-05 RX ADMIN — SODIUM CHLORIDE, SODIUM LACTATE, POTASSIUM CHLORIDE, AND CALCIUM CHLORIDE: .6; .31; .03; .02 INJECTION, SOLUTION INTRAVENOUS at 07:10

## 2022-03-05 RX ADMIN — PROPOFOL 50 MG: 10 INJECTION, EMULSION INTRAVENOUS at 07:17

## 2022-03-05 RX ADMIN — PROPOFOL 50 MG: 10 INJECTION, EMULSION INTRAVENOUS at 07:30

## 2022-03-05 RX ADMIN — SODIUM CHLORIDE, SODIUM LACTATE, POTASSIUM CHLORIDE, AND CALCIUM CHLORIDE 125 ML/HR: .6; .31; .03; .02 INJECTION, SOLUTION INTRAVENOUS at 06:41

## 2022-03-05 NOTE — INTERVAL H&P NOTE
H&P reviewed  After examining the patient I find no changes in the patients condition since the H&P had been written      Vitals:    03/05/22 0637   BP: 143/96   Pulse: 86   Resp: 16   Temp: 97 6 °F (36 4 °C)   SpO2: 99%

## 2022-03-05 NOTE — ANESTHESIA POSTPROCEDURE EVALUATION
Post-Op Assessment Note    CV Status:  Stable  Pain Score: 0    Pain management: adequate     Mental Status:  Alert and awake   Hydration Status:  Euvolemic   PONV Controlled:  Controlled   Airway Patency:  Patent and adequate      Post Op Vitals Reviewed: Yes      Staff: CRNA         No complications documented      /77 (03/05/22 0735)    Temp 97 7 °F (36 5 °C) (03/05/22 0735)    Pulse 82 (03/05/22 0735)   Resp 18 (03/05/22 0735)    SpO2 99 % (03/05/22 0735)

## 2022-03-05 NOTE — ANESTHESIA PREPROCEDURE EVALUATION
Procedure:  COLONOSCOPY  EGD    Relevant Problems   CARDIO   (+) Essential hypertension      MUSCULOSKELETAL   (+) Chronic right-sided low back pain with right-sided sciatica      NEURO/PSYCH   (+) Chronic pain syndrome      PULMONARY   (+) ALEXANDRE (obstructive sleep apnea)        Physical Exam    Airway    Mallampati score: II  TM Distance: >3 FB  Neck ROM: full     Dental   No notable dental hx     Cardiovascular      Pulmonary      Other Findings        Anesthesia Plan  ASA Score- 2     Anesthesia Type- IV sedation with anesthesia with ASA Monitors  Additional Monitors:   Airway Plan:           Plan Factors-Exercise tolerance (METS): >4 METS  Chart reviewed  Existing labs reviewed  Patient summary reviewed  Patient is a current smoker  Patient instructed to abstain from smoking on day of procedure  Patient did not smoke on day of surgery  Induction- intravenous  Postoperative Plan- Plan for postoperative opioid use  Informed Consent- Anesthetic plan and risks discussed with patient  I personally reviewed this patient with the CRNA  Discussed and agreed on the Anesthesia Plan with the CRNA  Kirk Laboy

## 2022-03-07 NOTE — TELEPHONE ENCOUNTER
S/w pt and rescheduled TFESI for 3/16/22 915 am  Gave pre procedure instructions and masking//vaccine policy

## 2022-03-15 NOTE — TELEPHONE ENCOUNTER
Pt fell this morning and hurt his left shoulder  Dee Kulkarni He is at the [facility] for his son    but is asking if an x ray order can be put in his chart so while he's waiting for his son, he can get the x ray done there    he had an apt scheduled for here but had to be cancelled        Wife's cb [phone] (H) not examined

## 2022-03-16 ENCOUNTER — HOSPITAL ENCOUNTER (OUTPATIENT)
Dept: RADIOLOGY | Facility: CLINIC | Age: 57
Discharge: HOME | End: 2022-03-16
Attending: ANESTHESIOLOGY | Admitting: ANESTHESIOLOGY
Payer: COMMERCIAL

## 2022-03-16 VITALS
SYSTOLIC BLOOD PRESSURE: 137 MMHG | OXYGEN SATURATION: 97 % | TEMPERATURE: 96 F | HEART RATE: 78 BPM | DIASTOLIC BLOOD PRESSURE: 100 MMHG | RESPIRATION RATE: 20 BRPM

## 2022-03-16 DIAGNOSIS — M51.16 LUMBAR DISC HERNIATION WITH RADICULOPATHY: ICD-10-CM

## 2022-03-16 DIAGNOSIS — G89.29 CHRONIC RIGHT-SIDED LOW BACK PAIN WITH RIGHT-SIDED SCIATICA: ICD-10-CM

## 2022-03-16 DIAGNOSIS — M54.41 CHRONIC RIGHT-SIDED LOW BACK PAIN WITH RIGHT-SIDED SCIATICA: ICD-10-CM

## 2022-03-16 PROCEDURE — 64483 NJX AA&/STRD TFRM EPI L/S 1: CPT | Performed by: ANESTHESIOLOGY

## 2022-03-16 PROCEDURE — 64484 NJX AA&/STRD TFRM EPI L/S EA: CPT | Performed by: ANESTHESIOLOGY

## 2022-03-16 RX ORDER — METHYLPREDNISOLONE ACETATE 80 MG/ML
80 INJECTION, SUSPENSION INTRA-ARTICULAR; INTRALESIONAL; INTRAMUSCULAR; PARENTERAL; SOFT TISSUE ONCE
Status: COMPLETED | OUTPATIENT
Start: 2022-03-16 | End: 2022-03-16

## 2022-03-16 RX ORDER — BUPIVACAINE HCL/PF 2.5 MG/ML
5 VIAL (ML) INJECTION ONCE
Status: COMPLETED | OUTPATIENT
Start: 2022-03-16 | End: 2022-03-16

## 2022-03-16 RX ORDER — LIDOCAINE HYDROCHLORIDE 10 MG/ML
5 INJECTION, SOLUTION EPIDURAL; INFILTRATION; INTRACAUDAL; PERINEURAL ONCE
Status: DISCONTINUED | OUTPATIENT
Start: 2022-03-16 | End: 2022-03-20 | Stop reason: HOSPADM

## 2022-03-16 RX ADMIN — METHYLPREDNISOLONE ACETATE 80 MG: 80 INJECTION, SUSPENSION INTRA-ARTICULAR; INTRALESIONAL; INTRAMUSCULAR; PARENTERAL; SOFT TISSUE at 09:50

## 2022-03-16 RX ADMIN — Medication 3 ML: at 09:50

## 2022-03-16 RX ADMIN — IOHEXOL 2 ML: 300 INJECTION, SOLUTION INTRAVENOUS at 09:50

## 2022-03-16 NOTE — DISCHARGE INSTR - LAB
Epidural Steroid Injection   WHAT YOU NEED TO KNOW:   An epidural steroid injection (PARAS) is a procedure to inject steroid medicine into the epidural space  The epidural space is between your spinal cord and vertebrae  Steroids reduce inflammation and fluid buildup in your spine that may be causing pain  You may be given pain medicine along with the steroids  ACTIVITY  Do not drive or operate machinery today  No strenuous activity today - bending, lifting, etc   You may resume normal activites starting tomorrow - start slowly and as tolerated  You may shower today, but no tub baths or hot tubs  You may have numbness for several hours from the local anesthetic  Please use caution and common sense, especially with weight-bearing activities  CARE OF THE INJECTION SITE  If you have soreness or pain, apply ice to the area today (20 minutes on/20 minutes off)  Starting tomorrow, you may use warm, moist heat or ice if needed  You may have an increase or change in your discomfort for 36-48 hours after your treatment  Apply ice and continue with any pain medication you have been prescribed  Notify the [facility] if you have any of the following: redness, drainage, swelling, headache, stiff neck or fever above 100°F     SPECIAL INSTRUCTIONS  Our office will contact you in approximately 7 days for a progress report  MEDICATIONS  Continue to take all routine medications  Our office may have instructed you to hold some medications  As no general anesthesia was used in today's procedure, you should not experience any side effects related to anesthesia  If you have a problem specifically related to your procedure, please call our office at [phone]  Problems not related to your procedure should be directed to your primary care physician

## 2022-03-16 NOTE — H&P
History of Present Illness: The patient is a 60 y o  male who presents with complaints of low back pain and right lower extremity radiculopathy  Patient Active Problem List   Diagnosis    Essential hypertension    Mild cognitive disorder    Finney's esophagus with dysplasia    Class 3 severe obesity due to excess calories without serious comorbidity with body mass index (BMI) of 40 0 to 44 9 in adult [facility])    Chronic pain syndrome    Chronic right-sided low back pain with right-sided sciatica    Optic nerve edema    ALEXANDRE (obstructive sleep apnea)    Erectile dysfunction    Multiple nevi       Past Medical History:   Diagnosis Date    Acute kidney injury (nontraumatic) (HCC)     Alcohol abuse     Anxiety     Finney esophagus     Benign essential hypertension     Chronic pain syndrome 02/17/2021    Colon polyp     Diverticulitis     Diverticulitis     Erectile dysfunction     Esophageal reflux     Essential hypertension     Lumbar radiculopathy 05/13/2019    Major depression     Mild cognitive disorder 05/06/2016    Mood disorder ([address] ) 05/02/2016    Multiple nevi     Obesity     Obesity (BMI 30-39  9)     ALEXANDRE (obstructive sleep apnea)     Psychosis (HCC)     Snoring     Suspicious nevus     Tremor of right hand     Weakness of right upper extremity        Past Surgical History:   Procedure Laterality Date    COLONOSCOPY N/A 3/9/2019    Procedure: COLONOSCOPY;  Surgeon: Dory Guerrero MD;  [facility]; Service: Gastroenterology    LIPOMA RESECTION      back    [place] ESOPHAGOGASTRODUODENOSCOPY TRANSORAL DIAGNOSTIC N/A 3/9/2019    Procedure: ESOPHAGOGASTRODUODENOSCOPY (EGD); Surgeon: Dory Guerrero MD;  [facility];   Service: Gastroenterology    REVISION COLOSTOMY           Current Outpatient Medications:     acetaminophen (TYLENOL) 650 mg CR tablet, Take 1 tablet (650 mg total) by mouth every 8 (eight) hours as needed for mild pain, Disp: 30 tablet, Rfl: 1    aspirin (ECOTRIN LOW STRENGTH) 81 mg EC tablet, Take 1 tablet (81 mg total) by mouth daily, Disp: 30 tablet, Rfl: 3    ibuprofen (MOTRIN) 200 mg tablet, Take by mouth every 6 (six) hours as needed for mild pain , Disp: , Rfl:     Naproxen Sodium (ALEVE PO), Take by mouth  , Disp: , Rfl:     omeprazole (PriLOSEC) 20 mg delayed release capsule, Take 1 capsule (20 mg total) by mouth daily, Disp: 30 capsule, Rfl: 0    Allergies   Allergen Reactions    Sulfa Antibiotics GI Intolerance     HA, vomiting, sweating     Amoxicillin GI Intolerance       Physical Exam:   Vitals:    03/16/22 0911   Pulse: 81   Resp: 20   Temp: (!) 96 °F (35 6 °C)   SpO2: 96%     General: Awake, Alert, Oriented x 3, Mood and affect appropriate  Respiratory: Respirations even and unlabored  Cardiovascular: Peripheral pulses intact; no edema  Musculoskeletal Exam:  Tenderness in lumbar spine region  ASA Score: 3    Patient/Chart Verification  Patient ID Verified: Verbal  Consents Confirmed: Procedural,To be obtained in the Pre-Procedure area  H&P( within 30 days) Verified: Yes  Interval H&P(within 24 hr) Complete (required for Outpatients and Surgery Admit only): Yes  Allergies Reviewed: Yes  Anticoag/NSAID held? :  (pt stated he has not taken aspirin 81 mg in 2 weeks)  Currently on antibiotics?: No    Assessment:   1  Chronic right-sided low back pain with right-sided sciatica    2   Lumbar disc herniation with radiculopathy        Plan: Right L2 and L3 transforaminal epidural steroid injection

## 2022-03-23 ENCOUNTER — TELEPHONE (OUTPATIENT)
Dept: PAIN MEDICINE | Facility: CLINIC | Age: 57
End: 2022-03-23

## 2022-04-18 ENCOUNTER — TELEPHONE (OUTPATIENT)
Dept: FAMILY MEDICINE CLINIC | Facility: CLINIC | Age: 57
End: 2022-04-18

## 2022-04-18 DIAGNOSIS — M25.512 CHRONIC LEFT SHOULDER PAIN: Primary | ICD-10-CM

## 2022-04-18 DIAGNOSIS — G89.29 CHRONIC LEFT SHOULDER PAIN: Primary | ICD-10-CM

## 2022-04-18 NOTE — TELEPHONE ENCOUNTER
Patient's wife called today and stated that he can barely lift his arm/shoulder  Did have xray done in March - wondering if he can have a MRI or referral to ortho?

## 2022-05-12 ENCOUNTER — OFFICE VISIT (OUTPATIENT)
Dept: OBGYN CLINIC | Facility: CLINIC | Age: 57
End: 2022-05-12
Payer: COMMERCIAL

## 2022-05-12 ENCOUNTER — APPOINTMENT (OUTPATIENT)
Dept: RADIOLOGY | Facility: CLINIC | Age: 57
End: 2022-05-12
Payer: COMMERCIAL

## 2022-05-12 ENCOUNTER — APPOINTMENT (OUTPATIENT)
Dept: LAB | Facility: HOSPITAL | Age: 57
End: 2022-05-12

## 2022-05-12 VITALS
WEIGHT: 265.2 LBS | HEART RATE: 83 BPM | BODY MASS INDEX: 37.13 KG/M2 | HEIGHT: 71 IN | DIASTOLIC BLOOD PRESSURE: 81 MMHG | SYSTOLIC BLOOD PRESSURE: 123 MMHG

## 2022-05-12 DIAGNOSIS — M25.511 RIGHT SHOULDER PAIN, UNSPECIFIED CHRONICITY: ICD-10-CM

## 2022-05-12 DIAGNOSIS — M25.512 CHRONIC LEFT SHOULDER PAIN: ICD-10-CM

## 2022-05-12 DIAGNOSIS — Z00.8 HEALTH EXAMINATION IN POPULATION SURVEY: ICD-10-CM

## 2022-05-12 DIAGNOSIS — M77.8 TENDINITIS OF RIGHT SHOULDER: ICD-10-CM

## 2022-05-12 DIAGNOSIS — G89.29 CHRONIC LEFT SHOULDER PAIN: ICD-10-CM

## 2022-05-12 DIAGNOSIS — M77.8 TENDINITIS OF LEFT SHOULDER: ICD-10-CM

## 2022-05-12 DIAGNOSIS — M25.511 RIGHT SHOULDER PAIN, UNSPECIFIED CHRONICITY: Primary | ICD-10-CM

## 2022-05-12 LAB
CHOLEST SERPL-MCNC: 164 MG/DL
EST. AVERAGE GLUCOSE BLD GHB EST-MCNC: 108 MG/DL
HBA1C MFR BLD: 5.4 %
HDLC SERPL-MCNC: 39 MG/DL
LDLC SERPL CALC-MCNC: 59 MG/DL (ref 0–100)
NONHDLC SERPL-MCNC: 125 MG/DL
TRIGL SERPL-MCNC: 330 MG/DL

## 2022-05-12 PROCEDURE — 20610 DRAIN/INJ JOINT/BURSA W/O US: CPT | Performed by: ORTHOPAEDIC SURGERY

## 2022-05-12 PROCEDURE — 83036 HEMOGLOBIN GLYCOSYLATED A1C: CPT

## 2022-05-12 PROCEDURE — 73030 X-RAY EXAM OF SHOULDER: CPT

## 2022-05-12 PROCEDURE — 36415 COLL VENOUS BLD VENIPUNCTURE: CPT

## 2022-05-12 PROCEDURE — 80061 LIPID PANEL: CPT

## 2022-05-12 PROCEDURE — 99203 OFFICE O/P NEW LOW 30 MIN: CPT | Performed by: ORTHOPAEDIC SURGERY

## 2022-05-12 RX ORDER — BUPIVACAINE HYDROCHLORIDE 2.5 MG/ML
2 INJECTION, SOLUTION INFILTRATION; PERINEURAL
Status: COMPLETED | OUTPATIENT
Start: 2022-05-12 | End: 2022-05-12

## 2022-05-12 RX ORDER — METHYLPREDNISOLONE ACETATE 40 MG/ML
2 INJECTION, SUSPENSION INTRA-ARTICULAR; INTRALESIONAL; INTRAMUSCULAR; SOFT TISSUE
Status: COMPLETED | OUTPATIENT
Start: 2022-05-12 | End: 2022-05-12

## 2022-05-12 RX ORDER — LIDOCAINE HYDROCHLORIDE 10 MG/ML
1 INJECTION, SOLUTION INFILTRATION; PERINEURAL
Status: COMPLETED | OUTPATIENT
Start: 2022-05-12 | End: 2022-05-12

## 2022-05-12 RX ADMIN — LIDOCAINE HYDROCHLORIDE 1 ML: 10 INJECTION, SOLUTION INFILTRATION; PERINEURAL at 15:25

## 2022-05-12 RX ADMIN — BUPIVACAINE HYDROCHLORIDE 2 ML: 2.5 INJECTION, SOLUTION INFILTRATION; PERINEURAL at 15:25

## 2022-05-12 RX ADMIN — METHYLPREDNISOLONE ACETATE 2 ML: 40 INJECTION, SUSPENSION INTRA-ARTICULAR; INTRALESIONAL; INTRAMUSCULAR; SOFT TISSUE at 15:25

## 2022-05-12 NOTE — PROGRESS NOTES
Patient Name:  Chicho Jones  MRN:  [id number]    Assessment & Plan     1  Right shoulder pain, unspecified chronicity  -     XR shoulder 2+ vw right; Future; Expected date: 05/12/2022  -     Ambulatory referral to Physical Therapy; Future    2  Chronic left shoulder pain  -     Ambulatory Referral to Orthopedic Surgery  -     Ambulatory referral to Physical Therapy; Future    3  Tendinitis of right shoulder  -     Ambulatory referral to Physical Therapy; Future    4  Tendinitis of left shoulder  -     Ambulatory referral to Physical Therapy; Future        Bilateral shoulder x-rays were reviewed today and examination of bilateral shoulders was performed today  Examination is suggested of bilateral shoulder tendinitis with right significantly worse than left  Patient was offered a right shoulder cortisone steroid injection to help with his pain as well as a formal order for therapy for bilateral shoulder range of motion strengthening scapular stabilization and postural exercises  Risks and benefits of corticosteroid injection were discussed in detail  Risks including:  Post injection pain, elevation in blood sugar, skin discoloration, fatty atrophy, and infection were discussed in detail  The patient understood and elected to proceed forward  After sterile preparation the right subacromial bursa was injected with 2 cc of 1% lidocaine, 2 cc of 0 25% bupivacaine, and 2 cc of Depo-Medrol  The patient tolerated the procedure and no immediate complications were noted  The patient was instructed to ice and elevate the injection site, limit strenuous activity for the next 24-48 hours, and contact us if there were any questions or concerns prior to their follow-up appointment  They were also instructed to contact their primary care physician to discuss elevated blood sugar    I will see the patient back in 8 weeks     Chief Complaint     Bilateral shoulder pain     History of the Present Illness     Joao MUÑIZ Lynn Naidu is a 60 y o  male with bilateral shoulder pain  He states that about 2 months ago he slipped on black ice and fell causing left shoulder pain  He states that his right shoulder has been painful for about a month prior to the fall  He notes clicking in his shoulders with motion and increased pain with attempted overhead reaching  He has significant difficulty lifting and reaching overhead  Patient also notes difficulty sleeping at night due to pain  No history of previous care for either shoulder no surgery  Patient denies numbness and tingling bilateral upper extremities  Patient states that he is taking ibuprofen naproxen and or Tylenol for pain without relief  Review of Systems     Review of Systems   Constitutional: Negative for chills, fever and unexpected weight change  HENT: Negative for hearing loss, nosebleeds and sore throat  Eyes: Negative for pain, redness and visual disturbance  Respiratory: Negative for cough, shortness of breath and wheezing  Cardiovascular: Negative for chest pain, palpitations and leg swelling  Gastrointestinal: Negative for abdominal pain, nausea and vomiting  Endocrine: Negative for polydipsia and polyuria  Genitourinary: Negative for dysuria and hematuria  Skin: Negative for rash and wound  Neurological: Negative for dizziness, light-headedness and headaches  Psychiatric/Behavioral: Negative for decreased concentration, dysphoric mood and suicidal ideas  The patient is not nervous/anxious  Physical Exam     /81   Pulse 83   Ht 5' 11" (1 803 m)   Wt 120 kg (265 lb 3 2 oz)   BMI 36 99 kg/m²     Right  Shoulder: Active range of motion   170 degrees forward flexion  140 degrees abduction  50 degrees external rotation   Lower thoracic equal to contralateral side internal rotation     There is no tenderness present over the   There is 4/5 strength with external rotation testing at the side      Empty can testing is positive Belly press test is negative  Sosa test is negative   Reinholds's test is negative    Speed's test is Negative  The patient is neurovascularly intact distally in the extremity  Left Shoulder: Active range of motion   170 degrees forward flexion  170 degrees abduction  50 degrees external rotation   Lower thoracic equal to contralateral side  internal rotation      There is no specific tenderness present    There is 4/5 strength with external rotation testing at the side  Empty can testing is negative   Belly press test is negative  Sosa test is negative   Reinholds's test is negative    Speed's test is Negative   The patient is neurovascularly intact distally in the extremity  Eyes:  Anicteric sclerae  Neck:  Supple  Lungs:  Normal respiratory effort  Cardiovascular:  Capillary refill is less than 2 seconds  Skin:  Intact without erythema  Neurologic:  Sensation grossly intact to light touch  Psychiatric:  Mood and affect are appropriate  Data Review     I have personally reviewed pertinent films in PACS, and my interpretation follows:    X-rays of the left shoulder performed 03/01/20 show maintained joint space, no acute fracture or dislocation    X-rays of the right shoulder performed today show maintained joint space no acute fracture or dislocation      Past Medical History:   Diagnosis Date    Acute kidney injury (nontraumatic) ([facility] [address] )     Alcohol abuse     Anxiety     Finney esophagus     Benign essential hypertension     Chronic pain syndrome 02/17/2021    Colon polyp     Diverticulitis     Diverticulitis     Erectile dysfunction     Esophageal reflux     Essential hypertension     Lumbar radiculopathy 05/13/2019    Major depression     Mild cognitive disorder 05/06/2016    Mood disorder ([facility] [address] ) 05/02/2016    Multiple nevi     Obesity     Obesity (BMI 30-39  9)     ALEXANDRE (obstructive sleep apnea)     Psychosis (HCC)     Snoring     Suspicious nevus     Tremor of right hand     Weakness of right upper extremity        Past Surgical History:   Procedure Laterality Date    COLONOSCOPY N/A 3/9/2019    Procedure: COLONOSCOPY;  Surgeon: Gloria Adrian MD;  Location: [facility]; Service: Gastroenterology    LIPOMA RESECTION      back    VT ESOPHAGOGASTRODUODENOSCOPY TRANSORAL DIAGNOSTIC N/A 3/9/2019    Procedure: ESOPHAGOGASTRODUODENOSCOPY (EGD); Surgeon: Gloria Adrian MD;  Location: [facility]; Service: Gastroenterology    REVISION COLOSTOMY         Allergies   Allergen Reactions    Sulfa Antibiotics GI Intolerance     HA, vomiting, sweating     Amoxicillin GI Intolerance       Current Outpatient Medications on File Prior to Visit   Medication Sig Dispense Refill    acetaminophen (TYLENOL) 650 mg CR tablet Take 1 tablet (650 mg total) by mouth every 8 (eight) hours as needed for mild pain 30 tablet 1    aspirin (ECOTRIN LOW STRENGTH) 81 mg EC tablet Take 1 tablet (81 mg total) by mouth daily 30 tablet 3    omeprazole (PriLOSEC) 20 mg delayed release capsule Take 1 capsule (20 mg total) by mouth daily 30 capsule 0    ibuprofen (MOTRIN) 200 mg tablet Take by mouth every 6 (six) hours as needed for mild pain  (Patient not taking: Reported on 5/12/2022)      Naproxen Sodium (ALEVE PO) Take by mouth   (Patient not taking: Reported on 5/12/2022)       No current facility-administered medications on file prior to visit         Social History     Tobacco Use    Smoking status: Former Smoker     Types: Cigarettes     Start date: 1981    Smokeless tobacco: Never Used   Vaping Use    Vaping Use: Former   Substance Use Topics    Alcohol use: Not Currently     Comment: Sober 5 moths    Drug use: Yes     Types: Marijuana     Comment: Medical Card       Family History   Problem Relation Age of Onset    Stroke Mother     Heart disease Father              Procedures Performed     Large joint arthrocentesis: R subacromial bursa  Universal Protocol:  Consent: Verbal consent obtained  Risks and benefits: risks, benefits and alternatives were discussed  Consent given by: patient  Patient understanding: patient states understanding of the procedure being performed  Patient consent: the patient's understanding of the procedure matches consent given  Site marked: the operative site was marked  Radiology Images displayed and confirmed   If images not available, report reviewed: imaging studies available  Required items: required blood products, implants, devices, and special equipment available    Supporting Documentation  Indications: pain and joint swelling   Procedure Details  Location: shoulder - R subacromial bursa  Preparation: Patient was prepped and draped in the usual sterile fashion  Ultrasound guidance: no  Medications administered: 2 mL bupivacaine 0 25 %; 1 mL lidocaine 1 %; 2 mL methylPREDNISolone acetate 40 mg/mL    Patient tolerance: patient tolerated the procedure well with no immediate complications  Dressing:  Sterile dressing applied              Scribe Attestation    I,:  Maya Saenz am acting as a scribe while in the presence of the attending physician :       I,:  Misa Leavitt DO personally performed the services described in this documentation    as scribed in my presence :

## 2022-05-13 ENCOUNTER — TELEPHONE (OUTPATIENT)
Dept: OBGYN CLINIC | Facility: MEDICAL CENTER | Age: 57
End: 2022-05-13

## 2022-05-13 NOTE — TELEPHONE ENCOUNTER
Patient sees Dr Manda Kapoor  Patients wife Conrad Pisano is calling about his right shoulder pain of an 7, he is had an injection yesterday, and pain has increased since then and he can bearly move  She is asking if there is any script can be prescribed, uses [facility] in [place], on file      CB #  [phone]

## 2022-05-13 NOTE — TELEPHONE ENCOUNTER
I spoke with patient and advised it is common after steroid injection for pain to get worse before it gets better as we irritate the tissue  It can take 1-2 weeks for steroid to do its work  In the meantime, Ice 20 on 20 off, Tylenol/NSAIDS if able to take them  Injection site looks good at this time  Call office with any s/s of infection at injection site, redness, swelling, drainage, fever   Patient verbalized understanding

## 2022-05-24 ENCOUNTER — APPOINTMENT (EMERGENCY)
Dept: RADIOLOGY | Facility: HOSPITAL | Age: 57
End: 2022-05-24
Payer: COMMERCIAL

## 2022-05-24 ENCOUNTER — HOSPITAL ENCOUNTER (EMERGENCY)
Facility: HOSPITAL | Age: 57
Discharge: HOME | End: 2022-05-24
Attending: EMERGENCY MEDICINE
Payer: COMMERCIAL

## 2022-05-24 VITALS
RESPIRATION RATE: 19 BRPM | SYSTOLIC BLOOD PRESSURE: 136 MMHG | TEMPERATURE: 98.7 F | DIASTOLIC BLOOD PRESSURE: 86 MMHG | OXYGEN SATURATION: 98 % | WEIGHT: 266 LBS | HEART RATE: 74 BPM | BODY MASS INDEX: 37.1 KG/M2

## 2022-05-24 DIAGNOSIS — R07.9 CHEST PAIN: Primary | ICD-10-CM

## 2022-05-24 LAB
ALBUMIN SERPL BCP-MCNC: 4.4 G/DL (ref 3.5–5)
ALP SERPL-CCNC: 75 U/L (ref 46–116)
ALT SERPL W P-5'-P-CCNC: 26 U/L (ref 12–78)
ANION GAP SERPL CALCULATED.3IONS-SCNC: 9 MMOL/L (ref 4–13)
APTT PPP: 35 SECONDS (ref 23–37)
AST SERPL W P-5'-P-CCNC: 18 U/L (ref 5–45)
ATRIAL RATE: 68 BPM
ATRIAL RATE: 83 BPM
BASOPHILS # BLD AUTO: 0.03 THOUSANDS/ΜL (ref 0–0.1)
BASOPHILS NFR BLD AUTO: 0 % (ref 0–1)
BILIRUB SERPL-MCNC: 0.59 MG/DL (ref 0.2–1)
BUN SERPL-MCNC: 18 MG/DL (ref 5–25)
CALCIUM SERPL-MCNC: 9.2 MG/DL (ref 8.3–10.1)
CARDIAC TROPONIN I PNL SERPL HS: <2 NG/L
CARDIAC TROPONIN I PNL SERPL HS: <2 NG/L
CHLORIDE SERPL-SCNC: 104 MMOL/L (ref 100–108)
CO2 SERPL-SCNC: 26 MMOL/L (ref 21–32)
CREAT SERPL-MCNC: 0.88 MG/DL (ref 0.6–1.3)
EOSINOPHIL # BLD AUTO: 0.17 THOUSAND/ΜL (ref 0–0.61)
EOSINOPHIL NFR BLD AUTO: 2 % (ref 0–6)
ERYTHROCYTE [DISTWIDTH] IN BLOOD BY AUTOMATED COUNT: 13.9 % (ref 11.6–15.1)
GFR SERPL CREATININE-BSD FRML MDRD: 94 ML/MIN/1.73SQ M
GLUCOSE SERPL-MCNC: 99 MG/DL (ref 65–140)
HCT VFR BLD AUTO: 50.6 % (ref 36.5–49.3)
HGB BLD-MCNC: 16.4 G/DL (ref 12–17)
IMM GRANULOCYTES # BLD AUTO: 0.04 THOUSAND/UL (ref 0–0.2)
IMM GRANULOCYTES NFR BLD AUTO: 1 % (ref 0–2)
INR PPP: 1.13 (ref 0.84–1.19)
LYMPHOCYTES # BLD AUTO: 1.45 THOUSANDS/ΜL (ref 0.6–4.47)
LYMPHOCYTES NFR BLD AUTO: 18 % (ref 14–44)
MCH RBC QN AUTO: 30 PG (ref 26.8–34.3)
MCHC RBC AUTO-ENTMCNC: 32.4 G/DL (ref 31.4–37.4)
MCV RBC AUTO: 93 FL (ref 82–98)
MONOCYTES # BLD AUTO: 0.6 THOUSAND/ΜL (ref 0.17–1.22)
MONOCYTES NFR BLD AUTO: 7 % (ref 4–12)
NEUTROPHILS # BLD AUTO: 5.93 THOUSANDS/ΜL (ref 1.85–7.62)
NEUTS SEG NFR BLD AUTO: 72 % (ref 43–75)
NRBC BLD AUTO-RTO: 0 /100 WBCS
P AXIS: 67 DEGREES
P AXIS: 69 DEGREES
PLATELET # BLD AUTO: 289 THOUSANDS/UL (ref 149–390)
PMV BLD AUTO: 9.4 FL (ref 8.9–12.7)
POTASSIUM SERPL-SCNC: 4.2 MMOL/L (ref 3.5–5.3)
PR INTERVAL: 164 MS
PR INTERVAL: 168 MS
PROT SERPL-MCNC: 8 G/DL (ref 6.4–8.2)
PROTHROMBIN TIME: 14 SECONDS (ref 11.6–14.5)
QRS AXIS: 46 DEGREES
QRS AXIS: 53 DEGREES
QRSD INTERVAL: 84 MS
QRSD INTERVAL: 84 MS
QT INTERVAL: 382 MS
QT INTERVAL: 394 MS
QTC INTERVAL: 418 MS
QTC INTERVAL: 448 MS
RBC # BLD AUTO: 5.47 MILLION/UL (ref 3.88–5.62)
SODIUM SERPL-SCNC: 139 MMOL/L (ref 136–145)
T WAVE AXIS: 34 DEGREES
T WAVE AXIS: 47 DEGREES
VENTRICULAR RATE: 68 BPM
VENTRICULAR RATE: 83 BPM
WBC # BLD AUTO: 8.22 THOUSAND/UL (ref 4.31–10.16)

## 2022-05-24 PROCEDURE — 99285 EMERGENCY DEPT VISIT HI MDM: CPT | Performed by: PHYSICIAN ASSISTANT

## 2022-05-24 PROCEDURE — 71045 X-RAY EXAM CHEST 1 VIEW: CPT

## 2022-05-24 PROCEDURE — 93005 ELECTROCARDIOGRAM TRACING: CPT

## 2022-05-24 PROCEDURE — 84484 ASSAY OF TROPONIN QUANT: CPT | Performed by: PHYSICIAN ASSISTANT

## 2022-05-24 PROCEDURE — 93010 ELECTROCARDIOGRAM REPORT: CPT | Performed by: INTERNAL MEDICINE

## 2022-05-24 PROCEDURE — 99285 EMERGENCY DEPT VISIT HI MDM: CPT

## 2022-05-24 PROCEDURE — 36415 COLL VENOUS BLD VENIPUNCTURE: CPT | Performed by: PHYSICIAN ASSISTANT

## 2022-05-24 PROCEDURE — 85610 PROTHROMBIN TIME: CPT | Performed by: PHYSICIAN ASSISTANT

## 2022-05-24 PROCEDURE — 85730 THROMBOPLASTIN TIME PARTIAL: CPT | Performed by: PHYSICIAN ASSISTANT

## 2022-05-24 PROCEDURE — 85025 COMPLETE CBC W/AUTO DIFF WBC: CPT | Performed by: PHYSICIAN ASSISTANT

## 2022-05-24 PROCEDURE — 80053 COMPREHEN METABOLIC PANEL: CPT | Performed by: PHYSICIAN ASSISTANT

## 2022-05-24 RX ORDER — SODIUM CHLORIDE 9 MG/ML
3 INJECTION INTRAVENOUS
Status: DISCONTINUED | OUTPATIENT
Start: 2022-05-24 | End: 2022-05-24 | Stop reason: HOSPADM

## 2022-05-24 NOTE — ED PROVIDER NOTES
History  Chief Complaint   Patient presents with    Chest Pain    Hypertension    Dizziness     Pt reports CP, dizziness, and HTN that began yesterday  61yo male with a history of hypertension and obesity presenting for evaluation of chest pain that began yesterday  Patient reports aching left-sided chest pain that began yesterday  Pain has been intermittent since it began and seems to come and go at random  He denies any exertional component  Patient also reports lightheadedness and states his blood pressure was high at home with SBP in the 170s  He denies any associated shortness of breath, diaphoresis, nausea, vomiting, abdominal pain, syncope, paresthesias  No prior history of heart disease  He had a nuclear stress test in July 2021 which was normal        History provided by:  Patient and medical records   used: No    Chest Pain  Pain location:  L chest  Pain quality: aching    Pain radiates to:  Does not radiate  Pain radiates to the back: no    Pain severity:  Moderate  Onset quality:  Gradual  Duration:  1 day  Timing:  Intermittent  Progression:  Unchanged  Chronicity:  New  Relieved by:  Nothing  Worsened by:  Nothing tried  Associated symptoms: dizziness    Associated symptoms: no abdominal pain, no altered mental status, no cough, no fever, no nausea, no shortness of breath, no syncope, not vomiting and no weakness    Hypertension  Associated symptoms: chest pain and dizziness    Associated symptoms: no abdominal pain, no anxiety, no confusion, no fever, no nausea, no neck pain, no shortness of breath, no syncope, not vomiting and no weakness    Dizziness  Associated symptoms: chest pain    Associated symptoms: no nausea, no shortness of breath, no syncope, no vomiting and no weakness        Prior to Admission Medications   Prescriptions Last Dose Informant Patient Reported? Taking?    Naproxen Sodium (ALEVE PO)  Self Yes No   Sig: Take by mouth     Patient not taking: Reported on 5/12/2022   acetaminophen (TYLENOL) 650 mg CR tablet  Self No No   Sig: Take 1 tablet (650 mg total) by mouth every 8 (eight) hours as needed for mild pain   aspirin (ECOTRIN LOW STRENGTH) 81 mg EC tablet  Self No No   Sig: Take 1 tablet (81 mg total) by mouth daily   ibuprofen (MOTRIN) 200 mg tablet  Self Yes No   Sig: Take by mouth every 6 (six) hours as needed for mild pain    Patient not taking: Reported on 5/12/2022   omeprazole (PriLOSEC) 20 mg delayed release capsule  Self No No   Sig: Take 1 capsule (20 mg total) by mouth daily      Facility-Administered Medications: None       Past Medical History:   Diagnosis Date    Acute kidney injury (nontraumatic) (HCC)     Alcohol abuse     Anxiety     Finney esophagus     Benign essential hypertension     Chronic pain syndrome 02/17/2021    Colon polyp     Diverticulitis     Diverticulitis     Erectile dysfunction     Esophageal reflux     Essential hypertension     Lumbar radiculopathy 05/13/2019    Major depression     Mild cognitive disorder 05/06/2016    Mood disorder ([address] ) 05/02/2016    Multiple nevi     Obesity     Obesity (BMI 30-39  9)     ALEXANDRE (obstructive sleep apnea)     Psychosis (HCC)     Snoring     Suspicious nevus     Tremor of right hand     Weakness of right upper extremity        Past Surgical History:   Procedure Laterality Date    COLONOSCOPY N/A 3/9/2019    Procedure: COLONOSCOPY;  Surgeon: Zhang Muhammad MD;  Location: [facility]; Service: Gastroenterology    LIPOMA RESECTION      back    ND ESOPHAGOGASTRODUODENOSCOPY TRANSORAL DIAGNOSTIC N/A 3/9/2019    Procedure: ESOPHAGOGASTRODUODENOSCOPY (EGD); Surgeon: Zhang Muhammad MD;  Location: [facility]; Service: Gastroenterology    REVISION COLOSTOMY         Family History   Problem Relation Age of Onset    Stroke Mother     Heart disease Father      I have reviewed and agree with the history as documented      E-Cigarette/Vaping    E-Cigarette Use Former User      E-Cigarette/Vaping Substances    Nicotine No     THC No     CBD No     Flavoring No     Other No     Unknown No      Social History     Tobacco Use    Smoking status: Former Smoker     Types: Cigarettes     Start date: 1981    Smokeless tobacco: Never Used   Vaping Use    Vaping Use: Former   Substance Use Topics    Alcohol use: Not Currently     Comment: Sober 5 moths    Drug use: Yes     Types: Marijuana     Comment: Medical Card       Review of Systems   Constitutional: Negative for chills and fever  HENT: Negative for drooling and voice change  Eyes: Negative for discharge and redness  Respiratory: Negative for cough, shortness of breath and stridor  Cardiovascular: Positive for chest pain  Negative for leg swelling and syncope  Gastrointestinal: Negative for abdominal pain, nausea and vomiting  Musculoskeletal: Negative for neck pain and neck stiffness  Skin: Negative for color change and rash  Neurological: Positive for dizziness and light-headedness  Negative for seizures, syncope and weakness  Psychiatric/Behavioral: Negative for confusion  The patient is not nervous/anxious  All other systems reviewed and are negative  Physical Exam  Physical Exam  Vitals and nursing note reviewed  Constitutional:       General: He is not in acute distress  Appearance: He is well-developed  He is not diaphoretic  HENT:      Head: Normocephalic and atraumatic  Right Ear: External ear normal       Left Ear: External ear normal    Eyes:      General: No scleral icterus  Right eye: No discharge  Left eye: No discharge  Conjunctiva/sclera: Conjunctivae normal    Cardiovascular:      Rate and Rhythm: Normal rate and regular rhythm  Heart sounds: Normal heart sounds  No murmur heard  Pulmonary:      Effort: Pulmonary effort is normal  No respiratory distress  Breath sounds: Normal breath sounds  No stridor  No wheezing or rales  Abdominal:      General: Bowel sounds are normal  There is no distension  Palpations: Abdomen is soft  Tenderness: There is no abdominal tenderness  There is no guarding  Musculoskeletal:         General: No deformity  Normal range of motion  Cervical back: Normal range of motion and neck supple  Right lower leg: No edema  Left lower leg: No edema  Skin:     General: Skin is warm and dry  Neurological:      Mental Status: He is alert  He is not disoriented  GCS: GCS eye subscore is 4  GCS verbal subscore is 5  GCS motor subscore is 6     Psychiatric:         Behavior: Behavior normal          Vital Signs  ED Triage Vitals [05/24/22 1244]   Temperature Pulse Respirations Blood Pressure SpO2   98 7 °F (37 1 °C) 85 20 (!) 212/105 100 %      Temp Source Heart Rate Source Patient Position - Orthostatic VS BP Location FiO2 (%)   Oral Monitor Lying Right arm --      Pain Score       8           Vitals:    05/24/22 1244 05/24/22 1300 05/24/22 1430 05/24/22 1500   BP: (!) 212/105 157/80 138/80 136/86   Pulse: 85 78 74 74   Patient Position - Orthostatic VS: Lying            Visual Acuity      ED Medications  Medications   sodium chloride (PF) 0 9 % injection 3 mL (has no administration in time range)       Diagnostic Studies  Results Reviewed     Procedure Component Value Units Date/Time    HS Troponin I 2hr [[id number]] Collected: 05/24/22 1449    Lab Status: Final result Specimen: Blood from Arm, Left Updated: 05/24/22 1525     hs TnI 2hr <2 ng/L      Delta 2hr hsTnI --    HS Troponin I 4hr [[id number]]     Lab Status: No result Specimen: Blood     HS Troponin 0hr (reflex protocol) [[id number]]  (Normal) Collected: 05/24/22 1251    Lab Status: Final result Specimen: Blood from Arm, Left Updated: 05/24/22 1341     hs TnI 0hr <2 ng/L     Comprehensive metabolic panel [[id number]] Collected: 05/24/22 1251    Lab Status: Final result Specimen: Blood from Arm, Left Updated: 05/24/22 1332 Sodium 139 mmol/L      Potassium 4 2 mmol/L      Chloride 104 mmol/L      CO2 26 mmol/L      ANION GAP 9 mmol/L      BUN 18 mg/dL      Creatinine 0 88 mg/dL      Glucose 99 mg/dL      Calcium 9 2 mg/dL      AST 18 U/L      ALT 26 U/L      Alkaline Phosphatase 75 U/L      Total Protein 8 0 g/dL      Albumin 4 4 g/dL      Total Bilirubin 0 59 mg/dL      eGFR 94 ml/min/1 73sq m     Narrative:      National Kidney Disease Foundation guidelines for Chronic Kidney Disease (CKD):     Stage 1 with normal or high GFR (GFR > 90 mL/min/1 73 square meters)    Stage 2 Mild CKD (GFR = 60-89 mL/min/1 73 square meters)    Stage 3A Moderate CKD (GFR = 45-59 mL/min/1 73 square meters)    Stage 3B Moderate CKD (GFR = 30-44 mL/min/1 73 square meters)    Stage 4 Severe CKD (GFR = 15-29 mL/min/1 73 square meters)    Stage 5 End Stage CKD (GFR <15 mL/min/1 73 square meters)  Note: GFR calculation is accurate only with a steady state creatinine    Protime-INR [[id number]]  (Normal) Collected: 05/24/22 1251    Lab Status: Final result Specimen: Blood from Arm, Left Updated: 05/24/22 1324     Protime 14 0 seconds      INR 1 13    APTT [[id number]]  (Normal) Collected: 05/24/22 1251    Lab Status: Final result Specimen: Blood from Arm, Left Updated: 05/24/22 1324     PTT 35 seconds     CBC and differential [[id number]]  (Abnormal) Collected: 05/24/22 1251    Lab Status: Final result Specimen: Blood from Arm, Left Updated: 05/24/22 1301     WBC 8 22 Thousand/uL      RBC 5 47 Million/uL      Hemoglobin 16 4 g/dL      Hematocrit 50 6 %      MCV 93 fL      MCH 30 0 pg      MCHC 32 4 g/dL      RDW 13 9 %      MPV 9 4 fL      Platelets 455 Thousands/uL      nRBC 0 /100 WBCs      Neutrophils Relative 72 %      Immat GRANS % 1 %      Lymphocytes Relative 18 %      Monocytes Relative 7 %      Eosinophils Relative 2 %      Basophils Relative 0 %      Neutrophils Absolute 5 93 Thousands/µL      Immature Grans Absolute 0 04 Thousand/uL Lymphocytes Absolute 1 45 Thousands/µL      Monocytes Absolute 0 60 Thousand/µL      Eosinophils Absolute 0 17 Thousand/µL      Basophils Absolute 0 03 Thousands/µL                  X-ray chest 1 view portable   Final Result by Tommy Springer MD (05/24 1532)      No acute cardiopulmonary disease                    Workstation performed: [id number]                    Procedures  ECG 12 Lead Documentation Only    Date/Time: 5/24/2022 4:06 PM  Performed by: Karina Roberson PA-C  Authorized by: Karina Roberson PA-C     Indications / Diagnosis:  CP  ECG reviewed by me, the ED Provider: yes    Patient location:  ED  Interpretation:     Interpretation: normal    Rate:     ECG rate:  83    ECG rate assessment: normal    Rhythm:     Rhythm: sinus rhythm    Ectopy:     Ectopy: none    QRS:     QRS axis:  Normal  Conduction:     Conduction: normal    ST segments:     ST segments:  Normal  T waves:     T waves: normal    ECG 12 Lead Documentation Only    Date/Time: 5/24/2022 4:07 PM  Performed by: Karina Roberson PA-C  Authorized by: Karina Roberson PA-C     Indications / Diagnosis:  Delta  ECG reviewed by me, the ED Provider: yes    Patient location:  ED  Previous ECG:     Previous ECG:  Compared to current    Comparison ECG info:  EKG 2 hours prior    Similarity:  No change  Interpretation:     Interpretation: normal    Rate:     ECG rate:  68    ECG rate assessment: normal    Rhythm:     Rhythm: sinus rhythm    Ectopy:     Ectopy: none    QRS:     QRS axis:  Normal  Conduction:     Conduction: normal    ST segments:     ST segments:  Normal  T waves:     T waves: normal               ED Course             HEART Risk Score    Flowsheet Row Most Recent Value   Heart Score Risk Calculator    History 1 Filed at: 05/24/2022 1530   ECG 0 Filed at: 05/24/2022 1530   Age 1 Filed at: 05/24/2022 1530   Risk Factors 1 Filed at: 05/24/2022 1530   Troponin 0 Filed at: 05/24/2022 1530   HEART Score 3 Filed at: 05/24/2022 0                        SBIRT 22yo+    Flowsheet Row Most Recent Value   SBIRT (23 yo +)    In order to provide better care to our patients, we are screening all of our patients for alcohol and drug use  Would it be okay to ask you these screening questions? No Filed at: 05/24/2022 1324                    MDM  Number of Diagnoses or Management Options  Chest pain: new and requires workup  Diagnosis management comments: 63yo male presenting for chest pain that began last night  Intermittent, comes and goes randomly, not exertional  No associated SOB/diaphoresis/nausea/syncope  He is hypertensive on arrival with otherwise normal vital signs  Differential diagnosis includes but is not limited to: ACS, pneumonia, pneumothorax, pericarditis, GERD, gastritis, musculoskeletal, anxiety, PE, aortic dissection, pleuritis, esophagitis, referred pain, nonspecific chest pain  Initial ED plan: Check cardiac labs, EKG, and CXR  Final assessment: Labs unremarkable including normal blood counts, electrolytes, renal function  EKG reveals NSR without ischemic changes and high sensitivity troponin is <2  CXR is clear  HEART score is 3  Delta troponin/EKG performed at 2 hours which is unchanged  No indication for admission at this time  Advised close PCP and cardiology follow-up  ED return precautions discussed  Patient expressed understanding and is agreeable to plan  Patient discharged in stable condition           Amount and/or Complexity of Data Reviewed  Clinical lab tests: reviewed and ordered  Tests in the radiology section of CPT®: ordered and reviewed  Tests in the medicine section of CPT®: ordered and reviewed  Review and summarize past medical records: yes  Independent visualization of images, tracings, or specimens: yes    Risk of Complications, Morbidity, and/or Mortality  Presenting problems: high  Diagnostic procedures: high  Management options: high    Patient Progress  Patient progress: stable      Disposition  Final diagnoses:   Chest pain     Time reflects when diagnosis was documented in both MDM as applicable and the Disposition within this note     Time User Action Codes Description Comment    5/24/2022  3:30 PM [facility] [address] [place] [R07 9] Chest pain       ED Disposition     ED Disposition   Discharge    Condition   Stable    Date/Time   Tue May 24, 2022  3:30 PM    Comment   Lashell Minerper discharge to home/self care                 Follow-up Information     Follow up With Specialties Details Why Contact Info Additional Information    Ree Notice, [address] Nurse Practitioner Schedule an appointment as soon as possible for a visit   21 [phone]  [facility] Cardiology Schedule an appointment as soon as possible for a visit   [address]  [facility] [address] Cardiology [address] [place] [facility] [place] [facility], [place], [facility] 13 Emergency Department Emergency Medicine  If symptoms worsen [address] Emergency [address], [address], [place], [address]          Discharge Medication List as of 5/24/2022  3:31 PM      CONTINUE these medications which have NOT CHANGED    Details   acetaminophen (TYLENOL) 650 mg CR tablet Take 1 tablet (650 mg total) by mouth every 8 (eight) hours as needed for mild pain, Starting Mon 5/13/2019, Normal      aspirin (ECOTRIN LOW STRENGTH) 81 mg EC tablet Take 1 tablet (81 mg total) by mouth daily, Starting Tue 7/13/2021, Normal      ibuprofen (MOTRIN) 200 mg tablet Take by mouth every 6 (six) hours as needed for mild pain , Historical Med      Naproxen Sodium (ALEVE PO) Take by mouth  , Historical Med      omeprazole (PriLOSEC) 20 mg delayed release capsule Take 1 capsule (20 mg total) by mouth daily, Starting Wed 3/2/2022, Until Thu 5/12/2022, No Print             No discharge procedures on file      PDMP Review       Value Time User    PDMP Reviewed  Yes 1/31/2022  1:30 PM Emanuel Morales DO          ED Provider  Electronically Signed by           Cody Alvarado PA-C  05/24/22 1625

## 2022-05-24 NOTE — DISCHARGE INSTRUCTIONS
Please follow-up with your family doctor and cardiology  Return to the ER with any new or worsening symptoms

## 2022-05-26 ENCOUNTER — EVALUATION (OUTPATIENT)
Dept: PHYSICAL THERAPY | Facility: CLINIC | Age: 57
End: 2022-05-26
Payer: COMMERCIAL

## 2022-05-26 DIAGNOSIS — M25.511 RIGHT SHOULDER PAIN, UNSPECIFIED CHRONICITY: ICD-10-CM

## 2022-05-26 DIAGNOSIS — M25.512 CHRONIC LEFT SHOULDER PAIN: ICD-10-CM

## 2022-05-26 DIAGNOSIS — M77.8 TENDINITIS OF LEFT SHOULDER: ICD-10-CM

## 2022-05-26 DIAGNOSIS — M77.8 TENDINITIS OF RIGHT SHOULDER: ICD-10-CM

## 2022-05-26 DIAGNOSIS — G89.29 CHRONIC LEFT SHOULDER PAIN: ICD-10-CM

## 2022-05-26 PROCEDURE — 97162 PT EVAL MOD COMPLEX 30 MIN: CPT | Performed by: PHYSICAL THERAPIST

## 2022-05-26 PROCEDURE — 97110 THERAPEUTIC EXERCISES: CPT | Performed by: PHYSICAL THERAPIST

## 2022-05-26 NOTE — PROGRESS NOTES
PT Evaluation     Today's date: 2022  Patient name: Livia Timmons  : 1965  MRN: [id number]  Referring provider: Jessica Che DO  Dx:   Encounter Diagnosis     ICD-10-CM    1  Chronic left shoulder pain  M25 512 Ambulatory referral to Physical Therapy    G89 29 PT plan of care cert/re-cert   2  Right shoulder pain, unspecified chronicity  M25 511 Ambulatory referral to Physical Therapy     PT plan of care cert/re-cert   3  Tendinitis of right shoulder  M77 8 Ambulatory referral to Physical Therapy     PT plan of care cert/re-cert   4  Tendinitis of left shoulder  M77 8 Ambulatory referral to Physical Therapy     PT plan of care cert/re-cert       Start Time: 952  Stop Time:   Total time in clinic (min): 53 minutes    Assessment  Assessment details: Livia Timmons is a 59 y o  male who presents with pain, decreased strength, decreased joint mobility and postural dysfunction  Due to these impairments, Patient has difficulty performing a/iadls, recreational activities and engaging in social activities  Patient's clinical presentation is consistent with their referring diagnosis of bilateral RC tendinitis with concern for underlying RC tear given significant weakness Patient would benefit from skilled physical therapy to address their aforementioned impairments, improve their level of function and to improve their overall quality of life  Impairments: abnormal muscle firing, abnormal or restricted ROM, activity intolerance, impaired physical strength, lacks appropriate home exercise program, pain with function, scapular dyskinesis and poor posture   Understanding of Dx/Px/POC: excellent   Prognosis: good    Goals  Short Term Goals: to be achieved by 4 weeks  1) Patient to be independent with basic HEP  2) Decrease pain to 5/10 at it's worst   3) Increase UE strength by 1/2 MMT grade in all deficient planes  4) Increase UE ROM by > 5 deg in all deficient planes    5) Patient to report decreased sleep interruption secondary to pain  Long Term Goals: to be achieved by discharge  1) FOTO equal to or greater than TBD  2) Patient to be independent with comprehensive HEP  3) Abolish pain for improved quality of life  4) Increase UE strength to 5/5 MMT grade in all deficient planes to improve a/iadls  5) Increase UE ROM to within 5 deg of contralateral UE to improve a/iadls  6) Patient to report no sleep interruption secondary to pain  Plan  Patient would benefit from: skilled PT  Planned modality interventions: biofeedback, cryotherapy, hydrotherapy, unattended electrical stimulation, thermotherapy: hydrocollator packs and low level laser therapy  Planned therapy interventions: activity modification, ADL retraining, behavior modification, body mechanics training, functional ROM exercises, home exercise program, IADL retraining, joint mobilization, manual therapy, massage, neuromuscular re-education, patient education, postural training, strengthening, stretching, therapeutic activities and therapeutic exercise  Frequency: 2-3x week  Duration in weeks: 12  Plan of Care beginning date: 5/26/2022  Plan of Care expiration date: 8/18/2022  Treatment plan discussed with: patient        Subjective Evaluation    History of Present Illness  Mechanism of injury: Patient presents with c/o bilateral shoulder pain  Patient's right shoulder pain began insidiously approximately 3 months ago, whereas his left shoulder pain began acutely when he slipped on ice and landed on his left side  Patient initially had bruising and swelling his his shoulder following his fall  Patient has painful mobility when reaching away from his body and lifting > 2-3 lbs  Patient's ROM has slightly been improving  Patient has significant crepitus in his both shoulders  Patient's quality of sleep is interrupted d/t his pain  Patient recently had a steroid injection to his left shoulder, which partially improved his symptoms  Patient denies experiencing tingling/numbness or instability  Patient's next f/u appointment with ortho is scheduled for   Per chart review:    XR R shoulder: "Mild osteoarthritis of the glenohumeral and acromioclavicular joints "    XR L shoulder: "Sclerotic homogeneous focus in the intramedullary space of the left proximal humeral shaft measuring 2 cm may represent bone island  No acute osseous abnormality "  Pain  Current pain ratin  At best pain ratin  At worst pain ratin  Location: right > left shoulder  Quality: sharp and dull ache  Alleviating factors: pain patch, Motrin, rest   Exacerbated by: reaching overhead, reaching behind back, lifting > 2-3 lbs, laying on side  Social Support    Employment status: not working  Hand dominance: right    Patient Goals  Patient goals for therapy: decreased pain, increased motion and increased strength          Objective     Postural Observations  Seated posture: fair  Standing posture: fair        Tenderness     Left Shoulder   Tenderness in the biceps tendon (proximal) and bicipital groove  Right Shoulder  Tenderness in the biceps tendon (proximal) and bicipital groove       Active Range of Motion   Cervical/Thoracic Spine     Normal active range of motion  Left Shoulder   Flexion: 170 degrees with pain  Abduction: 165 degrees with pain  External rotation BTH: T5 with pain  Internal rotation BTB: T7 with pain    Right Shoulder   Flexion: 170 degrees with pain  Abduction: 170 degrees with pain  External rotation BTH: T4 with pain  Internal rotation BTB: T9 with pain    Passive Range of Motion   Left Shoulder   Flexion: 170 degrees   Abduction: 170 degrees   External rotation 90°: 102 degrees   Internal rotation 90°: 68 degrees     Right Shoulder   Flexion: 170 degrees   Abduction: 170 degrees   External rotation 90°: 102 degrees   Internal rotation 90°: 68 degrees     Scapular Mobility   Left Shoulder   Scapular Dyskinesis: none  Scapular mobility: WFL    Right Shoulder   Scapular Dyskinesis: none  Scapular mobility: [facility] [employer]    Joint Play   Left Shoulder  Hypomobile in the posterior capsule and inferior capsule  Right Shoulder  Hypomobile in the posterior capsule and inferior capsule  Strength/Myotome Testing     Left Shoulder     Planes of Motion   Flexion: 3+   Abduction: 3+   External rotation at 0°: 4-   Internal rotation at 0°: 5     Isolated Muscles   Lower trapezius: 4   Middle trapezius: 4     Right Shoulder     Planes of Motion   Flexion: 3+   Abduction: 3+   External rotation at 0°: 3+   Internal rotation at 0°: 5     Isolated Muscles   Lower trapezius: 4   Middle trapezius: 4     Left Elbow   Flexion: 5  Extension: 5    Right Elbow   Flexion: 5  Extension: 5    Left Wrist/Hand   Wrist extension: 5  Wrist flexion: 5    Right Wrist/Hand   Wrist extension: 5  Wrist flexion: 5    Tests   Cervical   Negative vertical compression  Left Shoulder   Negative lift-off  Right Shoulder   Negative lift-off  Lumbar   Negative vertical compression  Precautions: standard      Manuals 5/26            Rhythmic stabilization: 60, 90, 120 deg FF b/l                                                    Neuro Re-Ed                                                                                                        Ther Ex             Patient education: pathophysiology, HEP review ANGEL Chowdary: M row RTB 2x10 3"            TB IR RTB 2x10            ER isometric walk-outs RTB 2x10 5"            Prone row             ER - s/l             [employer] wall slides                          Ther Activity                                       Gait Training                                       Modalities                                         Access Code: [email or web address]  URL: [email or web address]  Date: 05/26/2022  Prepared by: Justen Gray    Exercises  · Shoulder Internal Rotation with Resistance - 1 x daily - 4 x weekly - 2 sets - 10 reps  · Standing Shoulder Row with Anchored Resistance - 1 x daily - 4 x weekly - 2 sets - 10 reps - 3 seconds hold  · Shoulder External Rotation Reactive Isometrics - 1 x daily - 4 x weekly - 2 sets - 10 reps - 5 seconds hold

## 2022-05-31 ENCOUNTER — OFFICE VISIT (OUTPATIENT)
Dept: PHYSICAL THERAPY | Facility: CLINIC | Age: 57
End: 2022-05-31
Payer: COMMERCIAL

## 2022-05-31 DIAGNOSIS — G89.29 CHRONIC LEFT SHOULDER PAIN: Primary | ICD-10-CM

## 2022-05-31 DIAGNOSIS — M77.8 TENDINITIS OF LEFT SHOULDER: ICD-10-CM

## 2022-05-31 DIAGNOSIS — M25.511 RIGHT SHOULDER PAIN, UNSPECIFIED CHRONICITY: ICD-10-CM

## 2022-05-31 DIAGNOSIS — M77.8 TENDINITIS OF RIGHT SHOULDER: ICD-10-CM

## 2022-05-31 DIAGNOSIS — M25.512 CHRONIC LEFT SHOULDER PAIN: Primary | ICD-10-CM

## 2022-05-31 PROCEDURE — 97110 THERAPEUTIC EXERCISES: CPT

## 2022-05-31 NOTE — PROGRESS NOTES
Daily Note     Today's date: 2022  Patient name: Tan Haney  : 1965  MRN: [id number]  Referring provider: Sky Walls DO  Dx:   Encounter Diagnosis     ICD-10-CM    1  Chronic left shoulder pain  M25 512     G89 29    2  Right shoulder pain, unspecified chronicity  M25 511    3  Tendinitis of right shoulder  M77 8    4  Tendinitis of left shoulder  M77 8                   Subjective: Pt noted that he has been having pain in bilateral shoulders  Pt noted 5-6/10  Objective: See treatment diary below      Assessment: Continued with treatment session, trailed additional exercises  Tolerated treatment fairly well  Patient demonstrated fatigue post treatment and would benefit from continued PT  Pt noted that with prone rows feeling R shoulder muscle burn  Felt like it was "on fire"  S/p treatment session patient note having some muscle burn but no increase in pain noted  DOMS reviewed and acknowledged by patient may have 24 to 48 hours of muscle soreness following treatment session   educated he may use CP/ ice to help with muscle soreness along with pain due to patient noted feeling a positive relief with ice  Instructed With HEP on every other day performance, unless noted otherwise and/or having increased discomfort or pain         Plan: Continue per plan of care  Progress treatment as tolerated         Precautions: standard      Manuals            Rhythmic stabilization: 60, 90, 120 deg FF b/l  NV                                                  Neuro Re-Ed                                                                                                        Ther Ex             Patient education: pathophysiology, HEP review ANGEL Chowdary: M row RTB 2x10 3" RTB 2x10 3"  L and M            TB IR RTB 2x10 RTB 2x 10           ER isometric walk-outs RTB 2x10 5" RTB 2x 10 5"            Prone row  2x 10            ER - s/l  2x 10            Scaption wall slides  2x 10 RTB Ther Activity                                       Gait Training                                       Modalities

## 2022-06-02 ENCOUNTER — OFFICE VISIT (OUTPATIENT)
Dept: PHYSICAL THERAPY | Facility: CLINIC | Age: 57
End: 2022-06-02
Payer: COMMERCIAL

## 2022-06-02 DIAGNOSIS — M25.511 RIGHT SHOULDER PAIN, UNSPECIFIED CHRONICITY: ICD-10-CM

## 2022-06-02 DIAGNOSIS — M77.8 TENDINITIS OF LEFT SHOULDER: ICD-10-CM

## 2022-06-02 DIAGNOSIS — M25.512 CHRONIC LEFT SHOULDER PAIN: Primary | ICD-10-CM

## 2022-06-02 DIAGNOSIS — G89.29 CHRONIC LEFT SHOULDER PAIN: Primary | ICD-10-CM

## 2022-06-02 DIAGNOSIS — M77.8 TENDINITIS OF RIGHT SHOULDER: ICD-10-CM

## 2022-06-02 PROCEDURE — 97110 THERAPEUTIC EXERCISES: CPT | Performed by: PHYSICAL THERAPIST

## 2022-06-02 NOTE — PROGRESS NOTES
Daily Note     Today's date: 2022  Patient name: Andressa Musa  : 1965  MRN: [id number]  Referring provider: Lamont Atkins DO  Dx:   Encounter Diagnosis     ICD-10-CM    1  Chronic left shoulder pain  M25 512     G89 29    2  Right shoulder pain, unspecified chronicity  M25 511    3  Tendinitis of right shoulder  M77 8    4  Tendinitis of left shoulder  M77 8        Start Time: 841  Stop Time: 920  Total time in clinic (min): 39 minutes    Subjective: Patient reports that his shoulders have felt less painful and more strong  Patient was outside using his chainsaw yesterday  Objective: See treatment diary below      Assessment: Tolerated treatment fair  Patient demonstrated fatigue post treatment and would benefit from continued PT  Patient with c/o posterior shoulder pain mostly with ER at mid to end range  Patient able to progress from isometrics to isotonics, but remains weak  Plan: Continue per plan of care  Progress treatment as tolerated  Precautions: standard      Manuals           Rhythmic stabilization: 60, 90, 120 deg FF b/l  NV                                                  Neuro Re-Ed                                                                                                        Ther Ex             Patient education: pathophysiology, HEP review GR            UBE   3' / 3'          Webslide: M row RTB 2x10 3" RTB 2x10 3"  L and M  GTB 2x10 3" ea  TB IR RTB 2x10 RTB 2x 10 RTB 2x10          ER isometric walk-outs RTB 2x10 5" RTB 2x 10 5"  Isotonics YTB 2x10 ea  Prone row  2x 10  2x10 3" 3#          ER - s/l  2x 10  R: 2x10 ; L: 2x10 1#          Scaption wall slides  2x 10 RTB  2x10 RTB          Bilateral ER with scapular retraction - isometrics   Deferred P!           Ther Activity                                       Gait Training                                       Modalities

## 2022-06-07 ENCOUNTER — OFFICE VISIT (OUTPATIENT)
Dept: PHYSICAL THERAPY | Facility: CLINIC | Age: 57
End: 2022-06-07
Payer: COMMERCIAL

## 2022-06-07 DIAGNOSIS — M25.511 RIGHT SHOULDER PAIN, UNSPECIFIED CHRONICITY: ICD-10-CM

## 2022-06-07 DIAGNOSIS — M77.8 TENDINITIS OF LEFT SHOULDER: ICD-10-CM

## 2022-06-07 DIAGNOSIS — M25.512 CHRONIC LEFT SHOULDER PAIN: Primary | ICD-10-CM

## 2022-06-07 DIAGNOSIS — M77.8 TENDINITIS OF RIGHT SHOULDER: ICD-10-CM

## 2022-06-07 DIAGNOSIS — G89.29 CHRONIC LEFT SHOULDER PAIN: Primary | ICD-10-CM

## 2022-06-07 PROCEDURE — 97110 THERAPEUTIC EXERCISES: CPT | Performed by: PHYSICAL THERAPIST

## 2022-06-07 PROCEDURE — 97112 NEUROMUSCULAR REEDUCATION: CPT | Performed by: PHYSICAL THERAPIST

## 2022-06-07 NOTE — PROGRESS NOTES
Daily Note     Today's date: 2022  Patient name: Dilia Colin  : 1965  MRN: [id number]  Referring provider: Claudio Redding DO  Dx:   Encounter Diagnosis     ICD-10-CM    1  Chronic left shoulder pain  M25 512     G89 29    2  Right shoulder pain, unspecified chronicity  M25 511    3  Tendinitis of right shoulder  M77 8    4  Tendinitis of left shoulder  M77 8        Start Time: 830  Stop Time: 920  Total time in clinic (min): 50 minutes    Subjective: Patient reports that he was in an excavator digging around his pool  Patient feels more sore today  Objective: See treatment diary below      Assessment: Tolerated treatment fair  Patient demonstrated fatigue post treatment and would benefit from continued PT  Patient with significant left shoulder ER weakness with improved on right  Patient has difficulty resisting isometrics on left, where right is more painful  Plan: Continue per plan of care  Progress treatment as tolerated  Precautions: standard      Manuals          Rhythmic stabilization: 60, 90, 120 deg FF b/l  NV                                                  Neuro Re-Ed             Prone shoulder ext  2x10 3" ea  Prone h abd     2x10 3" ea  Prone scaption    2x10 3" ea  Ther Ex             Patient education: pathophysiology, HEP review GR            UBE   3' / 3' 3' / 3'         Webslide: M row RTB 2x10 3" RTB 2x10 3"  L and M  GTB 2x10 3" ea  TB IR RTB 2x10 RTB 2x 10 RTB 2x10          ER isometric walk-outs RTB 2x10 5" RTB 2x 10 5"  Isotonics YTB 2x10 ea  Isometrics YTB 2x10 5" ea  Prone row  2x 10  2x10 3" 3#          ER - s/l  2x 10  R: 2x10 ; L: 2x10 1# 2x10 1# ea  Scaption wall slides  2x 10 RTB  2x10 RTB          Bilateral ER with scapular retraction - isometrics   Deferred P!           Ther Activity                                       Gait Training                                       Modalities

## 2022-06-09 ENCOUNTER — APPOINTMENT (OUTPATIENT)
Dept: PHYSICAL THERAPY | Facility: CLINIC | Age: 57
End: 2022-06-09
Payer: COMMERCIAL

## 2022-06-13 ENCOUNTER — OFFICE VISIT (OUTPATIENT)
Dept: PHYSICAL THERAPY | Facility: CLINIC | Age: 57
End: 2022-06-13
Payer: COMMERCIAL

## 2022-06-13 DIAGNOSIS — M77.8 TENDINITIS OF LEFT SHOULDER: ICD-10-CM

## 2022-06-13 DIAGNOSIS — M25.511 RIGHT SHOULDER PAIN, UNSPECIFIED CHRONICITY: ICD-10-CM

## 2022-06-13 DIAGNOSIS — M25.512 CHRONIC LEFT SHOULDER PAIN: Primary | ICD-10-CM

## 2022-06-13 DIAGNOSIS — M77.8 TENDINITIS OF RIGHT SHOULDER: ICD-10-CM

## 2022-06-13 DIAGNOSIS — G89.29 CHRONIC LEFT SHOULDER PAIN: Primary | ICD-10-CM

## 2022-06-13 PROCEDURE — 97112 NEUROMUSCULAR REEDUCATION: CPT | Performed by: PHYSICAL MEDICINE & REHABILITATION

## 2022-06-13 PROCEDURE — 97110 THERAPEUTIC EXERCISES: CPT | Performed by: PHYSICAL MEDICINE & REHABILITATION

## 2022-06-13 NOTE — PROGRESS NOTES
Daily Note     Today's date: 2022  Patient name: Belle Johnson  : 1965  MRN: [id number]  Referring provider: Alexander Carter DO  Dx:   Encounter Diagnosis     ICD-10-CM    1  Chronic left shoulder pain  M25 512     G89 29    2  Right shoulder pain, unspecified chronicity  M25 511    3  Tendinitis of right shoulder  M77 8    4  Tendinitis of left shoulder  M77 8                   Subjective: Patient notes tenderness over R shoulder  Minimal pain yesterday  Overall improvement since beginning therapy  Objective: See treatment diary below    Assessment: Patient tolerated treatment well overall  Increased pain on R, specifically during ER  Decreased control on R during rhythmic stab  Continue as able  Plan: Continue per plan of care  Progress treatment as tolerated  Precautions: standard    Manuals         Rhythmic stabilization: 60, 90, 120 deg FF b/l  NV   90 deg 3x30" ea                                               Neuro Re-Ed             Prone shoulder ext  2x10 3" ea  2x10 ea        Prone h abd     2x10 3" ea  2x10x3" ea        Prone scaption    2x10 3" ea  2x10x3" ea                                                            Ther Ex             Patient education: pathophysiology, HEP review GR            UBE   3' / 3' 3' / 3' 3'/3'        Webslide: M row RTB 2x10 3" RTB 2x10 3"  L and M  GTB 2x10 3" ea  GTB 2x10x3"        TB IR RTB 2x10 RTB 2x 10 RTB 2x10          ER isometric walk-outs RTB 2x10 5" RTB 2x 10 5"  Isotonics YTB 2x10 ea  Isometrics YTB 2x10 5" ea  Jacob YTB 2x10 5" L 16x5" R, p! Prone row  2x 10  2x10 3" 3#          ER - s/l  2x 10  R: 2x10 ; L: 2x10 1# 2x10 1# ea  2x10 ea, 1#        Scaption wall slides  2x 10 RTB  2x10 RTB  2x10 RTB        Bilateral ER with scapular retraction - isometrics   Deferred P!           Ther Activity                                       Gait Training                                       Modalities

## 2022-06-17 ENCOUNTER — OFFICE VISIT (OUTPATIENT)
Dept: PHYSICAL THERAPY | Facility: CLINIC | Age: 57
End: 2022-06-17
Payer: COMMERCIAL

## 2022-06-17 DIAGNOSIS — M77.8 TENDINITIS OF LEFT SHOULDER: ICD-10-CM

## 2022-06-17 DIAGNOSIS — G89.29 CHRONIC LEFT SHOULDER PAIN: Primary | ICD-10-CM

## 2022-06-17 DIAGNOSIS — M77.8 TENDINITIS OF RIGHT SHOULDER: ICD-10-CM

## 2022-06-17 DIAGNOSIS — M25.512 CHRONIC LEFT SHOULDER PAIN: Primary | ICD-10-CM

## 2022-06-17 DIAGNOSIS — M25.511 RIGHT SHOULDER PAIN, UNSPECIFIED CHRONICITY: ICD-10-CM

## 2022-06-17 PROCEDURE — 97112 NEUROMUSCULAR REEDUCATION: CPT | Performed by: PHYSICAL THERAPIST

## 2022-06-17 PROCEDURE — 97110 THERAPEUTIC EXERCISES: CPT | Performed by: PHYSICAL THERAPIST

## 2022-06-20 ENCOUNTER — APPOINTMENT (OUTPATIENT)
Dept: PHYSICAL THERAPY | Facility: CLINIC | Age: 57
End: 2022-06-20
Payer: COMMERCIAL

## 2022-06-23 ENCOUNTER — APPOINTMENT (OUTPATIENT)
Dept: PHYSICAL THERAPY | Facility: CLINIC | Age: 57
End: 2022-06-23
Payer: COMMERCIAL

## 2022-06-30 ENCOUNTER — APPOINTMENT (OUTPATIENT)
Dept: PHYSICAL THERAPY | Facility: CLINIC | Age: 57
End: 2022-06-30
Payer: COMMERCIAL

## 2022-06-30 NOTE — PROGRESS NOTES
PT Re-Evaluation     Today's date: 2022  Patient name: Katie Shaw  : 1965  MRN: [id number]  Referring provider: Alan Riojas DO  Dx:   Encounter Diagnosis     ICD-10-CM    1  Chronic left shoulder pain  M25 512     G89 29    2  Right shoulder pain, unspecified chronicity  M25 511    3  Tendinitis of right shoulder  M77 8    4  Tendinitis of left shoulder  M77 8                   Assessment  Assessment details: Since beginning physical therapy, Chica Londono has attended a total number of 7 visits and has maintained excellent compliance with established POC  Patient has made significant improvements in all areas, including decreased pain, increased strength, increased ROM, improved flexibility, improved joint mobility, improved postural awareness and improved overall level of function  Patient is reporting improved ability to perform a/iadls, recreational activities and engaging in social activities  Despite these improvements, Patient continues to present with pain, decreased strength, decreased ROM, decreased joint mobility and postural dysfunction  Patient would continue to benefit from skilled physical therapy to address his aforementioned impairments, improve their level of function and to improve their overall quality of life  Impairments: abnormal muscle firing, abnormal or restricted ROM, activity intolerance, impaired physical strength, lacks appropriate home exercise program, pain with function, scapular dyskinesis and poor posture   Understanding of Dx/Px/POC: excellent   Prognosis: good    Goals  Short Term Goals: to be achieved by 4 weeks  1) Patient to be independent with basic HEP  2) Decrease pain to 5/10 at it's worst   3) Increase UE strength by 1/2 MMT grade in all deficient planes  4) Increase UE ROM by > 5 deg in all deficient planes  5) Patient to report decreased sleep interruption secondary to pain      Long Term Goals: to be achieved by discharge  1) FOTO equal to or greater than TBD  2) Patient to be independent with comprehensive HEP  3) Abolish pain for improved quality of life  4) Increase UE strength to 5/5 MMT grade in all deficient planes to improve a/iadls  5) Increase UE ROM to within 5 deg of contralateral UE to improve a/iadls  6) Patient to report no sleep interruption secondary to pain  Plan  Patient would benefit from: skilled PT  Planned modality interventions: biofeedback, cryotherapy, hydrotherapy, unattended electrical stimulation, thermotherapy: hydrocollator packs and low level laser therapy  Planned therapy interventions: activity modification, ADL retraining, behavior modification, body mechanics training, functional ROM exercises, home exercise program, IADL retraining, joint mobilization, manual therapy, massage, neuromuscular re-education, patient education, postural training, strengthening, stretching, therapeutic activities and therapeutic exercise  Frequency: 2-3x week  Duration in weeks: 6  Plan of Care beginning date: 6/30/2022  Plan of Care expiration date: 8/11/2022  Treatment plan discussed with: patient        Subjective Evaluation    History of Present Illness  Mechanism of injury:     Patient's next f/u appointment with ortho is scheduled for 7/7  Treatments  Current treatment: physical therapy  Patient Goals  Patient goals for therapy: decreased pain, increased motion and increased strength          Objective     Postural Observations  Seated posture: fair  Standing posture: fair        Tenderness     Left Shoulder   Tenderness in the biceps tendon (proximal) and bicipital groove  Right Shoulder  Tenderness in the biceps tendon (proximal) and bicipital groove       Active Range of Motion   Cervical/Thoracic Spine     Normal active range of motion  Left Shoulder   Flexion: 170 degrees with pain  Abduction: 165 degrees with pain  External rotation BTH: T5 with pain  Internal rotation BTB: T7 with pain    Right Shoulder   Flexion: 170 degrees with pain  Abduction: 170 degrees with pain  External rotation BTH: T4 with pain  Internal rotation BTB: T9 with pain    Passive Range of Motion   Left Shoulder   Flexion: 170 degrees   Abduction: 170 degrees   External rotation 90°: 102 degrees   Internal rotation 90°: 68 degrees     Right Shoulder   Flexion: 170 degrees   Abduction: 170 degrees   External rotation 90°: 102 degrees   Internal rotation 90°: 68 degrees     Scapular Mobility   Left Shoulder   Scapular Dyskinesis: none  Scapular mobility: WFL    Right Shoulder   Scapular Dyskinesis: none  Scapular mobility: [facility]    Joint Play   Left Shoulder  Hypomobile in the posterior capsule and inferior capsule  Right Shoulder  Hypomobile in the posterior capsule and inferior capsule  Strength/Myotome Testing     Left Shoulder     Planes of Motion   Flexion: 3+   Abduction: 3+   External rotation at 0°: 4-   Internal rotation at 0°: 5     Isolated Muscles   Lower trapezius: 4   Middle trapezius: 4     Right Shoulder     Planes of Motion   Flexion: 3+   Abduction: 3+   External rotation at 0°: 3+   Internal rotation at 0°: 5     Isolated Muscles   Lower trapezius: 4   Middle trapezius: 4     Left Elbow   Flexion: 5  Extension: 5    Right Elbow   Flexion: 5  Extension: 5    Left Wrist/Hand   Wrist extension: 5  Wrist flexion: 5    Right Wrist/Hand   Wrist extension: 5  Wrist flexion: 5    Tests   Cervical   Negative vertical compression  Left Shoulder   Negative lift-off  Right Shoulder   Negative lift-off  Lumbar   Negative vertical compression  Precautions: standard    Manuals 5/26 5/31 6/2 6/7 6/13 6/17 6/27      Rhythmic stabilization: 60, 90, 120 deg FF b/l  NV   90 deg 3x30" ea GR all 3                                              Neuro Re-Ed     6/13        Prone shoulder ext  2x10 3" ea  2x10 ea 2x12 ea  Prone h abd     2x10 3" ea  2x10x3" ea 2x12 3" ea  Prone scaption    2x10 3" ea   2x10x3" ea 2x12 3" ea                                                            Ther Ex     6/13        Patient education: pathophysiology, HEP review GR            UBE   3' / 3' 3' / 3' 3'/3' 3' / 3'       Webslide: M row RTB 2x10 3" RTB 2x10 3"  L and M  GTB 2x10 3" ea  GTB 2x10x3"        TB IR RTB 2x10 RTB 2x 10 RTB 2x10          ER isometric walk-outs RTB 2x10 5" RTB 2x 10 5"  Isotonics YTB 2x10 ea  Isometrics YTB 2x10 5" ea  Jacob YTB 2x10 5" L 16x5" R, p! Prone row  2x 10  2x10 3" 3#          ER - s/l  2x 10  R: 2x10 ; L: 2x10 1# 2x10 1# ea  2x10 ea, 1#        Scaption wall slides  2x 10 RTB  2x10 RTB  2x10 RTB        Bilateral ER with scapular retraction - isometrics   Deferred P!   2x10 5"       Front, lateral DB raise      2x10 2# ea         Ther Activity                                       Gait Training                                       Modalities

## 2022-07-05 ENCOUNTER — EVALUATION (OUTPATIENT)
Dept: PHYSICAL THERAPY | Facility: CLINIC | Age: 57
End: 2022-07-05
Payer: COMMERCIAL

## 2022-07-05 DIAGNOSIS — M25.511 RIGHT SHOULDER PAIN, UNSPECIFIED CHRONICITY: ICD-10-CM

## 2022-07-05 DIAGNOSIS — M77.8 TENDINITIS OF RIGHT SHOULDER: ICD-10-CM

## 2022-07-05 DIAGNOSIS — M25.512 CHRONIC LEFT SHOULDER PAIN: Primary | ICD-10-CM

## 2022-07-05 DIAGNOSIS — G89.29 CHRONIC LEFT SHOULDER PAIN: Primary | ICD-10-CM

## 2022-07-05 DIAGNOSIS — M77.8 TENDINITIS OF LEFT SHOULDER: ICD-10-CM

## 2022-07-05 PROCEDURE — 97110 THERAPEUTIC EXERCISES: CPT | Performed by: PHYSICAL THERAPIST

## 2022-07-05 NOTE — PROGRESS NOTES
PT Re-Evaluation     Today's date: 2022  Patient name: Maria Guadalupe Walsh  : 1965  MRN: [id number]  Referring provider: Shana Corona DO  Dx:   Encounter Diagnosis     ICD-10-CM    1  Chronic left shoulder pain  M25 512     G89 29    2  Right shoulder pain, unspecified chronicity  M25 511    3  Tendinitis of right shoulder  M77 8    4  Tendinitis of left shoulder  M77 8        Start Time: 1215  Stop Time: 1235  Total time in clinic (min): 20 minutes    Assessment  Assessment details: Since beginning physical therapy, Abimael Rondon has attended a total number of 7 visits and has maintained good compliance with established POC  Patient had recently been diagnosed with COVID-19, thus limiting his overall attendance in PT since initiation  Patient has made gradual improvements in all areas, including decreased pain, increased strength, increased ROM, improved flexibility, improved joint mobility, improved postural awareness and improved overall level of function  Patient is reporting improved ability to perform a/iadls, recreational activities and engaging in social activities  Despite these improvements, Patient continues to present with pain, decreased strength, decreased ROM, decreased joint mobility and postural dysfunction  Patient with persistent ER weakness and UT compensatory activation concerning for possible underlying RC pathology  Consider MRI in future to rule in/out RC pathology  Patient would continue to benefit from skilled physical therapy to address his aforementioned impairments, improve their level of function and to improve their overall quality of life    Impairments: abnormal muscle firing, abnormal or restricted ROM, activity intolerance, impaired physical strength, lacks appropriate home exercise program, pain with function and poor posture   Understanding of Dx/Px/POC: excellent   Prognosis: good    Goals  Short Term Goals: to be achieved by 4 weeks  1) Patient to be independent with basic HEP  MET  2) Decrease pain to 5/10 at it's worst  PROGRESSED, BUT NOT MET  3) Increase UE strength by 1/2 MMT grade in all deficient planes  PARTIALLY MET  4) Increase UE ROM by > 5 deg in all deficient planes  MET  5) Patient to report decreased sleep interruption secondary to pain  MET    Long Term Goals: to be achieved by discharge ALL GOALS PROGRESSING  1) FOTO equal to or greater than 73    2) Patient to be independent with comprehensive HEP  3) Abolish pain for improved quality of life  4) Increase UE strength to 5/5 MMT grade in all deficient planes to improve a/iadls  5) Increase UE ROM to within 5 deg of contralateral UE to improve a/iadls  6) Patient to report no sleep interruption secondary to pain  Plan  Patient would benefit from: skilled PT  Planned modality interventions: biofeedback, cryotherapy, hydrotherapy, unattended electrical stimulation, thermotherapy: hydrocollator packs and low level laser therapy  Planned therapy interventions: activity modification, ADL retraining, behavior modification, body mechanics training, functional ROM exercises, home exercise program, IADL retraining, joint mobilization, manual therapy, massage, neuromuscular re-education, patient education, postural training, strengthening, stretching, therapeutic activities and therapeutic exercise  Frequency: 2-3x week  Duration in weeks: 6  Plan of Care beginning date: 7/5/2022  Plan of Care expiration date: 8/16/2022  Treatment plan discussed with: patient        Subjective Evaluation    History of Present Illness  Mechanism of injury: Patient reports that the intensity of his shoulder pain has lessened  Patient does continue to have pain with reaching away from his body and lifting  Patient is able to reach into a shelf with greater ease  Patient is able to reach behind his back and lay on his side with less pain/difficulty  Patient feels that his left shoulder remains weak   Patient would like to hold on PT until his f/u with ortho, as he believes he may require an MRI of his shoulders  Patient's next f/u appointment with ortho is scheduled for   Pain  Current pain ratin  At best pain rating: 3  At worst pain ratin  Location: right > left shoulder  Quality: sharp    Treatments  Current treatment: physical therapy  Patient Goals  Patient goals for therapy: decreased pain, increased motion and increased strength          Objective     Postural Observations  Seated posture: fair  Standing posture: fair        Active Range of Motion   Cervical/Thoracic Spine     Normal active range of motion  Left Shoulder   Normal active range of motion    Right Shoulder   Normal active range of motion    Passive Range of Motion   Left Shoulder   Normal passive range of motion    Right Shoulder   Normal passive range of motion    Scapular Mobility   Left Shoulder   Scapular Dyskinesis: none  Scapular mobility: WFL    Right Shoulder   Scapular Dyskinesis: none  Scapular mobility: WFL    Strength/Myotome Testing     Left Shoulder     Planes of Motion   Flexion: 3+   Abduction: 3+   External rotation at 0°: 4-   Internal rotation at 0°: 5     Isolated Muscles   Lower trapezius: 4   Middle trapezius: 4     Right Shoulder     Planes of Motion   Flexion: 4-   Abduction: 4   External rotation at 0°: 4   Internal rotation at 0°: 5     Isolated Muscles   Lower trapezius: 4   Middle trapezius: 4     Left Elbow   Flexion: 5  Extension: 5    Right Elbow   Flexion: 5  Extension: 5    Left Wrist/Hand   Wrist extension: 5  Wrist flexion: 5    Right Wrist/Hand   Wrist extension: 5  Wrist flexion: 5    Tests   Cervical   Negative vertical compression  Left Shoulder   Negative lift-off  Right Shoulder   Negative lift-off  Lumbar   Negative vertical compression                Precautions: standard    Manuals  6 6 7      Rhythmic stabilization: 60, 90, 120 deg FF b/l  NV   90 deg 3x30" ea GR all 3 Reassessment       GR                                Neuro Re-Ed     6/13        Prone shoulder ext  2x10 3" ea  2x10 ea 2x12 ea  Prone h abd     2x10 3" ea  2x10x3" ea 2x12 3" ea  Prone scaption    2x10 3" ea  2x10x3" ea 2x12 3" ea  Ther Ex     6/13        Patient education: pathophysiology, HEP review GR      GR      UBE   3' / 3' 3' / 3' 3'/3' 3' / 3' 3' / 3'      Webslide: M row RTB 2x10 3" RTB 2x10 3"  L and M  GTB 2x10 3" ea  GTB 2x10x3"        TB IR RTB 2x10 RTB 2x 10 RTB 2x10          ER isometric walk-outs RTB 2x10 5" RTB 2x 10 5"  Isotonics YTB 2x10 ea  Isometrics YTB 2x10 5" ea  Jacob YTB 2x10 5" L 16x5" R, p! Prone row  2x 10  2x10 3" 3#          ER - s/l  2x 10  R: 2x10 ; L: 2x10 1# 2x10 1# ea  2x10 ea, 1#        Scaption wall slides  2x 10 RTB  2x10 RTB  2x10 RTB        Bilateral ER with scapular retraction - isometrics   Deferred P!   2x10 5"       Front, lateral DB raise      2x10 2# ea         Ther Activity                                       Gait Training                                       Modalities

## 2022-08-01 ENCOUNTER — OFFICE VISIT (OUTPATIENT)
Dept: OBGYN CLINIC | Facility: CLINIC | Age: 57
End: 2022-08-01
Payer: COMMERCIAL

## 2022-08-01 VITALS
WEIGHT: 266 LBS | HEART RATE: 83 BPM | HEIGHT: 71 IN | DIASTOLIC BLOOD PRESSURE: 93 MMHG | SYSTOLIC BLOOD PRESSURE: 147 MMHG | BODY MASS INDEX: 37.24 KG/M2

## 2022-08-01 DIAGNOSIS — M25.511 RIGHT SHOULDER PAIN, UNSPECIFIED CHRONICITY: ICD-10-CM

## 2022-08-01 DIAGNOSIS — M25.512 LEFT SHOULDER PAIN, UNSPECIFIED CHRONICITY: ICD-10-CM

## 2022-08-01 DIAGNOSIS — M24.811 INTERNAL DERANGEMENT OF RIGHT SHOULDER: ICD-10-CM

## 2022-08-01 DIAGNOSIS — M24.812 INTERNAL DERANGEMENT OF LEFT SHOULDER: Primary | ICD-10-CM

## 2022-08-01 PROCEDURE — 99214 OFFICE O/P EST MOD 30 MIN: CPT | Performed by: ORTHOPAEDIC SURGERY

## 2022-08-01 NOTE — PROGRESS NOTES
Patient Name:  Danny Lew  MRN:  [id number]    Assessment & Plan     1  Internal derangement of left shoulder  -     MRI shoulder left wo contrast; Future; Expected date: 08/01/2022    2  Internal derangement of right shoulder  -     MRI shoulder right wo contrast; Future; Expected date: 08/01/2022    3  Right shoulder pain, unspecified chronicity    4  Left shoulder pain, unspecified chronicity        59 y o  male with right and left shoulder pain  I do have concern for a rotator cuff tear of both shoulders  However, his right shoulder appears to be more symptomatic today  He does demonstrate weakness in abduction as well as a positive drop-arm sign  With this in mind, and the failure of non operative measures treatment the form intra-articular steroid injection of the right shoulder in addition to physical therapy  I would like to order an MRI of the right and left shoulders to question rotator cuff tears  I did provide him with a prescription for the MRI today  My office will help facilitate have the MRI scheduled  I would like him to follow up with me when the MRI of his left and right shoulders are completed  History of the Present Illness   Danny Lew is a 59 y o  male with bilateral shoulder pain  He has been participating in physical therapy over the course of the previous 6 weeks  He states that this has been beneficial in beginning and range of motion  However, is still experiencing significant pain anteriorly and laterally most severely in the right  He states he does experience intermittent and moderate sometimes severe sharp pain into the left  He states that the shoulder pain will wake him up at night  Typically his pain is exacerbated with overhead and reaching activities  He does take Motrin multiple times a day and states this does provide her with some symptomatic relief    Unfortunately, intra-articular steroid injection provided to his right shoulder at last visit failed to provide him with prolonged symptomatic relief  Today denies any distal paresthesias of either the right or left upper extremities  Review of Systems     Review of Systems   Constitutional: Negative for chills, fever and unexpected weight change  HENT: Negative for hearing loss, nosebleeds and sore throat  Eyes: Negative for pain, redness and visual disturbance  Respiratory: Negative for cough, shortness of breath and wheezing  Cardiovascular: Negative for chest pain, palpitations and leg swelling  Gastrointestinal: Negative for abdominal pain, nausea and vomiting  Endocrine: Negative for polyphagia and polyuria  Genitourinary: Negative for dysuria and hematuria  Musculoskeletal: Positive for arthralgias (Bilateral shoulder) and myalgias (Bilateral shoulder)  See HPI   Skin: Negative for rash and wound  Neurological: Negative for dizziness, numbness and headaches  Psychiatric/Behavioral: Negative for decreased concentration and suicidal ideas  The patient is not nervous/anxious  Physical Exam     /93   Pulse 83   Ht 5' 11" (1 803 m)   Wt 121 kg (266 lb)   BMI 37 10 kg/m²     Left Shoulder: Active range of motion   180 degrees forward flexion  180 degrees abduction  70 degrees external rotation   T12 internal rotation    Passive range of motion   180 degrees of forward flexion     There is  tenderness present over the biceps long head tendon and greater tuberosity patch  There is 4-/5 strength with external rotation testing at the side  4-/5 strength with abduction  Empty can testing is positive  Drop-arm sign: Positive  Belly press test is negative  Sosa test is negative  Rhea's test is negative  Speed's test is Positive  The patient is neurovascularly intact distally in the extremity  Right Shoulder:    Active range of motion   180 degrees forward flexion  180 degrees abduction  70 degrees external rotation   T12 internal rotation Passive range of motion   180 degrees of forward flexion     There is  tenderness present over the biceps long head tendon and greater tuberosity patch  There is 4-/5 strength with external rotation testing at the side  4-/5 strength with abduction  Empty can testing is positive  Belly press test is negative  Drop-arm sign: Positive  Sosa test is negative  Center's test is negative  Speed's test is Positive  The patient is neurovascularly intact distally in the extremity  Data Review     I have personally reviewed pertinent films in PACS, and my interpretation follows  X-rays taken 3/1/2022 of the left shoulder demonstrates no acute fracture or other osseous abnormalities  No obvious lytic or blastic lesions demonstrated  X-rays taken 5/12/2022 of the right shoulder demonstrates no acute fracture or other osseous abnormalities  No obvious lytic or blastic lesions demonstrated      Social History     Tobacco Use    Smoking status: Former Smoker     Types: Cigarettes     Start date: 1981    Smokeless tobacco: Never Used   Vaping Use    Vaping Use: Former   Substance Use Topics    Alcohol use: Not Currently     Comment: Sober 5 moths    Drug use: Yes     Types: Marijuana     Comment: Medical Card           Procedures  None performed    Scribe Attestation    I,:  Tarun Da Silva am acting as a scribe while in the presence of the attending physician :       I,:  Eliezer Barboza, DO personally performed the services described in this documentation    as scribed in my presence :

## 2022-08-10 NOTE — PROGRESS NOTES
Elayne Qureshi has attended a total of 7 physical therapy appointments to date  Patient was last treated on 7/5 and has no remaining appointments scheduled  Goals, objective and subjective information unable to be updated at this time  Patient will be discharged from physical therapy secondary to returning to ortho for further evaluation to r/o underlying RC tears

## 2022-08-29 ENCOUNTER — HOSPITAL ENCOUNTER (OUTPATIENT)
Dept: MRI IMAGING | Facility: HOSPITAL | Age: 57
Discharge: HOME | End: 2022-08-29
Attending: ORTHOPAEDIC SURGERY
Payer: COMMERCIAL

## 2022-08-29 DIAGNOSIS — M24.812 INTERNAL DERANGEMENT OF LEFT SHOULDER: ICD-10-CM

## 2022-08-29 DIAGNOSIS — M24.811 INTERNAL DERANGEMENT OF RIGHT SHOULDER: ICD-10-CM

## 2022-08-29 PROCEDURE — G1004 CDSM NDSC: HCPCS

## 2022-08-29 PROCEDURE — 73221 MRI JOINT UPR EXTREM W/O DYE: CPT

## 2022-09-01 ENCOUNTER — APPOINTMENT (OUTPATIENT)
Dept: LAB | Facility: HOSPITAL | Age: 57
End: 2022-09-01
Payer: COMMERCIAL

## 2022-09-01 ENCOUNTER — OFFICE VISIT (OUTPATIENT)
Dept: OBGYN CLINIC | Facility: CLINIC | Age: 57
End: 2022-09-01
Payer: COMMERCIAL

## 2022-09-01 ENCOUNTER — HOSPITAL ENCOUNTER (OUTPATIENT)
Dept: RADIOLOGY | Facility: HOSPITAL | Age: 57
End: 2022-09-01
Payer: COMMERCIAL

## 2022-09-01 ENCOUNTER — OFFICE VISIT (OUTPATIENT)
Dept: LAB | Facility: HOSPITAL | Age: 57
End: 2022-09-01
Payer: COMMERCIAL

## 2022-09-01 VITALS
WEIGHT: 260.8 LBS | HEART RATE: 85 BPM | DIASTOLIC BLOOD PRESSURE: 91 MMHG | SYSTOLIC BLOOD PRESSURE: 142 MMHG | HEIGHT: 71 IN | BODY MASS INDEX: 36.51 KG/M2

## 2022-09-01 DIAGNOSIS — Z01.818 PRE-OP TESTING: ICD-10-CM

## 2022-09-01 DIAGNOSIS — M75.122 COMPLETE TEAR OF LEFT ROTATOR CUFF, UNSPECIFIED WHETHER TRAUMATIC: ICD-10-CM

## 2022-09-01 DIAGNOSIS — M25.511 RIGHT SHOULDER PAIN, UNSPECIFIED CHRONICITY: ICD-10-CM

## 2022-09-01 DIAGNOSIS — M75.121 COMPLETE TEAR OF RIGHT ROTATOR CUFF, UNSPECIFIED WHETHER TRAUMATIC: Primary | ICD-10-CM

## 2022-09-01 DIAGNOSIS — Z01.818 PRE-OP TESTING: Primary | ICD-10-CM

## 2022-09-01 DIAGNOSIS — M75.121 COMPLETE TEAR OF RIGHT ROTATOR CUFF, UNSPECIFIED WHETHER TRAUMATIC: ICD-10-CM

## 2022-09-01 LAB
ALBUMIN SERPL BCP-MCNC: 4.2 G/DL (ref 3.5–5)
ALP SERPL-CCNC: 81 U/L (ref 46–116)
ALT SERPL W P-5'-P-CCNC: 20 U/L (ref 12–78)
ANION GAP SERPL CALCULATED.3IONS-SCNC: 10 MMOL/L (ref 4–13)
AST SERPL W P-5'-P-CCNC: 13 U/L (ref 5–45)
ATRIAL RATE: 83 BPM
BASOPHILS # BLD AUTO: 0.03 THOUSANDS/ΜL (ref 0–0.1)
BASOPHILS NFR BLD AUTO: 1 % (ref 0–1)
BILIRUB SERPL-MCNC: 0.29 MG/DL (ref 0.2–1)
BUN SERPL-MCNC: 19 MG/DL (ref 5–25)
CALCIUM SERPL-MCNC: 9.3 MG/DL (ref 8.3–10.1)
CHLORIDE SERPL-SCNC: 105 MMOL/L (ref 96–108)
CO2 SERPL-SCNC: 24 MMOL/L (ref 21–32)
CREAT SERPL-MCNC: 0.85 MG/DL (ref 0.6–1.3)
EOSINOPHIL # BLD AUTO: 0.13 THOUSAND/ΜL (ref 0–0.61)
EOSINOPHIL NFR BLD AUTO: 2 % (ref 0–6)
ERYTHROCYTE [DISTWIDTH] IN BLOOD BY AUTOMATED COUNT: 13.2 % (ref 11.6–15.1)
GFR SERPL CREATININE-BSD FRML MDRD: 95 ML/MIN/1.73SQ M
GLUCOSE P FAST SERPL-MCNC: 98 MG/DL (ref 65–99)
HCT VFR BLD AUTO: 48.5 % (ref 36.5–49.3)
HGB BLD-MCNC: 15.8 G/DL (ref 12–17)
IMM GRANULOCYTES # BLD AUTO: 0.01 THOUSAND/UL (ref 0–0.2)
IMM GRANULOCYTES NFR BLD AUTO: 0 % (ref 0–2)
LYMPHOCYTES # BLD AUTO: 1.22 THOUSANDS/ΜL (ref 0.6–4.47)
LYMPHOCYTES NFR BLD AUTO: 22 % (ref 14–44)
MCH RBC QN AUTO: 29.3 PG (ref 26.8–34.3)
MCHC RBC AUTO-ENTMCNC: 32.6 G/DL (ref 31.4–37.4)
MCV RBC AUTO: 90 FL (ref 82–98)
MONOCYTES # BLD AUTO: 0.44 THOUSAND/ΜL (ref 0.17–1.22)
MONOCYTES NFR BLD AUTO: 8 % (ref 4–12)
NEUTROPHILS # BLD AUTO: 3.64 THOUSANDS/ΜL (ref 1.85–7.62)
NEUTS SEG NFR BLD AUTO: 67 % (ref 43–75)
NRBC BLD AUTO-RTO: 0 /100 WBCS
P AXIS: 55 DEGREES
PLATELET # BLD AUTO: 282 THOUSANDS/UL (ref 149–390)
PMV BLD AUTO: 9.7 FL (ref 8.9–12.7)
POTASSIUM SERPL-SCNC: 4.6 MMOL/L (ref 3.5–5.3)
PR INTERVAL: 174 MS
PROT SERPL-MCNC: 8 G/DL (ref 6.4–8.4)
QRS AXIS: 29 DEGREES
QRSD INTERVAL: 80 MS
QT INTERVAL: 368 MS
QTC INTERVAL: 432 MS
RBC # BLD AUTO: 5.4 MILLION/UL (ref 3.88–5.62)
SODIUM SERPL-SCNC: 139 MMOL/L (ref 135–147)
T WAVE AXIS: 33 DEGREES
VENTRICULAR RATE: 83 BPM
WBC # BLD AUTO: 5.47 THOUSAND/UL (ref 4.31–10.16)

## 2022-09-01 PROCEDURE — 85025 COMPLETE CBC W/AUTO DIFF WBC: CPT

## 2022-09-01 PROCEDURE — 71046 X-RAY EXAM CHEST 2 VIEWS: CPT

## 2022-09-01 PROCEDURE — 80053 COMPREHEN METABOLIC PANEL: CPT

## 2022-09-01 PROCEDURE — 93005 ELECTROCARDIOGRAM TRACING: CPT

## 2022-09-01 PROCEDURE — 36415 COLL VENOUS BLD VENIPUNCTURE: CPT

## 2022-09-01 PROCEDURE — 20610 DRAIN/INJ JOINT/BURSA W/O US: CPT | Performed by: PHYSICIAN ASSISTANT

## 2022-09-01 PROCEDURE — 99214 OFFICE O/P EST MOD 30 MIN: CPT | Performed by: PHYSICIAN ASSISTANT

## 2022-09-01 PROCEDURE — 93010 ELECTROCARDIOGRAM REPORT: CPT | Performed by: INTERNAL MEDICINE

## 2022-09-01 RX ORDER — CHLORHEXIDINE GLUCONATE 0.12 MG/ML
15 RINSE ORAL ONCE
Status: CANCELLED | OUTPATIENT
Start: 2022-09-01 | End: 2022-09-01

## 2022-09-01 RX ORDER — CHLORHEXIDINE GLUCONATE 4 G/100ML
SOLUTION TOPICAL DAILY PRN
Status: CANCELLED | OUTPATIENT
Start: 2022-09-01

## 2022-09-01 RX ORDER — BUPIVACAINE HYDROCHLORIDE 2.5 MG/ML
2 INJECTION, SOLUTION INFILTRATION; PERINEURAL
Status: COMPLETED | OUTPATIENT
Start: 2022-09-01 | End: 2022-09-01

## 2022-09-01 RX ORDER — METHYLPREDNISOLONE ACETATE 40 MG/ML
2 INJECTION, SUSPENSION INTRA-ARTICULAR; INTRALESIONAL; INTRAMUSCULAR; SOFT TISSUE
Status: COMPLETED | OUTPATIENT
Start: 2022-09-01 | End: 2022-09-01

## 2022-09-01 RX ORDER — LIDOCAINE HYDROCHLORIDE 10 MG/ML
2 INJECTION, SOLUTION INFILTRATION; PERINEURAL
Status: COMPLETED | OUTPATIENT
Start: 2022-09-01 | End: 2022-09-01

## 2022-09-01 RX ADMIN — BUPIVACAINE HYDROCHLORIDE 2 ML: 2.5 INJECTION, SOLUTION INFILTRATION; PERINEURAL at 10:58

## 2022-09-01 RX ADMIN — LIDOCAINE HYDROCHLORIDE 2 ML: 10 INJECTION, SOLUTION INFILTRATION; PERINEURAL at 10:58

## 2022-09-01 RX ADMIN — METHYLPREDNISOLONE ACETATE 2 ML: 40 INJECTION, SUSPENSION INTRA-ARTICULAR; INTRALESIONAL; INTRAMUSCULAR; SOFT TISSUE at 10:58

## 2022-09-01 NOTE — PROGRESS NOTES
Patient Name:  Mandy Kuhn  MRN:  [id number]    Assessment & Plan     1  Complete tear of right rotator cuff, unspecified whether traumatic  -     Case request operating room: ARTHROSCOPY SHOULDER- Right shoulder arthroscopic rotator cuff repair,  all associated procedures; Standing  -     Ambulatory referral to [facility]; Future  -     Comprehensive metabolic panel; Future  -     CBC and differential; Future  -     EKG 12 lead; Future  -     XR chest pa & lateral; Future; Expected date: 09/01/2022  -     Case request operating room: ARTHROSCOPY SHOULDER- Right shoulder arthroscopic rotator cuff repair,  all associated procedures  -     Arc 2 0    2  Complete tear of left rotator cuff, unspecified whether traumatic  -     Large joint arthrocentesis: L subacromial bursa    3  Right shoulder pain, unspecified chronicity        59 y o  male with Bilateral shoulder rotator cuff tears, Right more painful than Left  I reviewed and discussed the patient's bilateral shoulder MRI displaying rotator cuff tears, Left side with increased retraction and fatty atrophy indicative chronicity; Right shoulder with mild atrophy and minimal retraction  We discussed the patient's diagnosis and associated treatment options including continued nonoperative and surgical treatment  Nonoperative management would include formal physical therapy and a physician directed home exercise program, activity modification, injection therapy, and oral analgesics  Risks of conservative management include:  Progression of tear size, persistent pain, increased weakness, increased tendon retraction, increased fatty infiltration, decreased potential for repair and healing in the future due to tear progression/retraction/fatty infiltration/increasing age, and future development of rotator cuff arthropathy  Surgical intervention was also discussed in the form of Right shoulder arthroscopy with rotator cuff repair, all associated procedures  Risks of surgery, including but not limited to, anesthesia complications, infection, damage to nerves and blood vessels, blood clots, postoperative stiffness, residual pain, inability to repair the torn tendon, failure of tendon healing, residual weakness, need for subsequent surgery, were discussed at length  The patient's MRI does display a rotator cuff tear which corresponds to the patient's symptoms and physical exam findings  The patient has failed non-operative management including injection therapy, outpatient PT, OTC oral analgesics as needed  The patient understands the risks and benefits of all mentioned treatment options and has no further questions  The patient has elected to proceed forward with Right shoulder arthroscopy with rotator cuff repair, all associated procedures  The patient received a postoperative sling in the office today and was instructed to bring it on the day of surgery  I will see him on the day of surgery on 09/16/2022  As patient is also having pain in the Left shoulder, offered corticosteroid injection today to decrease pain and wished to proceed  Risks and benefits of corticosteroid injection were discussed in detail  Risks including:  Post injection pain, elevation in blood sugar, skin discoloration, fatty atrophy, and infection were discussed in detail  The patient understood and elected to proceed forward  After sterile preparation the Left subacromial space was injected with 2 cc of 1% lidocaine, 2 cc of 0 25% bupivacaine, and 2 cc of Depo-Medrol  The patient tolerated the procedure and no immediate complications were noted  The patient was instructed to ice and elevate the injection site, limit strenuous activity for the next 24-48 hours, and contact us if there were any questions or concerns prior to their follow-up appointment    They were also instructed to monitor blood glucose, checking about 3 times daily for the next week, and contact their primary care physician for management of medications if indicated  History of the Present Illness   Leo Briones is a 59 y o  male with Bilateral shoulder internal derangement  Today, patient reports continued bilateral shoulder pain, Right > Left  He admits to difficulty with ADLs and range of motion; admits to nighttime awakenings  He has tried to administer NSAIDs with increased stomach discomfort  Review of Systems     Review of Systems   Constitutional: Negative for chills and fever  HENT: Negative for ear pain and sore throat  Eyes: Negative for pain and visual disturbance  Respiratory: Negative for cough and shortness of breath  Cardiovascular: Negative for chest pain and palpitations  Gastrointestinal: Negative for abdominal pain and vomiting  Genitourinary: Negative for dysuria and hematuria  Musculoskeletal: Negative for arthralgias and back pain  Skin: Negative for color change and rash  Neurological: Negative for seizures and syncope  All other systems reviewed and are negative  Physical Exam     /91   Pulse 85   Ht 5' 11" (1 803 m)   Wt 118 kg (260 lb 12 8 oz)   BMI 36 37 kg/m²     Right Shoulder: Active range of motion   170 degrees forward flexion  160 degrees abduction  60 degrees external rotation   Mid thoracic internal rotation    There is tenderness present over the proximal biceps tendon  There is 4-/5 strength with external rotation testing at the side  Empty can testing is positive with increased weakness compared to contralateral side  Belly press test is negative  Sosa test is positive  Dougherty's test is negative    Speed's test is Negative  The patient is neurovascularly intact distally in the extremity  Left Shoulder: Active range of motion   170 degrees forward flexion  160 degrees abduction  50 degrees external rotation   2 level restriction internal rotation    There is tenderness present over the proximal biceps tendon  There is 4-/5 strength with external rotation testing at the side  Empty can testing is positive   Belly press test is negative  Sosa test is negative   Hardwick's test is negative    Speed's test is Negative  The patient is neurovascularly intact distally in the extremity  Data Review     I have personally reviewed pertinent films in PACS, and my interpretation follows  X-rays taken previously of the Left shoulder demonstrates no acute fracture or other osseous abnormalities      X-rays taken previously of the Right shoulder demonstrates no acute fracture or other osseous abnormalities  MRI performed of Right shoulder demonstrates complete full thickness tear of supraspinatus with mild retraction to humeral head, minimal to mild atrophy noted  No actue fracture  MRI of Left shoulder demonstrates full thickness tear of supraspinatus and infraspinatus with retraction to glenoid  Moderate atrophy of supraspinatus and infraspinatus tendons       Social History     Tobacco Use    Smoking status: Former Smoker     Types: Cigarettes     Start date: 1981    Smokeless tobacco: Never Used   Vaping Use    Vaping Use: Former   Substance Use Topics    Alcohol use: Not Currently     Comment: Sober 5 moths    Drug use: Yes     Types: Marijuana     Comment: Medical Card           Large joint arthrocentesis: L subacromial bursa  Universal Protocol:  Consent given by: patient  Patient identity confirmed: verbally with patient    Supporting Documentation  Indications: pain   Procedure Details  Location: shoulder - L subacromial bursa  Needle size: 22 G  Approach: lateral  Medications administered: 2 mL bupivacaine 0 25 %; 2 mL lidocaine 1 %; 2 mL methylPREDNISolone acetate 40 mg/mL    Patient tolerance: patient tolerated the procedure well with no immediate complications  Dressing:  Sterile dressing applied            Sloan Rios DO

## 2022-09-02 ENCOUNTER — OFFICE VISIT (OUTPATIENT)
Dept: FAMILY MEDICINE CLINIC | Facility: CLINIC | Age: 57
End: 2022-09-02
Payer: COMMERCIAL

## 2022-09-02 VITALS
BODY MASS INDEX: 36.4 KG/M2 | OXYGEN SATURATION: 97 % | HEART RATE: 90 BPM | SYSTOLIC BLOOD PRESSURE: 124 MMHG | WEIGHT: 260 LBS | HEIGHT: 71 IN | DIASTOLIC BLOOD PRESSURE: 87 MMHG | TEMPERATURE: 98.5 F

## 2022-09-02 DIAGNOSIS — Z01.818 PRE-OP EXAMINATION: ICD-10-CM

## 2022-09-02 DIAGNOSIS — M75.121 COMPLETE TEAR OF RIGHT ROTATOR CUFF, UNSPECIFIED WHETHER TRAUMATIC: Primary | ICD-10-CM

## 2022-09-02 PROCEDURE — 99214 OFFICE O/P EST MOD 30 MIN: CPT | Performed by: FAMILY MEDICINE

## 2022-09-02 NOTE — PROGRESS NOTES
Obed Goodell 1965 male MRN: [id number]        ASSESSMENT/PLAN  Problem List Items Addressed This Visit        Musculoskeletal and Integument    Complete tear of right rotator cuff - Primary      Other Visit Diagnoses     Pre-op examination              High Risk Surgery: no  CAD: no  CHF: no  CVD: no  DM2 on insulin: no  Cr>2: no        Obed Goodell is undergoing a Low Risk surgery  He is at [address] for major adverse cardiac event (MACE)  He may proceed with surgery as planned without further workup  SUBJECTIVE  CC: Pre-op Exam (Right shoulder repair)      HPI:  Obed Goodell is a 59 y o  male who is planning to undergo rotator cuff repair under general by Dr Kasey Castorena on 9/16/22  Patient has not had complications with anesthesia in the past   Functional status: good     He has a h/o ALEXANDRE - says he lost 30 lbs and no longer snores  He doesn't use CPAP  HTN is well controlled     Labs reviewed:  CBC, CMP within normal limits    Review of Systems   Constitutional: Negative for activity change, appetite change, fatigue and unexpected weight change  Respiratory: Negative for chest tightness and shortness of breath  Cardiovascular: Negative for chest pain and leg swelling  Gastrointestinal: Negative for abdominal pain  Musculoskeletal: Positive for arthralgias  Neurological: Negative for headaches           Historical Information   The patient history was reviewed as follows:    Past Medical History:   Diagnosis Date    [facility]     Alcohol abuse     Anxiety     Finney esophagus     Benign essential hypertension     Chronic pain syndrome 02/17/2021    Colon polyp     Diverticulitis     Diverticulitis     Erectile dysfunction     Esophageal reflux     Essential hypertension     Lumbar radiculopathy 05/13/2019    Major depression     Mild cognitive disorder 05/06/2016    Mood disorder [facility] ) 05/02/2016    Multiple nevi     Obesity     Obesity (BMI 30-39  9)     ALEXANDRE (obstructive sleep apnea)     Psychosis (HCC)     Snoring     Suspicious nevus     Tremor of right hand     Weakness of right upper extremity      Past Surgical History:   Procedure Laterality Date    COLONOSCOPY N/A 3/9/2019    Procedure: COLONOSCOPY;  Surgeon: Suri Rhodes MD;  Location: [facility]; Service: Gastroenterology    LIPOMA RESECTION      back    NE ESOPHAGOGASTRODUODENOSCOPY TRANSORAL DIAGNOSTIC N/A 3/9/2019    Procedure: ESOPHAGOGASTRODUODENOSCOPY (EGD); Surgeon: Suri Rhodes MD;  Location: [facility]; Service: Gastroenterology    REVISION COLOSTOMY       Family History   Problem Relation Age of Onset    Stroke Mother     Heart disease Father       Social History       Medications:     Current Outpatient Medications:     acetaminophen (TYLENOL) 650 mg CR tablet, Take 1 tablet (650 mg total) by mouth every 8 (eight) hours as needed for mild pain, Disp: 30 tablet, Rfl: 1    ibuprofen (MOTRIN) 200 mg tablet, Take by mouth every 6 (six) hours as needed for mild pain, Disp: , Rfl:     Naproxen Sodium (ALEVE PO), Take by mouth, Disp: , Rfl:     omeprazole (PriLOSEC) 20 mg delayed release capsule, Take 1 capsule (20 mg total) by mouth daily, Disp: 30 capsule, Rfl: 0  Allergies   Allergen Reactions    Sulfa Antibiotics GI Intolerance     HA, vomiting, sweating     Amoxicillin GI Intolerance       OBJECTIVE    Vitals:   Vitals:    09/02/22 1429   BP: 124/87   Pulse: 90   Temp: 98 5 °F (36 9 °C)   SpO2: 97%   Weight: 118 kg (260 lb)   Height: 5' 11" (1 803 m)           Physical Exam  Vitals and nursing note reviewed  Constitutional:       General: He is not in acute distress  Appearance: Normal appearance  He is well-developed  He is obese  He is not diaphoretic  HENT:      Head: Normocephalic and atraumatic  Mouth/Throat:      Mouth: Mucous membranes are moist       Pharynx: Oropharynx is clear   No oropharyngeal exudate or posterior oropharyngeal erythema  Cardiovascular:      Rate and Rhythm: Normal rate and regular rhythm  Heart sounds: Normal heart sounds  No murmur heard  No friction rub  No gallop  Pulmonary:      Effort: Pulmonary effort is normal  No respiratory distress  Breath sounds: Normal breath sounds  No wheezing or rales  Chest:      Chest wall: No tenderness  Musculoskeletal:         General: No swelling  Right shoulder: Decreased range of motion  Left shoulder: Decreased range of motion  Right lower leg: No edema  Left lower leg: No edema  Neurological:      General: No focal deficit present  Mental Status: He is alert and oriented to person, place, and time  Mental status is at baseline  Psychiatric:         Mood and Affect: Mood normal          Behavior: Behavior normal          Thought Content:  Thought content normal          Judgment: Judgment normal                 Reggie Harper MD  [facility]   9/2/2022  2:54 PM

## 2022-09-07 ENCOUNTER — APPOINTMENT (OUTPATIENT)
Dept: PREADMISSION TESTING | Facility: HOSPITAL | Age: 57
End: 2022-09-07
Payer: COMMERCIAL

## 2022-09-09 ENCOUNTER — HOSPITAL ENCOUNTER (OUTPATIENT)
Dept: CT IMAGING | Facility: HOSPITAL | Age: 57
End: 2022-09-09
Payer: COMMERCIAL

## 2022-09-09 DIAGNOSIS — K65.4 MESENTERIC PANNICULITIS (HCC): ICD-10-CM

## 2022-09-09 PROCEDURE — G1004 CDSM NDSC: HCPCS

## 2022-09-09 PROCEDURE — 74177 CT ABD & PELVIS W/CONTRAST: CPT

## 2022-09-09 RX ADMIN — IOHEXOL 75 ML: 350 INJECTION, SOLUTION INTRAVENOUS at 16:27

## 2022-09-13 DIAGNOSIS — N17.9 AKI (ACUTE KIDNEY INJURY) (HCC): Primary | ICD-10-CM

## 2022-09-13 RX ORDER — MULTIVITAMIN
1 CAPSULE ORAL DAILY
COMMUNITY

## 2022-09-13 RX ORDER — OMEPRAZOLE 20 MG/1
20 CAPSULE, DELAYED RELEASE ORAL DAILY
COMMUNITY

## 2022-09-13 NOTE — PRE-PROCEDURE INSTRUCTIONS
Pre-Surgery Instructions:   Medication Instructions    acetaminophen (TYLENOL) 650 mg CR tablet Uses PRN- OK to take day of surgery    ibuprofen (MOTRIN) 200 mg tablet Stop taking 3 days prior to surgery   Multiple Vitamin (multivitamin) capsule Stop taking 3 days prior to surgery   Naproxen Sodium (ALEVE PO) Stop taking 3 days prior to surgery   omeprazole (PriLOSEC) 20 mg delayed release capsule Uses PRN- OK to take day of surgery   Pre op and bathing instructions reviewed  Pt has hibiclens  Pt  Verbalized understanding of current visitor restrictions  Pt  Verbalized an understanding of all instructions reviewed and offers no concerns at this time  Instructed to avoid all ASA/NSAIDs and OTC Vit/Supp from now until after surgery per anesthesia guidelines   Tylenol ok prn

## 2022-09-14 ENCOUNTER — TELEPHONE (OUTPATIENT)
Dept: GASTROENTEROLOGY | Facility: CLINIC | Age: 57
End: 2022-09-14

## 2022-09-14 NOTE — TELEPHONE ENCOUNTER
----- Message from Reji Stout PA-C sent at 9/14/2022  2:54 PM EDT -----  Please inform patient that the CT findings were stable - no worrisome features or enlarged lymph nodes

## 2022-09-15 ENCOUNTER — ANESTHESIA EVENT (OUTPATIENT)
Dept: PERIOP | Facility: HOSPITAL | Age: 57
End: 2022-09-15
Payer: COMMERCIAL

## 2022-09-15 PROBLEM — E66.9 CLASS II OBESITY: Status: ACTIVE | Noted: 2022-09-15

## 2022-09-15 PROBLEM — E66.812 CLASS II OBESITY: Status: ACTIVE | Noted: 2022-09-15

## 2022-09-16 ENCOUNTER — HOSPITAL ENCOUNTER (OUTPATIENT)
Facility: HOSPITAL | Age: 57
Setting detail: OUTPATIENT SURGERY
Discharge: HOME | End: 2022-09-16
Attending: ORTHOPAEDIC SURGERY | Admitting: ORTHOPAEDIC SURGERY
Payer: COMMERCIAL

## 2022-09-16 ENCOUNTER — ANESTHESIA (OUTPATIENT)
Dept: PERIOP | Facility: HOSPITAL | Age: 57
End: 2022-09-16
Payer: COMMERCIAL

## 2022-09-16 VITALS
HEART RATE: 78 BPM | RESPIRATION RATE: 21 BRPM | BODY MASS INDEX: 35.77 KG/M2 | WEIGHT: 255.51 LBS | OXYGEN SATURATION: 95 % | SYSTOLIC BLOOD PRESSURE: 141 MMHG | HEIGHT: 71 IN | TEMPERATURE: 97.5 F | DIASTOLIC BLOOD PRESSURE: 86 MMHG

## 2022-09-16 DIAGNOSIS — M75.121 COMPLETE TEAR OF RIGHT ROTATOR CUFF, UNSPECIFIED WHETHER TRAUMATIC: Primary | ICD-10-CM

## 2022-09-16 PROCEDURE — 29827 SHO ARTHRS SRG RT8TR CUF RPR: CPT | Performed by: ORTHOPAEDIC SURGERY

## 2022-09-16 PROCEDURE — C1713 ANCHOR/SCREW BN/BN,TIS/BN: HCPCS | Performed by: ORTHOPAEDIC SURGERY

## 2022-09-16 PROCEDURE — 29822 SHO ARTHRS SRG LMTD DBRDMT: CPT | Performed by: ORTHOPAEDIC SURGERY

## 2022-09-16 PROCEDURE — 29826 SHO ARTHRS SRG DECOMPRESSION: CPT | Performed by: ORTHOPAEDIC SURGERY

## 2022-09-16 PROCEDURE — 29826 SHO ARTHRS SRG DECOMPRESSION: CPT | Performed by: PHYSICIAN ASSISTANT

## 2022-09-16 PROCEDURE — 29827 SHO ARTHRS SRG RT8TR CUF RPR: CPT | Performed by: PHYSICIAN ASSISTANT

## 2022-09-16 PROCEDURE — 29822 SHO ARTHRS SRG LMTD DBRDMT: CPT | Performed by: PHYSICIAN ASSISTANT

## 2022-09-16 PROCEDURE — 29823 SHO ARTHRS SRG XTNSV DBRDMT: CPT | Performed by: PHYSICIAN ASSISTANT

## 2022-09-16 PROCEDURE — C9290 INJ, BUPIVACAINE LIPOSOME: HCPCS | Performed by: ANESTHESIOLOGY

## 2022-09-16 PROCEDURE — 29823 SHO ARTHRS SRG XTNSV DBRDMT: CPT | Performed by: ORTHOPAEDIC SURGERY

## 2022-09-16 DEVICE — BIO-COMP SWVLK C, CLD 4.75X19.1MM
Type: IMPLANTABLE DEVICE | Site: SHOULDER | Status: FUNCTIONAL
Brand: ARTHREX®

## 2022-09-16 DEVICE — BIO-COMPOSITE CRKSCRW 5.5X14.7MM
Type: IMPLANTABLE DEVICE | Site: SHOULDER | Status: FUNCTIONAL
Brand: ARTHREX®

## 2022-09-16 RX ORDER — PROPOFOL 10 MG/ML
INJECTION, EMULSION INTRAVENOUS AS NEEDED
Status: DISCONTINUED | OUTPATIENT
Start: 2022-09-16 | End: 2022-09-16

## 2022-09-16 RX ORDER — SODIUM CHLORIDE, SODIUM LACTATE, POTASSIUM CHLORIDE, CALCIUM CHLORIDE 600; 310; 30; 20 MG/100ML; MG/100ML; MG/100ML; MG/100ML
50 INJECTION, SOLUTION INTRAVENOUS CONTINUOUS
Status: DISCONTINUED | OUTPATIENT
Start: 2022-09-16 | End: 2022-09-16 | Stop reason: HOSPADM

## 2022-09-16 RX ORDER — MIDAZOLAM HYDROCHLORIDE 2 MG/2ML
INJECTION, SOLUTION INTRAMUSCULAR; INTRAVENOUS AS NEEDED
Status: DISCONTINUED | OUTPATIENT
Start: 2022-09-16 | End: 2022-09-16

## 2022-09-16 RX ORDER — LIDOCAINE HYDROCHLORIDE 20 MG/ML
INJECTION, SOLUTION EPIDURAL; INFILTRATION; INTRACAUDAL; PERINEURAL AS NEEDED
Status: DISCONTINUED | OUTPATIENT
Start: 2022-09-16 | End: 2022-09-16

## 2022-09-16 RX ORDER — OXYCODONE HYDROCHLORIDE AND ACETAMINOPHEN 5; 325 MG/1; MG/1
1 TABLET ORAL EVERY 6 HOURS PRN
Qty: 20 TABLET | Refills: 0 | Status: SHIPPED | OUTPATIENT
Start: 2022-09-16

## 2022-09-16 RX ORDER — IBUPROFEN 600 MG/1
600 TABLET ORAL EVERY 6 HOURS PRN
Qty: 30 TABLET | Refills: 0 | Status: SHIPPED | OUTPATIENT
Start: 2022-09-16

## 2022-09-16 RX ORDER — PROMETHAZINE HYDROCHLORIDE 25 MG/ML
12.5 INJECTION, SOLUTION INTRAMUSCULAR; INTRAVENOUS ONCE AS NEEDED
Status: DISCONTINUED | OUTPATIENT
Start: 2022-09-16 | End: 2022-09-16 | Stop reason: HOSPADM

## 2022-09-16 RX ORDER — DIPHENHYDRAMINE HYDROCHLORIDE 50 MG/ML
12.5 INJECTION INTRAMUSCULAR; INTRAVENOUS ONCE AS NEEDED
Status: DISCONTINUED | OUTPATIENT
Start: 2022-09-16 | End: 2022-09-16 | Stop reason: HOSPADM

## 2022-09-16 RX ORDER — GLYCOPYRROLATE 0.2 MG/ML
INJECTION INTRAMUSCULAR; INTRAVENOUS AS NEEDED
Status: DISCONTINUED | OUTPATIENT
Start: 2022-09-16 | End: 2022-09-16

## 2022-09-16 RX ORDER — CHLORHEXIDINE GLUCONATE 4 G/100ML
SOLUTION TOPICAL DAILY PRN
Status: DISCONTINUED | OUTPATIENT
Start: 2022-09-16 | End: 2022-09-16 | Stop reason: HOSPADM

## 2022-09-16 RX ORDER — DEXAMETHASONE SODIUM PHOSPHATE 10 MG/ML
INJECTION, SOLUTION INTRAMUSCULAR; INTRAVENOUS AS NEEDED
Status: DISCONTINUED | OUTPATIENT
Start: 2022-09-16 | End: 2022-09-16

## 2022-09-16 RX ORDER — FENTANYL CITRATE 50 UG/ML
INJECTION, SOLUTION INTRAMUSCULAR; INTRAVENOUS AS NEEDED
Status: DISCONTINUED | OUTPATIENT
Start: 2022-09-16 | End: 2022-09-16

## 2022-09-16 RX ORDER — BUPIVACAINE HYDROCHLORIDE 5 MG/ML
INJECTION, SOLUTION PERINEURAL
Status: COMPLETED | OUTPATIENT
Start: 2022-09-16 | End: 2022-09-16

## 2022-09-16 RX ORDER — LIDOCAINE HYDROCHLORIDE 10 MG/ML
0.5 INJECTION, SOLUTION EPIDURAL; INFILTRATION; INTRACAUDAL; PERINEURAL ONCE AS NEEDED
Status: DISCONTINUED | OUTPATIENT
Start: 2022-09-16 | End: 2022-09-16 | Stop reason: HOSPADM

## 2022-09-16 RX ORDER — FENTANYL CITRATE/PF 50 MCG/ML
25 SYRINGE (ML) INJECTION
Status: DISCONTINUED | OUTPATIENT
Start: 2022-09-16 | End: 2022-09-16 | Stop reason: HOSPADM

## 2022-09-16 RX ORDER — NEOSTIGMINE METHYLSULFATE 1 MG/ML
INJECTION INTRAVENOUS AS NEEDED
Status: DISCONTINUED | OUTPATIENT
Start: 2022-09-16 | End: 2022-09-16

## 2022-09-16 RX ORDER — ONDANSETRON 2 MG/ML
INJECTION INTRAMUSCULAR; INTRAVENOUS AS NEEDED
Status: DISCONTINUED | OUTPATIENT
Start: 2022-09-16 | End: 2022-09-16

## 2022-09-16 RX ORDER — ROCURONIUM BROMIDE 10 MG/ML
INJECTION, SOLUTION INTRAVENOUS AS NEEDED
Status: DISCONTINUED | OUTPATIENT
Start: 2022-09-16 | End: 2022-09-16

## 2022-09-16 RX ORDER — HYDROMORPHONE HCL/PF 1 MG/ML
0.5 SYRINGE (ML) INJECTION
Status: DISCONTINUED | OUTPATIENT
Start: 2022-09-16 | End: 2022-09-16 | Stop reason: HOSPADM

## 2022-09-16 RX ORDER — CEFAZOLIN SODIUM 2 G/50ML
2000 SOLUTION INTRAVENOUS ONCE
Status: COMPLETED | OUTPATIENT
Start: 2022-09-16 | End: 2022-09-16

## 2022-09-16 RX ORDER — CHLORHEXIDINE GLUCONATE 0.12 MG/ML
15 RINSE ORAL ONCE
Status: COMPLETED | OUTPATIENT
Start: 2022-09-16 | End: 2022-09-16

## 2022-09-16 RX ORDER — SODIUM CHLORIDE, SODIUM LACTATE, POTASSIUM CHLORIDE, AND CALCIUM CHLORIDE .6; .31; .03; .02 G/100ML; G/100ML; G/100ML; G/100ML
IRRIGANT IRRIGATION AS NEEDED
Status: DISCONTINUED | OUTPATIENT
Start: 2022-09-16 | End: 2022-09-16 | Stop reason: HOSPADM

## 2022-09-16 RX ADMIN — CEFAZOLIN SODIUM 2000 MG: 2 SOLUTION INTRAVENOUS at 08:43

## 2022-09-16 RX ADMIN — PROPOFOL 200 MG: 10 INJECTION, EMULSION INTRAVENOUS at 08:26

## 2022-09-16 RX ADMIN — ROCURONIUM BROMIDE 50 MG: 10 INJECTION, SOLUTION INTRAVENOUS at 08:27

## 2022-09-16 RX ADMIN — MIDAZOLAM 2 MG: 1 INJECTION INTRAMUSCULAR; INTRAVENOUS at 08:22

## 2022-09-16 RX ADMIN — NEOSTIGMINE METHYLSULFATE 2 MG: 1 INJECTION INTRAVENOUS at 10:35

## 2022-09-16 RX ADMIN — DEXAMETHASONE SODIUM PHOSPHATE 10 MG: 10 INJECTION INTRAMUSCULAR; INTRAVENOUS at 08:45

## 2022-09-16 RX ADMIN — SODIUM CHLORIDE, SODIUM LACTATE, POTASSIUM CHLORIDE, AND CALCIUM CHLORIDE 50 ML/HR: .6; .31; .03; .02 INJECTION, SOLUTION INTRAVENOUS at 07:08

## 2022-09-16 RX ADMIN — FENTANYL CITRATE 50 MCG: 50 INJECTION, SOLUTION INTRAMUSCULAR; INTRAVENOUS at 08:24

## 2022-09-16 RX ADMIN — GLYCOPYRROLATE 0.4 MG: 0.2 INJECTION, SOLUTION INTRAMUSCULAR; INTRAVENOUS at 10:33

## 2022-09-16 RX ADMIN — ONDANSETRON 4 MG: 2 INJECTION INTRAMUSCULAR; INTRAVENOUS at 10:33

## 2022-09-16 RX ADMIN — LIDOCAINE HYDROCHLORIDE 100 MG: 20 INJECTION, SOLUTION EPIDURAL; INFILTRATION; INTRACAUDAL at 08:25

## 2022-09-16 RX ADMIN — BUPIVACAINE HYDROCHLORIDE 5 ML: 5 INJECTION, SOLUTION PERINEURAL at 08:05

## 2022-09-16 RX ADMIN — CHLORHEXIDINE GLUCONATE 0.12% ORAL RINSE 15 ML: 1.2 LIQUID ORAL at 07:08

## 2022-09-16 NOTE — DISCHARGE INSTRUCTIONS
Shoulder Surgery:  Postoperative Instructions  Dr Roddy Sawnat may resume your regular diet as soon as possible    Medication    Take the pain medication as prescribed   Take pain medication with food   While taking pain medications, you may NOT operate a vehicle, heavy machinery, or appliances   While taking pain medication, you may NOT drink alcoholic beverages   If you have any reactions to your medications, stop taking them and call my office   Please keep in mind that constipation is a very common side effect of taking narcotic pain medication  Take precautions to prevent constipation:  o Drink plenty of water (6-8 glasses of 8 oz  a day)  o Avoid alcohol, caffeine, and dairy products  o Eat plenty of fiber (fruits, vegetables, and whole grains)  o Take an over the counter stool softener (Colace or Dulcolax)  o Patients that have had upper extremity surgery should take frequent walks    Activity    Minimize activity the day of surgery    DO NOT USE HEAT    Please keep ice applied to the shoulder for at least the first 72 hours or as long as pain or swelling persists  Do not apply ice directly to skin, or allow water to leak on your dressing  Place a pillow behind your elbow while lying down or sleeping  Sleeping in a more upright position (recliner) may be more comfortable initially  You should NOT roll onto the operative shoulder  You are in an immobilizer or sling and it should remain on at all times except when showering until your first postoperative visit   Open and close your hand 10 times every day for about 1 minute  You may also flex and extend your elbow each day when your sling is removed for showering  Be sure to keep your elbow at your side  DO NOT actively (on your own) lift your operative arm away from the side of your body or rotate it out away from your body  DO NOT use exercise equipment unless otherwise instructed       Sling/ Immobilizer    Use the sling/immobilizer at all times and while sleeping until your next office appointment  Showering    You may shower 4 days after surgery unless told otherwise  DO NOT immerse the shoulder under water and DO NOT rub the incision  Place new Band-Aids over the sutures after showering  You may sponge bathe for the first 72 hours, taking care to keep the dressing clean and dry  Dressing Care    It is normal to get some bloody wound seepage through the bandage  DO NOT BE ALARMED  If the dressing gets soaked with wound seepage, place more gauze over the dressing and secure with tape  If this soaks through remove the entire dressing and replace with STERILE gauze and tape    Remove all dressings at 72 hours after surgery  If there is still some wound seepage, apply a fresh STERILE gauze over the incisions and secure with tape  DO NOT TOUCH OR REMOVE THE SUTURES!! Emergency/Follow-Up   Please notify my office at [phone] if you develop any fever (101 deg or above), unexpected warmth, redness or swelling  Please call if your fingers become cold, purple, numb, or there is excessive bleeding  Please call the office within 24 hours at [phone] to schedule a follow up appt within 7-10 days from surgery

## 2022-09-16 NOTE — ANESTHESIA PREPROCEDURE EVALUATION
Procedure:  REPAIR ROTATOR CUFF  ARTHROSCOPIC (Right Shoulder)    Relevant Problems   CARDIO   (+) Essential hypertension      NEURO/PSYCH   (+) Chronic pain syndrome      PULMONARY   (+) ALEXANDRE (obstructive sleep apnea)      Digestive   (+) Finney's esophagus with dysplasia      Other   (+) Class II obesity        Physical Exam    Airway    Mallampati score: I  TM Distance: >3 FB  Neck ROM: full     Dental   No notable dental hx     Cardiovascular      Pulmonary      Other Findings        8/20/21 TTE:  LEFT VENTRICLE:  Systolic function was normal  Ejection fraction was estimated to be 60 %  There were no regional wall motion abnormalities  Wall thickness was mildly increased  There was mild concentric hypertrophy  7/30/21 Exercise Stress:  IMPRESSIONS: 1  Good exercise tolerance  2  EKG negative for ischemia  3  Normal myocardial perfusion-no ischemia    Anesthesia Plan  ASA Score- 2     Anesthesia Type- general with ASA Monitors  Additional Monitors:   Airway Plan: ETT  Comment: Ultrasound-guided single shot nerve block for post-op pain management  Discussed risks including bleeding, infection, nerve damage, local anesthetic toxicity and failure, as well as possibility of phrenic blockade and Elfego's syndrome  Plan Factors-Exercise tolerance (METS): >4 METS  Exercise comment: Able to climb two flights of stairs without cardiopulmonary limitation  Chart reviewed  EKG reviewed  Existing labs reviewed  Patient summary reviewed  Patient is a current smoker (marijuana)  Induction- intravenous  Postoperative Plan- Plan for postoperative opioid use  Planned trial extubation    Informed Consent- Anesthetic plan and risks discussed with patient

## 2022-09-16 NOTE — ANESTHESIA PROCEDURE NOTES
Peripheral Block    Start time: 9/16/2022 8:05 AM  Reason for block: at surgeon's request and post-op pain management  Staffing  Performed: Anesthesiologist   Anesthesiologist: Halle Hummel MD  Preanesthetic Checklist  Completed: patient identified, IV checked, site marked, risks and benefits discussed, surgical consent, monitors and equipment checked, pre-op evaluation and timeout performed  Peripheral Block  Patient position: supine  Prep: ChloraPrep  Patient monitoring: heart rate, cardiac monitor, continuous pulse ox and frequent blood pressure checks  Block type: interscalene  Laterality: right  Injection technique: single-shot  Procedures: ultrasound guided, Ultrasound guidance required for the procedure to increase accuracy and safety of medication placement and decrease risk of complications    Ultrasound permanent image savedbupivacaine (MARCAINE) 0 5 % - Perineural   5 mL - 9/16/2022 8:05:00 AM (exparel 20 mL)  Needle  Needle type: Stimuplex   Needle localization: ultrasound guidance  Assessment  Injection assessment: negative aspiration for CSF, negative aspiration for heme and transient paresthesias  Paresthesia pain: immediately resolved  patient tolerated the procedure well with no immediate complications

## 2022-09-16 NOTE — OP NOTE
OPERATIVE REPORT  PATIENT NAME: Andrea Negro    :  1965  MRN: [id number]  Pt Location: [facility] OR ROOM 04    SURGERY DATE: 2022    Surgeon(s) and Role:     * Dayanara Tyler DO - Primary     * Hieu Plaza PA-C - Assisting    Preop Diagnosis:  Complete tear of right rotator cuff, unspecified whether traumatic [M75 121]  Full-thickness tear of supraspinatus tendon    Post-Op Diagnosis Codes:     * Complete tear of right rotator cuff, unspecified whether traumatic [M75 121]  Full-thickness tear supraspinatus tendon  Near full-thickness tear of superior 1/3 of subscapularis   Partial-thickness tear of proximal biceps tendon  Degenerative tearing of anterior labrum  Subacromial impingement with large subacromial spur    Procedure(s) (LRB):  RIGHT SHOULDER ARTHROSCOPY, REPAIR ROTATOR CUFF, SUPRASPINATUS REPAIR, SUBSCAPULARIS REPAIR, BICEPS TENOTOMY, ACROMIOPLASTY, AND EXTENSIVE DEBRIDEMENT (Right)    Specimen(s):  * No specimens in log *    Estimated Blood Loss:   Minimal    Drains:  [REMOVED] NG/OG/Enteral Tube Orogastric 18 Fr Center mouth (Removed)   Number of days: 0       Anesthesia Type:   General    Operative Indications:  Complete tear of right rotator cuff, unspecified whether traumatic [M75 121]  Persistent pain and limited function despite treatment including physical therapy analgesics activity modification, injection therapy    Operative Findings:  Full-thickness tear supraspinatus tendon  Near full-thickness tear of superior 1/3 subscapularis tendon  Partial-thickness tear proximal biceps tendon  Degenerative tearing anterior labrum  Large subacromial spur    Complications:   None    Procedure and Technique:    Antibiotics:  2 g Ancef    IV fluids:  750 cc crystalloid    Implants:  Arthrex 5 5 mm triple loaded BioComposite anchor x 2, 4 75 mm BioComposite SwiveLock anchor    The patient was seen in the preoperative holding area and the appropriate site of surgery was confirmed and the patient was marked  Upon bringing the patient back to the operating room he was placed supine on the operating room table and general anesthesia was provided  The patient was placed in the lateral decubitus position with the right side up  The patient was held in position with a beanbag reinforce with a seat belt and tape  All bony prominences were appropriately padded and the brachial plexus was offloaded with the appropriate ramp pillow functioning as an axillary roll  An exam under anesthesia was performed and displayed full passive range of motion without instability  The right upper extremity was prepped and draped in the usual sterile fashion and placed in 15 lbs of in-line traction  A preoperative time-out was performed to once again confirm the site of surgery and procedure  A posterior arthroscopic viewing portal was made with an 11 blade  The arthroscopic camera was inserted  Upon entering the glenohumeral joint space an anterior portal was made under direct visualization with the aid of an 18 gauge spinal needle  An anterior cannula was inserted, followed by an arthroscopic probe, and a diagnostic arthroscopy was performed  Diagnostic arthroscopy revealed high-grade partial-thickness tearing of proximal biceps tendon  Upon inspection of subscapularis tendon near full-thickness tearing was noted of the superior border  Glenohumeral articular cartilage grossly intact with small areas of grade 2 chondromalacia inferior glenoid  Full-thickness tearing was noted of the supraspinatus tendon with retraction to the glenohumeral joint  Degenerative fraying was noted of the anterior labrum  At this time a meniscal biter was inserted to release the biceps tendon    This was followed by an arthroscopic shaver used to debride proximal biceps tendon stump, frayed anterior labrum, rotator interval to reveal coracoid process, superior border of the torn subscapularis tendon, inferior leaflet torn supraspinatus tendon  Attention was then turned to the subscapularis tear  Radiofrequency device was used to remove soft tissue that remained on the lesser tuberosity correspond to the torn portion of the footprint  A 70 degree arthroscope was inserted for better visualization of the lesser tuberosity  An arthroscopic bur was used to lightly decorticate the site of repair on the lesser tuberosity  Arthroscopic elevator and shaver were sequentially used to release the subscapularis tendon anteriorly, posteriorly, and superiorly  Rotator cuff grasper was used to ensure proper mobilization to the repair site lesser tuberosity without over tensioning  A FiberTape suture was passed to the superior border the subscapularis tendon which was retracted to the glenoid  An awl was used to make a starter hole for a 4 75 BioComposite SwiveLock anchor  Suture tape was then loaded into a SwiveLock and suture ends appropriately tensioned to reduce the subscapularis tendon  SwiveLock anchor was then inserted, maintaining tension to the sutures while insertion  Final subscapularis repair was probed and noted to be stable without over tensioning through internal and external rotation  Final arthroscopic pictures of the repair were taken  Upon completion of arthroscopic work within the glenohumeral joint, the arthroscope was removed and subsequently placed into the subacromial space  A lateral portal was made 3 cm off of the lateral aspect of the acromion in line with the rotator cuff tear  The arthroscopic shaver was inserted and a bursectomy was performed  Arthroscopic radiofrequency ablation device was used to remove soft tissue off the undersurface of the acromion  At this time, the arthroscopic camera was placed in the lateral portal and the remainder of the bursectomy was performed posteriorly  A large subacromial spur was visualized on the inferior aspect of the anterolateral acromion    An arthroscopic shaw was used to perform an acromioplasty  Next, attention was turned to the rotator cuff tear  After visualization of the rotator cuff and associated tear, the tear morphology was characterized  Soft tissue was removed off the footprint on greater tuberosity  An arthroscopic bur was used to lightly decorticate the rotator cuff footprint  The supraspinatus tendon was properly released with arthroscopic elevator and shaver and mobilized to the appropriate point on the rotator cuff footprint without over tensioning  Next, an accessory portal was made off of the lateral acromion for placement of anchors  A 4 5 mm tap was inserted followed by a 5 5 mm BioComposite triple loaded anchor anterior in the footprint  This process was then repeated placing a 2nd 5 5 mm triple loaded anchor posterior to the 1st   At this time sutures were passed through the rotator cuff tendon near the musculotendinous junction from posterior to anterior  Sutures then tied in simple fashion from anterior to posterior  Proper reduction and tensioning the rotator cuff repair was noted  Final repair was visualized and probed  Proper reduction and tensioning of the rotator cuff repair was confirmed through internal and external rotation and final arthroscopic pictures were taken  Arthroscopic incisions were closed with 3-0 nylon suture  A sterile dressing was applied and the patient was placed in a sling and abduction pillow  The patient tolerated the procedure well and was transferred to the PACU without complication         I was present for the entire procedure, A qualified resident physician was not available and A physician assistant, Marianne Espino PA-C, was required during the procedure for patient positioning, assistance with arthroscopic camera and equipment due to the minimally invasive nature of the surgical case, anchor insertion, and suturing    Patient Disposition:  PACU         SIGNATURE: [unfilled]  DATE: September 16, 2022  TIME: 10:38 AM

## 2022-09-16 NOTE — ANESTHESIA POSTPROCEDURE EVALUATION
Post-Op Assessment Note    CV Status:  Stable  Pain Score: 1    Pain management: adequate  Multimodal analgesia used between 6 hours prior to anesthesia start to PACU discharge    Mental Status:  Alert and awake   Hydration Status:  Euvolemic   PONV Controlled:  Controlled   Airway Patency:  Patent      Post Op Vitals Reviewed: Yes      Staff: CRNA         No complications documented      BP   163/87   Temp 97 9   Pulse 74   Resp 18   SpO2 99

## 2022-09-16 NOTE — INTERVAL H&P NOTE
H&P reviewed  After examining the patient I find no changes in the patients condition since the H&P had been written  Vitals:    09/16/22 0659   BP: 115/79   Pulse: 83   Resp: 16   Temp: 97 6 °F (36 4 °C)   SpO2: 98%       Plan for right shoulder arthroscopy, rotator cuff repair and all associated procedures today

## 2022-09-26 ENCOUNTER — OFFICE VISIT (OUTPATIENT)
Dept: OBGYN CLINIC | Facility: CLINIC | Age: 57
End: 2022-09-26

## 2022-09-26 VITALS
HEART RATE: 96 BPM | HEIGHT: 71 IN | BODY MASS INDEX: 35.73 KG/M2 | WEIGHT: 255.2 LBS | SYSTOLIC BLOOD PRESSURE: 133 MMHG | DIASTOLIC BLOOD PRESSURE: 79 MMHG

## 2022-09-26 DIAGNOSIS — Z48.89 AFTERCARE FOLLOWING SURGERY: Primary | ICD-10-CM

## 2022-09-26 DIAGNOSIS — Z98.890 S/P RIGHT ROTATOR CUFF REPAIR: ICD-10-CM

## 2022-09-26 PROCEDURE — 99024 POSTOP FOLLOW-UP VISIT: CPT | Performed by: ORTHOPAEDIC SURGERY

## 2022-09-26 NOTE — PROGRESS NOTES
Patient Name:  Orlin Russo  MRN:  [id number]    Assessment & Plan     1  Aftercare following surgery    2  S/P right rotator cuff repair        59 y o  male approximately 10 days  s/p right shoulder rotator cuff repair, biceps tenotomy, acromioplasty, extensive debridement performed on 9/16/2022  Patient is doing well status post surgical intervention  Arthroscopic images from surgery reviewed in the office with the patient today  Sutures removed in the office today  Reviewed with the patient basic passive home exercises to complete 3x daily  dashalonda Lares is scheduled to start outpatient  PT on 10/26/22  Continue wearing the sling at all times, only coming out of the sling to shower and do gentle passive ROM exercises  He should not be doing active range of motion at this time  Follow up in 5 weeks for re-evaluation of the right shoulder  History of the Present Illness   Orlin Russo is a 59 y o  male approximately 10 days  s/p right shoulder rotator cuff repair, biceps tenotomy, acromioplasty, extensive debridement performed on 9/16/2022  He is doing well, and has 3/10 pain on average  Motrin is his main pain medication at this time  He has been compliant with wearing his sling  Review of Systems     Review of Systems   Constitutional: Positive for activity change  Negative for chills, fever and unexpected weight change  HENT: Negative for hearing loss, nosebleeds and sore throat  Eyes: Negative for pain, redness and visual disturbance  Respiratory: Negative for cough, shortness of breath and wheezing  Cardiovascular: Negative for chest pain, palpitations and leg swelling  Gastrointestinal: Negative for abdominal pain, nausea and vomiting  Endocrine: Negative for polyphagia and polyuria  Genitourinary: Negative for dysuria and hematuria  Musculoskeletal: Positive for arthralgias, joint swelling and myalgias  See HPI   Skin: Negative for rash and wound     Neurological: Negative for dizziness, numbness and headaches  Psychiatric/Behavioral: Negative for decreased concentration and suicidal ideas  The patient is not nervous/anxious  Physical Exam     /79   Pulse 96   Ht 5' 11" (1 803 m)   Wt 116 kg (255 lb 3 2 oz)   BMI 35 59 kg/m²     Right Shoulder:   Surgical incisions are well healing, show no signs of infection  Active range of motion  Elbow Flexion 0-140      Passive range of motion   40 degrees of forward flexion     The patient is neurovascularly intact distally in the extremity  Data Review     I have personally reviewed pertinent films in PACS, and my interpretation follows  No new imaging taken in the office today      Social History     Tobacco Use    Smoking status: Never Smoker    Smokeless tobacco: Never Used   Vaping Use    Vaping Use: Former   Substance Use Topics    Alcohol use: Not Currently     Comment: Sober 5 moths    Drug use: Yes     Types: Marijuana     Comment: Medical Card QOD           Procedures  None     [employer]    I,:  Claudia Rothman am acting as a scribe while in the presence of the attending physician :       I,:  Jose Greenfield DO personally performed the services described in this documentation    as scribed in my presence :

## 2022-10-18 ENCOUNTER — APPOINTMENT (EMERGENCY)
Dept: CT IMAGING | Facility: HOSPITAL | Age: 57
End: 2022-10-18
Payer: COMMERCIAL

## 2022-10-18 ENCOUNTER — APPOINTMENT (EMERGENCY)
Dept: RADIOLOGY | Facility: HOSPITAL | Age: 57
End: 2022-10-18
Payer: COMMERCIAL

## 2022-10-18 ENCOUNTER — HOSPITAL ENCOUNTER (EMERGENCY)
Facility: HOSPITAL | Age: 57
Discharge: HOME | End: 2022-10-18
Attending: EMERGENCY MEDICINE
Payer: COMMERCIAL

## 2022-10-18 VITALS
HEART RATE: 66 BPM | RESPIRATION RATE: 16 BRPM | SYSTOLIC BLOOD PRESSURE: 169 MMHG | TEMPERATURE: 97.5 F | DIASTOLIC BLOOD PRESSURE: 92 MMHG | OXYGEN SATURATION: 98 %

## 2022-10-18 DIAGNOSIS — R10.9 ABDOMINAL PAIN: ICD-10-CM

## 2022-10-18 DIAGNOSIS — R11.0 NAUSEA: ICD-10-CM

## 2022-10-18 DIAGNOSIS — N40.0 ENLARGED PROSTATE: ICD-10-CM

## 2022-10-18 DIAGNOSIS — R07.9 CHEST PAIN: Primary | ICD-10-CM

## 2022-10-18 LAB
ALBUMIN SERPL BCP-MCNC: 3.9 G/DL (ref 3.5–5)
ALP SERPL-CCNC: 92 U/L (ref 46–116)
ALT SERPL W P-5'-P-CCNC: 23 U/L (ref 12–78)
ANION GAP SERPL CALCULATED.3IONS-SCNC: 10 MMOL/L (ref 4–13)
AST SERPL W P-5'-P-CCNC: 16 U/L (ref 5–45)
ATRIAL RATE: 82 BPM
BASOPHILS # BLD AUTO: 0.04 THOUSANDS/ÂΜL (ref 0–0.1)
BASOPHILS NFR BLD AUTO: 1 % (ref 0–1)
BILIRUB SERPL-MCNC: 0.27 MG/DL (ref 0.2–1)
BUN SERPL-MCNC: 26 MG/DL (ref 5–25)
CALCIUM SERPL-MCNC: 9 MG/DL (ref 8.3–10.1)
CARDIAC TROPONIN I PNL SERPL HS: <2 NG/L
CARDIAC TROPONIN I PNL SERPL HS: <2 NG/L
CHLORIDE SERPL-SCNC: 102 MMOL/L (ref 96–108)
CO2 SERPL-SCNC: 24 MMOL/L (ref 21–32)
CREAT SERPL-MCNC: 0.94 MG/DL (ref 0.6–1.3)
EOSINOPHIL # BLD AUTO: 0.19 THOUSAND/ÂΜL (ref 0–0.61)
EOSINOPHIL NFR BLD AUTO: 3 % (ref 0–6)
ERYTHROCYTE [DISTWIDTH] IN BLOOD BY AUTOMATED COUNT: 13.6 % (ref 11.6–15.1)
FLUAV RNA RESP QL NAA+PROBE: NEGATIVE
FLUBV RNA RESP QL NAA+PROBE: NEGATIVE
GFR SERPL CREATININE-BSD FRML MDRD: 88 ML/MIN/1.73SQ M
GLUCOSE SERPL-MCNC: 150 MG/DL (ref 65–140)
HCT VFR BLD AUTO: 45.9 % (ref 36.5–49.3)
HGB BLD-MCNC: 15.3 G/DL (ref 12–17)
IMM GRANULOCYTES # BLD AUTO: 0.02 THOUSAND/UL (ref 0–0.2)
IMM GRANULOCYTES NFR BLD AUTO: 0 % (ref 0–2)
LIPASE SERPL-CCNC: 179 U/L (ref 73–393)
LYMPHOCYTES # BLD AUTO: 1.31 THOUSANDS/ÂΜL (ref 0.6–4.47)
LYMPHOCYTES NFR BLD AUTO: 20 % (ref 14–44)
MCH RBC QN AUTO: 30.2 PG (ref 26.8–34.3)
MCHC RBC AUTO-ENTMCNC: 33.3 G/DL (ref 31.4–37.4)
MCV RBC AUTO: 91 FL (ref 82–98)
MONOCYTES # BLD AUTO: 0.6 THOUSAND/ÂΜL (ref 0.17–1.22)
MONOCYTES NFR BLD AUTO: 9 % (ref 4–12)
NEUTROPHILS # BLD AUTO: 4.55 THOUSANDS/ÂΜL (ref 1.85–7.62)
NEUTS SEG NFR BLD AUTO: 67 % (ref 43–75)
NRBC BLD AUTO-RTO: 0 /100 WBCS
P AXIS: 63 DEGREES
PLATELET # BLD AUTO: 285 THOUSANDS/UL (ref 149–390)
PMV BLD AUTO: 9.6 FL (ref 8.9–12.7)
POTASSIUM SERPL-SCNC: 4 MMOL/L (ref 3.5–5.3)
PR INTERVAL: 174 MS
PROT SERPL-MCNC: 7.7 G/DL (ref 6.4–8.4)
QRS AXIS: 49 DEGREES
QRSD INTERVAL: 84 MS
QT INTERVAL: 390 MS
QTC INTERVAL: 455 MS
RBC # BLD AUTO: 5.06 MILLION/UL (ref 3.88–5.62)
RSV RNA RESP QL NAA+PROBE: NEGATIVE
SARS-COV-2 RNA RESP QL NAA+PROBE: NEGATIVE
SODIUM SERPL-SCNC: 136 MMOL/L (ref 135–147)
T WAVE AXIS: 49 DEGREES
VENTRICULAR RATE: 82 BPM
WBC # BLD AUTO: 6.71 THOUSAND/UL (ref 4.31–10.16)

## 2022-10-18 PROCEDURE — 99285 EMERGENCY DEPT VISIT HI MDM: CPT | Performed by: EMERGENCY MEDICINE

## 2022-10-18 PROCEDURE — 80053 COMPREHEN METABOLIC PANEL: CPT | Performed by: EMERGENCY MEDICINE

## 2022-10-18 PROCEDURE — 93010 ELECTROCARDIOGRAM REPORT: CPT | Performed by: INTERNAL MEDICINE

## 2022-10-18 PROCEDURE — 71046 X-RAY EXAM CHEST 2 VIEWS: CPT

## 2022-10-18 PROCEDURE — 36415 COLL VENOUS BLD VENIPUNCTURE: CPT | Performed by: EMERGENCY MEDICINE

## 2022-10-18 PROCEDURE — 93005 ELECTROCARDIOGRAM TRACING: CPT

## 2022-10-18 PROCEDURE — 84484 ASSAY OF TROPONIN QUANT: CPT | Performed by: EMERGENCY MEDICINE

## 2022-10-18 PROCEDURE — 96374 THER/PROPH/DIAG INJ IV PUSH: CPT

## 2022-10-18 PROCEDURE — 85025 COMPLETE CBC W/AUTO DIFF WBC: CPT | Performed by: EMERGENCY MEDICINE

## 2022-10-18 PROCEDURE — 99285 EMERGENCY DEPT VISIT HI MDM: CPT

## 2022-10-18 PROCEDURE — 0241U HB NFCT DS VIR RESP RNA 4 TRGT: CPT | Performed by: EMERGENCY MEDICINE

## 2022-10-18 PROCEDURE — 74177 CT ABD & PELVIS W/CONTRAST: CPT

## 2022-10-18 PROCEDURE — 96375 TX/PRO/DX INJ NEW DRUG ADDON: CPT

## 2022-10-18 PROCEDURE — 83690 ASSAY OF LIPASE: CPT | Performed by: EMERGENCY MEDICINE

## 2022-10-18 PROCEDURE — 96361 HYDRATE IV INFUSION ADD-ON: CPT

## 2022-10-18 PROCEDURE — 96376 TX/PRO/DX INJ SAME DRUG ADON: CPT

## 2022-10-18 RX ORDER — FENTANYL CITRATE 50 UG/ML
50 INJECTION, SOLUTION INTRAMUSCULAR; INTRAVENOUS ONCE
Status: COMPLETED | OUTPATIENT
Start: 2022-10-18 | End: 2022-10-18

## 2022-10-18 RX ORDER — KETOROLAC TROMETHAMINE 30 MG/ML
15 INJECTION, SOLUTION INTRAMUSCULAR; INTRAVENOUS ONCE
Status: COMPLETED | OUTPATIENT
Start: 2022-10-18 | End: 2022-10-18

## 2022-10-18 RX ORDER — SODIUM CHLORIDE 9 MG/ML
3 INJECTION INTRAVENOUS
Status: DISCONTINUED | OUTPATIENT
Start: 2022-10-18 | End: 2022-10-18 | Stop reason: HOSPADM

## 2022-10-18 RX ORDER — ONDANSETRON 2 MG/ML
4 INJECTION INTRAMUSCULAR; INTRAVENOUS ONCE
Status: COMPLETED | OUTPATIENT
Start: 2022-10-18 | End: 2022-10-18

## 2022-10-18 RX ORDER — ONDANSETRON 2 MG/ML
INJECTION INTRAMUSCULAR; INTRAVENOUS
Status: DISCONTINUED
Start: 2022-10-18 | End: 2022-10-18 | Stop reason: HOSPADM

## 2022-10-18 RX ADMIN — FENTANYL CITRATE 50 MCG: 50 INJECTION INTRAMUSCULAR; INTRAVENOUS at 05:43

## 2022-10-18 RX ADMIN — FENTANYL CITRATE 50 MCG: 50 INJECTION INTRAMUSCULAR; INTRAVENOUS at 04:43

## 2022-10-18 RX ADMIN — SODIUM CHLORIDE 1000 ML: 0.9 INJECTION, SOLUTION INTRAVENOUS at 04:44

## 2022-10-18 RX ADMIN — KETOROLAC TROMETHAMINE 15 MG: 30 INJECTION, SOLUTION INTRAMUSCULAR at 05:43

## 2022-10-18 RX ADMIN — IOHEXOL 100 ML: 350 INJECTION, SOLUTION INTRAVENOUS at 05:02

## 2022-10-18 RX ADMIN — ONDANSETRON 4 MG: 2 INJECTION INTRAMUSCULAR; INTRAVENOUS at 04:16

## 2022-10-18 NOTE — DISCHARGE INSTRUCTIONS
CT IMPRESSION:     Enlarged prostate  Partially distended bladder  Mild circumferential bladder wall thickening noted, may suggest a component of cystitis an/or bladder outlet obstruction  Correlation with patient's symptoms, laboratory values, and urinalysis   recommended  Cholelithiasis without discrete evidence of acute cholecystitis, colonic diverticulosis without evidence of acute diverticulitis, and other findings as above

## 2022-10-18 NOTE — ED PROVIDER NOTES
History  Chief Complaint   Patient presents with   • Chest Pain     Pt with L sided non-radiating chest pain that began approx 2hrs ago, denies shortness of breath  C/o nausea     HPI  59 y o  male presents with a chief complaint of "I woke up with chest pain "  Patient states this started as 3 hours of left sided chest pain without radiation  Patient describes sharp that came on suddenly while sleeping and has improved though now he notes left upper quadrant  Patient states nothing worsens the pain and nothing improves the pain  Patient denies pleuritic component to chest pain  Patient denies exertional component to the chest pain  Patient states he was very diaphoretic upon awakening and became nauseous  Patient denies any vomiting but does affirm diarrhea approximately 1 hour prior to coming to the emergency room  Patient affirms chills but denies any fever  Patient denies dyspnea, cough, hemoptysis, weakness, dizziness, back pain, syncope, focal weakness or numbness, leg pain or swelling  All other review of systems reviewed and noted to be negative  The patient denies a history of atherosclerotic disease (CAD/TIA/CVA/PAD)  Patient denies any history of hypertension  Patient denies hyperlipidemia  Patient denies diabetes  Patient affirms obesity  Patient affirms possible early family history of CAD (male less than 51yo or female less than 71 yo)  Patient states father  of heart disease in his 62s, unclear when 1st onset was though  Patient denies any use of tobacco in the past 90 days  The patient denies any use of illicit drugs, including cocaine  Patient denies any immobilization of at least 3 days or surgery in the past 4 weeks  Patient did have surgery recently though it was over 4 weeks ago  Patient denies any history of DVT or PE  Patient denies any history or family history of thrombophilia  Patient denies any malignancy with treatment within the past 6 months  Focused Objective  CV:  Normal inspection with no rash, signs of infection, or trauma  Regular rate and rhythm  No murmur  Peripheral pulses intact and equal   Nontender to palpitation over area of patient's reported pain in the chest though he does have left upper quadrant pain  Respiratory:  Lungs clear to auscultation bilaterally without adventitious sounds  Skin:  Warm and dry  No rash or signs of herpes zoster over area of patient's reported pain  Extremities:  Non-tender lower extremities without asymmetry; no clinical signs of DVT  No lower extremity edema  Medical Decision Making    Patient presenting with chest pain with a broad differential, including multiple emergent etiologies  Notably patient now only notes left upper quadrant pain for which she has tenderness  EKG obtained and reviewed independently by myself, which was interpreted by myself as listed under ED course  Patient placed on cardiac monitoring  Regarding the possibility for ACS, patient's risk stratification by the HEART SCORE:    HEART score:    History 1=Moderate suspicious  ECG 1=Nonspecific repolarization disturbance  Age 1= > 45 - <65 years  Risk Factors 1= 1 or 2 risk factors  Troponin 0= Less than or equal to 12 ng/L  Total 4     The patient's HEART score is 4  CXR will be obtained to evaluate for alternative pathologies, including pneumothorax or pneumonia  Laboratory analysis including troponin to evaluate for ACS, CBC to evaluate for anemia or leukocytosis, and BMP to evaluate renal function and electrolytes considering possibility of cardiac involvement  Considering patient's associated abdominal pain extensive surgical history with prior colectomy secondary to diverticulitis that was subsequently reversed, will obtain CT imaging of patient's abdomen pelvis and additionally obtain hepatic evaluation along with lipase      Patient on continuous cardiac monitoring and has been instructed to inform nursing with any change in the character or severity of their chest pain so repeat EKG can be obtained  Will monitor patient, reassess and re-evaluate  History provided by:  Patient  Chest Pain  Pain location:  L chest  Pain quality: sharp    Pain radiates to:  Does not radiate  Pain severity:  Moderate  Onset quality:  Sudden  Timing:  Constant  Progression:  Resolved  Chronicity:  Recurrent  Relieved by:  Nothing  Worsened by:  Nothing tried  Associated symptoms: abdominal pain, diaphoresis and nausea    Associated symptoms: no altered mental status, no anorexia, no claudication, no cough, no dizziness, no dysphagia, no fatigue, no fever, no headache, no heartburn, no lower extremity edema, no near-syncope, no numbness, no orthopnea, no palpitations, no PND, no shortness of breath, no syncope, not vomiting and no weakness        Prior to Admission Medications   Prescriptions Last Dose Informant Patient Reported? Taking?    Multiple Vitamin (multivitamin) capsule  Self Yes No   Sig: Take 1 capsule by mouth daily   acetaminophen (TYLENOL) 650 mg CR tablet  Self No No   Sig: Take 1 tablet (650 mg total) by mouth every 8 (eight) hours as needed for mild pain   ibuprofen (MOTRIN) 600 mg tablet  Self No No   Sig: Take 1 tablet (600 mg total) by mouth every 6 (six) hours as needed for mild pain   omeprazole (PriLOSEC) 20 mg delayed release capsule  Self Yes No   Sig: Take 20 mg by mouth daily   oxyCODONE-acetaminophen (Percocet) 5-325 mg per tablet  Self No No   Sig: Take 1 tablet by mouth every 6 (six) hours as needed for moderate pain Max Daily Amount: 4 tablets      Facility-Administered Medications: None       Past Medical History:   Diagnosis Date   • Acute kidney injury (nontraumatic) (HCC)    • Alcohol abuse    • Anxiety    • Finney esophagus    • Benign essential hypertension    • Chronic pain syndrome 02/17/2021   • Colon polyp    • COVID 09/2020   • Diverticulitis    • Diverticulitis    • Diverticulitis    • Erectile dysfunction    • Esophageal reflux    • Essential hypertension    • GERD (gastroesophageal reflux disease)    • History of transfusion 2015   • Lumbar radiculopathy 05/13/2019   • Major depression    • Mild cognitive disorder 05/06/2016   • Mood disorder ([facility] [address] ) 05/02/2016   • Multiple nevi    • Obesity    • Obesity (BMI 30-39  9)    • ALEXANDRE (obstructive sleep apnea)    • Psychosis (HCC)    • Snoring    • Suspicious nevus    • Tremor of right hand    • Weakness of right upper extremity        Past Surgical History:   Procedure Laterality Date   • COLON SURGERY     • COLONOSCOPY N/A 03/09/2019    Procedure: COLONOSCOPY;  Surgeon: Kaity Waters MD;  Location: [facility]; Service: Gastroenterology   • LIPOMA RESECTION      back   • MO ESOPHAGOGASTRODUODENOSCOPY TRANSORAL DIAGNOSTIC N/A 03/09/2019    Procedure: ESOPHAGOGASTRODUODENOSCOPY (EGD); Surgeon: Kaity Waters MD;  Location: [facility]; Service: Gastroenterology   • MO SHLDR ARTHROSCOP,SURG,W/ROTAT CUFF REPR Right 09/16/2022    Procedure: RIGHT SHOULDER ARTHROSCOPY, REPAIR ROTATOR CUFF, SUPRASPINATUS REPAIR, SUBSCAPULARIS REPAIR, BICEPS TENOTOMY, ACROMIOPLASTY, AND EXTENSIVE DEBRIDEMENT;  Surgeon: Lala Daily DO;  Location: [facility];  Service: Orthopedics   • REVISION COLOSTOMY     • SHOULDER SURGERY         Family History   Problem Relation Age of Onset   • Stroke Mother    • Heart disease Father      I have reviewed and agree with the history as documented      E-Cigarette/Vaping   • E-Cigarette Use Former User    • Comments quit 2016      E-Cigarette/Vaping Substances   • Nicotine No    • THC No    • CBD No    • Flavoring No    • Other No    • Unknown No      Social History     Tobacco Use   • Smoking status: Never Smoker   • Smokeless tobacco: Never Used   Vaping Use   • Vaping Use: Former   Substance Use Topics   • Alcohol use: Not Currently     Comment: Sober 5 moths   • Drug use: Yes     Types: Marijuana Comment: Medical Card QOD       Review of Systems   Constitutional: Positive for diaphoresis  Negative for fatigue and fever  HENT: Negative for trouble swallowing  Respiratory: Negative for cough and shortness of breath  Cardiovascular: Positive for chest pain  Negative for palpitations, orthopnea, claudication, syncope, PND and near-syncope  Gastrointestinal: Positive for abdominal pain, diarrhea and nausea  Negative for anorexia, heartburn and vomiting  Neurological: Negative for dizziness, weakness, numbness and headaches  All other systems reviewed and are negative  Physical Exam  Physical Exam  Vitals reviewed  HENT:      Head: Atraumatic  Eyes:      Pupils: Pupils are equal, round, and reactive to light  Cardiovascular:      Rate and Rhythm: Normal rate and regular rhythm  Pulmonary:      Breath sounds: Normal breath sounds  Abdominal:      General: There is no distension  Palpations: Abdomen is soft  Tenderness: There is abdominal tenderness  There is no guarding or rebound  Musculoskeletal:         General: No deformity  Cervical back: Neck supple  Right lower leg: No tenderness  No edema  Left lower leg: No tenderness  No edema  Skin:     General: Skin is warm and dry  Neurological:      General: No focal deficit present  Mental Status: He is alert     Psychiatric:         Mood and Affect: Mood normal          Vital Signs  ED Triage Vitals   Temperature Pulse Respirations Blood Pressure SpO2   10/18/22 0404 10/18/22 0404 10/18/22 0404 10/18/22 0404 10/18/22 0404   97 5 °F (36 4 °C) 79 18 (!) 176/86 100 %      Temp Source Heart Rate Source Patient Position - Orthostatic VS BP Location FiO2 (%)   10/18/22 0404 10/18/22 0404 10/18/22 0404 10/18/22 0404 --   Oral Monitor Lying Right arm       Pain Score       10/18/22 0443       7           Vitals:    10/18/22 0500 10/18/22 0529 10/18/22 0635 10/18/22 0720   BP: (!) 180/86  (!) 179/97 169/92 Pulse:  69 65 66   Patient Position - Orthostatic VS:   Lying          Visual Acuity      ED Medications  Medications   ondansetron (ZOFRAN) injection 4 mg (4 mg Intravenous Not Given 10/18/22 0417)   fentanyl citrate (PF) 100 MCG/2ML 50 mcg (50 mcg Intravenous Given 10/18/22 0443)   sodium chloride 0 9 % bolus 1,000 mL (0 mL Intravenous Stopped 10/18/22 0644)   iohexol (OMNIPAQUE) 350 MG/ML injection (SINGLE-DOSE) 100 mL (100 mL Intravenous Given 10/18/22 0502)   fentanyl citrate (PF) 100 MCG/2ML 50 mcg (50 mcg Intravenous Given 10/18/22 0543)   ketorolac (TORADOL) injection 15 mg (15 mg Intravenous Given 10/18/22 0543)       Diagnostic Studies  Results Reviewed     Procedure Component Value Units Date/Time    HS Troponin I 2hr [[id number]] Collected: 10/18/22 0635    Lab Status: Final result Specimen: Blood from Arm, Right Updated: 10/18/22 0709     hs TnI 2hr <2 ng/L      Delta 2hr hsTnI --    Lipase [[id number]]  (Normal) Collected: 10/18/22 0414    Lab Status: Final result Specimen: Blood from Arm, Right Updated: 10/18/22 0555     Lipase 179 u/L     FLU/RSV/COVID - if FLU/RSV clinically relevant [[id number]]  (Normal) Collected: 10/18/22 0446    Lab Status: Final result Specimen: Nares from Nose Updated: 10/18/22 0535     SARS-CoV-2 Negative     INFLUENZA A PCR Negative     INFLUENZA B PCR Negative     RSV PCR Negative    Narrative:      FOR PEDIATRIC PATIENTS - copy/paste COVID Guidelines URL to browser: [email or web address]    SARS-CoV-2 assay is a Nucleic Acid Amplification assay intended for the  qualitative detection of nucleic acid from SARS-CoV-2 in nasopharyngeal  swabs  Results are for the presumptive identification of SARS-CoV-2 RNA  Positive results are indicative of infection with SARS-CoV-2, the virus  causing COVID-19, but do not rule out bacterial infection or co-infection  with other viruses   Laboratories within the [place] and its  territories are required to report all positive results to the appropriate  public health authorities  Negative results do not preclude SARS-CoV-2  infection and should not be used as the sole basis for treatment or other  patient management decisions  Negative results must be combined with  clinical observations, patient history, and epidemiological information  This test has not been FDA cleared or approved  This test has been authorized by FDA under an Emergency Use Authorization  (EUA)  This test is only authorized for the duration of time the  declaration that circumstances exist justifying the authorization of the  emergency use of an in vitro diagnostic tests for detection of SARS-CoV-2  virus and/or diagnosis of COVID-19 infection under section 564(b)(1) of  the Act, 21 U  S C  377FXU-2(N)(8), unless the authorization is terminated  or revoked sooner  The test has been validated but independent review by FDA  and CLIA is pending  Test performed using [employer] ([employer]) GeneXpert: This RT-PCR assay targets N2,  a region unique to SARS-CoV-2  A conserved region in the E-gene was chosen  for pan-Sarbecovirus detection which includes SARS-CoV-2  According to CMS-2020-01-R, this platform meets the definition of high-throughput technology      HS Troponin 0hr (reflex protocol) [[id number]]  (Normal) Collected: 10/18/22 0414    Lab Status: Final result Specimen: Blood from Arm, Right Updated: 10/18/22 0441     hs TnI 0hr <2 ng/L     Comprehensive metabolic panel [[id number]]  (Abnormal) Collected: 10/18/22 0414    Lab Status: Final result Specimen: Blood from Arm, Right Updated: 10/18/22 0434     Sodium 136 mmol/L      Potassium 4 0 mmol/L      Chloride 102 mmol/L      CO2 24 mmol/L      ANION GAP 10 mmol/L      BUN 26 mg/dL      Creatinine 0 94 mg/dL      Glucose 150 mg/dL      Calcium 9 0 mg/dL      AST 16 U/L      ALT 23 U/L      Alkaline Phosphatase 92 U/L      Total Protein 7 7 g/dL      Albumin 3 9 g/dL Total Bilirubin 0 27 mg/dL      eGFR 88 ml/min/1 73sq m     Narrative:      [place] guidelines for Chronic Kidney Disease (CKD):   •  Stage 1 with normal or high GFR (GFR > 90 mL/min/1 73 square meters)  •  Stage 2 Mild CKD (GFR = 60-89 mL/min/1 73 square meters)  •  Stage 3A Moderate CKD (GFR = 45-59 mL/min/1 73 square meters)  •  Stage 3B Moderate CKD (GFR = 30-44 mL/min/1 73 square meters)  •  Stage 4 Severe CKD (GFR = 15-29 mL/min/1 73 square meters)  •  Stage 5 End Stage CKD (GFR <15 mL/min/1 73 square meters)  Note: GFR calculation is accurate only with a steady state creatinine    CBC and differential [[id number]] Collected: 10/18/22 0414    Lab Status: Final result Specimen: Blood from Arm, Right Updated: 10/18/22 0418     WBC 6 71 Thousand/uL      RBC 5 06 Million/uL      Hemoglobin 15 3 g/dL      Hematocrit 45 9 %      MCV 91 fL      MCH 30 2 pg      MCHC 33 3 g/dL      RDW 13 6 %      MPV 9 6 fL      Platelets 376 Thousands/uL      nRBC 0 /100 WBCs      Neutrophils Relative 67 %      Immat GRANS % 0 %      Lymphocytes Relative 20 %      Monocytes Relative 9 %      Eosinophils Relative 3 %      Basophils Relative 1 %      Neutrophils Absolute 4 55 Thousands/µL      Immature Grans Absolute 0 02 Thousand/uL      Lymphocytes Absolute 1 31 Thousands/µL      Monocytes Absolute 0 60 Thousand/µL      Eosinophils Absolute 0 19 Thousand/µL      Basophils Absolute 0 04 Thousands/µL                  CT abdomen pelvis with contrast   Final Result by Vanda Key DO (10/18 6618)      Enlarged prostate  Partially distended bladder  Mild circumferential bladder wall thickening noted, may suggest a component of cystitis an/or bladder outlet obstruction  Correlation with patient's symptoms, laboratory values, and urinalysis    recommended        Cholelithiasis without discrete evidence of acute cholecystitis, colonic diverticulosis without evidence of acute diverticulitis, and other findings as above  Workstation performed: [id number]         XR chest 2 views   ED Interpretation by Harish Licea MD (98/56 6035)   No acute findings      Final Result by Hannah Flores MD (10/18 0101)      No acute cardiopulmonary disease  Workstation performed: [id number]                    Procedures  Procedures         ED Course  ED Course as of 10/19/22 2112   e Oct 18, 2022   0423 EKG demonstrates normal sinus rhythm with no acute ST segment changes  Appears similar to the prior  2093 Patient's laboratory evaluation unremarkable other than minimal hyperglycemia  Patient does not have a history of diabetes so I discussed with him the need for follow-up with primary care to re-evaluate this  Patient's blood pressure also notably elevated though he states he had taken his medications, I emphasized to him the need for repeat testing of this  Patient's pain improved while in the emergency room and I discussed the diagnostic uncertainty the etiology of his symptoms  Patient without any chest pain or dyspnea on my initial evaluation and on multiple repeat evaluations  Patient with left upper quadrant pain initially, on repeat evaluation this has improved but he notes some pain persistent on the left side of the abdomen, no flank pain or suprapubic pain  Patient denies any urinary symptoms though on re-evaluation he does note nocturia that has been ongoing  Patient awaiting delta troponin which is currently running  Patient's CT imaging demonstrating prostatomegaly, patient without urinary symptoms other than the nocturia and patient's pain is in the left upper quadrant, inconsistent with this being the source of his pain  Discussed these findings with the patient and the need for follow-up with primary care for continued evaluation    Discussed and will refer patient to urology and cardiology for continued evaluation of his prostatomegaly and chest pain respectively  Discussed emphasized diagnostic uncertainty with the patient and emphasized return precautions the emergency room  MDM    Disposition  Final diagnoses:   Chest pain   Abdominal pain   Nausea   Enlarged prostate     Time reflects when diagnosis was documented in both MDM as applicable and the Disposition within this note     Time User Action Codes Description Comment    10/18/2022  6:44 AM Moise Batter Add [R07 9] Chest pain     10/18/2022  6:44 AM Moise Batter Add [R10 9] Abdominal pain     10/18/2022  6:44 AM Moise Batter Add [R11 0] Nausea     10/18/2022  6:48 AM Moise Batter Add [N40 0] Enlarged prostate       ED Disposition     ED Disposition   Discharge    Condition   Stable    Date/Time   Tue Oct 18, 2022  6:44 AM    Comment   Skylar Souza discharge to home/self care  Follow-up Information     Follow up With Specialties Details Why Contact Info Additional Information    Shira Moss, [address] Nurse Practitioner Schedule an appointment as soon as possible for a visit in 3 days Follow up and reassessment  [address] For Urology [facility] Urology Schedule an appointment as soon as possible for a visit  Call to schedule follow-up with Urology on your enlarged prostate  [address] [facility] [id number]  [address]  [facility] For Urology [facility], [address], [address], [facility], [place], [address]    [facility] Cardiology Schedule an appointment as soon as possible for a visit in 3 days Follow-up and reassessment Cardiology to continue evaluation regarding or chest pain   [address] [facility] [address], [facility] [address], [facility], [place], [address] [place] Emergency Department Emergency Medicine Go to  If symptoms worsen [address] [facility]  [address] [facility] Emergency Department, [address] [facility], [place], [place], [address]          Discharge Medication List as of 10/18/2022  6:45 AM      CONTINUE these medications which have NOT CHANGED    Details   acetaminophen (TYLENOL) 650 mg CR tablet Take 1 tablet (650 mg total) by mouth every 8 (eight) hours as needed for mild pain, Starting Mon 5/13/2019, Normal      ibuprofen (MOTRIN) 600 mg tablet Take 1 tablet (600 mg total) by mouth every 6 (six) hours as needed for mild pain, Starting Fri 9/16/2022, Normal      Multiple Vitamin (multivitamin) capsule Take 1 capsule by mouth daily, Historical Med      omeprazole (PriLOSEC) 20 mg delayed release capsule Take 20 mg by mouth daily, Historical Med      oxyCODONE-acetaminophen (Percocet) 5-325 mg per tablet Take 1 tablet by mouth every 6 (six) hours as needed for moderate pain Max Daily Amount: 4 tablets, Starting Fri 9/16/2022, Normal                 PDMP Review       Value Time User    PDMP Reviewed  Yes 9/16/2022  8:09 AM Reinier Gary PA-C          ED Provider  Electronically Signed by           Frantz Stuart MD  10/19/22 7810

## 2022-10-24 ENCOUNTER — EVALUATION (OUTPATIENT)
Dept: PHYSICAL THERAPY | Facility: CLINIC | Age: 57
End: 2022-10-24
Payer: COMMERCIAL

## 2022-10-24 DIAGNOSIS — G89.29 CHRONIC RIGHT SHOULDER PAIN: ICD-10-CM

## 2022-10-24 DIAGNOSIS — M75.121 COMPLETE TEAR OF RIGHT ROTATOR CUFF, UNSPECIFIED WHETHER TRAUMATIC: Primary | ICD-10-CM

## 2022-10-24 DIAGNOSIS — M25.511 CHRONIC RIGHT SHOULDER PAIN: ICD-10-CM

## 2022-10-24 PROCEDURE — 97110 THERAPEUTIC EXERCISES: CPT | Performed by: PHYSICAL THERAPIST

## 2022-10-24 PROCEDURE — 97161 PT EVAL LOW COMPLEX 20 MIN: CPT | Performed by: PHYSICAL THERAPIST

## 2022-10-24 NOTE — PROGRESS NOTES
PT Evaluation     Today's date: 10/24/2022  Patient name: Danielito Hernandez  : 1965  MRN: [id number]  Referring provider: Samuel Menon PA-C  Dx:   Encounter Diagnosis     ICD-10-CM    1  Complete tear of right rotator cuff, unspecified whether traumatic  M75 121 Ambulatory referral to Physical Therapy   2  Chronic right shoulder pain  M25 511     G89 29        Start Time: 9863  Stop Time: 4621  Total time in clinic (min): 40 minutes    Assessment  Assessment details: Danielito Hernandez is a 59 y o  male who presents with pain, decreased strength, decreased ROM, decreased joint mobility and postural dysfunction  Due to these impairments, Patient has difficulty performing a/iadls, recreational activities and engaging in social activities  Patient's clinical presentation is consistent with their referring diagnosis of s/p right shoulder rotator cuff repair, biceps tenotomy, acromioplasty, extensive debridement, DOS: 2022  Patient has been educated in post-op contraindications / precautions  Patient has weaned out of his sling at 4 weeks  Educated Patient about risks of reinjury and potential for failure of repair to heal if he is not careful with his movements  Patient would benefit from skilled physical therapy to address their aforementioned impairments, improve their level of function and to improve their overall quality of life  Impairments: abnormal or restricted ROM, activity intolerance, impaired physical strength, lacks appropriate home exercise program and pain with function  Understanding of Dx/Px/POC: excellent  Goals  Short Term Goals: to be achieved by 4 weeks  1) Patient to be independent with basic HEP  2) Decrease pain to 3/10 at its worst   3) Patient to report decreased sleep interruption secondary to pain  Long Term Goals: to be achieved by discharge  1) FOTO equal to or greater than TBD  2) Patient to be independent with comprehensive HEP    3) Abolish pain for improved quality of life  4) Increase shoulder ROM to within 5 deg of contralateral UE to improve a/iadls  5) Increase shoulder strength to 5/5 MMT grade in all planes to improve a/iadls  6) Patient to report no sleep interruption secondary to pain  Plan  Patient would benefit from: skilled PT  Planned modality interventions: cryotherapy, hydrotherapy, TENS, thermotherapy: hydrocollator packs and low level laser therapy  Planned therapy interventions: activity modification, ADL retraining, ADL training, behavior modification, body mechanics training, dressing changes, functional ROM exercises, home exercise program, IADL retraining, joint mobilization, manual therapy, massage, neuromuscular re-education, patient education, postural training, self care, strengthening, stretching, therapeutic activities and therapeutic exercise  Frequency: 1-2x week  Duration in weeks: 12  Plan of Care beginning date: 10/24/2022  Plan of Care expiration date: 1/16/2023  Treatment plan discussed with: patient        Subjective Evaluation    History of Present Illness  Mechanism of injury: PRIOR: Patient presents with c/o right shoulder pain shoulder pain beginning insidiously several months ago  Patient reports having significant loss of ROM and strength  Patient participated in conservative treatment, including physical therapy and steroid injection  Patient ultimately opted to undergo surgical intervention and currently presents s/p right shoulder rotator cuff repair, biceps tenotomy, acromioplasty, extensive debridement, DOS:  9/16/2022  Patient denies experiencing acute post-operative complications  Patient's pain is well-controlled with Motrin and compared to prior to his surgery is less  Patient reports that he came out of his sling last week and has noticed that his right shoulder mobility is gradually improving  Patient's sleep remains interrupted  Patient denies experiencing tingling/numbness or crepitus   Patient did have severe acute right-sided chest pain last week and presented to the ED, but findings for cardiac abnormality negative  Patient was tested for COVID as well, results negative  Patient states that every time he eats streak he gets severe chest pain and gets ill  Pain  Current pain rating: 3  At best pain ratin  At worst pain ratin  Location: right shoulder  Quality: sharp and dull ache    Patient Goals  Patient goals for therapy: decreased pain, increased motion, return to sport/leisure activities, independence with ADLs/IADLs and increased strength          Objective     Postural Observations  Seated posture: fair  Standing posture: fair        Observations     Right Shoulder  Positive for incision (Clean, well-approximated, no signs of infection, good scar tissue mobility)  Active Range of Motion   Left Shoulder   Flexion: 172 degrees   Abduction: 160 degrees   External rotation BTH: T4   Internal rotation BTB: T6     Right Shoulder   Flexion: 161 (+) shrug sign degrees with pain  Abduction: 165 (+) shrug sign degrees with pain  External rotation BTH: T4   Internal rotation BTB: T6     Passive Range of Motion   Left Shoulder   Flexion: 172 degrees   Abduction: 175 degrees   External rotation 90°: 95 degrees   Internal rotation 90°: 75 degrees     Right Shoulder   Flexion: 165 degrees   Abduction: 165 degrees   External rotation 90°: 90 degrees   Internal rotation 90°: 65 degrees     Joint Play   Left Shoulder  Hypomobile in the posterior capsule and inferior capsule  Right Shoulder  Hypomobile in the posterior capsule and inferior capsule       Strength/Myotome Testing     Additional Strength Details  Upper extremity strength testing deferred secondary to recency of surgery      Flowsheet Rows    Flowsheet Row Most Recent Value   PT/OT G-Codes    Current Score 53   Projected Score 77             Precautions: s/p right shoulder rotator cuff repair, biceps tenotomy, acromioplasty, extensive debridement, DOS: 9/16/2022      Manuals 10/24            Right shoulder PROM             Gr  II-IV AP, inf GHJ mobs                                       Neuro Re-Ed                                                                                                        Ther Ex             Patient education: surgical protocol overview, pathophysiology, activity restrictions/precautions GR            UBE             Pulleys             Finger ladder             Prone row             Webslide: M, L rows             ER - s/l             TB IR             TB ER             Ther Activity                                       Gait Training                                       Modalities

## 2022-10-27 ENCOUNTER — OFFICE VISIT (OUTPATIENT)
Dept: PHYSICAL THERAPY | Facility: CLINIC | Age: 57
End: 2022-10-27
Payer: COMMERCIAL

## 2022-10-27 DIAGNOSIS — M25.511 CHRONIC RIGHT SHOULDER PAIN: ICD-10-CM

## 2022-10-27 DIAGNOSIS — M75.121 COMPLETE TEAR OF RIGHT ROTATOR CUFF, UNSPECIFIED WHETHER TRAUMATIC: Primary | ICD-10-CM

## 2022-10-27 DIAGNOSIS — G89.29 CHRONIC RIGHT SHOULDER PAIN: ICD-10-CM

## 2022-10-27 PROCEDURE — 97110 THERAPEUTIC EXERCISES: CPT | Performed by: PHYSICAL THERAPIST

## 2022-10-27 PROCEDURE — 97140 MANUAL THERAPY 1/> REGIONS: CPT | Performed by: PHYSICAL THERAPIST

## 2022-10-27 NOTE — PROGRESS NOTES
Daily Note     Today's date: 10/27/2022  Patient name: Tatianna Varghese  : 1965  MRN: [id number]  Referring provider: Jennifer Hicks PA-C  Dx:   Encounter Diagnosis     ICD-10-CM    1  Complete tear of right rotator cuff, unspecified whether traumatic  M75 121    2  Chronic right shoulder pain  M25 511     G89 29        Start Time: 0753  Stop Time: 0845  Total time in clinic (min): 52 minutes    Subjective: Patient reports no significant changes to overall status since last visit (IE)  Objective: See treatment diary below      Assessment: Tolerated treatment well  Patient demonstrated fatigue post treatment and would benefit from continued PT  Patient with full right shoulder PROM  Good tolerance for gentle PROM and gentle strengthening  Reviewed activity precautions to help prevent potential injury  Plan: Continue per plan of care  Progress treatment as tolerated  Progress treament per protocol  Precautions: s/p right shoulder rotator cuff repair, biceps tenotomy, acromioplasty, extensive debridement, DOS:  2022      Manuals 10/24 10/27           Right shoulder PROM  GR           Gr  II-IV AP, inf GHJ mobs                                       Neuro Re-Ed                                                                                                        Ther Ex             Patient education: surgical protocol overview, pathophysiology, activity restrictions/precautions GR            UBE  3' / 3'           Pulleys  20x5"           Finger ladder  10x5"           Prone row  20x3"           Webslide: M, L rows  YTB 2x10 3" ea             ER - s/l  2x10           TB IR             TB ER             Ther Activity                                       Gait Training                                       Modalities

## 2022-11-02 ENCOUNTER — OFFICE VISIT (OUTPATIENT)
Dept: PHYSICAL THERAPY | Facility: CLINIC | Age: 57
End: 2022-11-02

## 2022-11-02 DIAGNOSIS — G89.29 CHRONIC RIGHT SHOULDER PAIN: ICD-10-CM

## 2022-11-02 DIAGNOSIS — M25.511 CHRONIC RIGHT SHOULDER PAIN: ICD-10-CM

## 2022-11-02 DIAGNOSIS — M75.121 COMPLETE TEAR OF RIGHT ROTATOR CUFF, UNSPECIFIED WHETHER TRAUMATIC: Primary | ICD-10-CM

## 2022-11-02 NOTE — PROGRESS NOTES
Daily Note     Today's date: 2022  Patient name: Gaby Gold  : 1965  MRN: [id number]  Referring provider: Reid Wakefield PA-C  Dx:   Encounter Diagnosis     ICD-10-CM    1  Complete tear of right rotator cuff, unspecified whether traumatic  M75 121    2  Chronic right shoulder pain  M25 511     G89 29        Start Time: 0837  Stop Time: 0915  Total time in clinic (min): 38 minutes    Subjective: Pt noted that he does no have any pain in his R shoulder  Objective: See treatment diary below      Assessment: Continued with treatment session, Tolerated treatment fairly well with no change in pain status  Was able to perform exercises with proper scapular setting  Patient exhibited good technique with therapeutic exercises and would benefit from continued PT  S/p treatment session, Pt noted no changes in R shoulder  Plan: Continue per plan of care  Precautions: s/p right shoulder rotator cuff repair, biceps tenotomy, acromioplasty, extensive debridement, DOS:  2022      Manuals 10/24 10/27 11/          Right shoulder PROM  GR SC          Gr  II-IV AP, inf GHJ mobs                                       Neuro Re-Ed                                                                                                        Ther Ex             Patient education: surgical protocol overview, pathophysiology, activity restrictions/precautions GR            UBE  3' / 3' 3'/3'          Pulleys  20x5" 20x 5"           Finger ladder  10x5" 10"x 10           Prone row  20x3" 20x 5"           Webslide: M, L rows  YTB 2x10 3" ea  YTB 2x 10 3" ea             ER - s/l  2x10 2x 10           TB IR   NV          TB ER   NV                                                              Ther Activity                                       Gait Training                                       Modalities

## 2022-11-04 ENCOUNTER — OFFICE VISIT (OUTPATIENT)
Dept: PHYSICAL THERAPY | Facility: CLINIC | Age: 57
End: 2022-11-04

## 2022-11-04 DIAGNOSIS — G89.29 CHRONIC RIGHT SHOULDER PAIN: ICD-10-CM

## 2022-11-04 DIAGNOSIS — M25.511 CHRONIC RIGHT SHOULDER PAIN: ICD-10-CM

## 2022-11-04 DIAGNOSIS — M75.121 COMPLETE TEAR OF RIGHT ROTATOR CUFF, UNSPECIFIED WHETHER TRAUMATIC: Primary | ICD-10-CM

## 2022-11-04 NOTE — PROGRESS NOTES
Daily Note     Today's date: 2022  Patient name: Fritz Bailey  : 1965  MRN: [id number]  Referring provider: Nai Rodriguez PA-C  Dx:   Encounter Diagnosis     ICD-10-CM    1  Complete tear of right rotator cuff, unspecified whether traumatic  M75 121    2  Chronic right shoulder pain  M25 511     G89 29        Start Time: 0750  Stop Time: 0850  Total time in clinic (min): 60 minutes    Subjective: Patient reports that his right shoulder feels better than his left  Objective: See treatment diary below      Assessment: Tolerated treatment well  Patient demonstrated fatigue post treatment and would benefit from continued PT  Patient continues to demonstrate excellent right shoulder PROM in all planes  Patient with improving shoulder girdle strength  Plan: Continue per plan of care  Progress treatment as tolerated  Progress treament per protocol  Precautions: s/p right shoulder rotator cuff repair, biceps tenotomy, acromioplasty, extensive debridement, DOS:  2022      Manuals 10/24 10/27 11/2 11/4         Right shoulder PROM  GR SC GR         Gr  II-IV AP, inf GHJ mobs                                       Neuro Re-Ed                                                                                                        Ther Ex             Patient education: surgical protocol overview, pathophysiology, activity restrictions/precautions GR            UBE  3' / 3' 3'/3' 4' / 4'         Pulleys  20x5" 20x 5"  20x5"         Finger ladder  10x5" 10"x 10  10x10"         Prone row  20x3" 20x 5"  2# 2x10 3"         Webslide: M, L rows  YTB 2x10 3" ea  YTB 2x 10 3" ea    RTB 2x10 3"         ER - s/l  2x10 2x 10  2x10 1#         TB IR   NV YTB 2x10         TB ER   NV YTB 2x10                                                             Ther Activity                                       Gait Training                                       Modalities

## 2022-11-07 NOTE — PROGRESS NOTES
11/8/2022      Chief Complaint   Patient presents with   • Benign Prostatic Hypertrophy     Assessment and Plan    59 y o  male new patient     1  BPH with LUTS  - Incidentally seen on recent CT of abdomen and pelvis with some findings possible bladder outlet obstruction   - Urine dip negative  - PVR=7 mL   - He complains of bothersome urinary frequency and nocturia  - I recommend nighttime fluid restrictions and avoiding bladder irritants  - He wishes to trial medication  Rx for Alfuzosin sent to pharmacy    2  Prostate cancer screening  - Obtain PSA  - LALITO unremarkable  - F/u in 2-3 months for symptom reassessment  History of Present Illness  Lana Sunshine is a 59 y o  male here for new patient evaluation of enlarged prostate  He recently was in the ED last month with chest pain and left upper quadrant abdominal pain  A CT of the abdomen and pelvis was obtained which showed incidentally an enlarged prostate with partially distended bladder and mild circumferential bladder wall thickening possibly due to cystitis or bladder outlet obstruction  He does admit to urinary frequency and nocturia x2-3 that is bothersome to him  Also admits to occasional weak urinary stream   He denies any dysuria or hematuria  Denies any prior  surgical manipulation  Denies any family history of  malignancy  Review of Systems   Constitutional: Negative for chills and fever  Respiratory: Negative for shortness of breath  Cardiovascular: Negative for chest pain  Gastrointestinal: Negative for abdominal pain  Genitourinary: Positive for difficulty urinating and frequency  Negative for dysuria, flank pain, hematuria and urgency  Neurological: Negative for dizziness             AUA SYMPTOM SCORE    Flowsheet Row Most Recent Value   AUA SYMPTOM SCORE    How often have you had a sensation of not emptying your bladder completely after you finished urinating? 0 (P)     How often have you had to urinate again less than two hours after you finished urinating? 0 (P)     How often have you found you stopped and started again several times when you urinate? 0 (P)     How often have you found it difficult to postpone urination? 0 (P)     How often have you had a weak urinary stream? 0 (P)     How often have you had to push or strain to begin urination? 0 (P)     How many times did you most typically get up to urinate from the time you went to bed at night until the time you got up in the morning? 2 (P)     Quality of Life: If you were to spend the rest of your life with your urinary condition just the way it is now, how would you feel about that? 1 (P)     AUA SYMPTOM SCORE 2 (P)              Past Medical History  Past Medical History:   Diagnosis Date   • Acute kidney injury (nontraumatic) ([facility] )    • Alcohol abuse    • Anxiety    • Finney esophagus    • Benign essential hypertension    • Chronic pain syndrome 02/17/2021   • Colon polyp    • COVID 09/2020   • Diverticulitis    • Diverticulitis    • Diverticulitis    • Erectile dysfunction    • Esophageal reflux    • Essential hypertension    • GERD (gastroesophageal reflux disease)    • History of transfusion 2015   • Lumbar radiculopathy 05/13/2019   • Major depression    • Mild cognitive disorder 05/06/2016   • Mood disorder ([facility] ) 05/02/2016   • Multiple nevi    • Obesity    • Obesity (BMI 30-39  9)    • ALEXANDRE (obstructive sleep apnea)    • Psychosis (HCC)    • Snoring    • Suspicious nevus    • Tremor of right hand    • Weakness of right upper extremity        Past Social History  Past Surgical History:   Procedure Laterality Date   • COLON SURGERY     • COLONOSCOPY N/A 03/09/2019    Procedure: COLONOSCOPY;  Surgeon: Lars Vela MD;  Location: [facility]; Service: Gastroenterology   • LIPOMA RESECTION      back   • CT ESOPHAGOGASTRODUODENOSCOPY TRANSORAL DIAGNOSTIC N/A 03/09/2019    Procedure: ESOPHAGOGASTRODUODENOSCOPY (EGD);   Surgeon: Lars Vela MD;  Location: [facility];   Service: Gastroenterology   • GA SHLDR ARTHROSCOP,SURG,W/ROTAT CUFF REPR Right 09/16/2022    Procedure: RIGHT SHOULDER ARTHROSCOPY, REPAIR ROTATOR CUFF, SUPRASPINATUS REPAIR, SUBSCAPULARIS REPAIR, BICEPS TENOTOMY, ACROMIOPLASTY, AND EXTENSIVE DEBRIDEMENT;  Surgeon: Fermin Whelan DO;  Location: [facility];  Service: Orthopedics   • REVISION COLOSTOMY     • SHOULDER SURGERY       Social History     Tobacco Use   Smoking Status Never Smoker   Smokeless Tobacco Never Used       Past Family History  Family History   Problem Relation Age of Onset   • Stroke Mother    • Heart disease Father        Past Social history  Social History     Socioeconomic History   • Marital status: /Civil Union     Spouse name: Not on file   • Number of children: Not on file   • Years of education: Not on file   • Highest education level: Not on file   Occupational History   • Occupation: CONSTRUCTION   Tobacco Use   • Smoking status: Never Smoker   • Smokeless tobacco: Never Used   Vaping Use   • Vaping Use: Former   Substance and Sexual Activity   • Alcohol use: Not Currently     Comment: Sober 5 moths   • Drug use: Yes     Types: Marijuana     Comment: Medical Card QOD   • Sexual activity: Yes     Partners: Female   Other Topics Concern   • Not on file   Social History Narrative   • Not on file     Social Determinants of Health     Financial Resource Strain: Not on file   Food Insecurity: Not on file   Transportation Needs: Not on file   Physical Activity: Not on file   Stress: Not on file   Social Connections: Not on file   Intimate Partner Violence: Not on file   Housing Stability: Not on file       Current Medications  Current Outpatient Medications   Medication Sig Dispense Refill   • acetaminophen (TYLENOL) 650 mg CR tablet Take 1 tablet (650 mg total) by mouth every 8 (eight) hours as needed for mild pain 30 tablet 1   • ibuprofen (MOTRIN) 600 mg tablet Take 1 tablet (600 mg total) by mouth every 6 (six) hours as needed for mild pain 30 tablet 0   • Multiple Vitamin (multivitamin) capsule Take 1 capsule by mouth daily     • omeprazole (PriLOSEC) 20 mg delayed release capsule Take 20 mg by mouth daily     • oxyCODONE-acetaminophen (Percocet) 5-325 mg per tablet Take 1 tablet by mouth every 6 (six) hours as needed for moderate pain Max Daily Amount: 4 tablets 20 tablet 0     No current facility-administered medications for this visit  Allergies  Allergies   Allergen Reactions   • Sulfa Antibiotics GI Intolerance     HA, vomiting, sweating    • Amoxicillin GI Intolerance         The following portions of the patient's history were reviewed and updated as appropriate: allergies, current medications, past medical history, past social history, past surgical history and problem list       Vitals  Vitals:    11/08/22 0716   BP: 128/82   Pulse: 84   SpO2: 99%   Weight: 120 kg (264 lb)   Height: 5' 11" (1 803 m)           Physical Exam  Physical Exam  Constitutional:       Appearance: Normal appearance  He is obese  HENT:      Head: Normocephalic and atraumatic  Right Ear: External ear normal       Left Ear: External ear normal       Nose: Nose normal    Eyes:      General: No scleral icterus  Conjunctiva/sclera: Conjunctivae normal    Cardiovascular:      Pulses: Normal pulses  Pulmonary:      Effort: Pulmonary effort is normal    Genitourinary:     Comments: Prostate approximately 40 g without nodules or tenderness  Musculoskeletal:         General: Normal range of motion  Cervical back: Normal range of motion  Skin:     General: Skin is warm and dry  Neurological:      General: No focal deficit present  Mental Status: He is alert and oriented to person, place, and time  Psychiatric:         Mood and Affect: Mood normal          Behavior: Behavior normal          Thought Content:  Thought content normal          Judgment: Judgment normal            Results  Recent Results (from the past 1 hour(s)) POCT urine dip    Collection Time: 11/08/22  7:20 AM   Result Value Ref Range    LEUKOCYTE ESTERASE,UA -     NITRITE,UA -     SL AMB POCT UROBILINOGEN 0 2     POCT URINE PROTEIN -      PH,UA 5 0     BLOOD,UA -     SPECIFIC GRAVITY,UA 1 030     KETONES,UA -     BILIRUBIN,UA -     GLUCOSE, UA -      COLOR,UA Yellow     CLARITY,UA Clear    POCT Measure PVR    Collection Time: 11/08/22  7:22 AM   Result Value Ref Range    POST-VOID RESIDUAL VOLUME, ML POC 7 mL   ]  No results found for: PSA  Lab Results   Component Value Date    GLUCOSE 125 04/02/2016    CALCIUM 9 0 10/18/2022     01/06/2016    K 4 0 10/18/2022    CO2 24 10/18/2022     10/18/2022    BUN 26 (H) 10/18/2022    CREATININE 0 94 10/18/2022     Lab Results   Component Value Date    WBC 6 71 10/18/2022    HGB 15 3 10/18/2022    HCT 45 9 10/18/2022    MCV 91 10/18/2022     10/18/2022           Orders  Orders Placed This Encounter   Procedures   • POCT Measure PVR   • POCT urine dip       Connie Liao

## 2022-11-08 ENCOUNTER — OFFICE VISIT (OUTPATIENT)
Dept: UROLOGY | Facility: CLINIC | Age: 57
End: 2022-11-08

## 2022-11-08 ENCOUNTER — APPOINTMENT (OUTPATIENT)
Dept: LAB | Facility: HOSPITAL | Age: 57
End: 2022-11-08

## 2022-11-08 ENCOUNTER — TELEPHONE (OUTPATIENT)
Dept: UROLOGY | Facility: CLINIC | Age: 57
End: 2022-11-08

## 2022-11-08 VITALS
SYSTOLIC BLOOD PRESSURE: 128 MMHG | OXYGEN SATURATION: 99 % | DIASTOLIC BLOOD PRESSURE: 82 MMHG | BODY MASS INDEX: 36.96 KG/M2 | HEART RATE: 84 BPM | HEIGHT: 71 IN | WEIGHT: 264 LBS

## 2022-11-08 DIAGNOSIS — Z12.5 PROSTATE CANCER SCREENING: ICD-10-CM

## 2022-11-08 DIAGNOSIS — R35.0 BENIGN PROSTATIC HYPERPLASIA WITH URINARY FREQUENCY: ICD-10-CM

## 2022-11-08 DIAGNOSIS — N40.0 ENLARGED PROSTATE: Primary | ICD-10-CM

## 2022-11-08 DIAGNOSIS — K65.4 MESENTERIC PANNICULITIS (HCC): ICD-10-CM

## 2022-11-08 DIAGNOSIS — N40.1 BENIGN PROSTATIC HYPERPLASIA WITH URINARY FREQUENCY: ICD-10-CM

## 2022-11-08 LAB
BUN SERPL-MCNC: 25 MG/DL (ref 5–25)
CREAT SERPL-MCNC: 0.89 MG/DL (ref 0.6–1.3)
GFR SERPL CREATININE-BSD FRML MDRD: 93 ML/MIN/1.73SQ M
POST-VOID RESIDUAL VOLUME, ML POC: 7 ML
PSA SERPL-MCNC: 3.8 NG/ML (ref 0–4)
SL AMB  POCT GLUCOSE, UA: NORMAL
SL AMB LEUKOCYTE ESTERASE,UA: NORMAL
SL AMB POCT BILIRUBIN,UA: NORMAL
SL AMB POCT BLOOD,UA: NORMAL
SL AMB POCT CLARITY,UA: CLEAR
SL AMB POCT COLOR,UA: YELLOW
SL AMB POCT KETONES,UA: NORMAL
SL AMB POCT NITRITE,UA: NORMAL
SL AMB POCT PH,UA: 5
SL AMB POCT SPECIFIC GRAVITY,UA: 1.03
SL AMB POCT URINE PROTEIN: NORMAL
SL AMB POCT UROBILINOGEN: 0.2

## 2022-11-08 RX ORDER — ALFUZOSIN HYDROCHLORIDE 10 MG/1
10 TABLET, EXTENDED RELEASE ORAL DAILY
Qty: 30 TABLET | Refills: 2 | Status: SHIPPED | OUTPATIENT
Start: 2022-11-08

## 2022-11-08 NOTE — TELEPHONE ENCOUNTER
Spoke with patient, advised results, no further questions from patient, he was thankful for the call back

## 2022-11-08 NOTE — TELEPHONE ENCOUNTER
----- Message from Radha Lares PA-C sent at 11/8/2022  1:43 PM EST -----  PSA normal, however on the higher end of normal  Will plan to repeat in 6 months, will order at next visit

## 2022-11-09 ENCOUNTER — OFFICE VISIT (OUTPATIENT)
Dept: PHYSICAL THERAPY | Facility: CLINIC | Age: 57
End: 2022-11-09

## 2022-11-09 DIAGNOSIS — G89.29 CHRONIC RIGHT SHOULDER PAIN: ICD-10-CM

## 2022-11-09 DIAGNOSIS — M75.121 COMPLETE TEAR OF RIGHT ROTATOR CUFF, UNSPECIFIED WHETHER TRAUMATIC: Primary | ICD-10-CM

## 2022-11-09 DIAGNOSIS — M25.511 CHRONIC RIGHT SHOULDER PAIN: ICD-10-CM

## 2022-11-09 NOTE — PROGRESS NOTES
Daily Note     Today's date: 2022  Patient name: Sherice Hathaway  : 1965  MRN: [id number]  Referring provider: Neo Abraham PA-C  Dx:   Encounter Diagnosis     ICD-10-CM    1  Complete tear of right rotator cuff, unspecified whether traumatic  M75 121    2  Chronic right shoulder pain  M25 511     G89 29        Start Time: 0758          Subjective: Patient offers no new complaints  Objective: See treatment diary below      Assessment: Tolerated treatment well  Patient demonstrated fatigue post treatment and would benefit from continued PT  Plan: Continue per plan of care  Progress treatment as tolerated  Precautions: s/p right shoulder rotator cuff repair, biceps tenotomy, acromioplasty, extensive debridement, DOS:  2022      Manuals 10/24 10/27 11/2 11/4 11/9        Right shoulder PROM  GR SC GR         Gr  II-IV AP, inf GHJ mobs             Rhythmic stabilization: 60, 90, 120 deg FF ; IR/ER at 45 deg abd  Neuro Re-Ed             Prone shoulder extension             Prone h abd  Ther Ex             Patient education: surgical protocol overview, pathophysiology, activity restrictions/precautions GR            UBE  3' / 3' 3'/3' 4' / 4' 4' / 4'        Pulleys  20x5" 20x 5"  20x5"         Finger ladder  10x5" 10"x 10  10x10"         Prone row  20x3" 20x 5"  2# 2x10 3"         Webslide: M, L rows  YTB 2x10 3" ea  YTB 2x 10 3" ea    RTB 2x10 3"         ER - s/l  2x10 2x 10  2x10 1#         TB IR   NV YTB 2x10         TB ER   NV YTB 2x10                                                             Ther Activity                                       Gait Training                                       Modalities

## 2022-11-09 NOTE — PROGRESS NOTES
Daily Note     Today's date: 2022  Patient name: Hero Hennessy  : 1965  MRN: [id number]  Referring provider: Nohemy Abreu PA-C  Dx:   Encounter Diagnosis     ICD-10-CM    1  Complete tear of right rotator cuff, unspecified whether traumatic  M75 121    2  Chronic right shoulder pain  M25 511     G89 29        Start Time: 0758  Stop Time: 0845  Total time in clinic (min): 47 minutes    Subjective: Patient reports that he was able to use his backpack leaf blower without pain or difficulty  Objective: See treatment diary below      Assessment: Tolerated treatment well  Patient demonstrated fatigue post treatment and would benefit from continued PT  Patient continues to present with excellent shoulder PROM in all planes  Patient with gradually improving shoulder girdle strength  Patient challenged with rhythmic stabilization  No pain reported  Plan: Continue per plan of care  Progress treatment as tolerated  Precautions: s/p right shoulder rotator cuff repair, biceps tenotomy, acromioplasty, extensive debridement, DOS:  2022      Manuals 10/24 10/27 11/2 11/4 11/9        Right shoulder PROM  GR SC GR GR        Gr  II-IV AP, inf GHJ mobs             Multidirectional rhythmic stabilization: 60, 90, 120 deg FF ; IR/ER at 45 deg abd     GR                     Neuro Re-Ed             Prone shoulder extension     2x10 3"        Prone h abd      2x10 3"        Supine SA punch     2# 2x10 3"                                                            Ther Ex             Patient education: surgical protocol overview, pathophysiology, activity restrictions/precautions GR            UBE  3' / 3' 3'/3' 4' / 4' 4' / 4'        Pulleys  20x5" 20x 5"  20x5" 3 min        Finger ladder  10x5" 10"x 10  10x10"         Prone row  20x3" 20x 5"  2# 2x10 3"         Webslide: M, L rows  YTB 2x10 3" ea  YTB 2x 10 3" ea    RTB 2x10 3" RTB 2x10 3"        ER - s/l  2x10 2x 10  2x10 1# 2x10 1#        TB IR NV YTB 2x10 RTB 2x10        TB ER   NV YTB 2x10 YTB 3x10                                                            Ther Activity                                       Gait Training                                       Modalities

## 2022-11-11 ENCOUNTER — APPOINTMENT (OUTPATIENT)
Dept: PHYSICAL THERAPY | Facility: CLINIC | Age: 57
End: 2022-11-11

## 2022-11-11 DIAGNOSIS — M25.511 CHRONIC RIGHT SHOULDER PAIN: ICD-10-CM

## 2022-11-11 DIAGNOSIS — G89.29 CHRONIC RIGHT SHOULDER PAIN: ICD-10-CM

## 2022-11-11 DIAGNOSIS — M75.121 COMPLETE TEAR OF RIGHT ROTATOR CUFF, UNSPECIFIED WHETHER TRAUMATIC: Primary | ICD-10-CM

## 2022-11-11 NOTE — PROGRESS NOTES
Daily Note     Today's date: 2022  Patient name: Ally Chaudhry  : 1965  MRN: [id number]  Referring provider: Roman Nuñez PA-C  Dx:   Encounter Diagnosis     ICD-10-CM    1  Complete tear of right rotator cuff, unspecified whether traumatic  M75 121    2  Chronic right shoulder pain  M25 511     G89 29                   Subjective: ***      Objective: See treatment diary below      Assessment: Tolerated treatment well  Patient demonstrated fatigue post treatment and would benefit from continued PT  Plan: Continue per plan of care  Progress treatment as tolerated  Progress treament per protocol  Precautions: s/p right shoulder rotator cuff repair, biceps tenotomy, acromioplasty, extensive debridement, DOS:  2022      Manuals 10/24 10/27 11/2 11/4 11/9        Right shoulder PROM  GR SC GR GR        Gr  II-IV AP, inf GHJ mobs             Multidirectional rhythmic stabilization: 60, 90, 120 deg FF ; IR/ER at 45 deg abd     GR                     Neuro Re-Ed             Prone shoulder extension     2x10 3"        Prone h abd      2x10 3"        Supine SA punch     2# 2x10 3"                                                            Ther Ex             Patient education: surgical protocol overview, pathophysiology, activity restrictions/precautions GR            UBE  3' / 3' 3'/3' 4' / 4' 4' / 4'        Pulleys  20x5" 20x 5"  20x5" 3 min        Finger ladder  10x5" 10"x 10  10x10"         Prone row  20x3" 20x 5"  2# 2x10 3"         Webslide: M, L rows  YTB 2x10 3" ea  YTB 2x 10 3" ea    RTB 2x10 3" RTB 2x10 3"        ER - s/l  2x10 2x 10  2x10 1# 2x10 1#        TB IR   NV YTB 2x10 RTB 2x10        TB ER   NV YTB 2x10 YTB 3x10                                                            Ther Activity                                       Gait Training                                       Modalities

## 2022-11-14 ENCOUNTER — OFFICE VISIT (OUTPATIENT)
Dept: CARDIOLOGY CLINIC | Facility: CLINIC | Age: 57
End: 2022-11-14

## 2022-11-14 ENCOUNTER — TELEPHONE (OUTPATIENT)
Dept: CARDIOLOGY CLINIC | Facility: CLINIC | Age: 57
End: 2022-11-14

## 2022-11-14 ENCOUNTER — PREP FOR PROCEDURE (OUTPATIENT)
Dept: CARDIOLOGY CLINIC | Facility: CLINIC | Age: 57
End: 2022-11-14

## 2022-11-14 ENCOUNTER — APPOINTMENT (OUTPATIENT)
Dept: LAB | Facility: HOSPITAL | Age: 57
End: 2022-11-14

## 2022-11-14 VITALS
HEART RATE: 77 BPM | SYSTOLIC BLOOD PRESSURE: 120 MMHG | BODY MASS INDEX: 36.82 KG/M2 | DIASTOLIC BLOOD PRESSURE: 82 MMHG | RESPIRATION RATE: 16 BRPM | WEIGHT: 263 LBS | HEIGHT: 71 IN | OXYGEN SATURATION: 98 %

## 2022-11-14 DIAGNOSIS — R07.9 CHEST PAIN, UNSPECIFIED TYPE: ICD-10-CM

## 2022-11-14 DIAGNOSIS — I20.8 ANGINAL EQUIVALENT (HCC): ICD-10-CM

## 2022-11-14 DIAGNOSIS — R07.9 CHEST PAIN, UNSPECIFIED TYPE: Primary | ICD-10-CM

## 2022-11-14 DIAGNOSIS — R07.9 CHEST PAIN: ICD-10-CM

## 2022-11-14 LAB
ANION GAP SERPL CALCULATED.3IONS-SCNC: 9 MMOL/L (ref 4–13)
BUN SERPL-MCNC: 20 MG/DL (ref 5–25)
CALCIUM SERPL-MCNC: 9.6 MG/DL (ref 8.3–10.1)
CHLORIDE SERPL-SCNC: 105 MMOL/L (ref 96–108)
CO2 SERPL-SCNC: 27 MMOL/L (ref 21–32)
CREAT SERPL-MCNC: 0.84 MG/DL (ref 0.6–1.3)
ERYTHROCYTE [DISTWIDTH] IN BLOOD BY AUTOMATED COUNT: 13.7 % (ref 11.6–15.1)
GFR SERPL CREATININE-BSD FRML MDRD: 95 ML/MIN/1.73SQ M
GLUCOSE P FAST SERPL-MCNC: 96 MG/DL (ref 65–99)
HCT VFR BLD AUTO: 50.7 % (ref 36.5–49.3)
HGB BLD-MCNC: 15.9 G/DL (ref 12–17)
INR PPP: 1.08 (ref 0.84–1.19)
MCH RBC QN AUTO: 29.1 PG (ref 26.8–34.3)
MCHC RBC AUTO-ENTMCNC: 31.4 G/DL (ref 31.4–37.4)
MCV RBC AUTO: 93 FL (ref 82–98)
PLATELET # BLD AUTO: 293 THOUSANDS/UL (ref 149–390)
PMV BLD AUTO: 9.4 FL (ref 8.9–12.7)
POTASSIUM SERPL-SCNC: 4.9 MMOL/L (ref 3.5–5.3)
PROTHROMBIN TIME: 13.8 SECONDS (ref 11.6–14.5)
RBC # BLD AUTO: 5.47 MILLION/UL (ref 3.88–5.62)
SODIUM SERPL-SCNC: 141 MMOL/L (ref 135–147)
WBC # BLD AUTO: 5.37 THOUSAND/UL (ref 4.31–10.16)

## 2022-11-14 RX ORDER — ASPIRIN 81 MG/1
81 TABLET, CHEWABLE ORAL DAILY
Qty: 30 TABLET | Refills: 3 | Status: SHIPPED | OUTPATIENT
Start: 2022-11-14 | End: 2022-11-16

## 2022-11-14 RX ORDER — METOPROLOL SUCCINATE 25 MG/1
25 TABLET, EXTENDED RELEASE ORAL DAILY
Qty: 30 TABLET | Refills: 3 | Status: SHIPPED | OUTPATIENT
Start: 2022-11-14

## 2022-11-14 NOTE — TELEPHONE ENCOUNTER
Prescreening in progress  [facility] ordered by Dr Richard Spatz  Pt aware of labs  No meds hold
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
## 2022-11-16 ENCOUNTER — HOSPITAL ENCOUNTER (OUTPATIENT)
Facility: HOSPITAL | Age: 57
Setting detail: OUTPATIENT SURGERY
Discharge: HOME | End: 2022-11-16
Attending: INTERNAL MEDICINE | Admitting: INTERNAL MEDICINE

## 2022-11-16 ENCOUNTER — APPOINTMENT (OUTPATIENT)
Dept: PHYSICAL THERAPY | Facility: CLINIC | Age: 57
End: 2022-11-16

## 2022-11-16 VITALS
OXYGEN SATURATION: 99 % | BODY MASS INDEX: 35.76 KG/M2 | SYSTOLIC BLOOD PRESSURE: 128 MMHG | WEIGHT: 264 LBS | RESPIRATION RATE: 18 BRPM | TEMPERATURE: 97.9 F | HEIGHT: 72 IN | HEART RATE: 75 BPM | DIASTOLIC BLOOD PRESSURE: 94 MMHG

## 2022-11-16 DIAGNOSIS — R07.9 CHEST PAIN, UNSPECIFIED TYPE: ICD-10-CM

## 2022-11-16 DIAGNOSIS — I20.8 ANGINAL EQUIVALENT (HCC): ICD-10-CM

## 2022-11-16 RX ORDER — FENTANYL CITRATE 50 UG/ML
INJECTION, SOLUTION INTRAMUSCULAR; INTRAVENOUS CODE/TRAUMA/SEDATION MEDICATION
Status: DISCONTINUED | OUTPATIENT
Start: 2022-11-16 | End: 2022-11-16 | Stop reason: HOSPADM

## 2022-11-16 RX ORDER — LIDOCAINE HYDROCHLORIDE 10 MG/ML
INJECTION, SOLUTION EPIDURAL; INFILTRATION; INTRACAUDAL; PERINEURAL CODE/TRAUMA/SEDATION MEDICATION
Status: DISCONTINUED | OUTPATIENT
Start: 2022-11-16 | End: 2022-11-16 | Stop reason: HOSPADM

## 2022-11-16 RX ORDER — VERAPAMIL HCL 2.5 MG/ML
AMPUL (ML) INTRAVENOUS CODE/TRAUMA/SEDATION MEDICATION
Status: DISCONTINUED | OUTPATIENT
Start: 2022-11-16 | End: 2022-11-16 | Stop reason: HOSPADM

## 2022-11-16 RX ORDER — SODIUM CHLORIDE 9 MG/ML
75 INJECTION, SOLUTION INTRAVENOUS CONTINUOUS
Status: DISCONTINUED | OUTPATIENT
Start: 2022-11-16 | End: 2022-11-16

## 2022-11-16 RX ORDER — HEPARIN SODIUM 1000 [USP'U]/ML
INJECTION, SOLUTION INTRAVENOUS; SUBCUTANEOUS CODE/TRAUMA/SEDATION MEDICATION
Status: DISCONTINUED | OUTPATIENT
Start: 2022-11-16 | End: 2022-11-16 | Stop reason: HOSPADM

## 2022-11-16 RX ORDER — SODIUM CHLORIDE 9 MG/ML
75 INJECTION, SOLUTION INTRAVENOUS CONTINUOUS
Status: DISPENSED | OUTPATIENT
Start: 2022-11-16 | End: 2022-11-16

## 2022-11-16 RX ORDER — MIDAZOLAM HYDROCHLORIDE 2 MG/2ML
INJECTION, SOLUTION INTRAMUSCULAR; INTRAVENOUS CODE/TRAUMA/SEDATION MEDICATION
Status: DISCONTINUED | OUTPATIENT
Start: 2022-11-16 | End: 2022-11-16 | Stop reason: HOSPADM

## 2022-11-16 RX ADMIN — SODIUM CHLORIDE 75 ML/HR: 0.9 INJECTION, SOLUTION INTRAVENOUS at 06:55

## 2022-11-16 RX ADMIN — SODIUM CHLORIDE 75 ML/HR: 0.9 INJECTION, SOLUTION INTRAVENOUS at 09:15

## 2022-11-16 NOTE — INTERVAL H&P NOTE
Update: (This section must be completed if the H&P was completed greater than 24 hrs to procedure or admission)    H&P reviewed  After examining the patient, I find no changed to the H&P since it had been written  Patient re-evaluated  Accept as history and physical       I have discussed in detail with patient regarding the indications, alternatives, risks and benefit of cardiac catheterization and possible PCI  The procedure risks, benefits, and complications (including but not limited to bleeding, infection, arrhythmia, nephrotoxicity, vessel injury, myocardial infarction, CVA, and death) were reviewed  Patient is alert and oriented x3 and wishes to proceed  All questions answered         Char Rider MD/November 16, 2022/8:17 AM

## 2022-11-16 NOTE — DISCHARGE INSTRUCTIONS
After Radial Heart Catheterization   WHAT YOU NEED TO KNOW:   What will happen after a radial heart catheterization? You will be attached to a heart monitor until you are fully awake  A heart monitor is an EKG that stays on continuously to record your heart's electrical activity  Healthcare providers will monitor your vital signs and pulses in your arm  They will frequently check your pressure bandage for bleeding or swelling  You may have a band wrapped tightly around your wrist  The band puts pressure on your wound and helps prevent bleeding  A healthcare provider can put air into the band or remove air from the band  A healthcare provider will gradually remove air from the band and decrease pressure on your wrist  The band may be removed in 2 hours or when your wound stops bleeding  You will need to keep your wrist straight for 2 to 4 hours  Do not  push or pull with your arm  Arm movements can cause serious bleeding  After you are monitored for several hours, you may go home or may need to stay in the hospital overnight  What do I need to know before I go home? Care for your wound as directed  Remove the pressure bandage in 24 hours or as directed  Mild bruising is normal and expected  A small bandage can be placed on your wound after you remove the pressure bandage  Do not put powders, lotions, or creams on your wound  They may cause your wound to get infected  Monitor your wound every day for signs of infection, such as redness, swelling, or pus  Shower the day after your procedure or as directed  Remove your pressure bandage before you shower  Do not take baths or go in hot tubs or pools  Carefully wash the wound with soap and water  Pat the area dry  A small bandage can be placed on your wound after you shower  Apply firm, steady pressure to your wound if it bleeds  Apply pressure with a clean gauze or towel for 5 to 10 minutes  Call 911 if bleeding becomes heavy or does not stop  Drink liquids as directed  Liquids will help flush the contrast liquid from your body  Ask how much liquid to drink each day and which liquids are best for you  Do not lift anything heavier than 5 pounds until directed by your healthcare provider  Heavy lifting can put stress on your wound and cause bleeding  Do not push or pull with the arm that was used for the procedure  Do not do vigorous activity for at least 48 hours  Vigorous activity may cause bleeding from your wound  Rest and do quiet activities  Take short walks around the house to prevent a blood clot  Ask your healthcare provider when you can return to your normal activities  Do not drive or return to work until your healthcare provider says it is okay  Your healthcare provider may tell you to wait 48 hours before you drive to decrease your risk for bleeding  You may not be able to return to work for at least 2 days after your procedure if your job involves heavy lifting  What medicines may I need? You may need any of the following:  Blood thinners  help prevent blood clots  Clots can cause strokes, heart attacks, and death  The following are general safety guidelines to follow while you are taking a blood thinner:    Watch for bleeding and bruising while you take blood thinners  Watch for bleeding from your gums or nose  Watch for blood in your urine and bowel movements  Use a soft washcloth on your skin, and a soft toothbrush to brush your teeth  This can keep your skin and gums from bleeding  If you shave, use an electric shaver  Do not play contact sports  Tell your dentist and other healthcare providers that you take a blood thinner  Wear a bracelet or necklace that says you take this medicine  Do not start or stop any other medicines unless your healthcare provider tells you to  Many medicines cannot be used with blood thinners  Take your blood thinner exactly as prescribed by your healthcare provider   Do not skip does or take less than prescribed  Tell your provider right away if you forget to take your blood thinner, or if you take too much  Warfarin  is a blood thinner that you may need to take  The following are things you should be aware of if you take warfarin:     Foods and medicines can affect the amount of warfarin in your blood  Do not make major changes to your diet while you take warfarin  Warfarin works best when you eat about the same amount of vitamin K every day  Vitamin K is found in green leafy vegetables and certain other foods  Ask for more information about what to eat when you are taking warfarin  You will need to see your healthcare provider for follow-up visits when you are on warfarin  You will need regular blood tests  These tests are used to decide how much medicine you need  Acetaminophen  helps decrease pain and fever  This medicine is available without a doctor's order  Ask how much medicine is safe to take, and how often to take it  Acetaminophen can cause liver damage if not taken correctly  Take your medicine as directed  Contact your healthcare provider if you think your medicine is not helping or if you have side effects  Tell him or her if you are allergic to any medicine  Keep a list of the medicines, vitamins, and herbs you take  Include the amounts, and when and why you take them  Bring the list or the pill bottles to follow-up visits  Carry your medicine list with you in case of an emergency  Call your local emergency number (911 in the [address] [facility]) if:   You have chest pain       You have any of the following signs of a heart attack:      Squeezing, pressure, or pain in your chest    You may  also have any of the following:     Discomfort or pain in your back, neck, jaw, stomach, or arm    Shortness of breath    Nausea or vomiting    Lightheadedness or a sudden cold sweat    You have any of the following signs of a stroke:      Numbness or drooping on one side of your face Weakness in an arm or leg    Confusion or difficulty speaking    Dizziness, a severe headache, or vision loss    You cough up blood  You have trouble breathing  You cannot stop the bleeding from your wound even after you hold firm pressure for 10 minutes  When should I call my doctor? You have a fever or chills  Blood soaks through your bandage  Your stitches come apart  Your hand or arm feels numb, cool, or looks pale  Your wound gets swollen quickly  Your wound is red, swollen, or draining pus  Your wound looks more bruised or you have new bruising on the side of your wrist      You have nausea or are vomiting  Your skin is itchy, swollen, or you have a rash  You have questions or concerns about your condition or care  Healthy living tips: The following are general healthy guidelines  If your chest pain is caused by a heart problem, your healthcare provider will give you specific guidelines to follow  Manage other health conditions  Diabetes and high cholesterol increases your risk for another heart attack and stroke  Talk to your healthcare provider about your management plan  He or she will make a plan that helps you manage your conditions  Do not smoke  Nicotine and other chemicals in cigarettes and cigars can cause lung and heart damage  Ask your healthcare provider for information if you currently smoke and need help to quit  E-cigarettes or smokeless tobacco still contain nicotine  Talk to your healthcare provider before you use these products  Eat a variety of healthy, low-fat, low-salt foods  Healthy foods include fruits, vegetables, whole-grain breads, low-fat dairy products, beans, lean meats, and fish  Ask for more information about a heart healthy diet  Drink plenty of water every day  Your body is made of mostly water  Water helps your body to control your temperature and blood pressure   Ask your healthcare provider how much water you should drink every day  Ask about activity  Your healthcare provider will tell you which activities to limit or avoid  Ask when you can drive, return to work, and have sex  Ask about the best exercise plan for you  Maintain a healthy weight  Ask your healthcare provider how much you should weigh  Ask him or her to help you create a weight loss plan if you are overweight  Get the flu and pneumonia vaccines  All adults should get the influenza (flu) vaccine  Get it every year as soon as it becomes available  The pneumococcal vaccine is given to adults aged 72 years or older  The vaccine is given every 5 years to prevent pneumococcal disease, such as pneumonia  If you have a stent:   Carry your stent card with you at all times  Let all healthcare providers know that you have a stent  CARE AGREEMENT:   You have the right to help plan your care  Learn about your health condition and how it may be treated  Discuss treatment options with your healthcare providers to decide what care you want to receive  You always have the right to refuse treatment  The above information is an  only  It is not intended as medical advice for individual conditions or treatments  Talk to your doctor, nurse or pharmacist before following any medical regimen to see if it is safe and effective for you  © Copyright [employer] 2022 Information is for End User's use only and may not be sold, redistributed or otherwise used for commercial purposes   All illustrations and images included in CareNotes® are the copyrighted property of A D A M , Inc  or [address] [place] [address] [employer]

## 2022-11-18 ENCOUNTER — APPOINTMENT (OUTPATIENT)
Dept: PHYSICAL THERAPY | Facility: CLINIC | Age: 57
End: 2022-11-18

## 2022-11-18 LAB
ATRIAL RATE: 76 BPM
P AXIS: 73 DEGREES
PR INTERVAL: 182 MS
QRS AXIS: 80 DEGREES
QRSD INTERVAL: 84 MS
QT INTERVAL: 382 MS
QTC INTERVAL: 429 MS
T WAVE AXIS: 82 DEGREES
VENTRICULAR RATE: 76 BPM

## 2022-11-23 ENCOUNTER — OFFICE VISIT (OUTPATIENT)
Dept: PHYSICAL THERAPY | Facility: CLINIC | Age: 57
End: 2022-11-23

## 2022-11-23 DIAGNOSIS — M25.511 CHRONIC RIGHT SHOULDER PAIN: ICD-10-CM

## 2022-11-23 DIAGNOSIS — M75.121 COMPLETE TEAR OF RIGHT ROTATOR CUFF, UNSPECIFIED WHETHER TRAUMATIC: Primary | ICD-10-CM

## 2022-11-23 DIAGNOSIS — G89.29 CHRONIC RIGHT SHOULDER PAIN: ICD-10-CM

## 2022-11-23 NOTE — PROGRESS NOTES
Daily Note     Today's date: 2022  Patient name: Michelle Hollingsworth  : 1965  MRN: [id number]  Referring provider: Sun Fraga PA-C  Dx:   Encounter Diagnosis     ICD-10-CM    1  Complete tear of right rotator cuff, unspecified whether traumatic  M75 121       2  Chronic right shoulder pain  M25 511     G89 29           Start Time: 0753  Stop Time: 0835  Total time in clinic (min): 42 minutes    Subjective: Patient reports that he has minimal to no pain in his right shoulder  Patient's strength and mobility continue to improve  Objective: See treatment diary below      Assessment: Tolerated treatment well  Patient demonstrated fatigue post treatment and would benefit from continued PT  Patient with gradually improving shoulder girdle strength  Excellent ROM noted  Plan: Continue per plan of care  Progress treatment as tolerated  Progress treament per protocol  Precautions: s/p right shoulder rotator cuff repair, biceps tenotomy, acromioplasty, extensive debridement, DOS:  2022      Manuals 10/24 10/27 11/2 11/4 11/9 11/23       Right shoulder PROM  GR SC GR GR GR       Gr  II-IV AP, inf GHJ mobs             Multidirectional rhythmic stabilization: 60, 90, 120 deg FF ; IR/ER at 45 deg abd     GR GR                    Neuro Re-Ed             Prone shoulder extension     2x10 3" 1# 2x10 3"       Prone h abd      2x10 3" 1# 2x10 3"       Prone scaption      1# 2x10 3"       Supine SA punch     2# 2x10 3" 3# 2x10 3"                                                           Ther Ex             Patient education: surgical protocol overview, pathophysiology, activity restrictions/precautions GR            UBE  3' / 3' 3'/3' 4' / 4' 4' / 4' 4' / 4'       Pulleys  20x5" 20x 5"  20x5" 3 min        Finger ladder  10x5" 10"x 10  10x10"         Prone row  20x3" 20x 5"  2# 2x10 3"         Webslide: M, L rows  YTB 2x10 3" ea  YTB 2x 10 3" ea  RTB 2x10 3" RTB 2x10 3" GTB 2x10 3" ea          ER - s/l  2x10 2x 10  2x10 1# 2x10 1#        TB IR   NV YTB 2x10 RTB 2x10 RTB 2x10       TB ER   NV YTB 2x10 YTB 3x10 YTB 3x10                                                           Ther Activity                                       Gait Training                                       Modalities

## 2022-11-25 ENCOUNTER — TELEPHONE (OUTPATIENT)
Dept: PHYSICAL THERAPY | Facility: CLINIC | Age: 57
End: 2022-11-25

## 2022-11-25 ENCOUNTER — APPOINTMENT (OUTPATIENT)
Dept: PHYSICAL THERAPY | Facility: CLINIC | Age: 57
End: 2022-11-25

## 2022-11-25 NOTE — TELEPHONE ENCOUNTER
Called patient regarding him being 5 mins late for appointment at 815am on 11/25/22  Patient answered stating that he just woke up  Offered available 945am appointment later this morning  Patient deferred stating that he just "wanted to come in next week"

## 2022-11-25 NOTE — PROGRESS NOTES
PT Evaluation     Today's date: 2022  Patient name: Dilia Colin  : 1965  MRN: [id number]  Referring provider: Margarette Galan PA-C  Dx: No diagnosis found  Assessment/Plan     Goals  Short Term Goals: to be achieved by 4 weeks  1) Patient to be independent with basic HEP  2) Decrease pain to 3/10 at its worst   3) Patient to report decreased sleep interruption secondary to pain  Long Term Goals: to be achieved by discharge  1) FOTO equal to or greater than TBD  2) Patient to be independent with comprehensive HEP  3) Abolish pain for improved quality of life  4) Increase shoulder ROM to within 5 deg of contralateral UE to improve a/iadls  5) Increase shoulder strength to 5/5 MMT grade in all planes to improve a/iadls  6) Patient to report no sleep interruption secondary to pain  Subjective    Objective       Observations      Right Shoulder  Positive for incision (Clean, well-approximated, no signs of infection, good scar tissue mobility)        Active Range of Motion   Left Shoulder   Flexion: 172 degrees   Abduction: 160 degrees   External rotation BTH: T4   Internal rotation BTB: T6     Right Shoulder   Flexion: 161 (+) shrug sign degrees with pain  Abduction: 165 (+) shrug sign degrees with pain  External rotation BTH: T4   Internal rotation BTB: T6      Passive Range of Motion   Left Shoulder   Flexion: 172 degrees   Abduction: 175 degrees   External rotation 90°: 95 degrees   Internal rotation 90°: 75 degrees      Right Shoulder   Flexion: 165 degrees   Abduction: 165 degrees   External rotation 90°: 90 degrees   Internal rotation 90°: 65 degrees      Joint Play   Left Shoulder  Hypomobile in the posterior capsule and inferior capsule      Right Shoulder  Hypomobile in the posterior capsule and inferior capsule       Strength/Myotome Testing     Additional Strength Details  Upper extremity strength testing deferred secondary to recency of surgery Precautions: s/p right shoulder rotator cuff repair, biceps tenotomy, acromioplasty, extensive debridement, DOS:  9/16/2022        Manuals 10/24 10/27 11/2 11/4 11/9 11/23           Right shoulder PROM   GR SC GR GR GR           Gr  II-IV AP, inf GHJ mobs                       Multidirectional rhythmic stabilization: 60, 90, 120 deg FF ; IR/ER at 45 deg abd         GR GR                                   Neuro Re-Ed                       Prone shoulder extension         2x10 3" 1# 2x10 3"           Prone h abd          2x10 3" 1# 2x10 3"           Prone scaption           1# 2x10 3"           Supine SA punch         2# 2x10 3" 3# 2x10 3"                                                                                                           Ther Ex                       Patient education: surgical protocol overview, pathophysiology, activity restrictions/precautions GR                     UBE   3' / 3' 3'/3' 4' / 4' 4' / 4' 4' / 4'           Pulleys   20x5" 20x 5"  20x5" 3 min             Finger ladder   10x5" 10"x 10  10x10"               Prone row   20x3" 20x 5"  2# 2x10 3"               Webslide: M, L rows   YTB 2x10 3" ea  YTB 2x 10 3" ea    RTB 2x10 3" RTB 2x10 3" GTB 2x10 3" ea             ER - s/l   2x10 2x 10  2x10 1# 2x10 1#             TB IR     NV YTB 2x10 RTB 2x10 RTB 2x10           TB ER     NV YTB 2x10 YTB 3x10 YTB 3x10                                                                                                           Ther Activity                                                                       Gait Training                                                                       Modalities

## 2022-11-30 ENCOUNTER — OFFICE VISIT (OUTPATIENT)
Dept: PHYSICAL THERAPY | Facility: CLINIC | Age: 57
End: 2022-11-30

## 2022-11-30 DIAGNOSIS — M75.121 COMPLETE TEAR OF RIGHT ROTATOR CUFF, UNSPECIFIED WHETHER TRAUMATIC: Primary | ICD-10-CM

## 2022-11-30 DIAGNOSIS — G89.29 CHRONIC RIGHT SHOULDER PAIN: ICD-10-CM

## 2022-11-30 DIAGNOSIS — M25.511 CHRONIC RIGHT SHOULDER PAIN: ICD-10-CM

## 2022-11-30 NOTE — PROGRESS NOTES
Daily Note     Today's date: 2022  Patient name: Ally Chaudhry  : 1965  MRN: [id number]  Referring provider: Roman Nuñez PA-C  Dx:   Encounter Diagnosis     ICD-10-CM    1  Complete tear of right rotator cuff, unspecified whether traumatic  M75 121       2  Chronic right shoulder pain  M25 511     G89 29           Start Time: 0756  Stop Time: 0845  Total time in clinic (min): 49 minutes    Subjective: Patient reports that his shoulder has been feeling better each day  Patient would like to reduce frequency of visits from 2x/wk to 1x/wk  Objective: See treatment diary below      Assessment: Tolerated treatment well  Patient demonstrated fatigue post treatment and would benefit from continued PT  Patient continues to demonstrate good quality of movement with less compensation  Patient with improving shoulder girdle strength  Plan: Continue per plan of care  Progress treatment as tolerated  Progress treament per protocol  Precautions: s/p right shoulder rotator cuff repair, biceps tenotomy, acromioplasty, extensive debridement, DOS:  2022      Manuals 10/24 10/27 11/2 11/4 11/9 11/23 11/30      Right shoulder PROM  GR SC GR GR GR GR      Gr  II-IV AP, inf GHJ mobs             Multidirectional rhythmic stabilization: 60, 90, 120 deg FF ; IR/ER at 45 deg abd     GR GR                    Neuro Re-Ed             Prone shoulder extension     2x10 3" 1# 2x10 3" 1# 2x10 3"      Prone h abd      2x10 3" 1# 2x10 3" 1# 2x10 3"      Prone scaption      1# 2x10 3" 1# 2x10 3"      Supine SA punch     2# 2x10 3" 3# 2x10 3"       BodyBlade: IR/ER with elbow flexed at side       4x20"      Multidirectional ball stability: V, H, CW/CCW circles       GMB 20x ea                                  Ther Ex             Patient education: surgical protocol overview, pathophysiology, activity restrictions/precautions GR            UBE  3' / 3' 3'/3' 4' / 4' 4' / 4' 4' / 4' 4' / 4'      Pulleys  20x5" 20x 5"  20x5" 3 min        Finger ladder  10x5" 10"x 10  10x10"         Prone row  20x3" 20x 5"  2# 2x10 3"         Webslide: M, L rows  YTB 2x10 3" ea  YTB 2x 10 3" ea  RTB 2x10 3" RTB 2x10 3" GTB 2x10 3" ea  GTB 2x10 3" ea        ER - s/l  2x10 2x 10  2x10 1# 2x10 1#        TB IR   NV YTB 2x10 RTB 2x10 RTB 2x10 GTB 3x10      TB ER   NV YTB 2x10 YTB 3x10 YTB 3x10 RTB 3x10      Scaption wall slides       RTB 2x10                                             Ther Activity                                       Gait Training                                       Modalities

## 2022-12-07 ENCOUNTER — OFFICE VISIT (OUTPATIENT)
Dept: URGENT CARE | Facility: CLINIC | Age: 57
End: 2022-12-07

## 2022-12-07 VITALS
WEIGHT: 264 LBS | BODY MASS INDEX: 35.76 KG/M2 | SYSTOLIC BLOOD PRESSURE: 130 MMHG | HEIGHT: 72 IN | DIASTOLIC BLOOD PRESSURE: 93 MMHG

## 2022-12-07 DIAGNOSIS — J02.9 ACUTE PHARYNGITIS, UNSPECIFIED ETIOLOGY: Primary | ICD-10-CM

## 2022-12-07 LAB — S PYO AG THROAT QL: NEGATIVE

## 2022-12-07 NOTE — PROGRESS NOTES
[address] [employer]        NAME: Manda Myers is a 59 y o  male  : 1965    MRN: [id number]  DATE: 2022  TIME: 3:55 PM    Assessment and Plan   Acute pharyngitis, unspecified etiology [J02 9]  1  Acute pharyngitis, unspecified etiology  POCT rapid strepA    Throat culture          Tested for strep in office today, results were negative  Will send for culture and follow up on results  Continue supportive care, and educated pt on  Can try Cepacol throat spray from the pharmacy for symptoms  Patient Instructions     Will send for culture, we will notify you if any additional medications will be needed  Continue supportive care with salt water gargles, cough drops, over the counter throat sprays, and warm fluids  Follow up with PCP in 3-5 days  Proceed to  ER if symptoms worsen  Chief Complaint     Chief Complaint   Patient presents with   • Sore Throat     Patient mentioned that the sore throat has lasted 4-5 days now  Difficulty swallowing foods and describes the feeling of a small ball caught in the back of throat  Has been taking over the counter medication for a cough he had 4 days back and it has been getting better  Patient is here primarily for the sore throat  History of Present Illness       Presents with 4-5 days of sick symptoms including sore throat  He had a cough but improved  With sore throat feeling difficulty swallowing foods  Review of Systems   Review of Systems   Constitutional: Negative for chills and fever  HENT: Positive for sore throat  Negative for ear pain  Respiratory: Positive for cough  Negative for shortness of breath  Cardiovascular: Negative for chest pain and palpitations  Gastrointestinal: Negative for diarrhea, nausea and vomiting  Musculoskeletal: Negative for myalgias  Skin: Negative for color change and rash  Psychiatric/Behavioral: Negative for confusion     All other systems reviewed and are negative  Current Medications       Current Outpatient Medications:   •  acetaminophen (TYLENOL) 650 mg CR tablet, Take 1 tablet (650 mg total) by mouth every 8 (eight) hours as needed for mild pain, Disp: 30 tablet, Rfl: 1  •  alfuzosin (UROXATRAL) 10 mg 24 hr tablet, Take 1 tablet (10 mg total) by mouth daily, Disp: 30 tablet, Rfl: 2  •  ibuprofen (MOTRIN) 600 mg tablet, Take 1 tablet (600 mg total) by mouth every 6 (six) hours as needed for mild pain, Disp: 30 tablet, Rfl: 0  •  metoprolol succinate (TOPROL-XL) 25 mg 24 hr tablet, Take 1 tablet (25 mg total) by mouth daily, Disp: 30 tablet, Rfl: 3    Current Allergies     Allergies as of 12/07/2022 - Reviewed 12/07/2022   Allergen Reaction Noted   • Sulfa antibiotics GI Intolerance 03/16/2021   • Amoxicillin GI Intolerance 05/15/2019            The following portions of the patient's history were reviewed and updated as appropriate: allergies, current medications, past family history, past medical history, past social history, past surgical history and problem list      Past Medical History:   Diagnosis Date   • [facility]    • Alcohol abuse    • Anxiety    • Finney esophagus    • Benign essential hypertension    • Chronic pain syndrome 02/17/2021   • Colon polyp    • COVID 09/2020   • Diverticulitis    • Diverticulitis    • Diverticulitis    • Erectile dysfunction    • Esophageal reflux    • Essential hypertension    • GERD (gastroesophageal reflux disease)    • History of transfusion 2015   • Lumbar radiculopathy 05/13/2019   • Major depression    • Mild cognitive disorder 05/06/2016   • Mood disorder ([facility] ) 05/02/2016   • Multiple nevi    • Obesity    • Obesity (BMI 30-39  9)    • ALEXANDRE (obstructive sleep apnea)    • Psychosis (HCC)    • Snoring    • Suspicious nevus    • Tremor of right hand    • Weakness of right upper extremity        Past Surgical History:   Procedure Laterality Date   • CARDIAC CATHETERIZATION Left 11/16/2022    Procedure: Cardiac Left Heart Cath;  Surgeon: Shaneka Bonds MD;  Location: [address] [place] [address] CATH LAB; Service: Cardiology   • CARDIAC CATHETERIZATION N/A 11/16/2022    Procedure: Cardiac Coronary Angiogram;  Surgeon: Shaneka Bonds MD;  Location: [address] [place] [address] CATH LAB; Service: Cardiology   • COLON SURGERY     • COLONOSCOPY N/A 03/09/2019    Procedure: COLONOSCOPY;  Surgeon: Lv Godwin MD;  Location: [facility] GI LAB; Service: Gastroenterology   • LIPOMA RESECTION      back   • AK ESOPHAGOGASTRODUODENOSCOPY TRANSORAL DIAGNOSTIC N/A 03/09/2019    Procedure: ESOPHAGOGASTRODUODENOSCOPY (EGD); Surgeon: Lv Godwin MD;  Location: [facility]; Service: Gastroenterology   • AK SHLDR ARTHROSCOP,SURG,W/ROTAT CUFF REPR Right 09/16/2022    Procedure: RIGHT SHOULDER ARTHROSCOPY, REPAIR ROTATOR CUFF, SUPRASPINATUS REPAIR, SUBSCAPULARIS REPAIR, BICEPS TENOTOMY, ACROMIOPLASTY, AND EXTENSIVE DEBRIDEMENT;  Surgeon: Dustin José DO;  Location: [facility] MAIN OR;  Service: Orthopedics   • REVISION COLOSTOMY     • SHOULDER SURGERY         Family History   Problem Relation Age of Onset   • Stroke Mother    • Heart disease Father          Medications have been verified  Objective   /93   Ht 6' (1 829 m)   Wt 120 kg (264 lb)   BMI 35 80 kg/m²        Physical Exam     Physical Exam  Vitals reviewed  Constitutional:       General: He is not in acute distress  Appearance: Normal appearance  HENT:      Right Ear: Tympanic membrane, ear canal and external ear normal  Tympanic membrane is not erythematous  Left Ear: Tympanic membrane, ear canal and external ear normal  Tympanic membrane is not erythematous  Nose: Nose normal       Mouth/Throat:      Mouth: Mucous membranes are moist       Pharynx: Posterior oropharyngeal erythema present  Tonsils: Tonsillar exudate present  1+ on the right  1+ on the left     Eyes:      Conjunctiva/sclera: Conjunctivae normal    Cardiovascular: Rate and Rhythm: Normal rate and regular rhythm  Pulses: Normal pulses  Heart sounds: Normal heart sounds  No murmur heard  Pulmonary:      Effort: Pulmonary effort is normal  No respiratory distress  Breath sounds: Normal breath sounds  Skin:     General: Skin is warm and dry  Neurological:      General: No focal deficit present  Mental Status: He is alert and oriented to person, place, and time     Psychiatric:         Mood and Affect: Mood normal          Behavior: Behavior normal

## 2022-12-10 LAB — BACTERIA THROAT CULT: ABNORMAL

## 2022-12-13 NOTE — RESULT ENCOUNTER NOTE
Your culture shows a small amount of strep but is not the common strep we look for A, C, or G  This is a strep is often the normal bacteria that lives in your throat  Research has shown this group does not need treatment with antibiotics  If you have any additional questions or still having bad sore throat symptoms feel free to call the office at [phone]  Hope you are feeling better!

## 2023-01-09 NOTE — PROGRESS NOTES
1/10/2023      Chief Complaint   Patient presents with   • Follow-up     Assessment and Plan    1  BPH with LUTS  - Trialed on Alfuzosin with no benefit and side effects    - PVR today=15 mL  - Uroflow unable to be performed due to minimum voided volume  - Reviewed alternative pharmacotherapy and/or work-up with cystoscopy and TRUS  He declines any further medications at this time and wishes to proceed with CALHOUN work-up    2  Prostate cancer screening  - LALITO benign at recent visit  - PSA from 11/8/22 was 3 8  No prior PSAs for review  - Continue to monitor PSA on routine basis  - Follow up for cystoscopy and TRUS  History of Present Illness  [place] Tom is a 59 y o  male here for follow up evaluation of BPH  He admitted to urinary frequency, weak urinary stream, and nocturia at last visit  He was started on Alfuzosin with no significant benefit and reports it caused side effect of chest discomfort  He has subsequently discontinued  He complains of ongoing bothersome urinary issues as above  Denies any prior  surgical manipulation  Denies any family history of  malignancy  CT A/P from 10/18/22 - enlarged prostate with partially distended bladder and mild circumferential bladder wall thickening possibly due to cystitis or bladder outlet obstruction  PSA from 11/8/22 was 3 8  AUA SYMPTOM SCORE    Flowsheet Row Most Recent Value   AUA SYMPTOM SCORE    How often have you had a sensation of not emptying your bladder completely after you finished urinating? 0   How often have you had to urinate again less than two hours after you finished urinating? 5   How often have you found you stopped and started again several times when you urinate? 2   How often have you found it difficult to postpone urination? 3   How often have you had a weak urinary stream? 0   How often have you had to push or strain to begin urination?  3   How many times did you most typically get up to urinate from the time you went to bed at night until the time you got up in the morning? 3   Quality of Life: If you were to spend the rest of your life with your urinary condition just the way it is now, how would you feel about that? 4   AUA SYMPTOM SCORE 16            Review of Systems   Constitutional: Negative for chills and fever  Respiratory: Negative for shortness of breath  Cardiovascular: Negative for chest pain  Gastrointestinal: Negative for abdominal pain  Genitourinary: Positive for difficulty urinating and frequency  Negative for dysuria, flank pain, hematuria and urgency  Neurological: Negative for dizziness  Past Medical History  Past Medical History:   Diagnosis Date   • Acute kidney [facility] (nontraumatic) ([facility])    • Alcohol abuse    • Anxiety    • Finney esophagus    • Benign essential hypertension    • Chronic pain syndrome 02/17/2021   • Colon polyp    • COVID 09/2020   • Diverticulitis    • Diverticulitis    • Diverticulitis    • Erectile dysfunction    • Esophageal reflux    • Essential hypertension    • GERD (gastroesophageal reflux disease)    • History of transfusion 2015   • Lumbar radiculopathy 05/13/2019   • Major depression    • Mild cognitive disorder 05/06/2016   • [facility] 05/02/2016   • Multiple nevi    • Obesity    • Obesity (BMI 30-39  9)    • ALEXANDRE (obstructive sleep apnea)    • Psychosis (HCC)    • Snoring    • Suspicious nevus    • Tremor of right hand    • Weakness of right upper extremity        Past Social History  Past Surgical History:   Procedure Laterality Date   • CARDIAC CATHETERIZATION Left 11/16/2022    Procedure: Cardiac Left Heart Cath;  Surgeon: Kurtis Briones MD;  Location: [facility] CARDIAC CATH LAB; Service: Cardiology   • CARDIAC CATHETERIZATION N/A 11/16/2022    Procedure: Cardiac Coronary Angiogram;  Surgeon: Kurtis Briones MD;  Location: [address] [place] [address] CATH LAB;   Service: Cardiology   • COLON SURGERY     • COLONOSCOPY N/A 03/09/2019    Procedure: COLONOSCOPY;  Surgeon: Asuncion Jeffrey MD;  Location: [facility]; Service: Gastroenterology   • LIPOMA RESECTION      back   • HI ESOPHAGOGASTRODUODENOSCOPY TRANSORAL DIAGNOSTIC N/A 03/09/2019    Procedure: ESOPHAGOGASTRODUODENOSCOPY (EGD); Surgeon: Asuncion Jeffrey MD;  Location: [facility];   Service: Gastroenterology   • HI SURGICAL ARTHROSCOPY SHOULDER W/ROTATOR CUFF RPR Right 09/16/2022    Procedure: RIGHT SHOULDER ARTHROSCOPY, REPAIR ROTATOR CUFF, SUPRASPINATUS REPAIR, SUBSCAPULARIS REPAIR, BICEPS TENOTOMY, ACROMIOPLASTY, AND EXTENSIVE DEBRIDEMENT;  Surgeon: Sloan Rios DO;  Location: [facility] MA[facility];  Service: Orthopedics   • REVISION COLOSTOMY     • SHOULDER SURGERY       Social History     Tobacco Use   Smoking Status Never   Smokeless Tobacco Never       Past Family History  Family History   Problem Relation Age of Onset   • Stroke Mother    • Heart disease Father        Past Social history  Social History     Socioeconomic History   • Marital status: /Civil Union     Spouse name: Not on file   • Number of children: Not on file   • Years of education: Not on file   • Highest education level: Not on file   Occupational History   • Occupation: CONSTRUCTION   Tobacco Use   • Smoking status: Never   • Smokeless tobacco: Never   Vaping Use   • Vaping Use: Former   Substance and Sexual Activity   • Alcohol use: Not Currently     Comment: Sober 5 moths   • Drug use: Yes     Types: Marijuana     Comment: Medical Card QOD -last time 11/15   • Sexual activity: Yes     Partners: Female   Other Topics Concern   • Not on file   Social History Narrative   • Not on file     Social Determinants of Health     Financial Resource Strain: Not on file   Food Insecurity: Not on file   Transportation Needs: Not on file   Physical Activity: Not on file   Stress: Not on file   Social Connections: Not on file   Intimate Partner Violence: Not on file   Housing Stability: Not on file       Current Medications  Current Outpatient Medications   Medication Sig Dispense Refill   • acetaminophen (TYLENOL) 650 mg CR tablet Take 1 tablet (650 mg total) by mouth every 8 (eight) hours as needed for mild pain 30 tablet 1   • alfuzosin (UROXATRAL) 10 mg 24 hr tablet Take 1 tablet (10 mg total) by mouth daily 30 tablet 2   • ibuprofen (MOTRIN) 600 mg tablet Take 1 tablet (600 mg total) by mouth every 6 (six) hours as needed for mild pain 30 tablet 0   • metoprolol succinate (TOPROL-XL) 25 mg 24 hr tablet Take 1 tablet (25 mg total) by mouth daily 30 tablet 3     No current facility-administered medications for this visit  Allergies  Allergies   Allergen Reactions   • Sulfa Antibiotics GI Intolerance     HA, vomiting, sweating    • Amoxicillin GI Intolerance         The following portions of the patient's history were reviewed and updated as appropriate: allergies, current medications, past medical history, past social history, past surgical history and problem list       Vitals  Vitals:    01/10/23 0748   BP: 140/88   Pulse: 82   SpO2: 98%   Weight: 122 kg (270 lb)   Height: 6' (1 829 m)           Physical Exam  Physical Exam  Constitutional:       Appearance: Normal appearance  He is obese  HENT:      Head: Normocephalic and atraumatic  Right Ear: External ear normal       Left Ear: External ear normal       Nose: Nose normal    Eyes:      General: No scleral icterus  Conjunctiva/sclera: Conjunctivae normal    Cardiovascular:      Pulses: Normal pulses  Pulmonary:      Effort: Pulmonary effort is normal    Musculoskeletal:         General: Normal range of motion  Cervical back: Normal range of motion  Neurological:      General: No focal deficit present  Mental Status: He is alert and oriented to person, place, and time  Psychiatric:         Mood and Affect: Mood normal          Behavior: Behavior normal          Thought Content:  Thought content normal          Judgment: Judgment normal  Results  Recent Results (from the past 1 hour(s))   POCT Measure PVR    Collection Time: 01/10/23  7:52 AM   Result Value Ref Range    POST-VOID RESIDUAL VOLUME, ML POC 15 mL   ]  Lab Results   Component Value Date    PSA 3 8 11/08/2022     Lab Results   Component Value Date    GLUCOSE 125 04/02/2016    CALCIUM 9 6 11/14/2022     01/06/2016    K 4 9 11/14/2022    CO2 27 11/14/2022     11/14/2022    BUN 20 11/14/2022    CREATININE 0 84 11/14/2022     Lab Results   Component Value Date    WBC 5 37 11/14/2022    HGB 15 9 11/14/2022    HCT 50 7 (H) 11/14/2022    MCV 93 11/14/2022     11/14/2022           Orders  Orders Placed This Encounter   Procedures   • POCT Measure PVR       Eren Fournier

## 2023-01-10 ENCOUNTER — OFFICE VISIT (OUTPATIENT)
Dept: UROLOGY | Facility: CLINIC | Age: 58
End: 2023-01-10

## 2023-01-10 VITALS
SYSTOLIC BLOOD PRESSURE: 140 MMHG | BODY MASS INDEX: 36.57 KG/M2 | HEIGHT: 72 IN | DIASTOLIC BLOOD PRESSURE: 88 MMHG | HEART RATE: 82 BPM | OXYGEN SATURATION: 98 % | WEIGHT: 270 LBS

## 2023-01-10 DIAGNOSIS — Z12.5 PROSTATE CANCER SCREENING: ICD-10-CM

## 2023-01-10 DIAGNOSIS — N40.1 BENIGN LOCALIZED PROSTATIC HYPERPLASIA WITH LOWER URINARY TRACT SYMPTOMS (LUTS): Primary | ICD-10-CM

## 2023-01-10 LAB — POST-VOID RESIDUAL VOLUME, ML POC: 15 ML

## 2023-02-22 ENCOUNTER — OFFICE VISIT (OUTPATIENT)
Dept: URGENT CARE | Facility: CLINIC | Age: 58
End: 2023-02-22

## 2023-02-22 VITALS
SYSTOLIC BLOOD PRESSURE: 149 MMHG | OXYGEN SATURATION: 99 % | WEIGHT: 265 LBS | HEART RATE: 78 BPM | RESPIRATION RATE: 20 BRPM | TEMPERATURE: 97.3 F | DIASTOLIC BLOOD PRESSURE: 87 MMHG | HEIGHT: 71 IN | BODY MASS INDEX: 37.1 KG/M2

## 2023-02-22 DIAGNOSIS — R05.1 ACUTE COUGH: Primary | ICD-10-CM

## 2023-02-22 DIAGNOSIS — R09.81 NASAL CONGESTION: ICD-10-CM

## 2023-02-22 RX ORDER — BROMPHENIRAMINE MALEATE, PSEUDOEPHEDRINE HYDROCHLORIDE, AND DEXTROMETHORPHAN HYDROBROMIDE 2; 30; 10 MG/5ML; MG/5ML; MG/5ML
5 SYRUP ORAL 4 TIMES DAILY PRN
Qty: 120 ML | Refills: 0 | Status: SHIPPED | OUTPATIENT
Start: 2023-02-22

## 2023-02-22 RX ORDER — FLUTICASONE PROPIONATE 50 MCG
1 SPRAY, SUSPENSION (ML) NASAL DAILY
Qty: 9.9 ML | Refills: 0 | Status: SHIPPED | OUTPATIENT
Start: 2023-02-22

## 2023-02-22 NOTE — PATIENT INSTRUCTIONS
--Rest, drink plenty of fluids      --For nasal/sinus congestion, you can try steam, warm compresses, saline nasal spray, Neti pot, nasal steroid (Flonase, Nasocort) to be used at bedtime after the saline nasal spray or nasal decongestant (Afrin, Raheem-synephrine - for 3 days max or you can get rebound symptoms) may be taken  Can also decongestant (such as Sudafed) if > 10years of age and no history of high blood pressure  --For cough, you can take an OTC expectorant such as plain Robitussion or Mucinex  A spoonful of honey at bedtime may also be helpful  --For sore throat, you can take OTC lozenges, use warm gargles (salt water or apple cider vinegar and honey), herbal teas, or an OTC throat spray (Chloraseptic)  --You can take Tylenol or Motrin/Advil as needed for fever, headache, body aches  Do not take Motrin/Advil if you have a history of heart disease, bleeding ulcers, or if you take blood thinners       -For cold symptoms with high blood pressure take Coricidin cough/cold  --You should contact your primary care provider and/or go to the ER if your symptoms are not improved or get worse over the next 7 days  This includes new onset fever, localized ear pain, sinus pain, as well as worsening cough, chest pain, shortness of breath, or significant weakness/fatigue

## 2023-02-22 NOTE — PROGRESS NOTES
[address] [employer]      NAME: Ronna Essex is a 59 y o  male  : 1965    MRN: [id number]  DATE: 2023  TIME: 8:32 AM    Assessment and Plan   Acute cough [R05 1]  1  Acute cough  brompheniramine-pseudoephedrine-DM 30-2-10 MG/5ML syrup      2  Nasal congestion  fluticasone (FLONASE) 50 mcg/act nasal spray        Symptoms consistent with viral illness  Given education on supportive measures for symptoms in discharge instructions  Follow up with primary care in 3-5 days  Go to ER if symptoms get worse  Patient Instructions     --Rest, drink plenty of fluids     --For nasal/sinus congestion, you can try steam, warm compresses, saline nasal spray, Neti pot, nasal steroid (Flonase, Nasocort) to be used at bedtime after the saline nasal spray or nasal decongestant (Afrin, Raheem-synephrine - for 3 days max or you can get rebound symptoms) may be taken  Can also decongestant (such as Sudafed) if > 10years of age and no history of high blood pressure  --For cough, you can take an OTC expectorant such as plain Robitussion or Mucinex  A spoonful of honey at bedtime may also be helpful  --For sore throat, you can take OTC lozenges, use warm gargles (salt water or apple cider vinegar and honey), herbal teas, or an OTC throat spray (Chloraseptic)  --You can take Tylenol or Motrin/Advil as needed for fever, headache, body aches  Do not take Motrin/Advil if you have a history of heart disease, bleeding ulcers, or if you take blood thinners      -For cold symptoms with high blood pressure take Coricidin cough/cold  --You should contact your primary care provider and/or go to the ER if your symptoms are not improved or get worse over the next 7 days  This includes new onset fever, localized ear pain, sinus pain, as well as worsening cough, chest pain, shortness of breath, or significant weakness/fatigue        Chief Complaint     Chief Complaint   Patient presents with   • Cough     For 3d productive cough, clear sputum   • Earache     Left side clogged and painful x 3d   • Nasal Congestion     And chest congestion x3d  Covid test day 2 neg  History of Present Illness       Presents with sick symptoms including cough, earache and nasal congestion for 3 days  His cough is productive with clear sputum  His left ear is clogged and painful  He took an at home COVID test which was negative  Review of Systems   Review of Systems   Constitutional: Negative for chills and fever  HENT: Positive for congestion and ear pain  Negative for sore throat  Respiratory: Positive for cough  Negative for shortness of breath  Cardiovascular: Negative for chest pain and palpitations  Gastrointestinal: Negative for diarrhea, nausea and vomiting  Musculoskeletal: Negative for myalgias  Skin: Negative for color change and rash  Psychiatric/Behavioral: Negative for confusion  All other systems reviewed and are negative          Current Medications       Current Outpatient Medications:   •  acetaminophen (TYLENOL) 650 mg CR tablet, Take 1 tablet (650 mg total) by mouth every 8 (eight) hours as needed for mild pain, Disp: 30 tablet, Rfl: 1  •  brompheniramine-pseudoephedrine-DM 30-2-10 MG/5ML syrup, Take 5 mL by mouth 4 (four) times a day as needed for allergies, Disp: 120 mL, Rfl: 0  •  fluticasone (FLONASE) 50 mcg/act nasal spray, 1 spray into each nostril daily, Disp: 9 9 mL, Rfl: 0  •  alfuzosin (UROXATRAL) 10 mg 24 hr tablet, Take 1 tablet (10 mg total) by mouth daily (Patient not taking: Reported on 2/22/2023), Disp: 30 tablet, Rfl: 2  •  ibuprofen (MOTRIN) 600 mg tablet, Take 1 tablet (600 mg total) by mouth every 6 (six) hours as needed for mild pain (Patient not taking: Reported on 2/22/2023), Disp: 30 tablet, Rfl: 0  •  metoprolol succinate (TOPROL-XL) 25 mg 24 hr tablet, Take 1 tablet (25 mg total) by mouth daily (Patient not taking: Reported on 2/22/2023), Disp: 30 tablet, Rfl: 3    Current Allergies     Allergies as of 02/22/2023 - Reviewed 02/22/2023   Allergen Reaction Noted   • Sulfa antibiotics GI Intolerance 03/16/2021   • Amoxicillin GI Intolerance 05/15/2019            The following portions of the patient's history were reviewed and updated as appropriate: allergies, current medications, past family history, past medical history, past social history, past surgical history and problem list      Past Medical History:   Diagnosis Date   • Acute kidney injury (nontraumatic) ([facility] )    • Alcohol abuse    • Anxiety    • Finney esophagus    • Benign essential hypertension    • Chronic pain syndrome 02/17/2021   • Colon polyp    • COVID 09/2020   • Diverticulitis    • Diverticulitis    • Diverticulitis    • Erectile dysfunction    • Esophageal reflux    • Essential hypertension    • GERD (gastroesophageal reflux disease)    • History of transfusion 2015   • Lumbar radiculopathy 05/13/2019   • Major depression    • Mild cognitive disorder 05/06/2016   • Mood disorder ([facility] ) 05/02/2016   • Multiple nevi    • Obesity    • Obesity (BMI 30-39  9)    • ALEXANDRE (obstructive sleep apnea)    • Psychosis (HCC)    • Snoring    • Suspicious nevus    • Tremor of right hand    • Weakness of right upper extremity        Past Surgical History:   Procedure Laterality Date   • CARDIAC CATHETERIZATION Left 11/16/2022    Procedure: Cardiac Left Heart Cath;  Surgeon: Kurtis Briones MD;  [facility]: [facility]; Service: Cardiology   • CARDIAC CATHETERIZATION N/A 11/16/2022    Procedure: Cardiac Coronary Angiogram;  Surgeon: Kurtis Briones MD;  Location: [address] [place] [address] CATH LAB; Service: Cardiology   • COLON SURGERY     • COLONOSCOPY N/A 03/09/2019    Procedure: COLONOSCOPY;  Surgeon: Suri Rhodes MD;  Location: [facility] GI LAB;   Service: Gastroenterology   • LIPOMA RESECTION      back   • MT ESOPHAGOGASTRODUODENOSCOPY TRANSORAL DIAGNOSTIC N/A 03/09/2019    Procedure: ESOPHAGOGASTRODUODENOSCOPY (EGD); Surgeon: Suri Rhodes MD;  Location: [facility] GI LAB; Service: Gastroenterology   • CO SURGICAL ARTHROSCOPY SHOULDER W/ROTATOR CUFF RPR Right 09/16/2022    Procedure: RIGHT SHOULDER ARTHROSCOPY, REPAIR ROTATOR CUFF, SUPRASPINATUS REPAIR, SUBSCAPULARIS REPAIR, BICEPS TENOTOMY, ACROMIOPLASTY, AND EXTENSIVE DEBRIDEMENT;  Surgeon: Stuart Fallon DO;  Location: [facility] MAIN OR;  Service: Orthopedics   • REVISION COLOSTOMY     • SHOULDER SURGERY         Family History   Problem Relation Age of Onset   • Stroke Mother    • Heart disease Father          Medications have been verified  Objective   /87   Pulse 78   Temp (!) 97 3 °F (36 3 °C)   Resp 20   Ht 5' 11" (1 803 m)   Wt 120 kg (265 lb)   SpO2 99%   BMI 36 96 kg/m²        Physical Exam     Physical Exam  Vitals reviewed  Constitutional:       General: He is not in acute distress  Appearance: Normal appearance  HENT:      Right Ear: Tympanic membrane, ear canal and external ear normal       Left Ear: Tympanic membrane, ear canal and external ear normal       Nose: Congestion present  Mouth/Throat:      Mouth: Mucous membranes are moist       Pharynx: No posterior oropharyngeal erythema  Eyes:      Conjunctiva/sclera: Conjunctivae normal    Cardiovascular:      Rate and Rhythm: Normal rate and regular rhythm  Pulses: Normal pulses  Heart sounds: Normal heart sounds  No murmur heard  Pulmonary:      Effort: Pulmonary effort is normal  No respiratory distress  Breath sounds: Normal breath sounds  Skin:     General: Skin is warm and dry  Neurological:      General: No focal deficit present  Mental Status: He is alert and oriented to person, place, and time     Psychiatric:         Mood and Affect: Mood normal          Behavior: Behavior normal

## 2023-02-24 NOTE — TELEPHONE ENCOUNTER
Explained to patient that the [facility] prescribed for him yesterday is a "plan exclusion" for folks with [facility] prescription coverage  Thus, Dr Macedo Service put in a new prescription for Saxenda to [facility] [address]  Explained we would still probably have to do a prior auth and this could take up to two weeks or so  Finasteride Male Counseling: Finasteride Counseling:  I discussed with the patient the risks of use of finasteride including but not limited to decreased libido, decreased ejaculate volume, gynecomastia, and depression. Women should not handle medication.  All of the patient's questions and concerns were addressed. Finasteride Counseling:  I discussed with the patient the risks of use of finasteride including but not limited to decreased libido, decreased ejaculate volume, gynecomastia, and depression. Women should not handle medication.  All of the patient's questions and concerns were addressed.

## 2023-04-04 ENCOUNTER — OFFICE VISIT (OUTPATIENT)
Dept: OBGYN CLINIC | Facility: CLINIC | Age: 58
End: 2023-04-04

## 2023-04-04 VITALS
HEART RATE: 88 BPM | DIASTOLIC BLOOD PRESSURE: 115 MMHG | HEIGHT: 71 IN | SYSTOLIC BLOOD PRESSURE: 166 MMHG | BODY MASS INDEX: 37.1 KG/M2 | WEIGHT: 265 LBS

## 2023-04-04 DIAGNOSIS — M75.122 COMPLETE TEAR OF LEFT ROTATOR CUFF, UNSPECIFIED WHETHER TRAUMATIC: Primary | ICD-10-CM

## 2023-04-04 DIAGNOSIS — G89.29 CHRONIC LEFT SHOULDER PAIN: ICD-10-CM

## 2023-04-04 DIAGNOSIS — M25.512 CHRONIC LEFT SHOULDER PAIN: ICD-10-CM

## 2023-04-04 DIAGNOSIS — Z98.890 S/P RIGHT ROTATOR CUFF REPAIR: ICD-10-CM

## 2023-04-04 RX ORDER — BUPIVACAINE HYDROCHLORIDE 2.5 MG/ML
2 INJECTION, SOLUTION INFILTRATION; PERINEURAL
Status: COMPLETED | OUTPATIENT
Start: 2023-04-04 | End: 2023-04-04

## 2023-04-04 RX ORDER — LIDOCAINE HYDROCHLORIDE 10 MG/ML
2 INJECTION, SOLUTION INFILTRATION; PERINEURAL
Status: COMPLETED | OUTPATIENT
Start: 2023-04-04 | End: 2023-04-04

## 2023-04-04 RX ORDER — METHYLPREDNISOLONE ACETATE 40 MG/ML
2 INJECTION, SUSPENSION INTRA-ARTICULAR; INTRALESIONAL; INTRAMUSCULAR; SOFT TISSUE
Status: COMPLETED | OUTPATIENT
Start: 2023-04-04 | End: 2023-04-04

## 2023-04-04 RX ADMIN — BUPIVACAINE HYDROCHLORIDE 2 ML: 2.5 INJECTION, SOLUTION INFILTRATION; PERINEURAL at 10:25

## 2023-04-04 RX ADMIN — METHYLPREDNISOLONE ACETATE 2 ML: 40 INJECTION, SUSPENSION INTRA-ARTICULAR; INTRALESIONAL; INTRAMUSCULAR; SOFT TISSUE at 10:25

## 2023-04-04 RX ADMIN — LIDOCAINE HYDROCHLORIDE 2 ML: 10 INJECTION, SOLUTION INFILTRATION; PERINEURAL at 10:25

## 2023-04-04 NOTE — PROGRESS NOTES
Patient Name:  Jose Luis Fierro  MRN:  [id number]    Assessment & Plan     1  Complete tear of left rotator cuff, unspecified whether traumatic  -     Large joint arthrocentesis: L subacromial bursa    2  Chronic left shoulder pain  -     Large joint arthrocentesis: L subacromial bursa    3  S/P right rotator cuff repair          59 y o  male approximately 7 month s/p Right shoulder arthroscopic supraspinatus and subscapularis repair, biceps tenotomy, acromioplasty, and extensive debridement performed on 09/16/2022  In regards to his right shoulder, he is doing very well  He has no complaints at this time  In regards to his left shoulder, he does have worsening pain and weakness  We discussed continued nonoperative treatment options including oral analgesics, activity modification, injection therapy, and formal physical therapy  We also briefly discussed surgical intervention in the form of left shoulder arthroscopy with attempted rotator cuff repair versus reverse shoulder arthroplasty due to the chronicity of his tear  Patient would like to attempt further nonoperative treatment option with injection and formal physical therapy  Risks and benefits of injection were discussed  Patient tolerated the procedure well  Physical therapy prescription was written  I will see the patient back in 8 weeks for repeat evaluation  We will obtain new left shoulder x-rays at that visit  History of the Present Illness   Jose Luis Fierro is a 59 y o  male approximately 7 month s/p Right shoulder arthroscopic supraspinatus and subscapularis repair, biceps tenotomy, acromioplasty, and extensive debridement performed on 09/16/2022  Today, patient reports he is doing very well in regards to his right shoulder  He denies any pain and is very happy with his range of motion and strength  He does however have worsening pain in his left shoulder  He notes mild improvement after last corticosteroid injection on 9/1/2022  "Since that time he has had intermittent pain  Pain has been worse over the past week due to his grandson staying at his house and having to share his bed causing him disruption at night  He denies any numbness or tingling  Pain is worse with a overhead activity  No other new complaints  Review of Systems     Review of Systems   Constitutional: Negative for chills and fever  HENT: Negative for ear pain and sore throat  Eyes: Negative for pain and visual disturbance  Respiratory: Negative for cough and shortness of breath  Cardiovascular: Negative for chest pain and palpitations  Gastrointestinal: Negative for abdominal pain and vomiting  Genitourinary: Negative for dysuria and hematuria  Musculoskeletal: Negative for arthralgias and back pain  Skin: Negative for color change and rash  Neurological: Negative for seizures and syncope  All other systems reviewed and are negative  Physical Exam     BP (!) 166/115   Pulse 88   Ht 5' 11\" (1 803 m)   Wt 120 kg (265 lb)   BMI 36 96 kg/m²     Right Shoulder:   Surgical incisions well-healed  Active range of motion   170 degrees forward flexion  170 degrees abduction  70 degrees external rotation     There is 5/5 strength with external rotation testing at the side  Empty can testing is negative  Negative belly press test  The patient is neurovascularly intact distally in the extremity  Left  Shoulder: Active range of motion   150 degrees forward flexion  140 degrees abduction  50 degrees external rotation   2 level restriction internal rotation        There is tenderness present over the proximal biceps tendon  There is 4/5 strength with external rotation testing at the side  Empty can testing is positive   Belly press test is negative  Sosa test is positive  Essex's test is negative    Speed's test is Negative  The patient is neurovascularly intact distally in the extremity        Data Review     I have personally " reviewed pertinent films in PACS, and my interpretation follows  Previous x-rays and MRI of left shoulder once again reviewed in the office today displaying well-maintained glenohumeral joint space with full-thickness supraspinatus and infraspinatus tears with retraction to the glenoid        Social History     Tobacco Use   • Smoking status: Never   • Smokeless tobacco: Never   Vaping Use   • Vaping Use: Former   Substance Use Topics   • Alcohol use: Not Currently     Comment: Sober 5 moths   • Drug use: Yes     Types: Marijuana     Comment: Medical Card QOD -last time 11/15           Large joint arthrocentesis: L subacromial bursa  Universal Protocol:  Risks and benefits: risks, benefits and alternatives were discussed  Consent given by: patient  Patient identity confirmed: verbally with patient    Supporting Documentation  Indications: pain   Procedure Details  Location: shoulder - L subacromial bursa  Needle size: 22 G  Approach: posterolateral  Medications administered: 2 mL bupivacaine 0 25 %; 2 mL lidocaine 1 %; 2 mL methylPREDNISolone acetate 40 mg/mL    Patient tolerance: patient tolerated the procedure well with no immediate complications  Dressing:  Sterile dressing applied            Yogesh Baez DO

## 2023-05-01 PROBLEM — N40.1 BENIGN LOCALIZED PROSTATIC HYPERPLASIA WITH LOWER URINARY TRACT SYMPTOMS (LUTS): Status: ACTIVE | Noted: 2023-05-01

## 2023-05-01 PROBLEM — Z71.2 ENCOUNTER TO DISCUSS TEST RESULTS: Status: ACTIVE | Noted: 2023-05-01

## 2023-05-01 NOTE — PROGRESS NOTES
Problem List Items Addressed This Visit        Genitourinary    Benign localized prostatic hyperplasia with lower urinary tract symptoms (LUTS)    Relevant Orders    Case request operating room: TRANSURETHRAL RESECTION OF PROSTATE (TURP) (Completed)       Other    Class 3 severe obesity due to excess calories without serious comorbidity with body mass index (BMI) of 40 0 to 44 9 in adult [facility]) - Primary    Relevant Orders    POCT Measure PVR (Completed)    Erectile dysfunction    Encounter to discuss test results         Discussion:    Suzy Salas did well with cystoscopy and TRUS today  His prostate shows trilobar hypertrophy and measures 68 31 grams  There are no malignant lesions on his cystoscopy today  He does have obesity which can increase his change for ongoing prostatic growth and hypertrophy in the future due to insulin resistance  He has baseline erectile dysfunction at baseline  I have seen men improve and worsen after BPH surgeries and he is aware of this and was counseled on this  He has failed all medical therapies for his LUTS, maximum urinary flow is 8 ml/s and PVR is 10 mL    I discussed the pre, griselda, and post operative care for transurethral resection of prostate (TURP)  He understands the need for a catheter after surgery and the risks of bladder neck contracture/urethral stricture and the risks of infection, bleeding, pain, damage to surrounding structures, need for additional procedures, risk of failure of the procedure, risk of anesthesia, risk of positioning complications including neurapraxia, chronic pain, and paralysis as well as risk of rhabdomyolysis and compartment syndrome  Risk of deep venous thrombosis and venous thromboembolism as well as pneumonia and other potential but unforseeable or unpredictable complications was also discussed with the patient  His functional status is good  He has not taken alcohol in 1 year or so  No chest pain or SOB      He will return for TURP    Assessment and plan:      Please see problem oriented charting for the assessment plan of today's urological complaints    Vickie Harris MD      Chief Complaint     As above      History of Present Illness     Tiera Melgar is a 59 y o  man with uncontrolled lower urinary tract symptoms  He is status post cystoscopic/endoscopic work-up today with regard to potential surgeries for his outlet/lower urinary tract symptoms  After this discussion he thinks that he is interested in TURP    The following portions of the patient's history were reviewed and updated as appropriate: allergies, current medications, past family history, past medical history, past social history, past surgical history and problem list     Detailed Urologic History     - please refer to HPI    Review of Systems     Review of Systems   Constitutional: Negative  HENT: Negative  Eyes: Negative  Respiratory: Negative  Cardiovascular: Negative  Gastrointestinal: Negative  Endocrine: Negative  Genitourinary: Positive for difficulty urinating and urgency  Musculoskeletal: Negative  Skin: Negative  Allergic/Immunologic: Negative  Neurological: Negative  Hematological: Negative  Psychiatric/Behavioral: Negative  Allergies     Allergies   Allergen Reactions    Sulfa Antibiotics GI Intolerance     HA, vomiting, sweating     Amoxicillin GI Intolerance       Physical Exam     Physical Exam  Vitals reviewed  Constitutional:       General: He is not in acute distress  Appearance: Normal appearance  He is obese  He is not ill-appearing, toxic-appearing or diaphoretic  HENT:      Head: Normocephalic and atraumatic  Eyes:      General: No scleral icterus  Right eye: No discharge  Left eye: No discharge  Cardiovascular:      Pulses: Normal pulses  Pulmonary:      Effort: Pulmonary effort is normal    Abdominal:      General: There is no distension     Genitourinary: "Penis: Normal        Comments: Foreskin reduced over the head of the penis after today's cystoscopy  Musculoskeletal:         General: No swelling  Skin:     Coloration: Skin is not jaundiced  Neurological:      General: No focal deficit present  Mental Status: He is alert  Cranial Nerves: No cranial nerve deficit  Psychiatric:         Mood and Affect: Mood normal          Behavior: Behavior normal          Thought Content:  Thought content normal          Judgment: Judgment normal              Vital Signs  Vitals:    05/02/23 0751   BP: 162/98   Pulse: 87   SpO2: 98%   Weight: 123 kg (271 lb)   Height: 5' 11\" (1 803 m)         Current Medications       Current Outpatient Medications:     acetaminophen (TYLENOL) 650 mg CR tablet, Take 1 tablet (650 mg total) by mouth every 8 (eight) hours as needed for mild pain, Disp: 30 tablet, Rfl: 1    ibuprofen (MOTRIN) 600 mg tablet, Take 1 tablet (600 mg total) by mouth every 6 (six) hours as needed for mild pain, Disp: 30 tablet, Rfl: 0      Active Problems     Patient Active Problem List   Diagnosis    Essential hypertension    Mild cognitive disorder    Finney's esophagus with dysplasia    Class 3 severe obesity due to excess calories without serious comorbidity with body mass index (BMI) of 40 0 to 44 9 in adult [facility])    Chronic pain syndrome    Chronic right-sided low back pain with right-sided sciatica    Optic nerve edema    ALEXANDRE (obstructive sleep apnea)    Erectile dysfunction    Multiple nevi    Complete tear of right rotator cuff    Class II obesity    Benign localized prostatic hyperplasia with lower urinary tract symptoms (LUTS)    Encounter to discuss test results         Past Medical History     Past Medical History:   Diagnosis Date    Acute kidney injury (nontraumatic) (HCC)     Alcohol abuse     Anxiety     Chronic pain syndrome 02/17/2021    Colon polyp     COVID 09/2020    Diverticulitis     Erectile dysfunction     " Essential hypertension     GERD (gastroesophageal reflux disease)     History of transfusion 2015    Lumbar radiculopathy 05/13/2019    Major depression     Mild cognitive disorder 05/06/2016    Mood disorder ([address] ) 05/02/2016    Multiple nevi     Obesity (BMI 30-39  9)     ALEXANDRE (obstructive sleep apnea)     Psychosis (HCC)     Snoring     Suspicious nevus     Tremor of right hand     Weakness of right upper extremity          Surgical History     Past Surgical History:   Procedure Laterality Date    CARDIAC CATHETERIZATION Left 11/16/2022    Procedure: Cardiac Left Heart Cath;  Surgeon: Debra Marshall MD;  Location: [facility] CARD[facility]; Service: Cardiology    CARDIAC CATHETERIZATION N/A 11/16/2022    Procedure: Cardiac Coronary Angiogram;  Surgeon: Debra Marshall MD;  Location: [address] [place] [address] CATH LAB; Service: Cardiology    COLON SURGERY      COLONOSCOPY N/A 03/09/2019    Procedure: COLONOSCOPY;  Surgeon: Lee Crawford MD;  Location: [facility] GI [facility]; Service: Gastroenterology    LIPOMA RESECTION      back    NM ESOPHAGOGASTRODUODENOSCOPY TRANSORAL DIAGNOSTIC N/A 03/09/2019    Procedure: ESOPHAGOGASTRODUODENOSCOPY (EGD); Surgeon: Lee Crawford MD;  Location: [facility] GI [facility];   Service: Gastroenterology    NM SURGICAL ARTHROSCOPY SHOULDER W/ROTATOR CUFF RPR Right 09/16/2022    Procedure: RIGHT SHOULDER ARTHROSCOPY, REPAIR ROTATOR CUFF, SUPRASPINATUS REPAIR, SUBSCAPULARIS REPAIR, BICEPS TENOTOMY, ACROMIOPLASTY, AND EXTENSIVE DEBRIDEMENT;  Surgeon: Tony Mary DO;  Location: [facility] MA[facility];  Service: Orthopedics    REVISION COLOSTOMY      SHOULDER SURGERY           Family History     Family History   Problem Relation Age of Onset    Heart disease Father     Stroke Mother     Aneurysm Sister     Ovarian cancer Sister     Drug abuse Sister         overdose    No Known Problems Sister     No Known Problems Sister     No Known Problems Sister     Drug abuse Brother     Drug abuse Brother     No Known Problems Brother     No Known Problems Daughter     No Known Problems Daughter     No Known Problems Daughter     No Known Problems Daughter     No Known Problems Son     No Known Problems Son          Social History     Social History     Social History     Tobacco Use   Smoking Status Never    Passive exposure: Current   Smokeless Tobacco Never         Pertinent Lab Values     Lab Results   Component Value Date    CREATININE 0 84 11/14/2022       Lab Results   Component Value Date    PSA 3 8 11/08/2022             Pertinent Imaging      TRUS reviewed

## 2023-05-02 ENCOUNTER — PROCEDURE VISIT (OUTPATIENT)
Dept: UROLOGY | Facility: CLINIC | Age: 58
End: 2023-05-02

## 2023-05-02 ENCOUNTER — HOSPITAL ENCOUNTER (EMERGENCY)
Facility: HOSPITAL | Age: 58
Discharge: HOME | End: 2023-05-02
Attending: EMERGENCY MEDICINE

## 2023-05-02 ENCOUNTER — APPOINTMENT (EMERGENCY)
Dept: RADIOLOGY | Facility: HOSPITAL | Age: 58
End: 2023-05-02

## 2023-05-02 VITALS
SYSTOLIC BLOOD PRESSURE: 162 MMHG | WEIGHT: 271 LBS | DIASTOLIC BLOOD PRESSURE: 98 MMHG | HEART RATE: 87 BPM | BODY MASS INDEX: 37.94 KG/M2 | OXYGEN SATURATION: 98 % | HEIGHT: 71 IN

## 2023-05-02 VITALS
TEMPERATURE: 96.1 F | DIASTOLIC BLOOD PRESSURE: 101 MMHG | WEIGHT: 270 LBS | SYSTOLIC BLOOD PRESSURE: 151 MMHG | HEART RATE: 65 BPM | OXYGEN SATURATION: 97 % | RESPIRATION RATE: 17 BRPM | BODY MASS INDEX: 37.8 KG/M2 | HEIGHT: 71 IN

## 2023-05-02 DIAGNOSIS — N52.01 ERECTILE DYSFUNCTION DUE TO ARTERIAL INSUFFICIENCY: ICD-10-CM

## 2023-05-02 DIAGNOSIS — E66.01 CLASS 3 SEVERE OBESITY DUE TO EXCESS CALORIES WITHOUT SERIOUS COMORBIDITY WITH BODY MASS INDEX (BMI) OF 40.0 TO 44.9 IN ADULT (HCC): Primary | ICD-10-CM

## 2023-05-02 DIAGNOSIS — R07.9 CHEST PAIN: Primary | ICD-10-CM

## 2023-05-02 DIAGNOSIS — N40.1 BENIGN LOCALIZED PROSTATIC HYPERPLASIA WITH LOWER URINARY TRACT SYMPTOMS (LUTS): ICD-10-CM

## 2023-05-02 DIAGNOSIS — Z71.2 ENCOUNTER TO DISCUSS TEST RESULTS: ICD-10-CM

## 2023-05-02 DIAGNOSIS — J40 BRONCHITIS: ICD-10-CM

## 2023-05-02 LAB
ALBUMIN SERPL BCP-MCNC: 4.6 G/DL (ref 3.5–5)
ALP SERPL-CCNC: 73 U/L (ref 34–104)
ALT SERPL W P-5'-P-CCNC: 21 U/L (ref 7–52)
ANION GAP SERPL CALCULATED.3IONS-SCNC: 7 MMOL/L (ref 4–13)
AST SERPL W P-5'-P-CCNC: 18 U/L (ref 13–39)
ATRIAL RATE: 73 BPM
BASOPHILS # BLD AUTO: 0.03 THOUSANDS/ÂΜL (ref 0–0.1)
BASOPHILS NFR BLD AUTO: 0 % (ref 0–1)
BILIRUB SERPL-MCNC: 0.4 MG/DL (ref 0.2–1)
BUN SERPL-MCNC: 26 MG/DL (ref 5–25)
CALCIUM SERPL-MCNC: 9.6 MG/DL (ref 8.4–10.2)
CARDIAC TROPONIN I PNL SERPL HS: <2 NG/L
CARDIAC TROPONIN I PNL SERPL HS: <2 NG/L
CHLORIDE SERPL-SCNC: 108 MMOL/L (ref 96–108)
CO2 SERPL-SCNC: 24 MMOL/L (ref 21–32)
CREAT SERPL-MCNC: 0.88 MG/DL (ref 0.6–1.3)
EOSINOPHIL # BLD AUTO: 0.22 THOUSAND/ÂΜL (ref 0–0.61)
EOSINOPHIL NFR BLD AUTO: 3 % (ref 0–6)
ERYTHROCYTE [DISTWIDTH] IN BLOOD BY AUTOMATED COUNT: 13.8 % (ref 11.6–15.1)
GFR SERPL CREATININE-BSD FRML MDRD: 94 ML/MIN/1.73SQ M
GLUCOSE SERPL-MCNC: 137 MG/DL (ref 65–140)
HCT VFR BLD AUTO: 47.5 % (ref 36.5–49.3)
HGB BLD-MCNC: 15.6 G/DL (ref 12–17)
IMM GRANULOCYTES # BLD AUTO: 0.01 THOUSAND/UL (ref 0–0.2)
IMM GRANULOCYTES NFR BLD AUTO: 0 % (ref 0–2)
LYMPHOCYTES # BLD AUTO: 1.82 THOUSANDS/ÂΜL (ref 0.6–4.47)
LYMPHOCYTES NFR BLD AUTO: 27 % (ref 14–44)
MCH RBC QN AUTO: 29.2 PG (ref 26.8–34.3)
MCHC RBC AUTO-ENTMCNC: 32.8 G/DL (ref 31.4–37.4)
MCV RBC AUTO: 89 FL (ref 82–98)
MONOCYTES # BLD AUTO: 0.56 THOUSAND/ÂΜL (ref 0.17–1.22)
MONOCYTES NFR BLD AUTO: 8 % (ref 4–12)
NEUTROPHILS # BLD AUTO: 4.09 THOUSANDS/ÂΜL (ref 1.85–7.62)
NEUTS SEG NFR BLD AUTO: 62 % (ref 43–75)
NRBC BLD AUTO-RTO: 0 /100 WBCS
P AXIS: 57 DEGREES
PLATELET # BLD AUTO: 311 THOUSANDS/UL (ref 149–390)
PMV BLD AUTO: 9.5 FL (ref 8.9–12.7)
POST-VOID RESIDUAL VOLUME, ML POC: 10 ML
POTASSIUM SERPL-SCNC: 4.1 MMOL/L (ref 3.5–5.3)
PR INTERVAL: 184 MS
PROT SERPL-MCNC: 7.6 G/DL (ref 6.4–8.4)
QRS AXIS: 43 DEGREES
QRSD INTERVAL: 84 MS
QT INTERVAL: 382 MS
QTC INTERVAL: 420 MS
RBC # BLD AUTO: 5.35 MILLION/UL (ref 3.88–5.62)
SODIUM SERPL-SCNC: 139 MMOL/L (ref 135–147)
T WAVE AXIS: 36 DEGREES
VENTRICULAR RATE: 73 BPM
WBC # BLD AUTO: 6.73 THOUSAND/UL (ref 4.31–10.16)

## 2023-05-02 RX ORDER — NAPROXEN 500 MG/1
500 TABLET ORAL 2 TIMES DAILY WITH MEALS
Qty: 20 TABLET | Refills: 0 | Status: SHIPPED | OUTPATIENT
Start: 2023-05-02

## 2023-05-02 RX ORDER — ASPIRIN 81 MG/1
324 TABLET, CHEWABLE ORAL ONCE
Status: COMPLETED | OUTPATIENT
Start: 2023-05-02 | End: 2023-05-02

## 2023-05-02 RX ORDER — KETOROLAC TROMETHAMINE 30 MG/ML
15 INJECTION, SOLUTION INTRAMUSCULAR; INTRAVENOUS ONCE
Status: COMPLETED | OUTPATIENT
Start: 2023-05-02 | End: 2023-05-02

## 2023-05-02 RX ORDER — ALBUTEROL SULFATE 90 UG/1
2 AEROSOL, METERED RESPIRATORY (INHALATION) ONCE
Status: COMPLETED | OUTPATIENT
Start: 2023-05-02 | End: 2023-05-02

## 2023-05-02 RX ORDER — MORPHINE SULFATE 4 MG/ML
4 INJECTION, SOLUTION INTRAMUSCULAR; INTRAVENOUS ONCE
Status: COMPLETED | OUTPATIENT
Start: 2023-05-02 | End: 2023-05-02

## 2023-05-02 RX ORDER — IPRATROPIUM BROMIDE AND ALBUTEROL SULFATE 2.5; .5 MG/3ML; MG/3ML
3 SOLUTION RESPIRATORY (INHALATION) ONCE
Status: COMPLETED | OUTPATIENT
Start: 2023-05-02 | End: 2023-05-02

## 2023-05-02 RX ORDER — PREDNISONE 10 MG/1
TABLET ORAL
Qty: 36 TABLET | Refills: 0 | Status: SHIPPED | OUTPATIENT
Start: 2023-05-02 | End: 2023-05-12

## 2023-05-02 RX ORDER — ALBUTEROL SULFATE 90 UG/1
2 AEROSOL, METERED RESPIRATORY (INHALATION) EVERY 4 HOURS PRN
Qty: 18 G | Refills: 0 | Status: SHIPPED | OUTPATIENT
Start: 2023-05-02 | End: 2023-05-28

## 2023-05-02 RX ADMIN — ALBUTEROL SULFATE 2 PUFF: 90 AEROSOL, METERED RESPIRATORY (INHALATION) at 22:35

## 2023-05-02 RX ADMIN — KETOROLAC TROMETHAMINE 15 MG: 30 INJECTION, SOLUTION INTRAMUSCULAR; INTRAVENOUS at 20:19

## 2023-05-02 RX ADMIN — IPRATROPIUM BROMIDE AND ALBUTEROL SULFATE 3 ML: 2.5; .5 SOLUTION RESPIRATORY (INHALATION) at 20:19

## 2023-05-02 RX ADMIN — MORPHINE SULFATE 4 MG: 4 INJECTION INTRAVENOUS at 21:43

## 2023-05-02 RX ADMIN — ASPIRIN 324 MG: 81 TABLET, CHEWABLE ORAL at 22:35

## 2023-05-02 NOTE — ED PROVIDER NOTES
"History  Chief Complaint   Patient presents with    Chest Pain     Pt  Presents to ER with c/o Left sided chest pain since 0800 yesterday  Pt  States \"I woke up this way and it has just been getting worse  \"      HPI    Prior to Admission Medications   Prescriptions Last Dose Informant Patient Reported? Taking?   acetaminophen (TYLENOL) 650 mg CR tablet   No No   Sig: Take 1 tablet (650 mg total) by mouth every 8 (eight) hours as needed for mild pain   ibuprofen (MOTRIN) 600 mg tablet   No No   Sig: Take 1 tablet (600 mg total) by mouth every 6 (six) hours as needed for mild pain      Facility-Administered Medications: None       Past Medical History:   Diagnosis Date    Acute kidney injury (nontraumatic) ([facility])     Alcohol abuse     Anxiety     Chronic pain syndrome 02/17/2021    Colon polyp     COVID 09/2020    Diverticulitis     Erectile dysfunction     Essential hypertension     GERD (gastroesophageal reflux disease)     History of transfusion 2015    Lumbar radiculopathy 05/13/2019    Major depression     Mild cognitive disorder 05/06/2016    Mood disorder ([address] ) 05/02/2016    Multiple nevi     Obesity (BMI 30-39  9)     ALEXANDRE (obstructive sleep apnea)     Psychosis ([facility])     Snoring     Suspicious nevus     Tremor of right hand     Weakness of right upper extremity        Past Surgical History:   Procedure Laterality Date    CARDIAC CATHETERIZATION Left 11/16/2022    Procedure: Cardiac Left Heart Cath;  Surgeon: Evon Serrano MD;  Location: [facility]; Service: Cardiology    CARDIAC CATHETERIZATION N/A 11/16/2022    Procedure: Cardiac Coronary Angiogram;  Surgeon: Evon Serrano MD;  Location: [address] [place] [address] CATH LAB; Service: Cardiology    COLON SURGERY      COLONOSCOPY N/A 03/09/2019    Procedure: COLONOSCOPY;  Surgeon: Saint Cote, MD;  Location: [facility] GI [facility];   Service: Gastroenterology    LIPOMA RESECTION      back    ME ESOPHAGOGASTRODUODENOSCOPY TRANSORAL " DIAGNOSTIC N/A 03/09/2019    Procedure: ESOPHAGOGASTRODUODENOSCOPY (EGD); Surgeon: Maryann Dudley MD;  Location: [facility]; Service: Gastroenterology    [place] SURGICAL ARTHROSCOPY SHOULDER W/ROTATOR CUFF RPR Right 09/16/2022    Procedure: RIGHT SHOULDER ARTHROSCOPY, REPAIR ROTATOR CUFF, SUPRASPINATUS REPAIR, SUBSCAPULARIS REPAIR, BICEPS TENOTOMY, ACROMIOPLASTY, AND EXTENSIVE DEBRIDEMENT;  Surgeon: Celia Lefort, DO;  Location: [facility] MAIN [facility];  Service: Orthopedics    REVISION COLOSTOMY      SHOULDER SURGERY         Family History   Problem Relation Age of Onset    Heart disease Father     Stroke Mother     Aneurysm Sister     Ovarian cancer Sister     Drug abuse Sister         overdose    No Known Problems Sister     No Known Problems Sister     No Known Problems Sister     Drug abuse Brother     Drug abuse Brother     No Known Problems Brother     No Known Problems Daughter     No Known Problems Daughter     No Known Problems Daughter     No Known Problems Daughter     No Known Problems Son     No Known Problems Son      I have reviewed and agree with the history as documented  E-Cigarette/Vaping    E-Cigarette Use Former User     Comments quit 2016      E-Cigarette/Vaping Substances    Nicotine No     THC No     CBD No     Flavoring No     Other No     Unknown No      Social History     Tobacco Use    Smoking status: Never     Passive exposure: Current    Smokeless tobacco: Never   Vaping Use    Vaping Use: Former   Substance Use Topics    Alcohol use:  Yes     Alcohol/week: 1 0 standard drink     Types: 1 Cans of beer per week     Comment: Sober 5 moths    Drug use: Yes     Types: Marijuana     Comment: Medical Card QOD -last time 11/15       Review of Systems    Physical Exam  Physical Exam    Vital Signs  ED Triage Vitals [05/02/23 1904]   Temperature Pulse Respirations Blood Pressure SpO2   (!) 96 1 °F (35 6 °C) 75 18 (!) 173/105 98 %      Temp Source Heart Rate Source Patient Position - Orthostatic VS BP Location FiO2 (%)   Tympanic Monitor Sitting Left arm --      Pain Score       8           Vitals:    05/02/23 1904   BP: (!) 173/105   Pulse: 75   Patient Position - Orthostatic VS: Sitting         Visual Acuity      ED Medications  Medications - No data to display    Diagnostic Studies  Results Reviewed     None                 No orders to display              Procedures  Procedures         ED Course                                             MDM    Disposition  Final diagnoses:   None     ED Disposition     None      Follow-up Information    None         Patient's Medications   Discharge Prescriptions    No medications on file       No discharge procedures on file      PDMP Review       Value Time User    PDMP Reviewed  Yes 9/16/2022  8:09 AM Reg Barboza PA-C          ED Provider  Electronically Signed by There is no distension  Palpations: Abdomen is soft  Tenderness: There is no abdominal tenderness  There is no guarding or rebound  Musculoskeletal:         General: No tenderness or deformity  Normal range of motion  Cervical back: Normal range of motion and neck supple  Right lower leg: No tenderness  No edema  Left lower leg: No tenderness  No edema  Skin:     General: Skin is warm and dry  Coloration: Skin is not pale  Findings: No erythema or rash  Neurological:      Mental Status: He is alert and oriented to person, place, and time  Cranial Nerves: No cranial nerve deficit     Psychiatric:         Behavior: Behavior normal          Vital Signs  ED Triage Vitals [05/02/23 1904]   Temperature Pulse Respirations Blood Pressure SpO2   (!) 96 1 °F (35 6 °C) 75 18 (!) 173/105 98 %      Temp Source Heart Rate Source Patient Position - Orthostatic VS BP Location FiO2 (%)   Tympanic Monitor Sitting Left arm --      Pain Score       8           Vitals:    05/02/23 1904 05/02/23 2112 05/02/23 2200   BP: (!) 173/105 167/100 (!) 151/101   Pulse: 75 80 65   Patient Position - Orthostatic VS: Sitting  Sitting         Visual Acuity      ED Medications  Medications   ipratropium-albuterol (DUO-NEB) 0 5-2 5 mg/3 mL inhalation solution 3 mL (3 mL Nebulization Given 5/2/23 2019)   ketorolac (TORADOL) injection 15 mg (15 mg Intravenous Given 5/2/23 2019)   morphine injection 4 mg (4 mg Intravenous Given 5/2/23 2143)   albuterol (PROVENTIL HFA,VENTOLIN HFA) inhaler 2 puff (2 puffs Inhalation Given 5/2/23 2235)   aspirin chewable tablet 324 mg (324 mg Oral Given 5/2/23 2235)       Diagnostic Studies  Results Reviewed     Procedure Component Value Units Date/Time    HS Troponin I 2hr [[id number]] Collected: 05/02/23 2130    Lab Status: Final result Specimen: Blood from Arm, Right Updated: 05/02/23 2156     hs TnI 2hr <2 ng/L      Delta 2hr hsTnI --    HS Troponin 0hr (reflex protocol) [[id number]]  (Normal) Collected: 05/02/23 1929    Lab Status: Final result Specimen: Blood Updated: 05/02/23 2000     hs TnI 0hr <2 ng/L     Comprehensive metabolic panel [[id number]]  (Abnormal) Collected: 05/02/23 1928    Lab Status: Final result Specimen: Blood Updated: 05/02/23 1956     Sodium 139 mmol/L      Potassium 4 1 mmol/L      Chloride 108 mmol/L      CO2 24 mmol/L      ANION GAP 7 mmol/L      BUN 26 mg/dL      Creatinine 0 88 mg/dL      Glucose 137 mg/dL      Calcium 9 6 mg/dL      AST 18 U/L      ALT 21 U/L      Alkaline Phosphatase 73 U/L      Total Protein 7 6 g/dL      Albumin 4 6 g/dL      Total Bilirubin 0 40 mg/dL      eGFR 94 ml/min/1 73sq m     Narrative:      [place] guidelines for Chronic Kidney Disease (CKD):   •  Stage 1 with normal or high GFR (GFR > 90 mL/min/1 73 square meters)  •  Stage 2 Mild CKD (GFR = 60-89 mL/min/1 73 square meters)  •  Stage 3A Moderate CKD (GFR = 45-59 mL/min/1 73 square meters)  •  Stage 3B Moderate CKD (GFR = 30-44 mL/min/1 73 square meters)  •  Stage 4 Severe CKD (GFR = 15-29 mL/min/1 73 square meters)  •  Stage 5 End Stage CKD (GFR <15 mL/min/1 73 square meters)  Note: GFR calculation is accurate only with a steady state creatinine    CBC and differential [[id number]] Collected: 05/02/23 1928    Lab Status: Final result Specimen: Blood Updated: 05/02/23 1931     WBC 6 73 Thousand/uL      RBC 5 35 Million/uL      Hemoglobin 15 6 g/dL      Hematocrit 47 5 %      MCV 89 fL      MCH 29 2 pg      MCHC 32 8 g/dL      RDW 13 8 %      MPV 9 5 fL      Platelets 110 Thousands/uL      nRBC 0 /100 WBCs      Neutrophils Relative 62 %      Immat GRANS % 0 %      Lymphocytes Relative 27 %      Monocytes Relative 8 %      Eosinophils Relative 3 %      Basophils Relative 0 %      Neutrophils Absolute 4 09 Thousands/µL      Immature Grans Absolute 0 01 Thousand/uL      Lymphocytes Absolute 1 82 Thousands/µL      Monocytes Absolute 0 56 Thousand/µL Eosinophils Absolute 0 22 Thousand/µL      Basophils Absolute 0 03 Thousands/µL                  XR chest 2 views   Final Result by Juventino Hernandez MD (05/03 7292)      No radiographic evidence of acute intrathoracic process  Workstation performed: [id number]                    Procedures  Procedures  EKG, normal sinus rhythm, heart rate 70, no signs of ischemia or arrhythmias  ED Course  ED Course as of 05/17/23 2018   Tue May 02, 2023   1940 Diffuse wheezing   2035 hs TnI 0hr: <2                               SBIRT 22yo+    Flowsheet Row Most Recent Value   Initial Alcohol Screen: US AUDIT-C     1  How often do you have a drink containing alcohol? 0 Filed at: 05/02/2023 2147   2  How many drinks containing alcohol do you have on a typical day you are drinking? 0 Filed at: 05/02/2023 2147   3a  Male UNDER 65: How often do you have five or more drinks on one occasion? 0 Filed at: 05/02/2023 2147   3b  FEMALE Any Age, or MALE 65+: How often do you have 4 or more drinks on one occassion? 0 Filed at: 05/02/2023 2147   Audit-C Score 0 Filed at: 05/02/2023 2147   JODEE: How many times in the past year have you    Used an illegal drug or used a prescription medication for non-medical reasons? Never Filed at: 05/02/2023 2147                    Medical Decision Making  Chest pain  ACS work-up is negative  Patient has diffuse wheezing and feels improved after multiple breathing treatments  Discharged home with treatment for bronchitis/asthma exacerbation  With good return precautions in case of signs of ACS, advised follow-up with primary care provider and cardiology  Amount and/or Complexity of Data Reviewed  Labs: ordered  Decision-making details documented in ED Course  Radiology: ordered  Risk  OTC drugs  Prescription drug management            Disposition  Final diagnoses:   Chest pain   Bronchitis     Time reflects when diagnosis was documented in both MDM as applicable and the Disposition within this note     Time User Action Codes Description Comment    5/2/2023 10:28 PM Chucky Epley Add [R07 9] Chest pain     5/2/2023 10:30 PM Meolny Contreras Add [J40] Bronchitis       ED Disposition     ED Disposition   Discharge    Condition   Stable    Date/Time   Tue May 2, 2023 10:22 PM    Comment   Leslie Rocha discharge to home/self care                 Follow-up Information     Follow up With Specialties Details Why Contact Info Additional Information    Heidi Sparrow, [address], Nurse Practitioner Schedule an appointment as soon as possible for a visit  For follow up to ensure improvement, and for further testing and treatment as needed [address] [facility]  [address]  [address] [facility] Emergency Department Emergency Medicine  If symptoms worsen [facility] Emergency Department, [address] [place] [address], [address]  [facility] Cardiology Schedule an appointment as soon as possible for a visit  For follow up to ensure improvement, and for further testing and treatment as needed [address] [phone] 30[address] [facility], [address] [place], [place], [address] [place] [address] Dr Coffman Medication List as of 5/2/2023 10:34 PM      START taking these medications    Details   albuterol (ProAir HFA) 90 mcg/act inhaler Inhale 2 puffs every 4 (four) hours as needed for shortness of breath, Starting Tue 5/2/2023, Normal      naproxen (Naprosyn) 500 mg tablet Take 1 tablet (500 mg total) by mouth 2 (two) times a day with meals As needed for pain control, Starting Tue 5/2/2023, Normal      predniSONE 10 mg tablet Multiple Dosages:Starting Tue 5/2/2023, Until u 5/4/2023 at 2359, THEN Starting Fri 5/5/2023, Until Sun 5/7/2023 at 2359, THEN Starting Mon 5/8/2023, Until Tue 5/9/2023 at 2359, THEN Starting Wed 5/10/2023, Until Thu 5/11/2023 at 2359Take 6 tablet s (60 mg total) by mouth daily for 3 days, THEN 4 tablets (40 mg total) daily for 3 days, THEN 2 tablets (20 mg total) daily for 2 days, THEN 1 tablet (10 mg total) daily for 2 days  , Normal         CONTINUE these medications which have NOT CHANGED    Details   acetaminophen (TYLENOL) 650 mg CR tablet Take 1 tablet (650 mg total) by mouth every 8 (eight) hours as needed for mild pain, Starting Mon 5/13/2019, Normal      ibuprofen (MOTRIN) 600 mg tablet Take 1 tablet (600 mg total) by mouth every 6 (six) hours as needed for mild pain, Starting Fri 9/16/2022, Normal             No discharge procedures on file      PDMP Review       Value Time User    PDMP Reviewed  Yes 9/16/2022  8:09 AM Surendra Moreau PA-C          ED Provider  Electronically Signed by           Yonatan Ricardo DO  05/17/23 2018

## 2023-05-02 NOTE — PROGRESS NOTES
Office Transrectal ultrasound    Indication    LUTS, medically refractory    Informed consent   The risks, benefits and alternatives to TRUS d/w patient, informed consent obtained      Transrectal ultrasonography  The patient was placed in the left lateral decubitus position  After an attentive digital rectal examination, a 7 5 mHz sidefire ultrasound probe was gently inserted into the rectum and biplanar imaging of the prostate was done with the findings noted below  Images were taken of any abnormal findings and also to document prostate size  Bladder  The bladder base appeared normal     Prostate      Ultrasound size measurements:  -Volume:  68 31 cm3    Ultrasound findings:  -Cysts: None  -Masses: None  -Median lobe: present    Complications  There were no procedural complications  Disposition  The patient was dismissed to home     Post-procedure instructions: Today he underwent an uncomplicated transrectal ultrasound showing a 68 31 gram gland with trilobar hypertrophy and elevation of the bladder base, candidate for TURP          Biopsy prostate     Date/Time 5/2/2023 8:30 AM     Performed by  Nathalia Arteaga MD     Authorized by Nathalia Arteaga MD      Universal Protocol   Consent: Verbal consent obtained  Written consent obtained  Risks and benefits: risks, benefits and alternatives were discussed  Consent given by: patient  Patient understanding: patient states understanding of the procedure being performed  Patient consent: the patient's understanding of the procedure matches consent given  Procedure consent: procedure consent matches procedure scheduled  Relevant documents: relevant documents present and verified  Test results: test results available and properly labeled  Site marked: the operative site was not marked  Radiology Images displayed and confirmed   If images not available, report reviewed: imaging studies available  Required items: required blood products, implants, devices, and special equipment available  Patient identity confirmed: verbally with patient and provided demographic data        Local anesthesia used: no     Anesthesia   Local anesthesia used: no     Sedation   Patient sedated: no        Specimen: no    Culture: no   Procedure Details   Procedure Notes: Office Transrectal ultrasound    Indication    LUTS, medically refractory    Informed consent   The risks, benefits and alternatives to TRUS d/w patient, informed consent obtained      Transrectal ultrasonography  The patient was placed in the left lateral decubitus position  After an attentive digital rectal examination, a 7 5 mHz sidefire ultrasound probe was gently inserted into the rectum and biplanar imaging of the prostate was done with the findings noted below  Images were taken of any abnormal findings and also to document prostate size  Bladder  The bladder base appeared normal     Prostate      Ultrasound size measurements:  -Volume:  68 31 cm3    Ultrasound findings:  -Cysts: None  -Masses: None  -Median lobe: present    Complications  There were no procedural complications  Disposition  The patient was dismissed to home     Post-procedure instructions:      Today he underwent an uncomplicated transrectal ultrasound showing a 68 31 gram gland with trilobar hypertrophy and elevation of the bladder base, candidate for TURP  Patient Transportation: confirmed  Patient tolerance: patient tolerated the procedure well with no immediate complications

## 2023-05-02 NOTE — PROGRESS NOTES
Office Cystoscopy Procedure Note    Indication:     medically refractory lower urinary tract symptoms     Informed consent   The risks, benefits, complications, treatment options, and expected outcomes were discussed with the patient  The patient concurred with the proposed plan and provided informed consent  Anesthesia  Lidocaine jelly 2%    Antibiotic prophylaxis   None    Procedure  The patient was placed in the supineposition, was prepped and draped in the usual manner using sterile technique, and 2% lidocaine jelly instilled into the urethra  A 17 F flexible cystoscope was then inserted into the urethra and the urethra and bladder carefully examined  The following findings were noted:    Findings:  Urethra:  normal  Prostate:  Trilobar hypertrophy, high grade obstruction  Bladder:  normal  Ureteral orifices:  orthotopic  Other findings:  None, retroflex confirms    Specimens: None                 Complications:    None; patient tolerated the procedure well           Disposition: To home            Condition: Stable    Plan: High grade bladder outlet obstruction, negative for malignancy       Cystoscopy     Date/Time 5/2/2023 8:30 AM     Performed by  Moy Jarrett MD     Authorized by Moy Jarrett MD      Universal Protocol:  Consent: Verbal consent obtained  Written consent obtained  Risks and benefits: risks, benefits and alternatives were discussed  Consent given by: patient  Patient understanding: patient states understanding of the procedure being performed  Patient consent: the patient's understanding of the procedure matches consent given  Procedure consent: procedure consent matches procedure scheduled  Relevant documents: relevant documents present and verified  Test results: test results available and properly labeled  Site marked: the operative site was not marked  Radiology Images displayed and confirmed   If images not available, report reviewed: imaging studies available  Required items: required blood products, implants, devices, and special equipment available  Patient identity confirmed: verbally with patient and provided demographic data        Procedure Details:  Procedure type: cystoscopy    Patient tolerance: Patient tolerated the procedure well with no immediate complications    Additional Procedure Details: Office Cystoscopy Procedure Note    Indication:     medically refractory lower urinary tract symptoms     Informed consent   The risks, benefits, complications, treatment options, and expected outcomes were discussed with the patient  The patient concurred with the proposed plan and provided informed consent  Anesthesia  Lidocaine jelly 2%    Antibiotic prophylaxis   None    Procedure  The patient was placed in the supineposition, was prepped and draped in the usual manner using sterile technique, and 2% lidocaine jelly instilled into the urethra  A 17 F flexible cystoscope was then inserted into the urethra and the urethra and bladder carefully examined    The following findings were noted:    Findings:  Urethra:  normal  Prostate:  Trilobar hypertrophy, high grade obstruction  Bladder:  normal  Ureteral orifices:  orthotopic  Other findings:  None, retroflex confirms    Specimens: None                 Complications:    None; patient tolerated the procedure well           Disposition: To home            Condition: Stable    Plan: High grade bladder outlet obstruction, negative for malignancy

## 2023-05-02 NOTE — LETTER
May 2, 2023     Christi Patel, [address] [facility]    Patient: Jf Zheng   YOB: 1965   Date of Visit: 5/2/2023       Dear Dr Tamara Garcia: Thank you for referring Marquise Wilson to me for evaluation  Below are my notes for this consultation  If you have questions, please do not hesitate to call me  I look forward to following your patient along with you  Sincerely,        Romelia Kate MD        CC: No Recipients  Romelia Kate MD  5/2/2023  8:46 AM  Sign when Signing Visit  Office Transrectal ultrasound    Indication    LUTS, medically refractory    Informed consent   The risks, benefits and alternatives to TRUS d/w patient, informed consent obtained      Transrectal ultrasonography  The patient was placed in the left lateral decubitus position  After an attentive digital rectal examination, a 7 5 mHz sidefire ultrasound probe was gently inserted into the rectum and biplanar imaging of the prostate was done with the findings noted below  Images were taken of any abnormal findings and also to document prostate size  Bladder  The bladder base appeared normal     Prostate      Ultrasound size measurements:  -Volume:  68 31 cm3    Ultrasound findings:  -Cysts: None  -Masses: None  -Median lobe: present    Complications  There were no procedural complications  Disposition  The patient was dismissed to home     Post-procedure instructions: Today he underwent an uncomplicated transrectal ultrasound showing a 68 31 gram gland with trilobar hypertrophy and elevation of the bladder base, candidate for TURP          Biopsy prostate     Date/Time 5/2/2023 8:30 AM     Performed by  Romelia Kate MD     Authorized by Romelia Kate MD      [place] Protocol   Consent: Verbal consent obtained  Written consent obtained    Risks and benefits: risks, benefits and alternatives were discussed  Consent given by: patient  Patient understanding: patient states understanding of the procedure being performed  Patient consent: the patient's understanding of the procedure matches consent given  Procedure consent: procedure consent matches procedure scheduled  Relevant documents: relevant documents present and verified  Test results: test results available and properly labeled  Site marked: the operative site was not marked  Radiology Images displayed and confirmed  If images not available, report reviewed: imaging studies available  Required items: required blood products, implants, devices, and special equipment available  Patient identity confirmed: verbally with patient and provided demographic data        Local anesthesia used: no     Anesthesia   Local anesthesia used: no     Sedation   Patient sedated: no        Specimen: no    Culture: no   Procedure Details   Procedure Notes: Office Transrectal ultrasound    Indication    LUTS, medically refractory    Informed consent   The risks, benefits and alternatives to TRUS d/w patient, informed consent obtained      Transrectal ultrasonography  The patient was placed in the left lateral decubitus position  After an attentive digital rectal examination, a 7 5 mHz sidefire ultrasound probe was gently inserted into the rectum and biplanar imaging of the prostate was done with the findings noted below  Images were taken of any abnormal findings and also to document prostate size  Bladder  The bladder base appeared normal     Prostate      Ultrasound size measurements:  -Volume:  68 31 cm3    Ultrasound findings:  -Cysts: None  -Masses: None  -Median lobe: present    Complications  There were no procedural complications  Disposition  The patient was dismissed to home     Post-procedure instructions:      Today he underwent an uncomplicated transrectal ultrasound showing a 68 31 gram gland with trilobar hypertrophy and elevation of the bladder base, candidate for TURP  Patient Transportation: confirmed  Patient tolerance: patient tolerated the procedure well with no immediate complications                 Aden Blanco MD  5/2/2023  8:43 AM  Sign when Signing Visit  Office Cystoscopy Procedure Note    Indication:     medically refractory lower urinary tract symptoms     Informed consent   The risks, benefits, complications, treatment options, and expected outcomes were discussed with the patient  The patient concurred with the proposed plan and provided informed consent  Anesthesia  Lidocaine jelly 2%    Antibiotic prophylaxis   None    Procedure  The patient was placed in the supineposition, was prepped and draped in the usual manner using sterile technique, and 2% lidocaine jelly instilled into the urethra  A 17 F flexible cystoscope was then inserted into the urethra and the urethra and bladder carefully examined  The following findings were noted:    Findings:  Urethra:  normal  Prostate:  Trilobar hypertrophy, high grade obstruction  Bladder:  normal  Ureteral orifices:  orthotopic  Other findings:  None, retroflex confirms    Specimens: None                 Complications:    None; patient tolerated the procedure well           Disposition: To home            Condition: Stable    Plan: High grade bladder outlet obstruction, negative for malignancy       Cystoscopy     Date/Time 5/2/2023 8:30 AM     Performed by  Aden Blanco MD     Authorized by Aden Blanco MD      Universal Protocol:  Consent: Verbal consent obtained  Written consent obtained    Risks and benefits: risks, benefits and alternatives were discussed  Consent given by: patient  Patient understanding: patient states understanding of the procedure being performed  Patient consent: the patient's understanding of the procedure matches consent given  Procedure consent: procedure consent matches procedure scheduled  Relevant documents: relevant documents present and verified  Test results: test results available and properly labeled  Site marked: the operative site was not marked  Radiology Images displayed and confirmed  If images not available, report reviewed: imaging studies available  Required items: required blood products, implants, devices, and special equipment available  Patient identity confirmed: verbally with patient and provided demographic data        Procedure Details:  Procedure type: cystoscopy    Patient tolerance: Patient tolerated the procedure well with no immediate complications    Additional Procedure Details: Office Cystoscopy Procedure Note    Indication:     medically refractory lower urinary tract symptoms     Informed consent   The risks, benefits, complications, treatment options, and expected outcomes were discussed with the patient  The patient concurred with the proposed plan and provided informed consent  Anesthesia  Lidocaine jelly 2%    Antibiotic prophylaxis   None    Procedure  The patient was placed in the supineposition, was prepped and draped in the usual manner using sterile technique, and 2% lidocaine jelly instilled into the urethra  A 17 F flexible cystoscope was then inserted into the urethra and the urethra and bladder carefully examined    The following findings were noted:    Findings:  Urethra:  normal  Prostate:  Trilobar hypertrophy, high grade obstruction  Bladder:  normal  Ureteral orifices:  orthotopic  Other findings:  None, retroflex confirms    Specimens: None                 Complications:    None; patient tolerated the procedure well           Disposition: To home            Condition: Stable    Plan: High grade bladder outlet obstruction, negative for malignancy              Roberto Spring MD  5/2/2023  8:44 AM  Sign when Signing Visit       Problem List Items Addressed This Visit        Genitourinary    Benign localized prostatic hyperplasia with lower urinary tract symptoms (LUTS)    Relevant Orders    Case request operating room: TRANSURETHRAL RESECTION OF PROSTATE (TURP) (Completed)       Other    Class 3 severe obesity due to excess calories without serious comorbidity with body mass index (BMI) of 40 0 to 44 9 in adult [facility]) - Primary    Relevant Orders    POCT Measure PVR (Completed)    Erectile dysfunction    Encounter to discuss test results         Discussion:    Fina Collier did well with cystoscopy and TRUS today  His prostate shows trilobar hypertrophy and measures 68 31 grams  There are no malignant lesions on his cystoscopy today  He does have obesity which can increase his change for ongoing prostatic growth and hypertrophy in the future due to insulin resistance  He has baseline erectile dysfunction at baseline  I have seen men improve and worsen after BPH surgeries and he is aware of this and was counseled on this  He has failed all medical therapies for his LUTS, maximum urinary flow is 8 ml/s and PVR is 10 mL    I discussed the pre, griselda, and post operative care for transurethral resection of prostate (TURP)  He understands the need for a catheter after surgery and the risks of bladder neck contracture/urethral stricture and the risks of infection, bleeding, pain, damage to surrounding structures, need for additional procedures, risk of failure of the procedure, risk of anesthesia, risk of positioning complications including neurapraxia, chronic pain, and paralysis as well as risk of rhabdomyolysis and compartment syndrome  Risk of deep venous thrombosis and venous thromboembolism as well as pneumonia and other potential but unforseeable or unpredictable complications was also discussed with the patient  His functional status is good  He has not taken alcohol in 1 year or so  No chest pain or SOB      He will return for TURP    Assessment and plan:      Please see problem oriented charting for the assessment plan of today's urological complaints    Aden Blanco MD      Chief Complaint     As above      History of Present Illness     Thea hubbard a 59 y o  man with uncontrolled lower urinary tract symptoms  He is status post cystoscopic/endoscopic work-up today with regard to potential surgeries for his outlet/lower urinary tract symptoms  After this discussion he thinks that he is interested in TURP    The following portions of the patient's history were reviewed and updated as appropriate: allergies, current medications, past family history, past medical history, past social history, past surgical history and problem list     Detailed Urologic History     - please refer to HPI    Review of Systems     Review of Systems   Constitutional: Negative  HENT: Negative  Eyes: Negative  Respiratory: Negative  Cardiovascular: Negative  Gastrointestinal: Negative  Endocrine: Negative  Genitourinary: Positive for difficulty urinating and urgency  Musculoskeletal: Negative  Skin: Negative  Allergic/Immunologic: Negative  Neurological: Negative  Hematological: Negative  Psychiatric/Behavioral: Negative  Allergies     Allergies   Allergen Reactions    Sulfa Antibiotics GI Intolerance     HA, vomiting, sweating     Amoxicillin GI Intolerance       Physical Exam     Physical Exam  Vitals reviewed  Constitutional:       General: He is not in acute distress  Appearance: Normal appearance  He is obese  He is not ill-appearing, toxic-appearing or diaphoretic  HENT:      Head: Normocephalic and atraumatic  Eyes:      General: No scleral icterus  Right eye: No discharge  Left eye: No discharge  Cardiovascular:      Pulses: Normal pulses  Pulmonary:      Effort: Pulmonary effort is normal    Abdominal:      General: There is no distension  Genitourinary:     Penis: Normal        Comments: Foreskin reduced over the head of the penis after today's cystoscopy  Musculoskeletal:         General: No swelling  Skin:     Coloration: Skin is not jaundiced     Neurological:      General: No focal deficit "present  Mental Status: He is alert  Cranial Nerves: No cranial nerve deficit  Psychiatric:         Mood and Affect: Mood normal          Behavior: Behavior normal          Thought Content:  Thought content normal          Judgment: Judgment normal              Vital Signs  Vitals:    05/02/23 0751   BP: 162/98   Pulse: 87   SpO2: 98%   Weight: 123 kg (271 lb)   Height: 5' 11\" (1 803 m)         Current Medications       Current Outpatient Medications:     acetaminophen (TYLENOL) 650 mg CR tablet, Take 1 tablet (650 mg total) by mouth every 8 (eight) hours as needed for mild pain, Disp: 30 tablet, Rfl: 1    ibuprofen (MOTRIN) 600 mg tablet, Take 1 tablet (600 mg total) by mouth every 6 (six) hours as needed for mild pain, Disp: 30 tablet, Rfl: 0      Active Problems     Patient Active Problem List   Diagnosis    Essential hypertension    Mild cognitive disorder    Finney's esophagus with dysplasia    Class 3 severe obesity due to excess calories without serious comorbidity with body mass index (BMI) of 40 0 to 44 9 in adult [facility])    Chronic pain syndrome    Chronic right-sided low back pain with right-sided sciatica    Optic nerve edema    ALEXANDRE (obstructive sleep apnea)    Erectile dysfunction    Multiple nevi    Complete tear of right rotator cuff    Class II obesity    Benign localized prostatic hyperplasia with lower urinary tract symptoms (LUTS)    Encounter to discuss test results         Past Medical History     Past Medical History:   Diagnosis Date    Acute kidney injury (nontraumatic) (HCC)     Alcohol abuse     Anxiety     Chronic pain syndrome 02/17/2021    Colon polyp     COVID 09/2020    Diverticulitis     Erectile dysfunction     Essential hypertension     GERD (gastroesophageal reflux disease)     History of transfusion 2015    Lumbar radiculopathy 05/13/2019    Major depression     Mild cognitive disorder 05/06/2016    [facility] 05/02/2016    " Multiple nevi     Obesity (BMI 30-39  9)     ALEXANDRE (obstructive sleep apnea)     Psychosis (HCC)     Snoring     Suspicious nevus     Tremor of right hand     Weakness of right upper extremity          Surgical History     Past Surgical History:   Procedure Laterality Date    CARDIAC CATHETERIZATION Left 11/16/2022    Procedure: Cardiac Left Heart Cath;  Surgeon: Lola Walls MD;  Location: [facility] CARD[facility]; Service: Cardiology    CARDIAC CATHETERIZATION N/A 11/16/2022    Procedure: Cardiac Coronary Angiogram;  Surgeon: Lola Walls MD;  Location: [address] [place] [address] CATH LAB; Service: Cardiology    COLON SURGERY      COLONOSCOPY N/A 03/09/2019    Procedure: COLONOSCOPY;  Surgeon: Sina Mancini MD;  Location: [facility] GI LAB; Service: Gastroenterology    LIPOMA RESECTION      back    ID ESOPHAGOGASTRODUODENOSCOPY TRANSORAL DIAGNOSTIC N/A 03/09/2019    Procedure: ESOPHAGOGASTRODUODENOSCOPY (EGD); Surgeon: Sina Mancini MD;  Location: [facility];   Service: Gastroenterology    ID SURGICAL ARTHROSCOPY SHOULDER W/ROTATOR CUFF RPR Right 09/16/2022    Procedure: RIGHT SHOULDER ARTHROSCOPY, REPAIR ROTATOR CUFF, SUPRASPINATUS REPAIR, SUBSCAPULARIS REPAIR, BICEPS TENOTOMY, ACROMIOPLASTY, AND EXTENSIVE DEBRIDEMENT;  Surgeon: Char Schultz DO;  Location: [facility] MAIN OR;  Service: Orthopedics    REVISION COLOSTOMY      SHOULDER SURGERY           Family History     Family History   Problem Relation Age of Onset    Heart disease Father     Stroke Mother     Aneurysm Sister     Ovarian cancer Sister     Drug abuse Sister         overdose    No Known Problems Sister     No Known Problems Sister     No Known Problems Sister     Drug abuse Brother     Drug abuse Brother     No Known Problems Brother     No Known Problems Daughter     No Known Problems Daughter     No Known Problems Daughter     No Known Problems Daughter     No Known Problems Son     No Known Problems Son          Social History     Social History     Social History     Tobacco Use   Smoking Status Never    Passive exposure: Current   Smokeless Tobacco Never         Pertinent Lab Values     Lab Results   Component Value Date    CREATININE 0 84 11/14/2022       Lab Results   Component Value Date    PSA 3 8 11/08/2022             Pertinent Imaging      TRUS reviewed

## 2023-05-03 LAB
ATRIAL RATE: 70 BPM
P AXIS: 65 DEGREES
PR INTERVAL: 182 MS
QRS AXIS: 51 DEGREES
QRSD INTERVAL: 86 MS
QT INTERVAL: 394 MS
QTC INTERVAL: 425 MS
T WAVE AXIS: 40 DEGREES
VENTRICULAR RATE: 70 BPM

## 2023-05-08 ENCOUNTER — TELEPHONE (OUTPATIENT)
Dept: UROLOGY | Facility: CLINIC | Age: 58
End: 2023-05-08

## 2023-05-08 NOTE — TELEPHONE ENCOUNTER
LM in re to sched procedure, asked to pls cb  Holding 6/29 at [facility] w Dr Alejandra Medrano

## 2023-05-09 ENCOUNTER — PREP FOR PROCEDURE (OUTPATIENT)
Dept: UROLOGY | Facility: CLINIC | Age: 58
End: 2023-05-09

## 2023-05-09 DIAGNOSIS — N40.1 BENIGN LOCALIZED PROSTATIC HYPERPLASIA WITH LOWER URINARY TRACT SYMPTOMS (LUTS): Primary | ICD-10-CM

## 2023-05-09 NOTE — TELEPHONE ENCOUNTER
Patient's wife returning call to schedule surgery  Patient requesting the next available after 6/29 with Dr Shamika Fried in [place] or if anything sooner in [facility]      Patient's wife requesting a call back at [phone]

## 2023-05-09 NOTE — TELEPHONE ENCOUNTER
Patient is sched for 7/6 at [facility]  His wife is aware he will stay for observation, she will drive him and the hospital will call day prior w time of arrival  He will go for PATs on 6/15 and advised 5-7 day hold of any asprin products, multivitamins or fish oils  We will mail the ma surgical packet and they will contact us w any questions or concerns

## 2023-05-28 ENCOUNTER — OFFICE VISIT (OUTPATIENT)
Dept: URGENT CARE | Facility: CLINIC | Age: 58
End: 2023-05-28

## 2023-05-28 VITALS
BODY MASS INDEX: 38.55 KG/M2 | OXYGEN SATURATION: 94 % | TEMPERATURE: 97.5 F | DIASTOLIC BLOOD PRESSURE: 80 MMHG | HEART RATE: 84 BPM | SYSTOLIC BLOOD PRESSURE: 140 MMHG | WEIGHT: 276.38 LBS | RESPIRATION RATE: 16 BRPM

## 2023-05-28 DIAGNOSIS — R05.1 ACUTE COUGH: ICD-10-CM

## 2023-05-28 DIAGNOSIS — H10.32 ACUTE BACTERIAL CONJUNCTIVITIS OF LEFT EYE: Primary | ICD-10-CM

## 2023-05-28 RX ORDER — ALBUTEROL SULFATE 90 UG/1
2 AEROSOL, METERED RESPIRATORY (INHALATION) EVERY 6 HOURS PRN
Qty: 8.5 G | Refills: 0 | Status: SHIPPED | OUTPATIENT
Start: 2023-05-28

## 2023-05-28 RX ORDER — OFLOXACIN 3 MG/ML
1 SOLUTION/ DROPS OPHTHALMIC 4 TIMES DAILY
Qty: 5 ML | Refills: 0 | Status: SHIPPED | OUTPATIENT
Start: 2023-05-28 | End: 2023-06-04

## 2023-05-28 NOTE — PROGRESS NOTES
[address] [employer] [facility]        NAME: Jenelle Lafleur is a 59 y o  male  : 1965    MRN: [id number]  DATE: May 28, 2023  TIME: 10:48 AM    Assessment and Plan   Acute bacterial conjunctivitis of left eye [H10 32]  1  Acute bacterial conjunctivitis of left eye  ofloxacin (OCUFLOX) 0 3 % ophthalmic solution      2  Acute cough  albuterol (ProAir HFA) 90 mcg/act inhaler            Patient Instructions     Use antibiotic eye drops as prescribed   Take over the counter Claritin  Apply cold compresses  For contact users, throw out current contacts/case and do not wear new contacts during treatment  Avoid touching eyes  Wash hands frequently  Change pillowcases daily  Follow up with ophthalmologist if symptoms do not resolve    Follow up with PCP in 3-5 days  Proceed to the ER with worsening symptoms  Chief Complaint     Chief Complaint   Patient presents with   • Eye Problem     4 days left eye redness, no pain  Mild discharge  Pt states he had bronchitis and is having some mild SOB and needs refill on albuterol  History of Present Illness       The patient presents today with complaints of L eye irritation x 4 days  He states it has been slightly painful to the touch, itchy, watery, and crusted shut in the morning  He tried flushing his eye with minimal relief  He has a history of seasonal allergies, and has also been coughing and wheezing  He has been using an albuterol inhaler with relief, but needs a refill  Review of Systems   Review of Systems   Constitutional: Negative for chills, fatigue and fever  HENT: Positive for postnasal drip and rhinorrhea  Negative for congestion, ear pain, sinus pain and sore throat  Eyes: Positive for pain, discharge, redness and itching  Left eye   Respiratory: Positive for cough, shortness of breath and wheezing  Negative for chest tightness  Cardiovascular: Negative for chest pain and palpitations     Gastrointestinal: Negative for abdominal pain, diarrhea, nausea and vomiting  Genitourinary: Negative for difficulty urinating  Musculoskeletal: Negative for myalgias  Skin: Negative for rash  Allergic/Immunologic: Negative for environmental allergies  Neurological: Negative for dizziness and headaches  Psychiatric/Behavioral: Negative            Current Medications       Current Outpatient Medications:   •  acetaminophen (TYLENOL) 650 mg CR tablet, Take 1 tablet (650 mg total) by mouth every 8 (eight) hours as needed for mild pain, Disp: 30 tablet, Rfl: 1  •  albuterol (ProAir HFA) 90 mcg/act inhaler, Inhale 2 puffs every 6 (six) hours as needed for wheezing, Disp: 8 5 g, Rfl: 0  •  ibuprofen (MOTRIN) 600 mg tablet, Take 1 tablet (600 mg total) by mouth every 6 (six) hours as needed for mild pain, Disp: 30 tablet, Rfl: 0  •  ofloxacin (OCUFLOX) 0 3 % ophthalmic solution, Administer 1 drop into the left eye 4 (four) times a day for 7 days, Disp: 5 mL, Rfl: 0  •  naproxen (Naprosyn) 500 mg tablet, Take 1 tablet (500 mg total) by mouth 2 (two) times a day with meals As needed for pain control (Patient not taking: Reported on 5/28/2023), Disp: 20 tablet, Rfl: 0    Current Allergies     Allergies as of 05/28/2023 - Reviewed 05/28/2023   Allergen Reaction Noted   • Sulfa antibiotics GI Intolerance 03/16/2021   • Amoxicillin GI Intolerance 05/15/2019            The following portions of the patient's history were reviewed and updated as appropriate: allergies, current medications, past family history, past medical history, past social history, past surgical history and problem list      Past Medical History:   Diagnosis Date   • Acute kidney injury (nontraumatic) ([facility] [address] )    • Alcohol abuse    • Anxiety    • Chronic pain syndrome 02/17/2021   • Colon polyp    • COVID 09/2020   • Diverticulitis    • Erectile dysfunction    • Essential hypertension    • GERD (gastroesophageal reflux disease)    • History of transfusion 2015   • Lumbar radiculopathy 05/13/2019   • Major depression    • Mild cognitive disorder 05/06/2016   • Mood disorder ([facility] [address] ) 05/02/2016   • Multiple nevi    • Obesity (BMI 30-39  9)    • ALEXANDRE (obstructive sleep apnea)    • Psychosis (HCC)    • Snoring    • Suspicious nevus    • Tremor of right hand    • Weakness of right upper extremity        Past Surgical History:   Procedure Laterality Date   • CARDIAC CATHETERIZATION Left 11/16/2022    Procedure: Cardiac Left Heart Cath;  Surgeon: Amador Naylor MD;  Location: [facility] CARD[facility] CATH LAB; Service: Cardiology   • CARDIAC CATHETERIZATION N/A 11/16/2022    Procedure: Cardiac Coronary Angiogram;  Surgeon: Amador Naylor MD;  Location: [address] [place] [address] CATH LAB; Service: Cardiology   • COLON SURGERY     • COLONOSCOPY N/A 03/09/2019    Procedure: COLONOSCOPY;  Surgeon: Mariano Dee MD;  Location: [facility] GI LAB; Service: Gastroenterology   • LIPOMA RESECTION      back   • NE ESOPHAGOGASTRODUODENOSCOPY TRANSORAL DIAGNOSTIC N/A 03/09/2019    Procedure: ESOPHAGOGASTRODUODENOSCOPY (EGD); Surgeon: Mariano Dee MD;  Location: [facility] GI LAB;   Service: Gastroenterology   • NE SURGICAL ARTHROSCOPY SHOULDER W/ROTATOR CUFF RPR Right 09/16/2022    Procedure: RIGHT SHOULDER ARTHROSCOPY, REPAIR ROTATOR CUFF, SUPRASPINATUS REPAIR, SUBSCAPULARIS REPAIR, BICEPS TENOTOMY, ACROMIOPLASTY, AND EXTENSIVE DEBRIDEMENT;  Surgeon: Sarah Nuñez DO;  Location: [facility] MAIN OR;  Service: Orthopedics   • REVISION COLOSTOMY     • SHOULDER SURGERY         Family History   Problem Relation Age of Onset   • Heart disease Father    • Stroke Mother    • Aneurysm Sister    • Ovarian cancer Sister    • Drug abuse Sister         overdose   • No Known Problems Sister    • No Known Problems Sister    • No Known Problems Sister    • Drug abuse Brother    • Drug abuse Brother    • No Known Problems Brother    • No Known Problems Daughter    • No Known Problems Daughter    • No Known Problems Daughter    • No Known Problems Daughter    • No Known Problems Son    • No Known Problems Son          Medications have been verified  Objective   /80   Pulse 84   Temp 97 5 °F (36 4 °C)   Resp 16   Wt 125 kg (276 lb 6 oz)   SpO2 94%   BMI 38 55 kg/m²        Physical Exam     Physical Exam  Vitals and nursing note reviewed  Constitutional:       General: He is not in acute distress  Appearance: Normal appearance  He is not ill-appearing  HENT:      Head: Normocephalic and atraumatic  Right Ear: Tympanic membrane, ear canal and external ear normal  There is no impacted cerumen  No foreign body  Tympanic membrane is not erythematous or bulging  Left Ear: Tympanic membrane, ear canal and external ear normal  There is no impacted cerumen  No foreign body  Tympanic membrane is not erythematous or bulging  Nose: Nose normal  No congestion or rhinorrhea  Mouth/Throat:      Lips: Pink  Mouth: Mucous membranes are moist       Pharynx: Oropharynx is clear  No oropharyngeal exudate or posterior oropharyngeal erythema  Tonsils: No tonsillar exudate  Eyes:      General: Vision grossly intact  Left eye: No foreign body or hordeolum  Extraocular Movements: Extraocular movements intact  Conjunctiva/sclera:      Left eye: Left conjunctiva is injected  Exudate present  No chemosis  Pupils: Pupils are equal, round, and reactive to light  Comments: L eye with conjunctival erythema, worse on inner aspect of eye  Small amount of clear watery discharge  Cardiovascular:      Rate and Rhythm: Normal rate and regular rhythm  Heart sounds: Normal heart sounds  No murmur heard  Pulmonary:      Effort: Pulmonary effort is normal  No respiratory distress  Breath sounds: Normal breath sounds  No decreased air movement  No decreased breath sounds, wheezing, rhonchi or rales  Abdominal:      General: Abdomen is flat  Bowel sounds are normal       Palpations: Abdomen is soft     Musculoskeletal: General: Normal range of motion  Cervical back: Normal range of motion  Skin:     General: Skin is warm  Findings: No rash  Neurological:      Mental Status: He is alert and oriented to person, place, and time  Motor: Motor function is intact     Psychiatric:         Attention and Perception: Attention normal          Mood and Affect: Mood normal

## 2023-05-28 NOTE — PATIENT INSTRUCTIONS
Use antibiotic eye drops as prescribed   Take over the counter Claritin  Apply cold compresses  For contact users, throw out current contacts/case and do not wear new contacts during treatment  Avoid touching eyes  Wash hands frequently  Change pillowcases daily  Follow up with ophthalmologist if symptoms do not resolve    Follow up with PCP in 3-5 days  Proceed to the ER with worsening symptoms

## 2023-06-29 ENCOUNTER — HOSPITAL ENCOUNTER (EMERGENCY)
Facility: HOSPITAL | Age: 58
Discharge: HOME | End: 2023-06-29
Attending: EMERGENCY MEDICINE
Payer: COMMERCIAL

## 2023-06-29 ENCOUNTER — APPOINTMENT (EMERGENCY)
Dept: RADIOLOGY | Facility: HOSPITAL | Age: 58
End: 2023-06-29
Payer: COMMERCIAL

## 2023-06-29 VITALS
RESPIRATION RATE: 21 BRPM | SYSTOLIC BLOOD PRESSURE: 134 MMHG | HEART RATE: 84 BPM | DIASTOLIC BLOOD PRESSURE: 99 MMHG | OXYGEN SATURATION: 99 % | TEMPERATURE: 98.1 F

## 2023-06-29 DIAGNOSIS — R07.9 CHEST PAIN, UNSPECIFIED TYPE: Primary | ICD-10-CM

## 2023-06-29 LAB
2HR DELTA HS TROPONIN: 0 NG/L
ALBUMIN SERPL BCP-MCNC: 4.8 G/DL (ref 3.5–5)
ALP SERPL-CCNC: 69 U/L (ref 34–104)
ALT SERPL W P-5'-P-CCNC: 16 U/L (ref 7–52)
ANION GAP SERPL CALCULATED.3IONS-SCNC: 8 MMOL/L
AST SERPL W P-5'-P-CCNC: 19 U/L (ref 13–39)
BASOPHILS # BLD AUTO: 0.03 THOUSANDS/ÂΜL (ref 0–0.1)
BASOPHILS NFR BLD AUTO: 0 % (ref 0–1)
BILIRUB SERPL-MCNC: 0.55 MG/DL (ref 0.2–1)
BUN SERPL-MCNC: 30 MG/DL (ref 5–25)
CALCIUM SERPL-MCNC: 9.9 MG/DL (ref 8.4–10.2)
CARDIAC TROPONIN I PNL SERPL HS: 4 NG/L
CARDIAC TROPONIN I PNL SERPL HS: 4 NG/L
CHLORIDE SERPL-SCNC: 109 MMOL/L (ref 96–108)
CO2 SERPL-SCNC: 21 MMOL/L (ref 21–32)
CREAT SERPL-MCNC: 0.83 MG/DL (ref 0.6–1.3)
D DIMER PPP FEU-MCNC: 0.34 UG/ML FEU
EOSINOPHIL # BLD AUTO: 0.05 THOUSAND/ÂΜL (ref 0–0.61)
EOSINOPHIL NFR BLD AUTO: 1 % (ref 0–6)
ERYTHROCYTE [DISTWIDTH] IN BLOOD BY AUTOMATED COUNT: 13.7 % (ref 11.6–15.1)
GFR SERPL CREATININE-BSD FRML MDRD: 95 ML/MIN/1.73SQ M
GLUCOSE SERPL-MCNC: 105 MG/DL (ref 65–140)
HCT VFR BLD AUTO: 48.1 % (ref 36.5–49.3)
HGB BLD-MCNC: 15.9 G/DL (ref 12–17)
IMM GRANULOCYTES # BLD AUTO: 0.01 THOUSAND/UL (ref 0–0.2)
IMM GRANULOCYTES NFR BLD AUTO: 0 % (ref 0–2)
LYMPHOCYTES # BLD AUTO: 1.14 THOUSANDS/ÂΜL (ref 0.6–4.47)
LYMPHOCYTES NFR BLD AUTO: 15 % (ref 14–44)
MAGNESIUM SERPL-MCNC: 2.2 MG/DL (ref 1.9–2.7)
MCH RBC QN AUTO: 29.3 PG (ref 26.8–34.3)
MCHC RBC AUTO-ENTMCNC: 33.1 G/DL (ref 31.4–37.4)
MCV RBC AUTO: 89 FL (ref 82–98)
MONOCYTES # BLD AUTO: 0.74 THOUSAND/ÂΜL (ref 0.17–1.22)
MONOCYTES NFR BLD AUTO: 10 % (ref 4–12)
NEUTROPHILS # BLD AUTO: 5.59 THOUSANDS/ÂΜL (ref 1.85–7.62)
NEUTS SEG NFR BLD AUTO: 74 % (ref 43–75)
NRBC BLD AUTO-RTO: 0 /100 WBCS
PLATELET # BLD AUTO: 293 THOUSANDS/UL (ref 149–390)
PMV BLD AUTO: 9.7 FL (ref 8.9–12.7)
POTASSIUM SERPL-SCNC: 4 MMOL/L (ref 3.5–5.3)
PROT SERPL-MCNC: 8 G/DL (ref 6.4–8.4)
RBC # BLD AUTO: 5.43 MILLION/UL (ref 3.88–5.62)
SODIUM SERPL-SCNC: 138 MMOL/L (ref 135–147)
WBC # BLD AUTO: 7.56 THOUSAND/UL (ref 4.31–10.16)

## 2023-06-29 PROCEDURE — 99284 EMERGENCY DEPT VISIT MOD MDM: CPT | Performed by: EMERGENCY MEDICINE

## 2023-06-29 PROCEDURE — 93005 ELECTROCARDIOGRAM TRACING: CPT

## 2023-06-29 PROCEDURE — 80053 COMPREHEN METABOLIC PANEL: CPT | Performed by: EMERGENCY MEDICINE

## 2023-06-29 PROCEDURE — 85025 COMPLETE CBC W/AUTO DIFF WBC: CPT | Performed by: EMERGENCY MEDICINE

## 2023-06-29 PROCEDURE — 36415 COLL VENOUS BLD VENIPUNCTURE: CPT | Performed by: EMERGENCY MEDICINE

## 2023-06-29 PROCEDURE — 83735 ASSAY OF MAGNESIUM: CPT | Performed by: EMERGENCY MEDICINE

## 2023-06-29 PROCEDURE — 85379 FIBRIN DEGRADATION QUANT: CPT | Performed by: EMERGENCY MEDICINE

## 2023-06-29 PROCEDURE — 71045 X-RAY EXAM CHEST 1 VIEW: CPT

## 2023-06-29 PROCEDURE — 84484 ASSAY OF TROPONIN QUANT: CPT | Performed by: EMERGENCY MEDICINE

## 2023-06-29 PROCEDURE — 99285 EMERGENCY DEPT VISIT HI MDM: CPT

## 2023-06-29 RX ORDER — ASPIRIN 325 MG
325 TABLET ORAL ONCE
Status: COMPLETED | OUTPATIENT
Start: 2023-06-29 | End: 2023-06-29

## 2023-06-29 RX ORDER — NITROGLYCERIN 0.4 MG/1
0.4 TABLET SUBLINGUAL ONCE
Status: COMPLETED | OUTPATIENT
Start: 2023-06-29 | End: 2023-06-29

## 2023-06-29 RX ADMIN — ASPIRIN 325 MG ORAL TABLET 325 MG: 325 PILL ORAL at 19:06

## 2023-06-29 RX ADMIN — NITROGLYCERIN 0.4 MG: 0.4 TABLET SUBLINGUAL at 19:07

## 2023-06-29 NOTE — PRE-PROCEDURE INSTRUCTIONS
Pre-Surgery Instructions:   Medication Instructions   • albuterol (ProAir HFA) 90 mcg/act inhaler Uses PRN- OK to take day of surgery   • ibuprofen (MOTRIN) 600 mg tablet Stop taking 3 days prior to surgery. Review with patient via phone medications and showering instructions. Instructed to avoid all ASA and OTC Vit/Supp 1 week prior to surgery and to avoid NSAIDs 3 days prior to surgery per anesthesia instructions. Tylenol ok to take prn. [facility] call with surgery schedule time, nothing eat or drink after midnight. Verbalized understanding.

## 2023-06-29 NOTE — ED PROVIDER NOTES
Pt Name: Brandon Louie  MRN: [id number]  [place] 1965  Age/Sex: 59 y o  male  Date of evaluation: 6/29/2023  PCP: Isabel Mcclendon, [address] [place] [address]    Chief Complaint   Patient presents with   • Chest Pain     Complaints of left sided chest pain that started yesterday  States that pain has continued all day and has not let up at all  Notes that BP was elevated today which is abnormal for him  HPI and MDM    59 y o  male presenting with chest pain  Left-sided chest pain that started around 5 PM yesterday, has been intermittent since then  Worse today  No associated shortness of breath or nausea or vomiting or diaphoresis  States he felt a little better after taking a warm shower  No exertional symptoms  Denies any medical problems, denies any prior cardiac events  States he does not take any medications regularly  States his blood pressure has also been high  Describes the pain as sharp, sometimes last a few hours and other times minutes  Differential diagnosis considered includes but not limited to ACS, PE, muscular strain, gastritis  Doubt acute aortic pathology  Per my independent interpretation of EKG, normal sinus rhythm heart of 93, narrow QRS, normal axis, normal is reassuring, no STEMI  Blood work is reassuring  Blood pressure has improved  Patient feeling better upon reevaluation  He was updated with all results  Advised cardiology follow-up  Return precautions discussed  ED Course as of 06/29/23 2339   Thu Jun 29, 2023   1936 XR chest 1 view portable  No acute cardiopulmonary processes, per my independent interpretation          Medications   aspirin tablet 325 mg (325 mg Oral Given 6/29/23 1906)   nitroglycerin (NITROSTAT) SL tablet 0 4 mg (0 4 mg Sublingual Given 6/29/23 1907)         Past Medical and Surgical History    Past Medical History:   Diagnosis Date   • Acute kidney injury (nontraumatic) (HCC)    • Alcohol abuse    • Anxiety    • Chronic pain syndrome 02/17/2021   • Colon polyp    • COVID 09/2020   • Diverticulitis    • Erectile dysfunction    • Essential hypertension    • GERD (gastroesophageal reflux disease)    • History of transfusion 2015   • Hypertension    • Lumbar radiculopathy 05/13/2019   • Major depression    • Mild cognitive disorder 05/06/2016   • Mood disorder ([address] ) 05/02/2016   • Multiple nevi    • Obesity (BMI 30-39  9)    • ALEXANDRE (obstructive sleep apnea)    • Psychosis (HCC)    • Snoring    • Suspicious nevus    • Tremor of right hand    • Weakness of right upper extremity        Past Surgical History:   Procedure Laterality Date   • CARDIAC CATHETERIZATION Left 11/16/2022    Procedure: Cardiac Left Heart Cath;  Surgeon: Nicolette Irving MD;  Location: [facility] CARD[facility] C[facility] LAB; Service: Cardiology   • CARDIAC CATHETERIZATION N/A 11/16/2022    Procedure: Cardiac Coronary Angiogram;  Surgeon: Nicolette Irving MD;  Location: [address] [place] [address] CATH LAB; Service: Cardiology   • COLON SURGERY     • COLONOSCOPY N/A 03/09/2019    Procedure: COLONOSCOPY;  Surgeon: Hunter Vaughn MD;  Location: [facility] GI LAB; Service: Gastroenterology   • LIPOMA RESECTION      back   • ID ESOPHAGOGASTRODUODENOSCOPY TRANSORAL DIAGNOSTIC N/A 03/09/2019    Procedure: ESOPHAGOGASTRODUODENOSCOPY (EGD); Surgeon: Hunter Vaughn MD;  Location: [facility] GI LAB;   Service: Gastroenterology   • ID SURGICAL ARTHROSCOPY SHOULDER W/ROTATOR CUFF RPR Right 09/16/2022    Procedure: RIGHT SHOULDER ARTHROSCOPY, REPAIR ROTATOR CUFF, SUPRASPINATUS REPAIR, SUBSCAPULARIS REPAIR, BICEPS TENOTOMY, ACROMIOPLASTY, AND EXTENSIVE DEBRIDEMENT;  Surgeon: Maria D Menjivar DO;  Location: [facility] MAIN OR;  Service: Orthopedics   • REVISION COLOSTOMY     • SHOULDER SURGERY         Family History   Problem Relation Age of Onset   • Heart disease Father    • Stroke Mother    • Aneurysm Sister    • Ovarian cancer Sister    • Drug abuse Sister         overdose   • No Known Problems Sister    • No Known Problems Sister    • No Known Problems Sister    • Drug abuse Brother    • Drug abuse Brother    • No Known Problems Brother    • No Known Problems Daughter    • No Known Problems Daughter    • No Known Problems Daughter    • No Known Problems Daughter    • No Known Problems Son    • No Known Problems Son        Social History     Tobacco Use   • Smoking status: Never     Passive exposure: Current   • Smokeless tobacco: Never   Vaping Use   • Vaping Use: Former   Substance Use Topics   • Alcohol use: Not Currently     Alcohol/week: 1 0 standard drink of alcohol     Types: 1 Cans of beer per week     Comment: Sober 5 moths   • Drug use: Yes     Frequency: 4 0 times per week     Types: Marijuana     Comment: Medical Card QOD -last time 11/15           Allergies    Allergies   Allergen Reactions   • Sulfa Antibiotics GI Intolerance     HA, vomiting, sweating    • Amoxicillin GI Intolerance       Home Medications    Prior to Admission medications    Medication Sig Start Date End Date Taking?  Authorizing Provider   albuterol (ProAir HFA) 90 mcg/act inhaler Inhale 2 puffs every 6 (six) hours as needed for wheezing 5/28/23   OG Paz   ibuprofen (MOTRIN) 600 mg tablet Take 1 tablet (600 mg total) by mouth every 6 (six) hours as needed for mild pain 9/16/22   Yasmeen Smith PA-C   acetaminophen (TYLENOL) 650 mg CR tablet Take 1 tablet (650 mg total) by mouth every 8 (eight) hours as needed for mild pain 5/13/19 6/29/23  Melodie Walter MD   naproxen (Naprosyn) 500 mg tablet Take 1 tablet (500 mg total) by mouth 2 (two) times a day with meals As needed for pain control  Patient not taking: Reported on 5/28/2023 5/2/23 6/29/23  Suzanne Pickett DO           Physical Exam      ED Triage Vitals   Temperature Pulse Respirations Blood Pressure SpO2   06/29/23 1817 06/29/23 1817 06/29/23 1817 06/29/23 1817 06/29/23 1817   98 1 °F (36 7 °C) 95 18 (!) 185/99 99 %      Temp Source Heart Rate Source Patient Position - Orthostatic VS BP Location FiO2 (%)   06/29/23 1817 06/29/23 1817 06/29/23 1817 06/29/23 1817 --   Oral Monitor Sitting Right arm       Pain Score       06/29/23 1855       7               Physical Exam  Constitutional:       General: He is not in acute distress  Appearance: He is not ill-appearing  HENT:      Head: Normocephalic and atraumatic  Nose: Nose normal       Mouth/Throat:      Mouth: Mucous membranes are moist    Eyes:      Extraocular Movements: Extraocular movements intact  Pupils: Pupils are equal, round, and reactive to light  Cardiovascular:      Rate and Rhythm: Normal rate and regular rhythm  Pulmonary:      Effort: No respiratory distress  Breath sounds: Normal breath sounds  No wheezing  Chest:      Chest wall: No tenderness  Abdominal:      General: There is no distension  Palpations: Abdomen is soft  Tenderness: There is no abdominal tenderness  Musculoskeletal:         General: No swelling or deformity  Normal range of motion  Cervical back: Normal range of motion and neck supple  Skin:     General: Skin is warm  Findings: No erythema  Neurological:      Mental Status: He is alert and oriented to person, place, and time  Mental status is at baseline                Diagnostic Results      Labs:    Results Reviewed     Procedure Component Value Units Date/Time    HS Troponin I 2hr [[id number]]  (Normal) Collected: 06/29/23 2115    Lab Status: Final result Specimen: Blood from Arm, Left Updated: 06/29/23 2156     hs TnI 2hr 4 ng/L      Delta 2hr hsTnI 0 ng/L     Comprehensive metabolic panel [[id number]]  (Abnormal) Collected: 06/29/23 1915    Lab Status: Final result Specimen: Blood from Arm, Left Updated: 06/29/23 2009     Sodium 138 mmol/L      Potassium 4 0 mmol/L      Chloride 109 mmol/L      CO2 21 mmol/L      ANION GAP 8 mmol/L      BUN 30 mg/dL      Creatinine 0 83 mg/dL      Glucose 105 mg/dL      Calcium 9 9 mg/dL      AST 19 U/L ALT 16 U/L      Alkaline Phosphatase 69 U/L      Total Protein 8 0 g/dL      Albumin 4 8 g/dL      Total Bilirubin 0 55 mg/dL      eGFR 95 ml/min/1 73sq m     Narrative:      [place] guidelines for Chronic Kidney Disease (CKD):   •  Stage 1 with normal or high GFR (GFR > 90 mL/min/1 73 square meters)  •  Stage 2 Mild CKD (GFR = 60-89 mL/min/1 73 square meters)  •  Stage 3A Moderate CKD (GFR = 45-59 mL/min/1 73 square meters)  •  Stage 3B Moderate CKD (GFR = 30-44 mL/min/1 73 square meters)  •  Stage 4 Severe CKD (GFR = 15-29 mL/min/1 73 square meters)  •  Stage 5 End Stage CKD (GFR <15 mL/min/1 73 square meters)  Note: GFR calculation is accurate only with a steady state creatinine    Magnesium [[id number]]  (Normal) Collected: 06/29/23 1915    Lab Status: Final result Specimen: Blood from Arm, Left Updated: 06/29/23 2009     Magnesium 2 2 mg/dL     HS Troponin I 4hr [[id number]]     Lab Status: No result Specimen: Blood     HS Troponin 0hr (reflex protocol) [[id number]]  (Normal) Collected: 06/29/23 1915    Lab Status: Final result Specimen: Blood from Arm, Left Updated: 06/29/23 1949     hs TnI 0hr 4 ng/L     D-Dimer [[id number]]  (Normal) Collected: 06/29/23 1915    Lab Status: Final result Specimen: Blood from Arm, Left Updated: 06/29/23 1944     D-Dimer, Quant 0 34 ug/ml FEU     Narrative: In the evaluation for possible pulmonary embolism, in the appropriate (Well's Score of 4 or less) patient, the age adjusted d-dimer cutoff for this patient can be calculated as:    Age x 0 01 (in ug/mL) for Age-adjusted D-dimer exclusion threshold for a patient over 50 years      CBC and differential [[id number]] Collected: 06/29/23 1915    Lab Status: Final result Specimen: Blood from Arm, Left Updated: 06/29/23 1922     WBC 7 56 Thousand/uL      RBC 5 43 Million/uL      Hemoglobin 15 9 g/dL      Hematocrit 48 1 %      MCV 89 fL      MCH 29 3 pg      MCHC 33 1 g/dL      RDW 13 7 %      MPV 9 7 fL Platelets 895 Thousands/uL      nRBC 0 /100 WBCs      Neutrophils Relative 74 %      Immat GRANS % 0 %      Lymphocytes Relative 15 %      Monocytes Relative 10 %      Eosinophils Relative 1 %      Basophils Relative 0 %      Neutrophils Absolute 5 59 Thousands/µL      Immature Grans Absolute 0 01 Thousand/uL      Lymphocytes Absolute 1 14 Thousands/µL      Monocytes Absolute 0 74 Thousand/µL      Eosinophils Absolute 0 05 Thousand/µL      Basophils Absolute 0 03 Thousands/µL           All labs reviewed and utilized in the medical decision making process    Radiology:    XR chest 1 view portable    (Results Pending)       All radiology studies independently viewed by me and interpreted by the radiologist     Procedure    Procedures        FINAL IMPRESSION    Final diagnoses:   Chest pain, unspecified type         DISPOSITION    Time reflects when diagnosis was documented in both MDM as applicable and the Disposition within this note     Time User Action Codes Description Comment    6/29/2023 10:29 PM Connie Duarte Add [R07 9] Chest pain, unspecified type       ED Disposition     ED Disposition   Discharge    Condition   Stable    Date/Time   Thu Jun 29, 2023 10:29 PM    Comment   Kelvin Legato discharge to home/self care                 Follow-up Information     Follow up With Specialties Details Why Contact Info Additional [facility] [place] [address] Cardiology Call   [address] [place] [id number]  [address] Cardiology [address] [id number], [address], [address] [facility] [address] Rom Pearson, [address] [facility], Nurse Practitioner Call in 1 day  [address] [facility] 16 [phone]               PATIENT REFERRED TO:    [facility]  [address] [place] [id number] [phone]  Call       Eduarda Holcomb, [address]  [address] [facility] 16  [phone]    Call in 1 day        DISCHARGE MEDICATIONS:    Discharge Medication List as of 6/29/2023 10:32 PM      CONTINUE these medications which have NOT CHANGED    Details   albuterol (ProAir HFA) 90 mcg/act inhaler Inhale 2 puffs every 6 (six) hours as needed for wheezing, Starting Sun 5/28/2023, Normal      ibuprofen (MOTRIN) 600 mg tablet Take 1 tablet (600 mg total) by mouth every 6 (six) hours as needed for mild pain, Starting Fri 9/16/2022, Normal                      Bilal TUNG Shaffer DO        This note was partially completed using voice recognition technology, and was scanned for gross errors; however some errors may still exist  Please contact the author with any questions or requests for clarification        Thomas Shepherd DO  06/29/23 7595

## 2023-06-30 ENCOUNTER — OFFICE VISIT (OUTPATIENT)
Dept: CARDIOLOGY CLINIC | Facility: CLINIC | Age: 58
End: 2023-06-30
Payer: COMMERCIAL

## 2023-06-30 VITALS
RESPIRATION RATE: 16 BRPM | WEIGHT: 261 LBS | BODY MASS INDEX: 36.54 KG/M2 | DIASTOLIC BLOOD PRESSURE: 90 MMHG | HEIGHT: 71 IN | OXYGEN SATURATION: 98 % | SYSTOLIC BLOOD PRESSURE: 162 MMHG | HEART RATE: 94 BPM

## 2023-06-30 DIAGNOSIS — I10 ESSENTIAL HYPERTENSION: Primary | ICD-10-CM

## 2023-06-30 DIAGNOSIS — R07.9 CHEST PAIN, UNSPECIFIED TYPE: ICD-10-CM

## 2023-06-30 LAB
ATRIAL RATE: 88 BPM
ATRIAL RATE: 93 BPM
P AXIS: 60 DEGREES
P AXIS: 65 DEGREES
PR INTERVAL: 166 MS
PR INTERVAL: 170 MS
QRS AXIS: 47 DEGREES
QRS AXIS: 53 DEGREES
QRSD INTERVAL: 82 MS
QRSD INTERVAL: 84 MS
QT INTERVAL: 348 MS
QT INTERVAL: 382 MS
QTC INTERVAL: 432 MS
QTC INTERVAL: 462 MS
T WAVE AXIS: 39 DEGREES
T WAVE AXIS: 45 DEGREES
VENTRICULAR RATE: 88 BPM
VENTRICULAR RATE: 93 BPM

## 2023-06-30 PROCEDURE — 99214 OFFICE O/P EST MOD 30 MIN: CPT

## 2023-06-30 PROCEDURE — 93010 ELECTROCARDIOGRAM REPORT: CPT | Performed by: INTERNAL MEDICINE

## 2023-06-30 RX ORDER — LISINOPRIL 5 MG/1
5 TABLET ORAL DAILY
Qty: 30 TABLET | Refills: 3 | Status: SHIPPED | OUTPATIENT
Start: 2023-06-30

## 2023-06-30 NOTE — PROGRESS NOTES
[facility] [address] [address]  Cardiology Office Note    Kelvin Valentin 59 y o  male MRN: [id number]    06/30/23          Assessment/Plan:  1  Chest pain  • Patient evaluated in ED for chest pain yesterday  • High-sensitivity troponin negative x2, normal sinus rhythm on EKG  • Patient had cardiac catheterization in November 2022 which showed angiographically normal coronary arteries  • Symptom onset noted to be around the time that patient's sister and dog passed suddenly  • Further evaluation with TTE  • Patient denies lower extremity swelling at this time, advised to notify office if patient develops the symptoms  2   Hypertension  • Blood pressure elevated at today's visit and in the ER  • Start lisinopril 5 mg daily  • Nurse visit in 2 weeks for blood pressure check  • Patient advised to maintain log at home of blood pressures  Follow up: 3 months or sooner as needed, Nursing visit for BP check in 2 weeks  All questions and concerns addressed  Patient was advised to report any problems requiring medical attention  1  Essential hypertension  lisinopril (ZESTRIL) 5 mg tablet      2  Chest pain, unspecified type  Echo complete w/ contrast if indicated          HPI: Kelvin Valentin is a 59y o  year old male with PMH of hypertension, Finney's esophagus, chronic pain, ALEXANDRE who presents for ED follow-up  Patient is known to Dr Judit Ponce  Patient was evaluated yesterday at [facility] ED for left-sided chest pain  Blood pressure was elevated during evaluation at 185/99  High-sensitivity troponins were negative x2  ECG had no ischemic changes  Patient received SL nitro and aspirin  Of note, patient underwent a cardiac catheterization in November of 2022 which showed angiographically normal coronary arteries  Patient notes intermittent left sided chest pain for about a week  He denies shortness of breath   Patient recent lost sister and dog about a week ago and he states that symptoms started after these events  He denies any lower extremity edema  Patient is noted to have an elevated BP at today's appointment  Not currently on medication for BP  Patient is able to monitor BP at home  Patient was instructed to call the office or seek medical attention if any significant chest pain, shortness of breath, palpitations, or lower extremity swelling develop  All medications reviewed and patient is tolerating medications without side effects  Family History:   Father- heart disease    Social history:   Patient denies tobacco, significant alcohol, or recreational drug use       EKG- 6/29 Normal Sinus Rhythm        Patient Active Problem List   Diagnosis   • Essential hypertension   • Mild cognitive disorder   • Finney's esophagus with dysplasia   • Class 3 severe obesity due to excess calories without serious comorbidity with body mass index (BMI) of 40 0 to 44 9 in [facility])   • Chronic pain syndrome   • Chronic right-sided low back pain with right-sided sciatica   • Optic nerve edema   • ALEXANDRE (obstructive sleep apnea)   • Erectile dysfunction   • Multiple nevi   • Complete tear of right rotator cuff   • Class II obesity   • Benign localized prostatic hyperplasia with lower urinary tract symptoms (LUTS)   • Encounter to discuss test results       Allergies   Allergen Reactions   • Sulfa Antibiotics GI Intolerance     HA, vomiting, sweating    • Amoxicillin GI Intolerance         Current Outpatient Medications:   •  albuterol (ProAir HFA) 90 mcg/act inhaler, Inhale 2 puffs every 6 (six) hours as needed for wheezing, Disp: 8 5 g, Rfl: 0  •  ibuprofen (MOTRIN) 600 mg tablet, Take 1 tablet (600 mg total) by mouth every 6 (six) hours as needed for mild pain, Disp: 30 tablet, Rfl: 0  •  lisinopril (ZESTRIL) 5 mg tablet, Take 1 tablet (5 mg total) by mouth daily, Disp: 30 tablet, Rfl: 3  No current facility-administered medications for this visit  Past Medical History:   Diagnosis Date   • Acute kidney injury (nontraumatic) (HCC)    • Alcohol abuse    • Anxiety    • Chronic pain syndrome 02/17/2021   • Colon polyp    • COVID 09/2020   • Diverticulitis    • Erectile dysfunction    • Essential hypertension    • GERD (gastroesophageal reflux disease)    • History of transfusion 2015   • Hypertension    • Lumbar radiculopathy 05/13/2019   • Major depression    • Mild cognitive disorder 05/06/2016   • Mood disorder ([address] ) 05/02/2016   • Multiple nevi    • Obesity (BMI 30-39  9)    • ALEXANDRE (obstructive sleep apnea)    • Psychosis (HCC)    • Snoring    • Suspicious nevus    • Tremor of right hand    • Weakness of right upper extremity        Family History   Problem Relation Age of Onset   • Heart disease Father    • Stroke Mother    • Aneurysm Sister    • Ovarian cancer Sister    • Drug abuse Sister         overdose   • No Known Problems Sister    • No Known Problems Sister    • No Known Problems Sister    • Drug abuse Brother    • Drug abuse Brother    • No Known Problems Brother    • No Known Problems Daughter    • No Known Problems Daughter    • No Known Problems Daughter    • No Known Problems Daughter    • No Known Problems Son    • No Known Problems Son        Past Surgical History:   Procedure Laterality Date   • CARDIAC CATHETERIZATION Left 11/16/2022    Procedure: Cardiac Left Heart Cath;  Surgeon: Nicolette Irving MD;  [facility]; Service: Cardiology   • CARDIAC CATHETERIZATION N/A 11/16/2022    Procedure: Cardiac Coronary Angiogram;  Surgeon: Nicolette Irving MD;  Location: [address] [place] [address] CATH LAB; Service: Cardiology   • COLON SURGERY     • COLONOSCOPY N/A 03/09/2019    Procedure: COLONOSCOPY;  Surgeon: Hunter Vaughn MD;  Location: [facility];   Service: Gastroenterology   • LIPOMA RESECTION      back   • [place] ESOPHAGOGASTRODUODENOSCOPY TRANSORAL DIAGNOSTIC N/A 03/09/2019    Procedure: ESOPHAGOGASTRODUODENOSCOPY (EGD); Surgeon: Pearley Najjar, MD;  Location: [facility]; Service: Gastroenterology   • [place] SURGICAL ARTHROSCOPY SHOULDER W/ROTATOR CUFF RPR Right 09/16/2022    Procedure: RIGHT SHOULDER ARTHROSCOPY, REPAIR ROTATOR CUFF, SUPRASPINATUS REPAIR, SUBSCAPULARIS REPAIR, BICEPS TENOTOMY, ACROMIOPLASTY, AND EXTENSIVE DEBRIDEMENT;  Surgeon: Ana Sorto DO;  Location: [facility];  Service: Orthopedics   • REVISION COLOSTOMY     • SHOULDER SURGERY         Social History     Socioeconomic History   • Marital status: /Civil Union     Spouse name: Not on file   • Number of children: Not on file   • Years of education: Not on file   • Highest education level: Not on file   Occupational History   • Occupation: CONSTRUCTION   Tobacco Use   • Smoking status: Never     Passive exposure: Current   • Smokeless tobacco: Never   Vaping Use   • Vaping Use: Former   Substance and Sexual Activity   • Alcohol use: Not Currently     Alcohol/week: 1 0 standard drink of alcohol     Types: 1 Cans of beer per week     Comment: Sober 5 moths   • Drug use: Yes     Frequency: 4 0 times per week     Types: Marijuana     Comment: Medical Card QOD -last time 11/15   • Sexual activity: Yes     Partners: Female   Other Topics Concern   • Not on file   Social History Narrative   • Not on file     Social Determinants of Health     Financial Resource Strain: Not on file   Food Insecurity: Not on file   Transportation Needs: Not on file   Physical Activity: Not on file   Stress: Not on file   Social Connections: Not on file   Intimate Partner Violence: Not on file   Housing Stability: Not on file       Review of symptoms:   Review of Systems   Constitutional: Negative for chills, diaphoresis and fever  Respiratory: Negative for cough, chest tightness and shortness of breath  Cardiovascular: Positive for chest pain  Negative for palpitations and leg swelling     Gastrointestinal: Negative for abdominal distention, blood in stool, nausea "and vomiting  Genitourinary: Negative for difficulty urinating  Musculoskeletal: Negative for arthralgias and back pain  Neurological: Negative for dizziness, syncope, light-headedness and headaches  Psychiatric/Behavioral: Negative for agitation and confusion  The patient is not nervous/anxious  Vitals: /90 (BP Location: Left arm, Patient Position: Sitting, Cuff Size: Standard)   Pulse 94   Resp 16   Ht 5' 11\" (1 803 m)   Wt 118 kg (261 lb)   SpO2 98%   BMI 36 40 kg/m²         Physical Exam:     Physical Exam  Vitals and nursing note reviewed  Constitutional:       General: He is not in acute distress  Appearance: He is well-developed  HENT:      Head: Normocephalic and atraumatic  Eyes:      Conjunctiva/sclera: Conjunctivae normal    Neck:      Vascular: No carotid bruit  Cardiovascular:      Rate and Rhythm: Normal rate and regular rhythm  Heart sounds: Normal heart sounds  No murmur heard  Pulmonary:      Effort: Pulmonary effort is normal  No respiratory distress  Breath sounds: Normal breath sounds  Abdominal:      Palpations: Abdomen is soft  Tenderness: There is no abdominal tenderness  Musculoskeletal:         General: No swelling  Cervical back: Neck supple  Right lower leg: No edema  Left lower leg: No edema  Skin:     General: Skin is warm and dry  Capillary Refill: Capillary refill takes less than 2 seconds  Neurological:      Mental Status: He is alert and oriented to person, place, and time  Psychiatric:         Mood and Affect: Mood normal             Thank you for allowing me to participate in the care and evaluation of your patient  Should you have any questions, please feel free to contact me        "

## 2023-07-01 ENCOUNTER — APPOINTMENT (OUTPATIENT)
Dept: LAB | Facility: CLINIC | Age: 58
End: 2023-07-01
Payer: COMMERCIAL

## 2023-07-01 ENCOUNTER — LAB (OUTPATIENT)
Dept: LAB | Facility: CLINIC | Age: 58
End: 2023-07-01
Payer: COMMERCIAL

## 2023-07-01 DIAGNOSIS — N40.1 BENIGN LOCALIZED PROSTATIC HYPERPLASIA WITH LOWER URINARY TRACT SYMPTOMS (LUTS): ICD-10-CM

## 2023-07-01 DIAGNOSIS — Z12.5 PROSTATE CANCER SCREENING: ICD-10-CM

## 2023-07-01 DIAGNOSIS — Z00.8 HEALTH EXAMINATION IN POPULATION SURVEY: ICD-10-CM

## 2023-07-01 LAB
ANION GAP SERPL CALCULATED.3IONS-SCNC: 7 MMOL/L
APTT PPP: 34 SECONDS (ref 23–37)
BASOPHILS # BLD AUTO: 0.04 THOUSANDS/ÂΜL (ref 0–0.1)
BASOPHILS NFR BLD AUTO: 1 % (ref 0–1)
BUN SERPL-MCNC: 37 MG/DL (ref 5–25)
CALCIUM SERPL-MCNC: 9.1 MG/DL (ref 8.3–10.1)
CHLORIDE SERPL-SCNC: 109 MMOL/L (ref 96–108)
CHOLEST SERPL-MCNC: 159 MG/DL
CO2 SERPL-SCNC: 23 MMOL/L (ref 21–32)
CREAT SERPL-MCNC: 1.05 MG/DL (ref 0.6–1.3)
EOSINOPHIL # BLD AUTO: 0.18 THOUSAND/ÂΜL (ref 0–0.61)
EOSINOPHIL NFR BLD AUTO: 3 % (ref 0–6)
ERYTHROCYTE [DISTWIDTH] IN BLOOD BY AUTOMATED COUNT: 13.8 % (ref 11.6–15.1)
EST. AVERAGE GLUCOSE BLD GHB EST-MCNC: 108 MG/DL
GFR SERPL CREATININE-BSD FRML MDRD: 76 ML/MIN/1.73SQ M
GLUCOSE P FAST SERPL-MCNC: 82 MG/DL (ref 65–99)
HBA1C MFR BLD: 5.4 %
HCT VFR BLD AUTO: 48.6 % (ref 36.5–49.3)
HDLC SERPL-MCNC: 41 MG/DL
HGB BLD-MCNC: 16.1 G/DL (ref 12–17)
IMM GRANULOCYTES # BLD AUTO: 0.02 THOUSAND/UL (ref 0–0.2)
IMM GRANULOCYTES NFR BLD AUTO: 0 % (ref 0–2)
INR PPP: 1.05 (ref 0.84–1.19)
LDLC SERPL CALC-MCNC: 100 MG/DL (ref 0–100)
LYMPHOCYTES # BLD AUTO: 1.32 THOUSANDS/ÂΜL (ref 0.6–4.47)
LYMPHOCYTES NFR BLD AUTO: 19 % (ref 14–44)
MCH RBC QN AUTO: 29.3 PG (ref 26.8–34.3)
MCHC RBC AUTO-ENTMCNC: 33.1 G/DL (ref 31.4–37.4)
MCV RBC AUTO: 89 FL (ref 82–98)
MONOCYTES # BLD AUTO: 0.66 THOUSAND/ÂΜL (ref 0.17–1.22)
MONOCYTES NFR BLD AUTO: 10 % (ref 4–12)
NEUTROPHILS # BLD AUTO: 4.57 THOUSANDS/ÂΜL (ref 1.85–7.62)
NEUTS SEG NFR BLD AUTO: 67 % (ref 43–75)
NONHDLC SERPL-MCNC: 118 MG/DL
NRBC BLD AUTO-RTO: 0 /100 WBCS
PLATELET # BLD AUTO: 295 THOUSANDS/UL (ref 149–390)
PMV BLD AUTO: 10.2 FL (ref 8.9–12.7)
POTASSIUM SERPL-SCNC: 4.7 MMOL/L (ref 3.5–5.3)
PROTHROMBIN TIME: 14 SECONDS (ref 11.6–14.5)
PSA SERPL-MCNC: 4.11 NG/ML (ref 0–4)
RBC # BLD AUTO: 5.49 MILLION/UL (ref 3.88–5.62)
SODIUM SERPL-SCNC: 139 MMOL/L (ref 135–147)
TRIGL SERPL-MCNC: 88 MG/DL
WBC # BLD AUTO: 6.79 THOUSAND/UL (ref 4.31–10.16)

## 2023-07-01 PROCEDURE — 86850 RBC ANTIBODY SCREEN: CPT

## 2023-07-01 PROCEDURE — 83036 HEMOGLOBIN GLYCOSYLATED A1C: CPT

## 2023-07-01 PROCEDURE — G0103 PSA SCREENING: HCPCS

## 2023-07-01 PROCEDURE — 85730 THROMBOPLASTIN TIME PARTIAL: CPT

## 2023-07-01 PROCEDURE — 80048 BASIC METABOLIC PNL TOTAL CA: CPT

## 2023-07-01 PROCEDURE — 87086 URINE CULTURE/COLONY COUNT: CPT

## 2023-07-01 PROCEDURE — 85025 COMPLETE CBC W/AUTO DIFF WBC: CPT

## 2023-07-01 PROCEDURE — 36415 COLL VENOUS BLD VENIPUNCTURE: CPT

## 2023-07-01 PROCEDURE — 85610 PROTHROMBIN TIME: CPT

## 2023-07-01 PROCEDURE — 86900 BLOOD TYPING SEROLOGIC ABO: CPT

## 2023-07-01 PROCEDURE — 86901 BLOOD TYPING SEROLOGIC RH(D): CPT

## 2023-07-01 PROCEDURE — 80061 LIPID PANEL: CPT

## 2023-07-02 LAB
ABO GROUP BLD: NORMAL
BACTERIA UR CULT: NORMAL
BLD GP AB SCN SERPL QL: NEGATIVE
RH BLD: POSITIVE
SPECIMEN EXPIRATION DATE: NORMAL

## 2023-07-03 ENCOUNTER — TELEPHONE (OUTPATIENT)
Dept: UROLOGY | Facility: AMBULATORY SURGERY CENTER | Age: 58
End: 2023-07-03

## 2023-07-03 ENCOUNTER — ANESTHESIA EVENT (OUTPATIENT)
Dept: PERIOP | Facility: HOSPITAL | Age: 58
End: 2023-07-03
Payer: COMMERCIAL

## 2023-07-03 ENCOUNTER — APPOINTMENT (OUTPATIENT)
Dept: LAB | Facility: HOSPITAL | Age: 58
End: 2023-07-03
Payer: COMMERCIAL

## 2023-07-03 PROCEDURE — NC001 PR NO CHARGE: Performed by: UROLOGY

## 2023-07-03 NOTE — H&P
H&P Exam - Urology   Suzanna Redd 59 y.o. male MRN: [id number]  Unit/Bed#:  Encounter: [id number]    Assessment/Plan     Assessment:  59year old man with uncontrolled LUTS, here for transurethral resection of prostate (TURP)      Plan:  Proceed to TURP    History of Present Illness   HPI:  Suzanna Redd is a 59 y.o. male who presents with uncontrolled LUTS, here for TURP. Changes in his health since his last visit include an ER visit for chest pain which was negative for acute coronary syndrome/events. Cleared by cardiology at that time    The following portions of the patient's history were reviewed and updated as appropriate: allergies, current medications, past family history, past medical history, past social history, past surgical history and problem list.  .    Review of Systems   Constitutional: Negative. HENT: Negative. Eyes: Negative. Respiratory: Negative. Cardiovascular: Negative. Gastrointestinal: Negative. Endocrine: Negative. Genitourinary: Positive for difficulty urinating. Musculoskeletal: Negative. Skin: Negative. Allergic/Immunologic: Negative. Neurological: Negative. Hematological: Negative. Psychiatric/Behavioral: Negative. Historical Information   Past Medical History:   Diagnosis Date   • Acute kidney injury (nontraumatic) ([address])    • Alcohol abuse    • Anxiety    • Chronic pain syndrome 02/17/2021   • Colon polyp    • COVID 09/2020   • Diverticulitis    • Erectile dysfunction    • Essential hypertension    • GERD (gastroesophageal reflux disease)    • History of transfusion 2015   • Hypertension    • Lumbar radiculopathy 05/13/2019   • Major depression    • Mild cognitive disorder 05/06/2016   • Mood disorder ([address]) 05/02/2016   • Multiple nevi    • Obesity (BMI 30-39. 9)    • ALEXANDRE (obstructive sleep apnea)    • Psychosis (HCC)    • Snoring    • Suspicious nevus    • Tremor of right hand    • Weakness of right upper extremity      Past Surgical History:   Procedure Laterality Date   • CARDIAC CATHETERIZATION Left 11/16/2022    Procedure: Cardiac Left Heart Cath;  Surgeon: Jarad Mendez MD;  [facility]; Service: Cardiology   • CARDIAC CATHETERIZATION N/A 11/16/2022    Procedure: Cardiac Coronary Angiogram;  Surgeon: Jarad Mendez MD;  Location: [address] [place] [address] CATH LAB; Service: Cardiology   • COLON SURGERY     • COLONOSCOPY N/A 03/09/2019    Procedure: COLONOSCOPY;  Surgeon: Jacquelin Wilson MD;  [facility]; Service: Gastroenterology   • LIPOMA RESECTION      back   • [place] ESOPHAGOGASTRODUODENOSCOPY TRANSORAL DIAGNOSTIC N/A 03/09/2019    Procedure: ESOPHAGOGASTRODUODENOSCOPY (EGD); Surgeon: Jacquelin Wilson MD;  [facility];   Service: Gastroenterology   • [place] SURGICAL ARTHROSCOPY SHOULDER W/ROTATOR CUFF RPR Right 09/16/2022    Procedure: RIGHT SHOULDER ARTHROSCOPY, REPAIR ROTATOR CUFF, SUPRASPINATUS REPAIR, SUBSCAPULARIS REPAIR, BICEPS TENOTOMY, ACROMIOPLASTY, AND EXTENSIVE DEBRIDEMENT;  Surgeon: Laila Rodriguez DO;  [facility];  Service: Orthopedics   • REVISION COLOSTOMY     • SHOULDER SURGERY       Social History   Social History     Substance and Sexual Activity   Alcohol Use Not Currently   • Alcohol/week: 1.0 standard drink of alcohol   • Types: 1 Cans of beer per week    Comment: Sober 5 moths     Social History     Substance and Sexual Activity   Drug Use Yes   • Frequency: 4.0 times per week   • Types: Marijuana    Comment: Medical Card QOD -last time 11/15     Social History     Tobacco Use   Smoking Status Never   • Passive exposure: Current   Smokeless Tobacco Never     E-Cigarette/Vaping   • E-Cigarette Use Former User    • Comments quit 2016      E-Cigarette/Vaping Substances   • Nicotine No    • THC No    • CBD No    • Flavoring No    • Other No    • Unknown No      Family History:   Family History   Problem Relation Age of Onset   • Heart disease Father    • Stroke Mother • Aneurysm Sister    • Ovarian cancer Sister    • Drug abuse Sister         overdose   • No Known Problems Sister    • No Known Problems Sister    • No Known Problems Sister    • Drug abuse Brother    • Drug abuse Brother    • No Known Problems Brother    • No Known Problems Daughter    • No Known Problems Daughter    • No Known Problems Daughter    • No Known Problems Daughter    • No Known Problems Son    • No Known Problems Son        Meds/Allergies   all medications and allergies reviewed  Allergies   Allergen Reactions   • Sulfa Antibiotics GI Intolerance     HA, vomiting, sweating    • Amoxicillin GI Intolerance       Objective   Vitals: There were no vitals taken for this visit. No intake/output data recorded. Invasive Devices     None                 Physical Exam  Vitals reviewed. Constitutional:       General: He is not in acute distress. Appearance: Normal appearance. He is not ill-appearing, toxic-appearing or diaphoretic. HENT:      Head: Normocephalic and atraumatic. Eyes:      General: No scleral icterus. Right eye: No discharge. Left eye: No discharge. Cardiovascular:      Pulses: Normal pulses. Pulmonary:      Effort: Pulmonary effort is normal.   Abdominal:      General: There is no distension. Musculoskeletal:         General: No swelling. Skin:     Coloration: Skin is not jaundiced. Neurological:      General: No focal deficit present. Mental Status: He is alert and oriented to person, place, and time. Cranial Nerves: No cranial nerve deficit. Psychiatric:         Mood and Affect: Mood normal.         Behavior: Behavior normal.         Thought Content: Thought content normal.         Judgment: Judgment normal.         Lab Results: I have personally reviewed pertinent reports. Imaging: I have personally reviewed pertinent reports. EKG, Pathology, and Other Studies: I have personally reviewed pertinent reports.     VTE Prophylaxis: Sequential compression device Alcira Crespo)     Code Status: Prior  Advance Directive and Living Will:      Power of :    POLST:      Counseling / Coordination of Care  Total floor / unit time spent today 15 minutes. Greater than 50% of total time was spent with the patient and / or family counseling and / or coordination of care. A description of the counseling / coordination of care: Review of today's case.

## 2023-07-03 NOTE — DISCHARGE INSTR - AVS FIRST PAGE
Betsey Lai,    Today you had a transurethral resection of prostate (TURP). This went well. I will have your catheter removed in the coming week. You may notice urgency and frequency of urination and blood in the urine after surgery like this. Some squeezing pain in the bladder may also occur postoperatively with some leakage of urine around your catheter (bladder spasms). You only need to worry if your catheter stops draining entirely (no drainage into your drainage bag). If this occurs, please call or come by the office so that your catheter can be irrigated. We will have you taper off any prostate medications that you are currently taking.     We wish you a rapid and uneventful recovery,    Dr Gerard Hess

## 2023-07-03 NOTE — TELEPHONE ENCOUNTER
Pt currently at the lab for pre op testing. Lab wanted to know if he needed another EKG. Per his surgery coordinator Farhan Light he had one on 6/23 and does not need another one.

## 2023-07-06 ENCOUNTER — ANESTHESIA (OUTPATIENT)
Dept: PERIOP | Facility: HOSPITAL | Age: 58
End: 2023-07-06
Payer: COMMERCIAL

## 2023-07-06 ENCOUNTER — TELEPHONE (OUTPATIENT)
Dept: UROLOGY | Facility: CLINIC | Age: 58
End: 2023-07-06

## 2023-07-06 ENCOUNTER — HOSPITAL ENCOUNTER (OUTPATIENT)
Facility: HOSPITAL | Age: 58
Setting detail: OUTPATIENT SURGERY
Discharge: HOME | End: 2023-07-06
Attending: UROLOGY | Admitting: UROLOGY
Payer: COMMERCIAL

## 2023-07-06 VITALS
DIASTOLIC BLOOD PRESSURE: 90 MMHG | WEIGHT: 273.59 LBS | HEART RATE: 66 BPM | RESPIRATION RATE: 16 BRPM | TEMPERATURE: 97 F | BODY MASS INDEX: 38.3 KG/M2 | SYSTOLIC BLOOD PRESSURE: 160 MMHG | OXYGEN SATURATION: 99 % | HEIGHT: 71 IN

## 2023-07-06 DIAGNOSIS — N40.1 BENIGN LOCALIZED PROSTATIC HYPERPLASIA WITH LOWER URINARY TRACT SYMPTOMS (LUTS): Primary | ICD-10-CM

## 2023-07-06 PROCEDURE — 52601 PROSTATECTOMY (TURP): CPT | Performed by: UROLOGY

## 2023-07-06 PROCEDURE — 88305 TISSUE EXAM BY PATHOLOGIST: CPT | Performed by: PATHOLOGY

## 2023-07-06 RX ORDER — ONDANSETRON 2 MG/ML
4 INJECTION INTRAMUSCULAR; INTRAVENOUS EVERY 6 HOURS PRN
Status: CANCELLED | OUTPATIENT
Start: 2023-07-06

## 2023-07-06 RX ORDER — OXYCODONE HYDROCHLORIDE 5 MG/1
5 TABLET ORAL EVERY 4 HOURS PRN
Status: CANCELLED | OUTPATIENT
Start: 2023-07-06

## 2023-07-06 RX ORDER — FENTANYL CITRATE 50 UG/ML
INJECTION, SOLUTION INTRAMUSCULAR; INTRAVENOUS AS NEEDED
Status: DISCONTINUED | OUTPATIENT
Start: 2023-07-06 | End: 2023-07-06

## 2023-07-06 RX ORDER — SODIUM CHLORIDE, SODIUM LACTATE, POTASSIUM CHLORIDE, CALCIUM CHLORIDE 600; 310; 30; 20 MG/100ML; MG/100ML; MG/100ML; MG/100ML
125 INJECTION, SOLUTION INTRAVENOUS CONTINUOUS
Status: CANCELLED | OUTPATIENT
Start: 2023-07-06

## 2023-07-06 RX ORDER — DICLOFENAC POTASSIUM 50 MG/1
50 TABLET, FILM COATED ORAL 2 TIMES DAILY
Qty: 10 TABLET | Refills: 0 | Status: SHIPPED | OUTPATIENT
Start: 2023-07-06 | End: 2023-07-11

## 2023-07-06 RX ORDER — MIDAZOLAM HYDROCHLORIDE 2 MG/2ML
INJECTION, SOLUTION INTRAMUSCULAR; INTRAVENOUS AS NEEDED
Status: DISCONTINUED | OUTPATIENT
Start: 2023-07-06 | End: 2023-07-06

## 2023-07-06 RX ORDER — HYDROMORPHONE HCL/PF 1 MG/ML
0.2 SYRINGE (ML) INJECTION
Status: DISCONTINUED | OUTPATIENT
Start: 2023-07-06 | End: 2023-07-06 | Stop reason: HOSPADM

## 2023-07-06 RX ORDER — ONDANSETRON 2 MG/ML
INJECTION INTRAMUSCULAR; INTRAVENOUS AS NEEDED
Status: DISCONTINUED | OUTPATIENT
Start: 2023-07-06 | End: 2023-07-06

## 2023-07-06 RX ORDER — FENTANYL CITRATE/PF 50 MCG/ML
25 SYRINGE (ML) INJECTION
Status: DISCONTINUED | OUTPATIENT
Start: 2023-07-06 | End: 2023-07-06 | Stop reason: HOSPADM

## 2023-07-06 RX ORDER — NITROFURANTOIN 25; 75 MG/1; MG/1
100 CAPSULE ORAL 2 TIMES DAILY
Qty: 6 CAPSULE | Refills: 0 | Status: SHIPPED | OUTPATIENT
Start: 2023-07-06 | End: 2023-07-09

## 2023-07-06 RX ORDER — MAGNESIUM HYDROXIDE 1200 MG/15ML
LIQUID ORAL AS NEEDED
Status: DISCONTINUED | OUTPATIENT
Start: 2023-07-06 | End: 2023-07-06 | Stop reason: HOSPADM

## 2023-07-06 RX ORDER — ACETAMINOPHEN 500 MG
500 TABLET ORAL EVERY 6 HOURS
Qty: 20 TABLET | Refills: 0 | Status: SHIPPED | OUTPATIENT
Start: 2023-07-06 | End: 2023-07-11

## 2023-07-06 RX ORDER — LIDOCAINE HYDROCHLORIDE 10 MG/ML
INJECTION, SOLUTION EPIDURAL; INFILTRATION; INTRACAUDAL; PERINEURAL AS NEEDED
Status: DISCONTINUED | OUTPATIENT
Start: 2023-07-06 | End: 2023-07-06

## 2023-07-06 RX ORDER — ACETAMINOPHEN 325 MG/1
650 TABLET ORAL EVERY 6 HOURS PRN
Status: CANCELLED | OUTPATIENT
Start: 2023-07-06

## 2023-07-06 RX ORDER — PROPOFOL 10 MG/ML
INJECTION, EMULSION INTRAVENOUS AS NEEDED
Status: DISCONTINUED | OUTPATIENT
Start: 2023-07-06 | End: 2023-07-06

## 2023-07-06 RX ORDER — ALBUTEROL SULFATE 2.5 MG/3ML
2.5 SOLUTION RESPIRATORY (INHALATION) ONCE
Status: COMPLETED | OUTPATIENT
Start: 2023-07-06 | End: 2023-07-06

## 2023-07-06 RX ORDER — PHENAZOPYRIDINE HYDROCHLORIDE 100 MG/1
100 TABLET, FILM COATED ORAL
Status: CANCELLED | OUTPATIENT
Start: 2023-07-06

## 2023-07-06 RX ORDER — OXYCODONE HYDROCHLORIDE 5 MG/1
10 TABLET ORAL EVERY 4 HOURS PRN
Status: CANCELLED | OUTPATIENT
Start: 2023-07-06

## 2023-07-06 RX ORDER — DEXAMETHASONE SODIUM PHOSPHATE 10 MG/ML
INJECTION, SOLUTION INTRAMUSCULAR; INTRAVENOUS AS NEEDED
Status: DISCONTINUED | OUTPATIENT
Start: 2023-07-06 | End: 2023-07-06

## 2023-07-06 RX ORDER — DIPHENHYDRAMINE HCL 25 MG
12.5 TABLET ORAL EVERY 6 HOURS PRN
Status: CANCELLED | OUTPATIENT
Start: 2023-07-06

## 2023-07-06 RX ORDER — SODIUM CHLORIDE 9 MG/ML
INJECTION, SOLUTION INTRAVENOUS AS NEEDED
Status: DISCONTINUED | OUTPATIENT
Start: 2023-07-06 | End: 2023-07-06 | Stop reason: HOSPADM

## 2023-07-06 RX ORDER — SODIUM CHLORIDE, SODIUM LACTATE, POTASSIUM CHLORIDE, CALCIUM CHLORIDE 600; 310; 30; 20 MG/100ML; MG/100ML; MG/100ML; MG/100ML
INJECTION, SOLUTION INTRAVENOUS CONTINUOUS PRN
Status: DISCONTINUED | OUTPATIENT
Start: 2023-07-06 | End: 2023-07-06

## 2023-07-06 RX ORDER — FUROSEMIDE 10 MG/ML
INJECTION INTRAMUSCULAR; INTRAVENOUS AS NEEDED
Status: DISCONTINUED | OUTPATIENT
Start: 2023-07-06 | End: 2023-07-06

## 2023-07-06 RX ORDER — ONDANSETRON 2 MG/ML
4 INJECTION INTRAMUSCULAR; INTRAVENOUS ONCE AS NEEDED
Status: DISCONTINUED | OUTPATIENT
Start: 2023-07-06 | End: 2023-07-06 | Stop reason: HOSPADM

## 2023-07-06 RX ORDER — ALBUTEROL SULFATE 90 UG/1
AEROSOL, METERED RESPIRATORY (INHALATION) AS NEEDED
Status: DISCONTINUED | OUTPATIENT
Start: 2023-07-06 | End: 2023-07-06

## 2023-07-06 RX ADMIN — FENTANYL CITRATE 25 MCG: 50 INJECTION INTRAMUSCULAR; INTRAVENOUS at 08:31

## 2023-07-06 RX ADMIN — ONDANSETRON 4 MG: 2 INJECTION INTRAMUSCULAR; INTRAVENOUS at 08:14

## 2023-07-06 RX ADMIN — FENTANYL CITRATE 50 MCG: 50 INJECTION INTRAMUSCULAR; INTRAVENOUS at 08:18

## 2023-07-06 RX ADMIN — FENTANYL CITRATE 50 MCG: 50 INJECTION INTRAMUSCULAR; INTRAVENOUS at 08:03

## 2023-07-06 RX ADMIN — DEXAMETHASONE SODIUM PHOSPHATE 10 MG: 10 INJECTION, SOLUTION INTRAMUSCULAR; INTRAVENOUS at 08:08

## 2023-07-06 RX ADMIN — FENTANYL CITRATE 50 MCG: 50 INJECTION INTRAMUSCULAR; INTRAVENOUS at 08:15

## 2023-07-06 RX ADMIN — PROPOFOL 50 MG: 10 INJECTION, EMULSION INTRAVENOUS at 08:09

## 2023-07-06 RX ADMIN — ALBUTEROL SULFATE 4 PUFF: 90 AEROSOL, METERED RESPIRATORY (INHALATION) at 08:05

## 2023-07-06 RX ADMIN — FUROSEMIDE 40 MG: 10 INJECTION, SOLUTION INTRAVENOUS at 08:43

## 2023-07-06 RX ADMIN — PROPOFOL 50 MG: 10 INJECTION, EMULSION INTRAVENOUS at 08:04

## 2023-07-06 RX ADMIN — CEFAZOLIN 3000 MG: 1 INJECTION, POWDER, FOR SOLUTION INTRAMUSCULAR; INTRAVENOUS at 07:46

## 2023-07-06 RX ADMIN — PROPOFOL 200 MG: 10 INJECTION, EMULSION INTRAVENOUS at 08:03

## 2023-07-06 RX ADMIN — LIDOCAINE HYDROCHLORIDE 50 MG: 10 INJECTION, SOLUTION EPIDURAL; INFILTRATION; INTRACAUDAL at 08:06

## 2023-07-06 RX ADMIN — ALBUTEROL SULFATE 2.5 MG: 2.5 SOLUTION RESPIRATORY (INHALATION) at 07:47

## 2023-07-06 RX ADMIN — FENTANYL CITRATE 25 MCG: 50 INJECTION INTRAMUSCULAR; INTRAVENOUS at 08:49

## 2023-07-06 RX ADMIN — SODIUM CHLORIDE, SODIUM LACTATE, POTASSIUM CHLORIDE, AND CALCIUM CHLORIDE: .6; .31; .03; .02 INJECTION, SOLUTION INTRAVENOUS at 08:01

## 2023-07-06 RX ADMIN — MIDAZOLAM 2 MG: 1 INJECTION INTRAMUSCULAR; INTRAVENOUS at 07:54

## 2023-07-06 NOTE — TELEPHONE ENCOUNTER
Presently undergoing TURP, please arrange for TOV early in the coming week (please note that the patient was given no guarantee of particular days/dates for mitchell removal).  He will need a visit in 2-3 weeks for uroflow and PVR and pathology review, thank you

## 2023-07-06 NOTE — ANESTHESIA PREPROCEDURE EVALUATION
Procedure:  TRANSURETHRAL RESECTION OF PROSTATE (TURP) (Abdomen)    Relevant Problems   CARDIO   (+) Essential hypertension      /RENAL   (+) Benign localized prostatic hyperplasia with lower urinary tract symptoms (LUTS)      MUSCULOSKELETAL   (+) Chronic right-sided low back pain with right-sided sciatica      NEURO/PSYCH   (+) Chronic pain syndrome   (+) Chronic right-sided low back pain with right-sided sciatica      PULMONARY   (+) ALEXANDRE (obstructive sleep apnea)      Other   (+) Class II obesity     EKG 6/29/23: NRS HR 73    Cardiac Cath  11/22: Angiographically normal coronary arteries. Normal LVEDP. TTE 2021:     LEFT VENTRICLE:  Systolic function was normal. Ejection fraction was estimated to be 60 %. There were no regional wall motion abnormalities. Wall thickness was mildly increased. There was mild concentric hypertrophy. Physical Exam    Airway    Mallampati score: III  TM Distance: >3 FB  Neck ROM: full     Dental       Cardiovascular      Pulmonary      Other Findings        Anesthesia Plan  ASA Score- 3     Anesthesia Type- general with ASA Monitors. Additional Monitors:   Airway Plan: LMA.     Comment: Recent labs personally reviewed:  Lab Results       Component                Value               Date                       WBC                      6.79                07/01/2023                 HGB                      16.1                07/01/2023                 PLT                      295                 07/01/2023            Lab Results       Component                Value               Date                       NA                       136                 01/06/2016                 K                        4.7                 07/01/2023                 BUN                      37 (H)              07/01/2023                 CREATININE               1.05                07/01/2023                 GLUCOSE                  125                 04/02/2016            Lab Results       Component                Value               Date                       PTT                      34                  07/01/2023             Lab Results       Component                Value               Date                       INR                      1.05                07/01/2023              Blood type A      I, Dona Connell MD, have personally seen and evaluated the patient prior to anesthetic care. I have reviewed the pre-anesthetic record, medical history, allergies, medications and any other medical records if appropriate to the anesthetic care. If a CRNA is involved in the case, I have reviewed the CRNA assessment, if present, and agree. I consented the patient for general anesthesia with appropriate airway support as indicated. We reviewed the risks associated including PONV, sore throat, allergic reaction to anesthetics and management plan to address these issues. We discussed the indication and risks associated with any invasive monitors that would be placed. We discussed post op pain control and expectations. We discussed rare complications including hypoxia, perioperative cardiac and neurologic events, and death based on the patient's baseline risk. All questions and concerns were addressed. .       Plan Factors-Exercise tolerance (METS): >4 METS. Chart reviewed. EKG reviewed. Imaging results reviewed. Existing labs reviewed. Patient summary reviewed. Patient is not a current smoker. Patient did not smoke on day of surgery. Obstructive sleep apnea risk education given perioperatively. Induction- intravenous. Postoperative Plan-     Informed Consent- Anesthetic plan and risks discussed with patient. I personally reviewed this patient with the CRNA. Discussed and agreed on the Anesthesia Plan with the CRNA. Rachid Cohn

## 2023-07-06 NOTE — OP NOTE
OPERATIVE REPORT  PATIENT NAME: Reece Pham    :  1965  MRN: [id number]  Pt Location: [facility] 04    SURGERY DATE: 2023    Surgeon(s) and Role:     * Alejandra Carrasquillo MD - Primary    Preop Diagnosis:  Benign localized prostatic hyperplasia with lower urinary tract symptoms (LUTS) [N40.1]    Post-Op Diagnosis Codes: * Benign localized prostatic hyperplasia with lower urinary tract symptoms (LUTS) [N40.1]    Procedure(s):  TRANSURETHRAL RESECTION OF PROSTATE (TURP)    Specimen(s):  ID Type Source Tests Collected by Time Destination   1 : Prostate Chips Tissue Prostate TISSUE EXAM Alejandra Carrasquillo MD 2023 82        Estimated Blood Loss:   Minimal    Drains:  Continuous Bladder Irrigation Three-way (Active)   Number of days: 0       Anesthesia Type:   General    Operative Indications:  Benign localized prostatic hyperplasia with lower urinary tract symptoms (LUTS) [N40.1]      Operative Findings:  Successful TURP, button electrode also used for vaporization, smoothing, and hemostasis after TURP today. Plan for CBI x 1 hour then D/c home with mitchell removal in the coming week    Complications:   None    Procedure and Technique:  INDICATIONS:    The patient is a pleasant 59y.o. year old man whose outpatient urologic workup revealed high grade bladder outlet obstruction with enlargement of the prostate. Indications for Transurethral resection of prostate were discussed with him at length including all risks, benefits, and alternatives of this procedure. Specific risks relevant to this procedure are infection, bleeding, pain, risk of failure of procedure, risk of the need for secondary procedures, risk of urethral or bladder neck stricture or contraction, risk of ejaculatory dysfunction with subsequent fertility issues, risk of tissue regrowth, and risks of anesthesia.  Benefits of this procedure include treatment of urinary retention, removal of vascular prostatic adenomatous tissue, potential decreased risk of recurrent urinary tract infection, and treatment of bladder outlet obstruction. Alternatives to this procedure include suprapubic catheter placement, clean intermittent catheterization, and continued medical therapy. After weighing the above information, the patient wished to proceed with the procedure, and witnessed informed consent was obtained. PROCEDURE SUMMARY:    The patient was brought to the operating room and placed in the supine position for the induction of anesthesia. The patient was then placed in the lithotomy position and prepped and draped using standard sterile technique. All pressure points were carefully padded. A full surgical time-out occurred during which the proper patient, position, and procedure were verified. Intravenous antibiotics were then administered as per the guidelines, and thromboembolism prophylaxis was administered in the form of sequential compression devices. A 27 F resectoscope was inserted into the urethra after gentle dilation of the urethral meatus to 28 F using standard urethral sounds. Cystoscopy revealed high grade outlet obstruction. The location of the ureteral orifices was identified and respected. Resection of the prostatic adenomatous tissue was carried down to the floor of the bladder. It was then extended out laterally towards the prostatic capsule which was visualized but not violated. The distal margin of the resection was the veru montanum which was also left intact. All prostate chips were evacuated from the bladder using an Ellick evacuator, and a clean bladder was confirmed under direct vision. A button electrode was used for addition transurethral vaporization of prostate and smoothing of the resection fossa. Coagulation settings on the button device were used for advanced hemostasis.   Hemostasis was obtained using electrocautery after ensuring that the patient's blood pressure was not artificially low due to general anesthesia. Each ureteral orifice was again visualized and remained intact at the end of the case. The specimen was submitted for permanent pathology. A 24 [place] three-way Cutris catheter was inserted per urethra and was connected to bladder irrigation, which was running clear prior to terminating the procedure. The patient was awakened and then transported to PACU in stable condition. There were no complications and all instrument and sponge counts were correct. A post operative debrief was performed      DISPOSITION:   PACU . SPECIMENS:  Prostate Chips    Plan:  The plan going forward will be for CBI x 1 hour after which he will be discharged home with Curtis catheter to leg bag with the plan for Curtis removal and trial of void in 3-7 days in the office. I was present for the entire procedure. and A qualified resident physician was not available.     Patient Disposition:  PACU         SIGNATURE: Luis Carlos Contreras MD  DATE: July 6, 2023  TIME: 8:46 AM

## 2023-07-06 NOTE — ANESTHESIA POSTPROCEDURE EVALUATION
Post-Op Assessment Note    Encounter Notable Events   Notable Event Outcome Phase Comment   Bronchospasm Resolved Intraoperatively Intraprocedure placed on sevo and given albuterol       BP      Temp     Pulse     Resp      SpO2

## 2023-07-06 NOTE — TELEPHONE ENCOUNTER
Tentatively scheduled patient for void trial. Patient was already scheduled for f/u in 4-6 week time frame, I did reschedule this to 2-3 per Dr. Tarun Bermudez recommendations. I will call patient once d/c to review post-op concerns/appointments.

## 2023-07-06 NOTE — ANESTHESIA POSTPROCEDURE EVALUATION
Post-Op Assessment Note    CV Status:  Stable    Pain management: adequate     Mental Status:  Alert and awake   Hydration Status:  Euvolemic   PONV Controlled:  Controlled   Airway Patency:  Patent      Post Op Vitals Reviewed: Yes      Staff: CRNA         No notable events documented.     /82 (07/06/23 0858)    Temp   97/1   Pulse 82 (07/06/23 0900)   Resp 15 (07/06/23 0858)    SpO2   98

## 2023-07-06 NOTE — INTERVAL H&P NOTE
H&P reviewed. After examining the patient I find no changes in the patients condition since the H&P had been written.     Vitals:    07/06/23 0638   BP: 141/75   Pulse: 81   Resp: 18   Temp: 97.7 °F (36.5 °C)   SpO2: 100%   Proceed to transurethral resection of prostate

## 2023-07-07 NOTE — TELEPHONE ENCOUNTER
Called and spoke to patient. Patient doing well after procedure yesterday. Patient did state there was some blood around the urethra where catheter is inserted. Reviewed that may be common, can use Vaseline or abx ointment to help avoid irritation. Informed patient he may see intermittent bleeding in urine for the next 4-6 weeks while prostate is healing. Educated patient to avoid any heavy lifting or strenuous activity. Patient denied questions regarding catheter care and medications. Did inform medication the abx is to be started the day prior to catheter removal. Patient aware of void trial and post-op appointment. Patient thankful for call and verbalized understanding.

## 2023-07-10 ENCOUNTER — OFFICE VISIT (OUTPATIENT)
Dept: FAMILY MEDICINE CLINIC | Facility: CLINIC | Age: 58
End: 2023-07-10
Payer: COMMERCIAL

## 2023-07-10 VITALS
OXYGEN SATURATION: 98 % | DIASTOLIC BLOOD PRESSURE: 90 MMHG | HEART RATE: 76 BPM | WEIGHT: 276 LBS | BODY MASS INDEX: 38.64 KG/M2 | HEIGHT: 71 IN | TEMPERATURE: 96.4 F | SYSTOLIC BLOOD PRESSURE: 140 MMHG

## 2023-07-10 DIAGNOSIS — Z90.79 S/P TURP: ICD-10-CM

## 2023-07-10 DIAGNOSIS — N40.1 BENIGN LOCALIZED PROSTATIC HYPERPLASIA WITH LOWER URINARY TRACT SYMPTOMS (LUTS): ICD-10-CM

## 2023-07-10 DIAGNOSIS — R07.89 OTHER CHEST PAIN: Primary | ICD-10-CM

## 2023-07-10 DIAGNOSIS — R05.1 ACUTE COUGH: ICD-10-CM

## 2023-07-10 DIAGNOSIS — I10 ESSENTIAL HYPERTENSION: ICD-10-CM

## 2023-07-10 PROCEDURE — 99214 OFFICE O/P EST MOD 30 MIN: CPT | Performed by: PHYSICIAN ASSISTANT

## 2023-07-10 PROCEDURE — 88305 TISSUE EXAM BY PATHOLOGIST: CPT | Performed by: PATHOLOGY

## 2023-07-10 RX ORDER — ALBUTEROL SULFATE 90 UG/1
2 AEROSOL, METERED RESPIRATORY (INHALATION) EVERY 6 HOURS PRN
Qty: 8.5 G | Refills: 0 | Status: SHIPPED | OUTPATIENT
Start: 2023-07-10

## 2023-07-10 NOTE — PROGRESS NOTES
Name: Joel Mann      : 1965      MRN: [id number]  Encounter Provider: Velma uL PA-C  Encounter Date: 7/10/2023   Encounter department: [address] [place] [address]     1. Other chest pain    2. Acute cough  -     albuterol (ProAir HFA) 90 mcg/act inhaler; Inhale 2 puffs every 6 (six) hours as needed for wheezing    3. Benign localized prostatic hyperplasia with lower urinary tract symptoms (LUTS)    4. S/P TURP    5. Essential hypertension    recent hospital encounters reviewed. Echo on . No recurrent cardiac complaints. Recent cath was normal. S/p TURP, continue with urology. No hematuria. Plan for mitchell removal.   BP borderline today, continue to monitor   Continue routine follow ups         Subjective     Pt presents for follow up    Was in ER on  with chest pain. This occurred after his sister and dog passed away in a matter of a few days and he was understandably upset. trops negative. Unremarkable ER course. Had cardiology follow up and there is echo pending. No recurrent cardiac complaints or LE edema    Also had TURP , still has mitchell. No hematuria. Some burning noted. No fevers. Will begin antibiotics and plans to have mitchell removal with voiding trial in a few days. Continues with urology    Review of Systems   Constitutional: Negative for chills, fatigue and fever. HENT: Negative for congestion, ear pain, hearing loss, nosebleeds, postnasal drip, rhinorrhea, sinus pressure, sinus pain, sneezing and sore throat. Eyes: Negative for pain, discharge, itching and visual disturbance. Respiratory: Negative for cough, chest tightness, shortness of breath and wheezing. Cardiovascular: Negative for chest pain, palpitations and leg swelling. Gastrointestinal: Negative for abdominal pain, blood in stool, constipation, diarrhea, nausea and vomiting. Genitourinary: Negative for frequency and urgency.    Neurological: Negative for dizziness, light-headedness and numbness. Past Medical History:   Diagnosis Date   • Acute kidney injury (nontraumatic) ([facility])    • Alcohol abuse    • Anxiety    • Chronic pain syndrome 02/17/2021   • Colon polyp    • COVID 09/2020   • Diverticulitis    • Erectile dysfunction    • Essential hypertension    • GERD (gastroesophageal reflux disease)    • History of transfusion 2015   • Hypertension    • Lumbar radiculopathy 05/13/2019   • Major depression    • Mild cognitive disorder 05/06/2016   • Mood disorder ([address]) 05/02/2016   • Multiple nevi    • Obesity (BMI 30-39. 9)    • ALEXANDRE (obstructive sleep apnea)    • Psychosis (HCC)    • Snoring    • Suspicious nevus    • Tremor of right hand    • Weakness of right upper extremity      Past Surgical History:   Procedure Laterality Date   • CARDIAC CATHETERIZATION Left 11/16/2022    Procedure: Cardiac Left Heart Cath;  Surgeon: Lilly Valentine MD;  Location: [facility]; Service: Cardiology   • CARDIAC CATHETERIZATION N/A 11/16/2022    Procedure: Cardiac Coronary Angiogram;  Surgeon: Lilly Valentine MD;  Location: [address] [place] [address] CATH LAB; Service: Cardiology   • COLON SURGERY     • COLONOSCOPY N/A 03/09/2019    Procedure: COLONOSCOPY;  Surgeon: Truong Miner MD;  Location: [facility] GI [facility]; Service: Gastroenterology   • LIPOMA RESECTION      back   • FL ESOPHAGOGASTRODUODENOSCOPY TRANSORAL DIAGNOSTIC N/A 03/09/2019    Procedure: ESOPHAGOGASTRODUODENOSCOPY (EGD); Surgeon: Truong Miner MD;  Location: [facility];   Service: Gastroenterology   • FL SURGICAL ARTHROSCOPY SHOULDER W/ROTATOR CUFF RPR Right 09/16/2022    Procedure: RIGHT SHOULDER ARTHROSCOPY, REPAIR ROTATOR CUFF, SUPRASPINATUS REPAIR, SUBSCAPULARIS REPAIR, BICEPS TENOTOMY, ACROMIOPLASTY, AND EXTENSIVE DEBRIDEMENT;  Surgeon: Caitlyn Montero DO;  Location: [facility] MAIN [facility];  Service: Orthopedics   • FL TRURL ELECTROSURG RESCJ PROSTATE BLEED COMPLETE N/A 7/6/2023    Procedure: TRANSURETHRAL RESECTION OF PROSTATE (TURP);   Surgeon: Radha Tellez MD;  Location: [facility];  Service: Urology   • REVISION COLOSTOMY     • SHOULDER SURGERY       Family History   Problem Relation Age of Onset   • Heart disease Father    • Stroke Mother    • Aneurysm Sister    • Ovarian cancer Sister    • Drug abuse Sister         overdose   • No Known Problems Sister    • No Known Problems Sister    • No Known Problems Sister    • Drug abuse Brother    • Drug abuse Brother    • No Known Problems Brother    • No Known Problems Daughter    • No Known Problems Daughter    • No Known Problems Daughter    • No Known Problems Daughter    • No Known Problems Son    • No Known Problems Son      Social History     Socioeconomic History   • Marital status: /Civil Union     Spouse name: None   • Number of children: None   • Years of education: None   • Highest education level: None   Occupational History   • Occupation: CONSTRUCTION   Tobacco Use   • Smoking status: Never     Passive exposure: Current   • Smokeless tobacco: Never   Vaping Use   • Vaping Use: Former   Substance and Sexual Activity   • Alcohol use: Not Currently     Alcohol/week: 1.0 standard drink of alcohol     Types: 1 Cans of beer per week     Comment: Sober 5 moths   • Drug use: Yes     Frequency: 4.0 times per week     Types: Marijuana   • Sexual activity: Yes     Partners: Female   Other Topics Concern   • None   Social History Narrative   • None     Social Determinants of Health     Financial Resource Strain: Not on file   Food Insecurity: Not on file   Transportation Needs: Not on file   Physical Activity: Not on file   Stress: Not on file   Social Connections: Not on file   Intimate Partner Violence: Not on file   Housing Stability: Not on file     Current Outpatient Medications on File Prior to Visit   Medication Sig   • acetaminophen (TYLENOL) 500 mg tablet Take 1 tablet (500 mg total) by mouth every 6 (six) hours for 5 days   • diclofenac potassium (CATAFLAM) 50 mg tablet Take 1 tablet (50 mg total) by mouth 2 (two) times a day for 5 days   • lisinopril (ZESTRIL) 5 mg tablet Take 1 tablet (5 mg total) by mouth daily   • [] nitrofurantoin (MACROBID) 100 mg capsule Take 1 capsule (100 mg total) by mouth 2 (two) times a day for 3 days Start the day prior to catheter removal   • [DISCONTINUED] albuterol (ProAir HFA) 90 mcg/act inhaler Inhale 2 puffs every 6 (six) hours as needed for wheezing     Allergies   Allergen Reactions   • Sulfa Antibiotics GI Intolerance     HA, vomiting, sweating    • Amoxicillin GI Intolerance     Immunization History   Administered Date(s) Administered   • COVID-19 PFIZER VACCINE 0.3 ML IM 2021, 2021   • Tdap 2015       Objective     /90 (BP Location: Left arm, Patient Position: Sitting, Cuff Size: Large)   Pulse 76   Temp (!) 96.4 °F (35.8 °C)   Ht 5' 11" (1.803 m)   Wt 125 kg (276 lb)   SpO2 98%   BMI 38.49 kg/m²     Physical Exam  Vitals and nursing note reviewed. Constitutional:       General: He is not in acute distress. Appearance: Normal appearance. HENT:      Head: Normocephalic and atraumatic. Nose: Nose normal.   Eyes:      Pupils: Pupils are equal, round, and reactive to light. Cardiovascular:      Rate and Rhythm: Normal rate and regular rhythm. Heart sounds: Normal heart sounds. No murmur heard. Pulmonary:      Effort: Pulmonary effort is normal. No respiratory distress. Breath sounds: Normal breath sounds. No wheezing, rhonchi or rales. Musculoskeletal:         General: Normal range of motion. Cervical back: Normal range of motion and neck supple. Right lower leg: No edema. Left lower leg: No edema. Skin:     General: Skin is warm and dry. Neurological:      Mental Status: He is alert and oriented to person, place, and time.    Psychiatric:         Mood and Affect: Mood and affect normal.       Korina Norwood PA-C

## 2023-07-12 ENCOUNTER — PROCEDURE VISIT (OUTPATIENT)
Dept: UROLOGY | Facility: CLINIC | Age: 58
End: 2023-07-12
Payer: COMMERCIAL

## 2023-07-12 VITALS — HEIGHT: 71 IN | WEIGHT: 273 LBS | BODY MASS INDEX: 38.22 KG/M2

## 2023-07-12 DIAGNOSIS — N40.1 BENIGN LOCALIZED PROSTATIC HYPERPLASIA WITH LOWER URINARY TRACT SYMPTOMS (LUTS): Primary | ICD-10-CM

## 2023-07-12 LAB — POST-VOID RESIDUAL VOLUME, ML POC: 10 ML

## 2023-07-12 PROCEDURE — 99024 POSTOP FOLLOW-UP VISIT: CPT

## 2023-07-12 PROCEDURE — 51798 US URINE CAPACITY MEASURE: CPT

## 2023-07-12 NOTE — PROGRESS NOTES
7/12/2023    Jake Landers  1965  [id number]    Diagnosis  Chief Complaint    Benign Prostatic Hypertrophy         Patient presents for void trial managed by Dr. Kimberly Jc  -follow up as scheduled for post-op appointment    Procedure Curtis removal/voiding trial    Curtsi catheter removed after deflation of an intact balloon. Patient tolerated well. Encouraged patient to hydrate well and return this afternoon for post void residual.  he knows he may return early if uncomfortable and unable to urinate. Patient agrees to this plan. Patient returned this afternoon. Patient states able to void. Patient voided 0 ml while in office. Bladder ultrasound performed and PVR measured 10ml.     Recent Results (from the past 4 hour(s))   POCT Measure PVR    Collection Time: 07/12/23  2:06 PM   Result Value Ref Range    POST-VOID RESIDUAL VOLUME, ML POC 10 mL           Vitals:    07/12/23 0815   Weight: 124 kg (273 lb)   Height: 5' 11" (1.803 m)           Jake Landers LPN

## 2023-07-14 ENCOUNTER — HOSPITAL ENCOUNTER (OUTPATIENT)
Dept: NON INVASIVE DIAGNOSTICS | Facility: CLINIC | Age: 58
Discharge: HOME | End: 2023-07-14
Payer: COMMERCIAL

## 2023-07-14 VITALS
SYSTOLIC BLOOD PRESSURE: 140 MMHG | HEIGHT: 71 IN | DIASTOLIC BLOOD PRESSURE: 90 MMHG | WEIGHT: 273 LBS | BODY MASS INDEX: 38.22 KG/M2 | HEART RATE: 76 BPM

## 2023-07-14 DIAGNOSIS — R07.9 CHEST PAIN, UNSPECIFIED TYPE: ICD-10-CM

## 2023-07-14 LAB
AORTIC ROOT: 3.3 CM
APICAL FOUR CHAMBER EJECTION FRACTION: 66 %
ASCENDING AORTA: 3.6 CM
E WAVE DECELERATION TIME: 186 MS
FRACTIONAL SHORTENING: 24 % (ref 28–44)
INTERVENTRICULAR SEPTUM IN DIASTOLE (PARASTERNAL SHORT AXIS VIEW): 1.1 CM
INTERVENTRICULAR SEPTUM: 1.1 CM (ref 0.6–1.1)
LAAS-AP2: 16.9 CM2
LAAS-AP4: 20.1 CM2
LEFT ATRIUM SIZE: 3.9 CM
LEFT ATRIUM VOLUME (MOD BIPLANE): 53 ML
LEFT INTERNAL DIMENSION IN SYSTOLE: 3.7 CM (ref 2.1–4)
LEFT VENTRICULAR INTERNAL DIMENSION IN DIASTOLE: 4.9 CM (ref 3.5–6)
LEFT VENTRICULAR POSTERIOR WALL IN END DIASTOLE: 1.1 CM
LEFT VENTRICULAR STROKE VOLUME: 56 ML
LVSV (TEICH): 56 ML
MV E'TISSUE VEL-SEP: 7 CM/S
MV PEAK A VEL: 0.87 M/S
MV PEAK E VEL: 81 CM/S
MV STENOSIS PRESSURE HALF TIME: 54 MS
MV VALVE AREA P 1/2 METHOD: 4.07 CM2
RIGHT ATRIUM AREA SYSTOLE A4C: 17.1 CM2
RIGHT VENTRICLE ID DIMENSION: 3.6 CM
SL CV LEFT ATRIUM LENGTH A2C: 4.9 CM
SL CV LV EF: 55
SL CV PED ECHO LEFT VENTRICLE DIASTOLIC VOLUME (MOD BIPLANE) 2D: 113 ML
SL CV PED ECHO LEFT VENTRICLE SYSTOLIC VOLUME (MOD BIPLANE) 2D: 57 ML
TRICUSPID ANNULAR PLANE SYSTOLIC EXCURSION: 2.1 CM

## 2023-07-14 PROCEDURE — 93306 TTE W/DOPPLER COMPLETE: CPT

## 2023-07-14 PROCEDURE — 93306 TTE W/DOPPLER COMPLETE: CPT | Performed by: INTERNAL MEDICINE

## 2023-07-18 ENCOUNTER — TELEPHONE (OUTPATIENT)
Dept: CARDIOLOGY CLINIC | Facility: CLINIC | Age: 58
End: 2023-07-18

## 2023-07-18 NOTE — TELEPHONE ENCOUNTER
----- Message from [address] [place] [address] Se sent at 7/17/2023 11:55 PM EDT -----  Please let patient know that echo looks good. No significant findings that would be contributing to symptoms. Thank you.

## 2023-07-27 NOTE — PROGRESS NOTES
7/28/2023      Chief Complaint   Patient presents with   • Follow-up     Assessment and Plan    1. BPH with LUTS s/p TURP on 7/6/23  - Doing well with improvement in symptoms. Notes some mild hematuria and dysuria today. Advised this can be normal part of healing after this procedure. Urine culture ordered place to r/o UTI should he have persistent dysuria. - PVR=8 mL   - Uroflow: Peak flow 6 ml/s, Average flow 3.2 ml/s, voided volume 26 mL   - TURP tissue pathology negative for malignancy  - F/u in 4-months. 2. Prostate cancer screening/elevated PSA  - Most recent PSA from 7/1/23 was 4.11, previously 3.8 (11/8/22)  - TURP tissue pathology negative as above  - Obtain repeat PSA prior to next visit. History of Present Illness  Ethan Johnson is a 59 y.o. male here for follow up evaluation of BPH with lower urinary tract symptoms. He failed medication therapy for his symptoms and underwent work-up which revealed trilobar hypertrophy and a 68 g prostate. He is now status post TURP on 7/6/2023. Pathology from surgery was negative for malignancy. He is doing well and reports improvement in urination since the procedure. He states he continues to have some mild hematuria as well as dysuria at times. Denies any significant hematuria with clots. Denies any fever or chills. AUA SYMPTOM SCORE    Flowsheet Row Most Recent Value   AUA SYMPTOM SCORE    How often have you had a sensation of not emptying your bladder completely after you finished urinating? 1   How often have you had to urinate again less than two hours after you finished urinating? 3   How often have you found you stopped and started again several times when you urinate? 3   How often have you found it difficult to postpone urination? 5   How often have you had a weak urinary stream? 1   How often have you had to push or strain to begin urination?  0   How many times did you most typically get up to urinate from the time you went to bed at night until the time you got up in the morning? 3   Quality of Life: If you were to spend the rest of your life with your urinary condition just the way it is now, how would you feel about that? 3   AUA SYMPTOM SCORE 16            Review of Systems   Constitutional: Negative for chills and fever. Respiratory: Negative for shortness of breath. Cardiovascular: Negative for chest pain. Gastrointestinal: Negative for abdominal pain. Genitourinary: Positive for dysuria and hematuria. Negative for difficulty urinating, flank pain, frequency and urgency. Neurological: Negative for dizziness. Past Medical History  Past Medical History:   Diagnosis Date   • Acute kidney injury (nontraumatic) ([facility])    • Alcohol abuse    • Anxiety    • Chronic pain syndrome 02/17/2021   • Colon polyp    • COVID 09/2020   • Diverticulitis    • Erectile dysfunction    • Essential hypertension    • GERD (gastroesophageal reflux disease)    • History of transfusion 2015   • Hypertension    • Lumbar radiculopathy 05/13/2019   • Major depression    • Mild cognitive disorder 05/06/2016   • Mood disorder ([address]) 05/02/2016   • Multiple nevi    • Obesity (BMI 30-39. 9)    • ALEXANDRE (obstructive sleep apnea)    • Psychosis (HCC)    • Snoring    • Suspicious nevus    • Tremor of right hand    • Weakness of right upper extremity        Past Social History  Past Surgical History:   Procedure Laterality Date   • CARDIAC CATHETERIZATION Left 11/16/2022    Procedure: Cardiac Left Heart Cath;  Surgeon: Bry Gilbert MD;  Location: [facility]; Service: Cardiology   • CARDIAC CATHETERIZATION N/A 11/16/2022    Procedure: Cardiac Coronary Angiogram;  Surgeon: Bry Gilbert MD;  Location: [address] [place] [address] CATH LAB; Service: Cardiology   • COLON SURGERY     • COLONOSCOPY N/A 03/09/2019    Procedure: COLONOSCOPY;  Surgeon: Dalila Martell MD;  Location: [facility] GI LAB;   Service: Gastroenterology   • LIPOMA RESECTION      back   • WA ESOPHAGOGASTRODUODENOSCOPY TRANSORAL DIAGNOSTIC N/A 03/09/2019    Procedure: ESOPHAGOGASTRODUODENOSCOPY (EGD); Surgeon: Tushar Bojorquez MD;  Location: [facility]; Service: Gastroenterology   • MA SURGICAL ARTHROSCOPY SHOULDER W/ROTATOR CUFF RPR Right 09/16/2022    Procedure: RIGHT SHOULDER ARTHROSCOPY, REPAIR ROTATOR CUFF, SUPRASPINATUS REPAIR, SUBSCAPULARIS REPAIR, BICEPS TENOTOMY, ACROMIOPLASTY, AND EXTENSIVE DEBRIDEMENT;  Surgeon: Jose Lloyd DO;  Location: [facility];  Service: Orthopedics   • MA TRURL ELECTROSURG RESCJ PROSTATE BLEED COMPLETE N/A 7/6/2023    Procedure: TRANSURETHRAL RESECTION OF PROSTATE (TURP);   Surgeon: Luis A Martinez MD;  Location: [facility];  Service: Urology   • REVISION COLOSTOMY     • SHOULDER SURGERY       Social History     Tobacco Use   Smoking Status Never   • Passive exposure: Current   Smokeless Tobacco Never       Past Family History  Family History   Problem Relation Age of Onset   • Heart disease Father    • Stroke Mother    • Aneurysm Sister    • Ovarian cancer Sister    • Drug abuse Sister         overdose   • No Known Problems Sister    • No Known Problems Sister    • No Known Problems Sister    • Drug abuse Brother    • Drug abuse Brother    • No Known Problems Brother    • No Known Problems Daughter    • No Known Problems Daughter    • No Known Problems Daughter    • No Known Problems Daughter    • No Known Problems Son    • No Known Problems Son        Past Social history  Social History     Socioeconomic History   • Marital status: /Civil Union     Spouse name: Not on file   • Number of children: Not on file   • Years of education: Not on file   • Highest education level: Not on file   Occupational History   • Occupation: CONSTRUCTION   Tobacco Use   • Smoking status: Never     Passive exposure: Current   • Smokeless tobacco: Never   Vaping Use   • Vaping Use: Former   Substance and Sexual Activity   • Alcohol use: Not Currently     Alcohol/week: 1.0 standard drink of alcohol     Types: 1 Cans of beer per week     Comment: Sober 5 moths   • Drug use: Yes     Frequency: 4.0 times per week     Types: Marijuana   • Sexual activity: Yes     Partners: Female   Other Topics Concern   • Not on file   Social History Narrative   • Not on file     Social Determinants of Health     Financial Resource Strain: Not on file   Food Insecurity: Not on file   Transportation Needs: Not on file   Physical Activity: Not on file   Stress: Not on file   Social Connections: Not on file   Intimate Partner Violence: Not on file   Housing Stability: Not on file       Current Medications  Current Outpatient Medications   Medication Sig Dispense Refill   • albuterol (ProAir HFA) 90 mcg/act inhaler Inhale 2 puffs every 6 (six) hours as needed for wheezing 8.5 g 0   • diclofenac potassium (CATAFLAM) 50 mg tablet Take 1 tablet (50 mg total) by mouth 2 (two) times a day for 5 days 10 tablet 0   • lisinopril (ZESTRIL) 5 mg tablet Take 1 tablet (5 mg total) by mouth daily 30 tablet 3     No current facility-administered medications for this visit. Allergies  Allergies   Allergen Reactions   • Sulfa Antibiotics GI Intolerance     HA, vomiting, sweating    • Amoxicillin GI Intolerance         The following portions of the patient's history were reviewed and updated as appropriate: allergies, current medications, past medical history, past social history, past surgical history and problem list.      Vitals  Vitals:    07/28/23 1043   BP: 166/98   Pulse: 64   SpO2: 99%   Weight: 121 kg (266 lb)   Height: 5' 11" (1.803 m)           Physical Exam  Physical Exam  Constitutional:       Appearance: Normal appearance. HENT:      Head: Normocephalic and atraumatic. Right Ear: External ear normal.      Left Ear: External ear normal.      Nose: Nose normal.   Eyes:      General: No scleral icterus. Conjunctiva/sclera: Conjunctivae normal.   Cardiovascular:      Pulses: Normal pulses. Pulmonary:      Effort: Pulmonary effort is normal.   Musculoskeletal:         General: Normal range of motion. Cervical back: Normal range of motion. Neurological:      General: No focal deficit present. Mental Status: He is alert and oriented to person, place, and time. Psychiatric:         Mood and Affect: Mood normal.         Behavior: Behavior normal.         Thought Content:  Thought content normal.         Judgment: Judgment normal.           Results  Recent Results (from the past 1 hour(s))   POCT Measure PVR    Collection Time: 07/28/23 10:49 AM   Result Value Ref Range    POST-VOID RESIDUAL VOLUME, ML POC 8 mL   ]  Lab Results   Component Value Date    PSA 4.11 (H) 07/01/2023    PSA 3.8 11/08/2022     Lab Results   Component Value Date    GLUCOSE 125 04/02/2016    CALCIUM 9.1 07/01/2023     01/06/2016    K 4.7 07/01/2023    CO2 23 07/01/2023     (H) 07/01/2023    BUN 37 (H) 07/01/2023    CREATININE 1.05 07/01/2023     Lab Results   Component Value Date    WBC 6.79 07/01/2023    HGB 16.1 07/01/2023    HCT 48.6 07/01/2023    MCV 89 07/01/2023     07/01/2023           Orders  Orders Placed This Encounter   Procedures   • Urine culture     Standing Status:   Future     Standing Expiration Date:   7/28/2024     Order Specific Question:   [facility]     Answer:   Immediate   • POCT Measure PVR       Elias Ortega

## 2023-07-28 ENCOUNTER — OFFICE VISIT (OUTPATIENT)
Dept: UROLOGY | Facility: CLINIC | Age: 58
End: 2023-07-28
Payer: COMMERCIAL

## 2023-07-28 VITALS
BODY MASS INDEX: 37.24 KG/M2 | WEIGHT: 266 LBS | SYSTOLIC BLOOD PRESSURE: 166 MMHG | OXYGEN SATURATION: 99 % | DIASTOLIC BLOOD PRESSURE: 98 MMHG | HEIGHT: 71 IN | HEART RATE: 64 BPM

## 2023-07-28 DIAGNOSIS — R97.20 ELEVATED PSA: ICD-10-CM

## 2023-07-28 DIAGNOSIS — R30.0 DYSURIA: ICD-10-CM

## 2023-07-28 DIAGNOSIS — N40.1 BENIGN LOCALIZED PROSTATIC HYPERPLASIA WITH LOWER URINARY TRACT SYMPTOMS (LUTS): Primary | ICD-10-CM

## 2023-07-28 LAB — POST-VOID RESIDUAL VOLUME, ML POC: 8 ML

## 2023-07-28 PROCEDURE — 51798 US URINE CAPACITY MEASURE: CPT | Performed by: PHYSICIAN ASSISTANT

## 2023-07-28 PROCEDURE — 99024 POSTOP FOLLOW-UP VISIT: CPT | Performed by: PHYSICIAN ASSISTANT

## 2023-08-01 DIAGNOSIS — R05.1 ACUTE COUGH: ICD-10-CM

## 2023-08-01 RX ORDER — ALBUTEROL SULFATE 90 UG/1
AEROSOL, METERED RESPIRATORY (INHALATION)
Qty: 18 G | Refills: 2 | Status: SHIPPED | OUTPATIENT
Start: 2023-08-01

## 2023-08-09 ENCOUNTER — APPOINTMENT (OUTPATIENT)
Dept: LAB | Facility: HOSPITAL | Age: 58
End: 2023-08-09
Payer: COMMERCIAL

## 2023-08-09 DIAGNOSIS — R30.0 DYSURIA: ICD-10-CM

## 2023-08-09 PROCEDURE — 87086 URINE CULTURE/COLONY COUNT: CPT

## 2023-08-10 ENCOUNTER — TELEPHONE (OUTPATIENT)
Dept: UROLOGY | Facility: CLINIC | Age: 58
End: 2023-08-10

## 2023-08-10 LAB — BACTERIA UR CULT: NORMAL

## 2023-08-10 NOTE — TELEPHONE ENCOUNTER
----- Message from Valerio Godfrey PA-C sent at 8/10/2023 10:52 AM EDT -----  Urine culture negative

## 2023-08-28 ENCOUNTER — TELEPHONE (OUTPATIENT)
Age: 58
End: 2023-08-28

## 2023-08-28 NOTE — TELEPHONE ENCOUNTER
Spoke to wife per communication. She states he had a TURP one month ago and is complaining of frequent trips to the bathroom. No dysuria. No fever or pain. Would like to see a [address] [facility] [address] [address] provider due to recent surgery. ER/Urgent care precautions given.

## 2023-08-29 ENCOUNTER — TELEPHONE (OUTPATIENT)
Dept: UROLOGY | Facility: CLINIC | Age: 58
End: 2023-08-29

## 2023-08-29 NOTE — TELEPHONE ENCOUNTER
Called and left VM for the PT. PT is to have a Uroflow with appointment and was calling the PT to make sure not to Urinate for at least 1 and a half to 2 hours. Office number for the PT if any questions.

## 2023-09-28 ENCOUNTER — HOSPITAL ENCOUNTER (EMERGENCY)
Facility: HOSPITAL | Age: 58
Discharge: HOME | End: 2023-09-28
Attending: EMERGENCY MEDICINE
Payer: COMMERCIAL

## 2023-09-28 ENCOUNTER — APPOINTMENT (EMERGENCY)
Dept: RADIOLOGY | Facility: HOSPITAL | Age: 58
End: 2023-09-28
Payer: COMMERCIAL

## 2023-09-28 VITALS
RESPIRATION RATE: 17 BRPM | SYSTOLIC BLOOD PRESSURE: 131 MMHG | TEMPERATURE: 97.6 F | HEART RATE: 79 BPM | OXYGEN SATURATION: 96 % | DIASTOLIC BLOOD PRESSURE: 83 MMHG

## 2023-09-28 DIAGNOSIS — R07.9 CHEST PAIN: Primary | ICD-10-CM

## 2023-09-28 LAB
ALBUMIN SERPL BCP-MCNC: 4.6 G/DL (ref 3.5–5)
ALP SERPL-CCNC: 67 U/L (ref 34–104)
ALT SERPL W P-5'-P-CCNC: 18 U/L (ref 7–52)
ANION GAP SERPL CALCULATED.3IONS-SCNC: 6 MMOL/L
AST SERPL W P-5'-P-CCNC: 15 U/L (ref 13–39)
BASOPHILS # BLD AUTO: 0.04 THOUSANDS/ÂΜL (ref 0–0.1)
BASOPHILS NFR BLD AUTO: 1 % (ref 0–1)
BILIRUB SERPL-MCNC: 0.45 MG/DL (ref 0.2–1)
BUN SERPL-MCNC: 23 MG/DL (ref 5–25)
CALCIUM SERPL-MCNC: 9.7 MG/DL (ref 8.4–10.2)
CARDIAC TROPONIN I PNL SERPL HS: 2 NG/L
CHLORIDE SERPL-SCNC: 107 MMOL/L (ref 96–108)
CO2 SERPL-SCNC: 23 MMOL/L (ref 21–32)
CREAT SERPL-MCNC: 0.87 MG/DL (ref 0.6–1.3)
EOSINOPHIL # BLD AUTO: 0.18 THOUSAND/ÂΜL (ref 0–0.61)
EOSINOPHIL NFR BLD AUTO: 2 % (ref 0–6)
ERYTHROCYTE [DISTWIDTH] IN BLOOD BY AUTOMATED COUNT: 13.3 % (ref 11.6–15.1)
GFR SERPL CREATININE-BSD FRML MDRD: 93 ML/MIN/1.73SQ M
GLUCOSE SERPL-MCNC: 99 MG/DL (ref 65–140)
HCT VFR BLD AUTO: 46.6 % (ref 36.5–49.3)
HGB BLD-MCNC: 15.2 G/DL (ref 12–17)
IMM GRANULOCYTES # BLD AUTO: 0.02 THOUSAND/UL (ref 0–0.2)
IMM GRANULOCYTES NFR BLD AUTO: 0 % (ref 0–2)
LYMPHOCYTES # BLD AUTO: 1.87 THOUSANDS/ÂΜL (ref 0.6–4.47)
LYMPHOCYTES NFR BLD AUTO: 23 % (ref 14–44)
MCH RBC QN AUTO: 29.1 PG (ref 26.8–34.3)
MCHC RBC AUTO-ENTMCNC: 32.6 G/DL (ref 31.4–37.4)
MCV RBC AUTO: 89 FL (ref 82–98)
MONOCYTES # BLD AUTO: 0.8 THOUSAND/ÂΜL (ref 0.17–1.22)
MONOCYTES NFR BLD AUTO: 10 % (ref 4–12)
NEUTROPHILS # BLD AUTO: 5.12 THOUSANDS/ÂΜL (ref 1.85–7.62)
NEUTS SEG NFR BLD AUTO: 64 % (ref 43–75)
NRBC BLD AUTO-RTO: 0 /100 WBCS
PLATELET # BLD AUTO: 326 THOUSANDS/UL (ref 149–390)
PMV BLD AUTO: 9.3 FL (ref 8.9–12.7)
POTASSIUM SERPL-SCNC: 4.2 MMOL/L (ref 3.5–5.3)
PROT SERPL-MCNC: 7.9 G/DL (ref 6.4–8.4)
RBC # BLD AUTO: 5.22 MILLION/UL (ref 3.88–5.62)
SODIUM SERPL-SCNC: 136 MMOL/L (ref 135–147)
WBC # BLD AUTO: 8.03 THOUSAND/UL (ref 4.31–10.16)

## 2023-09-28 PROCEDURE — 84484 ASSAY OF TROPONIN QUANT: CPT

## 2023-09-28 PROCEDURE — 71045 X-RAY EXAM CHEST 1 VIEW: CPT

## 2023-09-28 PROCEDURE — 99285 EMERGENCY DEPT VISIT HI MDM: CPT | Performed by: EMERGENCY MEDICINE

## 2023-09-28 PROCEDURE — 80053 COMPREHEN METABOLIC PANEL: CPT

## 2023-09-28 PROCEDURE — 99285 EMERGENCY DEPT VISIT HI MDM: CPT

## 2023-09-28 PROCEDURE — 85025 COMPLETE CBC W/AUTO DIFF WBC: CPT

## 2023-09-28 PROCEDURE — 36415 COLL VENOUS BLD VENIPUNCTURE: CPT

## 2023-09-28 PROCEDURE — 96374 THER/PROPH/DIAG INJ IV PUSH: CPT

## 2023-09-28 RX ORDER — ACETAMINOPHEN 325 MG/1
975 TABLET ORAL ONCE
Status: COMPLETED | OUTPATIENT
Start: 2023-09-28 | End: 2023-09-28

## 2023-09-28 RX ORDER — MAGNESIUM HYDROXIDE/ALUMINUM HYDROXICE/SIMETHICONE 120; 1200; 1200 MG/30ML; MG/30ML; MG/30ML
30 SUSPENSION ORAL ONCE
Status: COMPLETED | OUTPATIENT
Start: 2023-09-28 | End: 2023-09-28

## 2023-09-28 RX ORDER — KETOROLAC TROMETHAMINE 30 MG/ML
15 INJECTION, SOLUTION INTRAMUSCULAR; INTRAVENOUS ONCE
Status: COMPLETED | OUTPATIENT
Start: 2023-09-28 | End: 2023-09-28

## 2023-09-28 RX ORDER — METHOCARBAMOL 500 MG/1
1000 TABLET, FILM COATED ORAL ONCE
Status: COMPLETED | OUTPATIENT
Start: 2023-09-28 | End: 2023-09-28

## 2023-09-28 RX ADMIN — KETOROLAC TROMETHAMINE 15 MG: 30 INJECTION INTRAMUSCULAR; INTRAVENOUS at 22:21

## 2023-09-28 RX ADMIN — ACETAMINOPHEN 975 MG: 325 TABLET, FILM COATED ORAL at 22:19

## 2023-09-28 RX ADMIN — METHOCARBAMOL 1000 MG: 500 TABLET ORAL at 22:19

## 2023-09-28 RX ADMIN — ALUMINUM HYDROXIDE, MAGNESIUM HYDROXIDE, AND DIMETHICONE 30 ML: 200; 20; 200 SUSPENSION ORAL at 21:42

## 2023-09-29 NOTE — ED NOTES
Alert oriented x4 in no acute distress discharged to home with written instructions     Ina Teixeira RN  09/28/23 8032

## 2023-09-29 NOTE — ED PROVIDER NOTES
History  Chief Complaint   Patient presents with   • Chest Pain     Pt reports L sided chest pain described as a "stabbing" pain since 0700. States intermittent in nature. Pt denies radiating pains. Pt denies cardiac hx. Denies shortness of breath. Advil taken approx 2 hrs PTA. 55-year-old male history of hypertension presenting with chest pain. Patient reports intermittent left-sided sharp chest pain throughout the day today. Reports pain will begin insidiously and last for approximate 20 minutes before resolving and recurring similar fashion within about an hour. Denies any association with exertion, diaphoresis, nausea/vomiting, lightheadedness/syncope, radiation. Reports history of similar. Denies abdominal pain. Denies any other complaints. Chart reviewed. Past Medical History:  No date: Acute kidney injury (nontraumatic) (HCC)  No date: Alcohol abuse  No date: Anxiety  02/17/2021: Chronic pain syndrome  No date: Colon polyp  09/2020: COVID  No date: Diverticulitis  No date: Erectile dysfunction  No date: Essential hypertension  No date: GERD (gastroesophageal reflux disease)  2015: History of transfusion  No date: Hypertension  05/13/2019: Lumbar radiculopathy  No date: Major depression  05/06/2016: Mild cognitive disorder  05/02/2016: Mood disorder ([address])  No date: Multiple nevi  No date: Obesity (BMI 30-39. 9)  No date: ALEXANDRE (obstructive sleep apnea)  No date: Psychosis ([address])  No date: Snoring  No date: Suspicious nevus  No date: Tremor of right hand  No date: Weakness of right upper extremity  Family History: non-contributory  Social History            Prior to Admission Medications   Prescriptions Last Dose Informant Patient Reported? Taking?    albuterol (PROVENTIL HFA,VENTOLIN HFA) 90 mcg/act inhaler   No No   Sig: INHALE 2 PUFFS EVERY 6 HOURS AS NEEDED FOR WHEEZING   diclofenac potassium (CATAFLAM) 50 mg tablet  Self No No   Sig: Take 1 tablet (50 mg total) by mouth 2 (two) times a day for 5 days   lisinopril (ZESTRIL) 5 mg tablet  Self No No   Sig: Take 1 tablet (5 mg total) by mouth daily      Facility-Administered Medications: None       Past Medical History:   Diagnosis Date   • Acute kidney injury (nontraumatic) ([address])    • Alcohol abuse    • Anxiety    • Chronic pain syndrome 02/17/2021   • Colon polyp    • COVID 09/2020   • Diverticulitis    • Erectile dysfunction    • Essential hypertension    • GERD (gastroesophageal reflux disease)    • History of transfusion 2015   • Hypertension    • Lumbar radiculopathy 05/13/2019   • Major depression    • Mild cognitive disorder 05/06/2016   • Mood disorder ([address]) 05/02/2016   • Multiple nevi    • Obesity (BMI 30-39. 9)    • ALEXANDRE (obstructive sleep apnea)    • Psychosis (HCC)    • Snoring    • Suspicious nevus    • Tremor of right hand    • Weakness of right upper extremity        Past Surgical History:   Procedure Laterality Date   • CARDIAC CATHETERIZATION Left 11/16/2022    Procedure: Cardiac Left Heart Cath;  Surgeon: Ana M Ravi MD;  Location: [facility]; Service: Cardiology   • CARDIAC CATHETERIZATION N/A 11/16/2022    Procedure: Cardiac Coronary Angiogram;  Surgeon: Ana M Ravi MD;  Location: [address] [place] [address] CATH LAB; Service: Cardiology   • COLON SURGERY     • COLONOSCOPY N/A 03/09/2019    Procedure: COLONOSCOPY;  Surgeon: Tita Ovalle MD;  Location: [facility]; Service: Gastroenterology   • LIPOMA RESECTION      back   • VA ESOPHAGOGASTRODUODENOSCOPY TRANSORAL DIAGNOSTIC N/A 03/09/2019    Procedure: ESOPHAGOGASTRODUODENOSCOPY (EGD); Surgeon: Tita Ovalle MD;  Location: [facility];   Service: Gastroenterology   • VA SURGICAL ARTHROSCOPY SHOULDER W/ROTATOR CUFF RPR Right 09/16/2022    Procedure: RIGHT SHOULDER ARTHROSCOPY, REPAIR ROTATOR CUFF, SUPRASPINATUS REPAIR, SUBSCAPULARIS REPAIR, BICEPS TENOTOMY, ACROMIOPLASTY, AND EXTENSIVE DEBRIDEMENT;  Surgeon: Sueanne Duane, DO;  Location: [facility];  Service: Orthopedics • [facility] TRURL ELECTROSURG RESCJ PROSTATE BLEED COMPLETE N/A 7/6/2023    Procedure: TRANSURETHRAL RESECTION OF PROSTATE (TURP); Surgeon: Lynn Lowery MD;  Location: [facility];  Service: Urology   • REVISION COLOSTOMY     • SHOULDER SURGERY         Family History   Problem Relation Age of Onset   • Heart disease Father    • Stroke Mother    • Aneurysm Sister    • Ovarian cancer Sister    • Drug abuse Sister         overdose   • No Known Problems Sister    • No Known Problems Sister    • No Known Problems Sister    • Drug abuse Brother    • Drug abuse Brother    • No Known Problems Brother    • No Known Problems Daughter    • No Known Problems Daughter    • No Known Problems Daughter    • No Known Problems Daughter    • No Known Problems Son    • No Known Problems Son      I have reviewed and agree with the history as documented. E-Cigarette/Vaping   • E-Cigarette Use Former User    • Comments quit 2016      E-Cigarette/Vaping Substances   • Nicotine No    • THC No    • CBD No    • Flavoring No    • Other No    • Unknown No      Social History     Tobacco Use   • Smoking status: Never     Passive exposure: Current   • Smokeless tobacco: Never   Vaping Use   • Vaping Use: Former   Substance Use Topics   • Alcohol use: Not Currently     Alcohol/week: 1.0 standard drink of alcohol     Types: 1 Cans of beer per week     Comment: Sober 5 moths   • Drug use: Yes     Frequency: 4.0 times per week     Types: Marijuana       Review of Systems   Constitutional: Negative for appetite change, chills, diaphoresis, fever and unexpected weight change. HENT: Negative for congestion and rhinorrhea. Eyes: Negative for photophobia and visual disturbance. Respiratory: Negative for cough, chest tightness and shortness of breath. Cardiovascular: Positive for chest pain. Negative for palpitations and leg swelling.    Gastrointestinal: Negative for abdominal distention, abdominal pain, blood in stool, constipation, diarrhea, nausea and vomiting. Genitourinary: Negative for dysuria and hematuria. Musculoskeletal: Negative for back pain, joint swelling, neck pain and neck stiffness. Skin: Negative for color change, pallor, rash and wound. Neurological: Negative for dizziness, syncope, weakness, light-headedness and headaches. Psychiatric/Behavioral: Negative for agitation. All other systems reviewed and are negative. Physical Exam  Physical Exam  Vitals and nursing note reviewed. Constitutional:       General: He is not in acute distress. Appearance: Normal appearance. He is well-developed. He is not ill-appearing, toxic-appearing or diaphoretic. HENT:      Head: Normocephalic and atraumatic. Nose: Nose normal. No congestion or rhinorrhea. Mouth/Throat:      Mouth: Mucous membranes are moist.      Pharynx: Oropharynx is clear. No oropharyngeal exudate or posterior oropharyngeal erythema. Eyes:      General: No scleral icterus. Right eye: No discharge. Left eye: No discharge. Extraocular Movements: Extraocular movements intact. Conjunctiva/sclera: Conjunctivae normal.      Pupils: Pupils are equal, round, and reactive to light. Neck:      Vascular: No JVD. Trachea: No tracheal deviation. Comments: Supple. Normal range of motion. Cardiovascular:      Rate and Rhythm: Normal rate and regular rhythm. Heart sounds: Normal heart sounds. No murmur heard. No friction rub. No gallop. Comments: Normal rate and regular rhythm  Pulmonary:      Effort: Pulmonary effort is normal. No respiratory distress. Breath sounds: Normal breath sounds. No stridor. No wheezing or rales. Comments: Clear to auscultation bilaterally  Chest:      Chest wall: No tenderness. Abdominal:      General: Bowel sounds are normal. There is no distension. Palpations: Abdomen is soft. Tenderness: There is no abdominal tenderness.  There is no right CVA tenderness, left CVA tenderness, guarding or rebound. Comments: Soft, nontender, nondistended. Normal bowel sounds throughout   Musculoskeletal:         General: No swelling, tenderness, deformity or signs of injury. Normal range of motion. Cervical back: Normal range of motion and neck supple. No rigidity. No muscular tenderness. Right lower leg: No edema. Left lower leg: No edema. Lymphadenopathy:      Cervical: No cervical adenopathy. Skin:     General: Skin is warm and dry. Coloration: Skin is not pale. Findings: No erythema or rash. Neurological:      General: No focal deficit present. Mental Status: He is alert. Mental status is at baseline. Sensory: No sensory deficit. Motor: No weakness or abnormal muscle tone. Coordination: Coordination normal.      Gait: Gait normal.      Comments: Alert. Strength and sensation grossly intact. Ambulatory without difficulty at baseline. Psychiatric:         Behavior: Behavior normal.         Thought Content:  Thought content normal.         Vital Signs  ED Triage Vitals   Temperature Pulse Respirations Blood Pressure SpO2   09/28/23 2102 09/28/23 2101 09/28/23 2101 09/28/23 2101 09/28/23 2101   97.6 °F (36.4 °C) 86 18 155/96 98 %      Temp Source Heart Rate Source Patient Position - Orthostatic VS BP Location FiO2 (%)   09/28/23 2102 09/28/23 2101 09/28/23 2101 09/28/23 2101 --   Temporal Monitor Sitting Left arm       Pain Score       09/28/23 2101       8           Vitals:    09/28/23 2101 09/28/23 2200 09/28/23 2300   BP: 155/96 142/78 131/83   Pulse: 86 80 79   Patient Position - Orthostatic VS: Sitting Lying          Visual Acuity      ED Medications  Medications   aluminum-magnesium hydroxide-simethicone (MAALOX) oral suspension 30 mL (30 mL Oral Given 9/28/23 2142)   acetaminophen (TYLENOL) tablet 975 mg (975 mg Oral Given 9/28/23 2219)   ketorolac (TORADOL) injection 15 mg (15 mg Intravenous Given 9/28/23 2221) methocarbamol (ROBAXIN) tablet 1,000 mg (1,000 mg Oral Given 9/28/23 2219)       Diagnostic Studies  Results Reviewed     Procedure Component Value Units Date/Time    HS Troponin 0hr (reflex protocol) [[id number]]  (Normal) Collected: 09/28/23 2114    Lab Status: Final result Specimen: Blood from Arm, Left Updated: 09/28/23 2155     hs TnI 0hr 2 ng/L     Comprehensive metabolic panel [[id number]] Collected: 09/28/23 2114    Lab Status: Final result Specimen: Blood from Arm, Left Updated: 09/28/23 2151     Sodium 136 mmol/L      Potassium 4.2 mmol/L      Chloride 107 mmol/L      CO2 23 mmol/L      ANION GAP 6 mmol/L      BUN 23 mg/dL      Creatinine 0.87 mg/dL      Glucose 99 mg/dL      Calcium 9.7 mg/dL      AST 15 U/L      ALT 18 U/L      Alkaline Phosphatase 67 U/L      Total Protein 7.9 g/dL      Albumin 4.6 g/dL      Total Bilirubin 0.45 mg/dL      eGFR 93 ml/min/1.73sq m     Narrative:      [place] guidelines for Chronic Kidney Disease (CKD):   •  Stage 1 with normal or high GFR (GFR > 90 mL/min/1.73 square meters)  •  Stage 2 Mild CKD (GFR = 60-89 mL/min/1.73 square meters)  •  Stage 3A Moderate CKD (GFR = 45-59 mL/min/1.73 square meters)  •  Stage 3B Moderate CKD (GFR = 30-44 mL/min/1.73 square meters)  •  Stage 4 Severe CKD (GFR = 15-29 mL/min/1.73 square meters)  •  Stage 5 End Stage CKD (GFR <15 mL/min/1.73 square meters)  Note: GFR calculation is accurate only with a steady state creatinine    CBC and differential [[id number]] Collected: 09/28/23 2114    Lab Status: Final result Specimen: Blood from Arm, Left Updated: 09/28/23 2125     WBC 8.03 Thousand/uL      RBC 5.22 Million/uL      Hemoglobin 15.2 g/dL      Hematocrit 46.6 %      MCV 89 fL      MCH 29.1 pg      MCHC 32.6 g/dL      RDW 13.3 %      MPV 9.3 fL      Platelets 896 Thousands/uL      nRBC 0 /100 WBCs      Neutrophils Relative 64 %      Immat GRANS % 0 %      Lymphocytes Relative 23 %      Monocytes Relative 10 % Eosinophils Relative 2 %      Basophils Relative 1 %      Neutrophils Absolute 5.12 Thousands/µL      Immature Grans Absolute 0.02 Thousand/uL      Lymphocytes Absolute 1.87 Thousands/µL      Monocytes Absolute 0.80 Thousand/µL      Eosinophils Absolute 0.18 Thousand/µL      Basophils Absolute 0.04 Thousands/µL                  XR chest 1 view portable    (Results Pending)              Procedures  Procedures         ED Course             HEART Risk Score    Flowsheet Row Most Recent Value   Heart Score Risk Calculator    History 0 Filed at: 09/28/2023 2200   ECG 0 Filed at: 09/28/2023 2200   Age 1 Filed at: 09/28/2023 2200   Risk Factors 1 Filed at: 09/28/2023 2200   Troponin 0 Filed at: 09/28/2023 2200   HEART Score 2 Filed at: 09/28/2023 2200                        SBIRT 22yo+    Flowsheet Row Most Recent Value   Initial Alcohol Screen: US AUDIT-C     1. How often do you have a drink containing alcohol? 0 Filed at: 09/28/2023 2138   2. How many drinks containing alcohol do you have on a typical day you are drinking? 0 Filed at: 09/28/2023 2138   3a. Male UNDER 65: How often do you have five or more drinks on one occasion? 0 Filed at: 09/28/2023 2138   Audit-C Score 0 Filed at: 09/28/2023 2138   JODEE: How many times in the past year have you. .. Used an illegal drug or used a prescription medication for non-medical reasons? Never Filed at: 09/28/2023 2138                    Medical Decision Making  43-year-old male history of hypertension presenting with chest pain. Atypical chest pain. Plan for cardiac evaluation including EKG and troponin plus chest x-ray. Will trial GI medication to start. Reassess. EKG interpreted by me with normal sinus rhythm and no acute ST abnormality. No significant improvement after initial medications. Improvement after additional oral and IV medications. Labs interpreted by me without significant acute process.   X-ray interpreted by me without acute cardiopulmonary process. Discussed results and recommendations. Advised follow up PCP and cardiology. Medication recommendations. Given instructions and return precautions. Patient/family at bedside acknowledged understanding of all written and verbal instructions and return precautions. Discharged. Risk  OTC drugs. Prescription drug management. Disposition  Final diagnoses:   Chest pain     Time reflects when diagnosis was documented in both MDM as applicable and the Disposition within this note     Time User Action Codes Description Comment    9/28/2023  9:58 PM Marchengaquiles Free Add [R07.9] Chest pain       ED Disposition     ED Disposition   Discharge    Condition   Stable    Date/Time   Thu Sep 28, 2023 10:44 PM    Comment   Dilia Deal discharge to home/self care.                Follow-up Information     Follow up With Specialties Details Why Contact Info Additional Information    Dominic Siemens, [address] [facility], Nurse Practitioner Schedule an appointment as soon as possible for a visit in 1 week  [address] [facility]  [address] [facility] Cardiology Schedule an appointment as soon as possible for a visit in 1 week  [address] [facility] [phone]  [address] [place] [address] Cardiology [facility], [address] [place] [address], [address] [place] [address],[address], [id number] [phone]          Discharge Medication List as of 9/28/2023 10:45 PM      CONTINUE these medications which have NOT CHANGED    Details   albuterol (PROVENTIL HFA,VENTOLIN HFA) 90 mcg/act inhaler INHALE 2 PUFFS EVERY 6 HOURS AS NEEDED FOR WHEEZING, Normal      diclofenac potassium (CATAFLAM) 50 mg tablet Take 1 tablet (50 mg total) by mouth 2 (two) times a day for 5 days, Starting Thu 7/6/2023, Until Fri 7/28/2023, Normal      lisinopril (ZESTRIL) 5 mg tablet Take 1 tablet (5 mg total) by mouth daily, Starting Fri 6/30/2023, Normal             No discharge procedures on file.     PDMP Review       Value Time User    PDMP Reviewed  Yes 9/16/2022  8:09 AM Soniya Isabel PA-C          ED Provider  Electronically Signed by           Werner Burns MD  09/29/23 1442

## 2023-10-17 ENCOUNTER — OFFICE VISIT (OUTPATIENT)
Dept: CARDIOLOGY CLINIC | Facility: CLINIC | Age: 58
End: 2023-10-17
Payer: COMMERCIAL

## 2023-10-17 VITALS
BODY MASS INDEX: 37.66 KG/M2 | WEIGHT: 269 LBS | HEIGHT: 71 IN | SYSTOLIC BLOOD PRESSURE: 119 MMHG | HEART RATE: 80 BPM | OXYGEN SATURATION: 99 % | DIASTOLIC BLOOD PRESSURE: 76 MMHG | RESPIRATION RATE: 18 BRPM

## 2023-10-17 DIAGNOSIS — I10 ESSENTIAL HYPERTENSION: Primary | ICD-10-CM

## 2023-10-17 DIAGNOSIS — R07.9 CHEST PAIN, UNSPECIFIED TYPE: ICD-10-CM

## 2023-10-17 PROCEDURE — 99214 OFFICE O/P EST MOD 30 MIN: CPT | Performed by: INTERNAL MEDICINE

## 2023-10-17 NOTE — PROGRESS NOTES
Cardiology Follow Up    Bhavin Polk  1965  [id number]  [facility] [address] [address] [phone]    Discussion/Summary:  Hypertension    Doing well. No complaints. No further episodes of CP    Remains active. Cont with lisinopril    Recommend patient presents to the emergency room or call our office if he has any new/persistent or progressive symptoms. Previous studies were reviewed. Safety measures were reviewed. Questions were entertained and answered. Patient was advised to report any problems requiring medical attention. Follow-up with PCP and appropriate specialist and lab work as discussed. Return for follow up visit as scheduled or earlier, if needed. Thank you for allowing me to participate in the care and evaluation of your patient. Should you have any questions, please feel free to contact me. History of Present Illness:     Bhavin Polk is a 59 y.o. male who presents for follow up for cardiovascular care. Denies chest pain, chest pressure, shortness of breath, dizziness, lightheadedness, presyncope or syncope. Denies orthopnea, PND or lower limb edema. Was in the ER three weeks ago for CP. Occurred when he was stressed. Has since resolved. No recurrence. 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 Walks everyday    FLP 7/1/23:  , TG 88, HDL 41,     Echo 7/14/23:     Left Ventricle: Left ventricular cavity size is normal. Wall thickness is normal. The left ventricular ejection fraction is 55%. Systolic function is normal. Wall motion is normal. Diastolic function is mildly abnormal, consistent with grade I (abnormal) relaxation. Right Ventricle: Right ventricular cavity size is normal. Systolic function is normal.    Cardiac cath 11/16/22:  Angiographically normal coronary arteries. Normal LVEDP.      Social history:  Smokes medical marijuana for diverticulitis. Social alcohol   He is retired.  Use to work in construction    Patient Active Problem List   Diagnosis    Essential hypertension    Mild cognitive disorder    Finney's esophagus with dysplasia    Class 3 severe obesity due to excess calories without serious comorbidity with body mass index (BMI) of 40.0 to 44.9 in adult [facility])    Chronic pain syndrome    Chronic right-sided low back pain with right-sided sciatica    Optic nerve edema    ALEXANDRE (obstructive sleep apnea)    Erectile dysfunction    Multiple nevi    Complete tear of right rotator cuff    Class II obesity    Benign localized prostatic hyperplasia with lower urinary tract symptoms (LUTS)    Encounter to discuss test results     Past Medical History:   Diagnosis Date    Acute kidney injury (nontraumatic) ([address])     Alcohol abuse     Anxiety     Chronic pain syndrome 02/17/2021    Colon polyp     COVID 09/2020    Diverticulitis     Erectile dysfunction     Essential hypertension     GERD (gastroesophageal reflux disease)     History of transfusion 2015    Hypertension     Lumbar radiculopathy 05/13/2019    Major depression     Mild cognitive disorder 05/06/2016    Mood disorder ([address]) 05/02/2016    Multiple nevi     Obesity (BMI 30-39.9)     ALEXANDRE (obstructive sleep apnea)     Psychosis (HCC)     Snoring     Suspicious nevus     Tremor of right hand     Weakness of right upper extremity      Social History     Socioeconomic History    Marital status: /Civil Union     Spouse name: Not on file    Number of children: Not on file    Years of education: Not on file    Highest education level: Not on file   Occupational History    Occupation: CONSTRUCTION   Tobacco Use    Smoking status: Never     Passive exposure: Current    Smokeless tobacco: Never   Vaping Use    Vaping Use: Former   Substance and Sexual Activity    Alcohol use: Not Currently     Alcohol/week: 1.0 standard drink of alcohol     Types: 1 Cans of beer per week     Comment: Sober 5 moths    Drug use: Yes     Frequency: 4.0 times per week     Types: Marijuana    Sexual activity: Yes     Partners: Female   Other Topics Concern    Not on file   Social History Narrative    Not on file     Social Determinants of Health     Financial Resource Strain: Not on file   Food Insecurity: Not on file   Transportation Needs: Not on file   Physical Activity: Not on file   Stress: Not on file   Social Connections: Not on file   Intimate Partner Violence: Not on file   Housing Stability: Not on file      Family History   Problem Relation Age of Onset    Heart disease Father     Stroke Mother     Aneurysm Sister     Ovarian cancer Sister     Drug abuse Sister         overdose    No Known Problems Sister     No Known Problems Sister     No Known Problems Sister     Drug abuse Brother     Drug abuse Brother     No Known Problems Brother     No Known Problems Daughter     No Known Problems Daughter     No Known Problems Daughter     No Known Problems Daughter     No Known Problems Son     No Known Problems Son      Past Surgical History:   Procedure Laterality Date    CARDIAC CATHETERIZATION Left 11/16/2022    Procedure: Cardiac Left Heart Cath;  Surgeon: Rodrigue Diaz MD;  Location: [facility]; Service: Cardiology    CARDIAC CATHETERIZATION N/A 11/16/2022    Procedure: Cardiac Coronary Angiogram;  Surgeon: Rodrigue Diaz MD;  Location: [address] [place] [address] CATH LAB; Service: Cardiology    COLON SURGERY      COLONOSCOPY N/A 03/09/2019    Procedure: COLONOSCOPY;  Surgeon: Cielo De Guzman MD;  Location: [facility]; Service: Gastroenterology    LIPOMA RESECTION      back    ND ESOPHAGOGASTRODUODENOSCOPY TRANSORAL DIAGNOSTIC N/A 03/09/2019    Procedure: ESOPHAGOGASTRODUODENOSCOPY (EGD); Surgeon: Cielo De Guzman MD;  Location: [facility];   Service: Gastroenterology    ND SURGICAL ARTHROSCOPY SHOULDER W/ROTATOR CUFF RPR Right 09/16/2022    Procedure: RIGHT SHOULDER ARTHROSCOPY, REPAIR ROTATOR CUFF, SUPRASPINATUS REPAIR, SUBSCAPULARIS REPAIR, BICEPS TENOTOMY, ACROMIOPLASTY, AND EXTENSIVE DEBRIDEMENT;  Surgeon: Magda Obando DO;  Location: [facility];  Service: Orthopedics    SD TRURL ELECTROSURG RESCJ PROSTATE BLEED COMPLETE N/A 7/6/2023    Procedure: TRANSURETHRAL RESECTION OF PROSTATE (TURP); Surgeon: Tati Hennessy MD;  Location: [facility];  Service: Urology    REVISION COLOSTOMY      SHOULDER SURGERY         Current Outpatient Medications:     albuterol (PROVENTIL HFA,VENTOLIN HFA) 90 mcg/act inhaler, INHALE 2 PUFFS EVERY 6 HOURS AS NEEDED FOR WHEEZING, Disp: 18 g, Rfl: 2    lisinopril (ZESTRIL) 5 mg tablet, Take 1 tablet (5 mg total) by mouth daily, Disp: 30 tablet, Rfl: 3  Allergies   Allergen Reactions    Sulfa Antibiotics GI Intolerance     HA, vomiting, sweating     Amoxicillin GI Intolerance       Labs:  Lab Results   Component Value Date    WBC 8.03 09/28/2023    HGB 15.2 09/28/2023    HCT 46.6 09/28/2023    MCV 89 09/28/2023     09/28/2023     Lab Results   Component Value Date    GLUCOSE 125 04/02/2016    CALCIUM 9.7 09/28/2023     01/06/2016    K 4.2 09/28/2023    CO2 23 09/28/2023     09/28/2023    BUN 23 09/28/2023    CREATININE 0.87 09/28/2023     Lab Results   Component Value Date    HGBA1C 5.4 07/01/2023     Lab Results   Component Value Date    CHOL 217 08/04/2014    CHOL 217 02/27/2014     Lab Results   Component Value Date    HDL 41 07/01/2023    HDL 39 (L) 05/12/2022    HDL 42 09/14/2021     Lab Results   Component Value Date    LDLCALC 100 07/01/2023    LDLCALC 59 05/12/2022    LDLCALC 120 (H) 09/14/2021     Lab Results   Component Value Date    TRIG 88 07/01/2023    TRIG 330 (H) 05/12/2022    TRIG 100 09/14/2021     No results found for: "CHOLHDL"    Review of Systems:  Review of Systems   Constitutional: Negative. Negative for appetite change, chills, fatigue and fever.    Respiratory:  Negative for cough, chest tightness and shortness of breath. Cardiovascular:  Negative for chest pain, palpitations and leg swelling. Gastrointestinal:  Negative for nausea and vomiting. Musculoskeletal:  Negative for arthralgias and back pain. Skin:  Negative for color change. Neurological:  Negative for dizziness, syncope, weakness and light-headedness. Hematological:  Negative for adenopathy. All other systems reviewed and are negative. Physical Exam:  Physical Exam  Vitals and nursing note reviewed. Constitutional:       General: He is not in acute distress. Appearance: Normal appearance. He is not ill-appearing or diaphoretic. HENT:      Head: Normocephalic and atraumatic. Eyes:      Extraocular Movements: Extraocular movements intact. Cardiovascular:      Rate and Rhythm: Normal rate and regular rhythm. Heart sounds: No murmur heard. No friction rub. No gallop. Pulmonary:      Effort: Pulmonary effort is normal. No respiratory distress. Breath sounds: Normal breath sounds. Abdominal:      General: Abdomen is flat. There is no distension. Palpations: Abdomen is soft. Musculoskeletal:         General: No swelling. Normal range of motion. Skin:     General: Skin is warm and dry. Capillary Refill: Capillary refill takes less than 2 seconds. Coloration: Skin is not pale. Neurological:      General: No focal deficit present. Mental Status: He is alert and oriented to person, place, and time. Cranial Nerves: No cranial nerve deficit.    Psychiatric:         Mood and Affect: Mood normal.         Behavior: Behavior normal.

## 2023-11-22 DIAGNOSIS — I10 ESSENTIAL HYPERTENSION: ICD-10-CM

## 2023-11-22 RX ORDER — LISINOPRIL 5 MG/1
5 TABLET ORAL DAILY
Qty: 90 TABLET | Refills: 3 | Status: SHIPPED | OUTPATIENT
Start: 2023-11-22

## 2023-12-14 RX ORDER — AMOXICILLIN 500 MG/1
TABLET, FILM COATED ORAL
COMMUNITY
Start: 2023-11-06

## 2023-12-15 ENCOUNTER — OFFICE VISIT (OUTPATIENT)
Dept: UROLOGY | Facility: CLINIC | Age: 58
End: 2023-12-15
Payer: COMMERCIAL

## 2023-12-15 VITALS
HEIGHT: 71 IN | SYSTOLIC BLOOD PRESSURE: 148 MMHG | BODY MASS INDEX: 38.69 KG/M2 | WEIGHT: 276.4 LBS | OXYGEN SATURATION: 99 % | HEART RATE: 88 BPM | DIASTOLIC BLOOD PRESSURE: 86 MMHG

## 2023-12-15 DIAGNOSIS — N40.1 BENIGN LOCALIZED PROSTATIC HYPERPLASIA WITH LOWER URINARY TRACT SYMPTOMS (LUTS): Primary | ICD-10-CM

## 2023-12-15 LAB — POST-VOID RESIDUAL VOLUME, ML POC: 28 ML

## 2023-12-15 PROCEDURE — 51798 US URINE CAPACITY MEASURE: CPT | Performed by: PHYSICIAN ASSISTANT

## 2023-12-15 PROCEDURE — 99213 OFFICE O/P EST LOW 20 MIN: CPT | Performed by: PHYSICIAN ASSISTANT

## 2023-12-15 NOTE — PROGRESS NOTES
12/15/2023      Chief Complaint   Patient presents with    Follow-up     Assessment and Plan    1. BPH with LUTS s/p TURP on 7/6/23  - Doing very well, no complaints  - PVR 28 mL      2. Prostate cancer screening/elevated PSA  - Most recent PSA from 7/1/23 was 4.11, previously 3.8 (11/8/22)  - TURP tissue pathology negative as above  - Obtain repeat PSA as ordered    History of Present Illness  Julee Howell is a 59 y.o. male here for follow up evaluation of BPH. S/p TURP earlier this year. Noted to have slight PSA elevation at last visit. Surgical pathology was negative for malignancy. He is doing very well and is satisfied with urination at this time. AUA SYMPTOM SCORE      Flowsheet Row Most Recent Value   AUA SYMPTOM SCORE    How often have you had a sensation of not emptying your bladder completely after you finished urinating? 0   How often have you had to urinate again less than two hours after you finished urinating? 2   How often have you found you stopped and started again several times when you urinate? 0   How often have you found it difficult to postpone urination? 0   How often have you had a weak urinary stream? 0   How often have you had to push or strain to begin urination? 0   How many times did you most typically get up to urinate from the time you went to bed at night until the time you got up in the morning? 0   Quality of Life: If you were to spend the rest of your life with your urinary condition just the way it is now, how would you feel about that? 0   AUA SYMPTOM SCORE 2              Review of Systems   Constitutional:  Negative for chills and fever. Respiratory:  Negative for shortness of breath. Cardiovascular:  Negative for chest pain. Gastrointestinal:  Negative for abdominal pain. Genitourinary:  Negative for difficulty urinating, dysuria, flank pain, frequency, hematuria and urgency. Neurological:  Negative for dizziness.                   Past Medical History  Past Medical History:   Diagnosis Date    Acute kidney injury (nontraumatic) (HCC)     Alcohol abuse     Anxiety     Chronic pain syndrome 02/17/2021    Colon polyp     COVID 09/2020    Diverticulitis     Erectile dysfunction     Essential hypertension     GERD (gastroesophageal reflux disease)     History of transfusion 2015    Hypertension     Lumbar radiculopathy 05/13/2019    Major depression     Mild cognitive disorder 05/06/2016    Mood disorder ([address]) 05/02/2016    Multiple nevi     Obesity (BMI 30-39.9)     ALEXANDRE (obstructive sleep apnea)     Psychosis (HCC)     Snoring     Suspicious nevus     Tremor of right hand     Weakness of right upper extremity        Past Social History  Past Surgical History:   Procedure Laterality Date    CARDIAC CATHETERIZATION Left 11/16/2022    Procedure: Cardiac Left Heart Cath;  Surgeon: Sandy Tate MD;  Location: [address] CATH LAB; Service: Cardiology    CARDIAC CATHETERIZATION N/A 11/16/2022    Procedure: Cardiac Coronary Angiogram;  Surgeon: Sandy Tate MD;  Location: [address] CATH LAB; Service: Cardiology    COLON SURGERY      COLONOSCOPY N/A 03/09/2019    Procedure: COLONOSCOPY;  Surgeon: Antonio Hassan MD;  Location: [facility]; Service: Gastroenterology    LIPOMA RESECTION      back    MD ESOPHAGOGASTRODUODENOSCOPY TRANSORAL DIAGNOSTIC N/A 03/09/2019    Procedure: ESOPHAGOGASTRODUODENOSCOPY (EGD); Surgeon: Antonio Hassan MD;  Location: [facility]; Service: Gastroenterology    MD SURGICAL ARTHROSCOPY SHOULDER W/ROTATOR CUFF RPR Right 09/16/2022    Procedure: RIGHT SHOULDER ARTHROSCOPY, REPAIR ROTATOR CUFF, SUPRASPINATUS REPAIR, SUBSCAPULARIS REPAIR, BICEPS TENOTOMY, ACROMIOPLASTY, AND EXTENSIVE DEBRIDEMENT;  Surgeon: Moose Hughes DO;  Location: [facility];  Service: Orthopedics    MD TRURL ELECTROSURG RESCJ PROSTATE BLEED COMPLETE N/A 7/6/2023    Procedure: TRANSURETHRAL RESECTION OF PROSTATE (TURP);   Surgeon: Carley Ochoa MD; Location: [facility];  Service: Urology    REVISION COLOSTOMY      SHOULDER SURGERY       Social History     Tobacco Use   Smoking Status Never    Passive exposure: Current   Smokeless Tobacco Never       Past Family History  Family History   Problem Relation Age of Onset    Heart disease Father     Stroke Mother     Aneurysm Sister     Ovarian cancer Sister     Drug abuse Sister         overdose    No Known Problems Sister     No Known Problems Sister     No Known Problems Sister     Drug abuse Brother     Drug abuse Brother     No Known Problems Brother     No Known Problems Daughter     No Known Problems Daughter     No Known Problems Daughter     No Known Problems Daughter     No Known Problems Son     No Known Problems Son        Past Social history  Social History     Socioeconomic History    Marital status: /Civil Union     Spouse name: Not on file    Number of children: Not on file    Years of education: Not on file    Highest education level: Not on file   Occupational History    Occupation: CONSTRUCTION   Tobacco Use    Smoking status: Never     Passive exposure: Current    Smokeless tobacco: Never   Vaping Use    Vaping status: Former   Substance and Sexual Activity    Alcohol use:  Yes     Alcohol/week: 1.0 standard drink of alcohol     Types: 1 Cans of beer per week     Comment: occasionally 1-2 x a month    Drug use: Yes     Frequency: 4.0 times per week     Types: Marijuana     Comment: medical    Sexual activity: Yes     Partners: Female   Other Topics Concern    Not on file   Social History Narrative    Not on file     Social Determinants of Health     Financial Resource Strain: Not on file   Food Insecurity: Not on file   Transportation Needs: Not on file   Physical Activity: Not on file   Stress: Not on file   Social Connections: Not on file   Intimate Partner Violence: Not on file   Housing Stability: Not on file       Current Medications  Current Outpatient Medications   Medication Sig Dispense Refill    albuterol (PROVENTIL HFA,VENTOLIN HFA) 90 mcg/act inhaler INHALE 2 PUFFS EVERY 6 HOURS AS NEEDED FOR WHEEZING 18 g 2    amoxicillin (AMOXIL) 500 MG tablet TAKE ONE TABLET BY MOUTH EVERY 8 HOURS UNTIL COMPLETE      lisinopril (ZESTRIL) 5 mg tablet TAKE 1 TABLET (5 MG TOTAL) BY MOUTH DAILY. 90 tablet 3     No current facility-administered medications for this visit. Allergies  Allergies   Allergen Reactions    Sulfa Antibiotics GI Intolerance     HA, vomiting, sweating     Amoxicillin GI Intolerance         The following portions of the patient's history were reviewed and updated as appropriate: allergies, current medications, past medical history, past social history, past surgical history and problem list.      Vitals  Vitals:    12/15/23 1026   BP: 148/86   BP Location: Left arm   Patient Position: Sitting   Cuff Size: Large   Pulse: 88   SpO2: 99%   Weight: 125 kg (276 lb 6.4 oz)   Height: 5' 11" (1.803 m)           Physical Exam  Physical Exam  Constitutional:       Appearance: Normal appearance. HENT:      Head: Normocephalic and atraumatic. Right Ear: External ear normal.      Left Ear: External ear normal.      Nose: Nose normal.   Eyes:      General: No scleral icterus. Conjunctiva/sclera: Conjunctivae normal.   Cardiovascular:      Pulses: Normal pulses. Pulmonary:      Effort: Pulmonary effort is normal.   Musculoskeletal:         General: Normal range of motion. Cervical back: Normal range of motion. Neurological:      General: No focal deficit present. Mental Status: He is alert and oriented to person, place, and time. Psychiatric:         Mood and Affect: Mood normal.         Behavior: Behavior normal.         Thought Content:  Thought content normal.         Judgment: Judgment normal.           Results  Recent Results (from the past 1 hour(s))   POCT Measure PVR    Collection Time: 12/15/23 10:30 AM   Result Value Ref Range    POST-VOID RESIDUAL VOLUME, ML POC 28 mL   ]  Lab Results   Component Value Date    PSA 4.11 (H) 07/01/2023    PSA 3.8 11/08/2022     Lab Results   Component Value Date    GLUCOSE 125 04/02/2016    CALCIUM 9.7 09/28/2023     01/06/2016    K 4.2 09/28/2023    CO2 23 09/28/2023     09/28/2023    BUN 23 09/28/2023    CREATININE 0.87 09/28/2023     Lab Results   Component Value Date    WBC 8.03 09/28/2023    HGB 15.2 09/28/2023    HCT 46.6 09/28/2023    MCV 89 09/28/2023     09/28/2023           Orders  Orders Placed This Encounter   Procedures    POCT Measure PVR       Odette Winston

## 2024-03-21 ENCOUNTER — OFFICE VISIT (OUTPATIENT)
Dept: CARDIOLOGY CLINIC | Facility: CLINIC | Age: 59
End: 2024-03-21
Payer: COMMERCIAL

## 2024-03-21 VITALS
HEIGHT: 71 IN | DIASTOLIC BLOOD PRESSURE: 92 MMHG | BODY MASS INDEX: 38.36 KG/M2 | HEART RATE: 87 BPM | OXYGEN SATURATION: 97 % | RESPIRATION RATE: 16 BRPM | WEIGHT: 274 LBS | SYSTOLIC BLOOD PRESSURE: 122 MMHG

## 2024-03-21 DIAGNOSIS — E78.2 HYPERLIPEMIA, MIXED: ICD-10-CM

## 2024-03-21 DIAGNOSIS — I10 ESSENTIAL HYPERTENSION: Primary | ICD-10-CM

## 2024-03-21 PROCEDURE — 99214 OFFICE O/P EST MOD 30 MIN: CPT | Performed by: PHYSICIAN ASSISTANT

## 2024-03-21 RX ORDER — LOSARTAN POTASSIUM 25 MG/1
25 TABLET ORAL DAILY
Qty: 30 TABLET | Refills: 5 | Status: SHIPPED | OUTPATIENT
Start: 2024-03-21

## 2024-03-21 NOTE — PATIENT INSTRUCTIONS
Switch lisinopril to losartan given dry cough.  Reviewed the importance of taking all medications as prescribed.  Plan for routine blood work.  Report any exertional symptoms.  Follow with PCP.  All questions answered.  Continue all medications. Previous studies reviewed with patient, medications reviewed and possible side effects discussed. Continue risk factor modification. Optimize weight, regular exercise and follow up with appropriate specialists and primary care physician as discussed.  All questions answered. Patient advised to report any problems prompting to medical attention. Return for follow up visit in 6 months or earlier if needed

## 2024-03-21 NOTE — PROGRESS NOTES
[facility]  [address] [id number]  Cardiology Follow Up    Joao Ibarra  1965  [id number]    1. Essential hypertension  losartan (COZAAR) 25 mg tablet    CBC and differential    Comprehensive metabolic panel    Lipid panel      2. Hyperlipemia, mixed  CBC and differential    Comprehensive metabolic panel    Lipid panel          Discussion/Summary:  Hypertension, patient admitted to not taking medications or blood pressure readings for the last 1 week.  Patient also admits to dry cough therefore switch lisinopril to losartan 25.  Advised about importance of taking medications regularly.  Advised to monitor blood pressure at home report any readings more than 140/90.  Plan for routine blood work    2.  Hyperlipidemia, last .  Plan for routine blood work.    Switch lisinopril to losartan given dry cough.  Reviewed the importance of taking all medications as prescribed.  Plan for routine blood work.  Report any exertional symptoms.  Follow with PCP.  All questions answered.  Continue all medications. Previous studies reviewed with patient, medications reviewed and possible side effects discussed. Continue risk factor modification. Optimize weight, regular exercise and follow up with appropriate specialists and primary care physician as discussed.  All questions answered. Patient advised to report any problems prompting to medical attention. Return for follow up visit in 6 months or earlier if needed    Chief Complaint   Patient presents with    Follow-up       Interval History: Patient presents for routine follow-up visit with history of hypertension.  Patient admits he has not taken his blood pressure in the last 1 week and actually has not taken his lisinopril in the last 1 week.  Patient states he thought it was causing erectile dysfunction.  Patient notes he has had a dry cough for the last couple of months that sometimes causes a coughing spell.  Patient  denies any chest pain, chest pressure, chest heaviness.  Denies any shortness of breath, dizziness, palpitations.  Patient states otherwise feeling well with no complaints.  Follows with PCP closely.  Due for blood work.    Last echo July 2023--EF 55%, G1 DD  Cardiac cath November 2022--angiographically normal coronary arteries    Patient Active Problem List   Diagnosis    Essential hypertension    Mild cognitive disorder    Finney's esophagus with dysplasia    Class 3 severe obesity due to excess calories without serious comorbidity with body mass index (BMI) of 40.0 to 44.9 in adult ([facility])    Chronic pain syndrome    Chronic right-sided low back pain with right-sided sciatica    Optic nerve edema    ALEXANDRE (obstructive sleep apnea)    Erectile dysfunction    Multiple nevi    Complete tear of right rotator cuff    Class II obesity    Benign localized prostatic hyperplasia with lower urinary tract symptoms (LUTS)    Encounter to discuss test results     Past Medical History:   Diagnosis Date    Acute kidney injury (nontraumatic) ([facility])     Alcohol abuse     Anxiety     Chronic pain syndrome 02/17/2021    Colon polyp     COVID 09/2020    Diverticulitis     Erectile dysfunction     Essential hypertension     GERD (gastroesophageal reflux disease)     History of transfusion 2015    Hypertension     Lumbar radiculopathy 05/13/2019    Major depression     Mild cognitive disorder 05/06/2016    Mood disorder ([facility]) 05/02/2016    Multiple nevi     Obesity (BMI 30-39.9)     ALEXANDRE (obstructive sleep apnea)     Psychosis ([facility])     Snoring     Suspicious nevus     Tremor of right hand     Weakness of right upper extremity      Social History     Socioeconomic History    Marital status: /Civil Union     Spouse name: Not on file    Number of children: Not on file    Years of education: Not on file    Highest education level: Not on file   Occupational History    Occupation: CONSTRUCTION   Tobacco Use    Smoking status: Never      Passive exposure: Current    Smokeless tobacco: Never   Vaping Use    Vaping status: Former   Substance and Sexual Activity    Alcohol use: Yes     Alcohol/week: 1.0 standard drink of alcohol     Types: 1 Cans of beer per week     Comment: occasionally 1-2 x a month    Drug use: Yes     Frequency: 4.0 times per week     Types: Marijuana     Comment: medical    Sexual activity: Yes     Partners: Female   Other Topics Concern    Not on file   Social History Narrative    Not on file     Social Determinants of Health     Financial Resource Strain: Not on file   Food Insecurity: Not on file   Transportation Needs: Not on file   Physical Activity: Not on file   Stress: Not on file   Social Connections: Not on file   Intimate Partner Violence: Not on file   Housing Stability: Not on file      Family History   Problem Relation Age of Onset    Heart disease Father     Stroke Mother     Aneurysm Sister     Ovarian cancer Sister     Drug abuse Sister         overdose    No Known Problems Sister     No Known Problems Sister     No Known Problems Sister     Drug abuse Brother     Drug abuse Brother     No Known Problems Brother     No Known Problems Daughter     No Known Problems Daughter     No Known Problems Daughter     No Known Problems Daughter     No Known Problems Son     No Known Problems Son      Past Surgical History:   Procedure Laterality Date    CARDIAC CATHETERIZATION Left 11/16/2022    Procedure: Cardiac Left Heart Cath;  Surgeon: Paresh Street MD;  [facility];  Service: Cardiology    CARDIAC CATHETERIZATION N/A 11/16/2022    Procedure: Cardiac Coronary Angiogram;  Surgeon: Paresh Street MD;  [facility];  Service: Cardiology    COLON SURGERY      COLONOSCOPY N/A 03/09/2019    Procedure: COLONOSCOPY;  Surgeon: Keaton Begum MD;  [facility]: [facility];  Service: Gastroenterology    LIPOMA RESECTION      back    [place] ESOPHAGOGASTRODUODENOSCOPY TRANSORAL DIAGNOSTIC N/A  "03/09/2019    Procedure: ESOPHAGOGASTRODUODENOSCOPY (EGD);  Surgeon: Keaton Begum MD;  [facility]: [facility];  Service: Gastroenterology    [place] SURGICAL ARTHROSCOPY SHOULDER W/ROTATOR CUFF RPR Right 09/16/2022    Procedure: RIGHT SHOULDER ARTHROSCOPY, REPAIR ROTATOR CUFF, SUPRASPINATUS REPAIR, SUBSCAPULARIS REPAIR, BICEPS TENOTOMY, ACROMIOPLASTY, AND EXTENSIVE DEBRIDEMENT;  Surgeon: Luciano Hamilton DO;  Location: [facility];  Service: Orthopedics    [place] TRURL ELECTROSURG RESCJ PROSTATE BLEED COMPLETE N/A 7/6/2023    Procedure: TRANSURETHRAL RESECTION OF PROSTATE (TURP);  Surgeon: Chavez Olivera MD;  [facility]: [facility];  Service: Urology    REVISION COLOSTOMY      SHOULDER SURGERY         Current Outpatient Medications:     albuterol (PROVENTIL HFA,VENTOLIN HFA) 90 mcg/act inhaler, INHALE 2 PUFFS EVERY 6 HOURS AS NEEDED FOR WHEEZING, Disp: 18 g, Rfl: 2    losartan (COZAAR) 25 mg tablet, Take 1 tablet (25 mg total) by mouth daily, Disp: 30 tablet, Rfl: 5    amoxicillin (AMOXIL) 500 MG tablet, TAKE ONE TABLET BY MOUTH EVERY 8 HOURS UNTIL COMPLETE (Patient not taking: Reported on 3/21/2024), Disp: , Rfl:   Allergies   Allergen Reactions    Sulfa Antibiotics GI Intolerance     HA, vomiting, sweating     Amoxicillin GI Intolerance         Imaging: No results found.    Review of Systems:  Review of Systems   Constitutional: Negative.    Respiratory: Negative.     Cardiovascular: Negative.    Musculoskeletal: Negative.    Neurological: Negative.    Hematological: Negative.    Psychiatric/Behavioral: Negative.     All other systems reviewed and are negative.        /92   Pulse 87   Resp 16   Ht 5' 11\" (1.803 m)   Wt 124 kg (274 lb)   SpO2 97%   BMI 38.22 kg/m²     Physical Exam:  Physical Exam  Vitals and nursing note reviewed.   Constitutional:       Appearance: Normal appearance.   HENT:      Head: Normocephalic and atraumatic.   Cardiovascular:      Rate and Rhythm: Normal rate and regular rhythm.      " Pulses: Normal pulses.      Heart sounds: Normal heart sounds.   Pulmonary:      Effort: Pulmonary effort is normal.      Breath sounds: Normal breath sounds.   Musculoskeletal:         General: Normal range of motion.      Cervical back: Normal range of motion and neck supple.   Skin:     General: Skin is warm and dry.   Neurological:      General: No focal deficit present.      Mental Status: He is alert and oriented to person, place, and time.   Psychiatric:         Mood and Affect: Mood normal.         Behavior: Behavior normal.         Thought Content: Thought content normal.         Judgment: Judgment normal.

## 2024-04-04 DIAGNOSIS — I10 ESSENTIAL HYPERTENSION: ICD-10-CM

## 2024-04-05 RX ORDER — LOSARTAN POTASSIUM 25 MG/1
25 TABLET ORAL DAILY
Qty: 90 TABLET | Refills: 3 | Status: SHIPPED | OUTPATIENT
Start: 2024-04-05

## 2024-05-01 ENCOUNTER — APPOINTMENT (OUTPATIENT)
Dept: LAB | Facility: HOSPITAL | Age: 59
End: 2024-05-01
Payer: COMMERCIAL

## 2024-05-01 DIAGNOSIS — Z00.8 HEALTH EXAMINATION IN POPULATION SURVEY: ICD-10-CM

## 2024-05-01 DIAGNOSIS — I10 ESSENTIAL HYPERTENSION: ICD-10-CM

## 2024-05-01 DIAGNOSIS — E78.2 HYPERLIPEMIA, MIXED: ICD-10-CM

## 2024-05-01 LAB
BASOPHILS # BLD AUTO: 0.04 THOUSANDS/ÂΜL (ref 0–0.1)
BASOPHILS NFR BLD AUTO: 1 % (ref 0–1)
CHOLEST SERPL-MCNC: 208 MG/DL
EOSINOPHIL # BLD AUTO: 0.24 THOUSAND/ÂΜL (ref 0–0.61)
EOSINOPHIL NFR BLD AUTO: 5 % (ref 0–6)
ERYTHROCYTE [DISTWIDTH] IN BLOOD BY AUTOMATED COUNT: 13.7 % (ref 11.6–15.1)
EST. AVERAGE GLUCOSE BLD GHB EST-MCNC: 117 MG/DL
HBA1C MFR BLD: 5.7 %
HCT VFR BLD AUTO: 48.6 % (ref 36.5–49.3)
HDLC SERPL-MCNC: 49 MG/DL
HGB BLD-MCNC: 15.6 G/DL (ref 12–17)
IMM GRANULOCYTES # BLD AUTO: 0.01 THOUSAND/UL (ref 0–0.2)
IMM GRANULOCYTES NFR BLD AUTO: 0 % (ref 0–2)
LDLC SERPL CALC-MCNC: 134 MG/DL (ref 0–100)
LYMPHOCYTES # BLD AUTO: 1.21 THOUSANDS/ÂΜL (ref 0.6–4.47)
LYMPHOCYTES NFR BLD AUTO: 23 % (ref 14–44)
MCH RBC QN AUTO: 28.6 PG (ref 26.8–34.3)
MCHC RBC AUTO-ENTMCNC: 32.1 G/DL (ref 31.4–37.4)
MCV RBC AUTO: 89 FL (ref 82–98)
MONOCYTES # BLD AUTO: 0.45 THOUSAND/ÂΜL (ref 0.17–1.22)
MONOCYTES NFR BLD AUTO: 9 % (ref 4–12)
NEUTROPHILS # BLD AUTO: 3.27 THOUSANDS/ÂΜL (ref 1.85–7.62)
NEUTS SEG NFR BLD AUTO: 62 % (ref 43–75)
NONHDLC SERPL-MCNC: 159 MG/DL
NRBC BLD AUTO-RTO: 0 /100 WBCS
PLATELET # BLD AUTO: 227 THOUSANDS/UL (ref 149–390)
PMV BLD AUTO: 10.2 FL (ref 8.9–12.7)
RBC # BLD AUTO: 5.46 MILLION/UL (ref 3.88–5.62)
TRIGL SERPL-MCNC: 124 MG/DL
WBC # BLD AUTO: 5.22 THOUSAND/UL (ref 4.31–10.16)

## 2024-05-01 PROCEDURE — 36415 COLL VENOUS BLD VENIPUNCTURE: CPT

## 2024-05-01 PROCEDURE — 80061 LIPID PANEL: CPT

## 2024-05-01 PROCEDURE — 83036 HEMOGLOBIN GLYCOSYLATED A1C: CPT

## 2024-05-01 PROCEDURE — 80053 COMPREHEN METABOLIC PANEL: CPT

## 2024-05-01 PROCEDURE — 85025 COMPLETE CBC W/AUTO DIFF WBC: CPT

## 2024-05-06 ENCOUNTER — TELEPHONE (OUTPATIENT)
Dept: CARDIOLOGY CLINIC | Facility: CLINIC | Age: 59
End: 2024-05-06

## 2024-05-06 NOTE — TELEPHONE ENCOUNTER
----- Message from Adriana Macdonald PA-C sent at 5/3/2024  4:13 PM EDT -----  Pls call labs were good

## 2024-05-08 LAB
ALBUMIN SERPL BCP-MCNC: 4.6 G/DL (ref 3.5–5)
ALP SERPL-CCNC: 70 U/L (ref 34–104)
ALT SERPL W P-5'-P-CCNC: 18 U/L (ref 7–52)
ANION GAP SERPL CALCULATED.3IONS-SCNC: 5 MMOL/L (ref 4–13)
AST SERPL W P-5'-P-CCNC: 22 U/L (ref 13–39)
BILIRUB SERPL-MCNC: 0.43 MG/DL (ref 0.2–1)
BUN SERPL-MCNC: 24 MG/DL (ref 5–25)
CALCIUM SERPL-MCNC: 9.5 MG/DL (ref 8.4–10.2)
CHLORIDE SERPL-SCNC: 109 MMOL/L (ref 96–108)
CO2 SERPL-SCNC: 23 MMOL/L (ref 21–32)
CREAT SERPL-MCNC: 0.8 MG/DL (ref 0.6–1.3)
GFR SERPL CREATININE-BSD FRML MDRD: 97 ML/MIN/1.73SQ M
GLUCOSE P FAST SERPL-MCNC: 98 MG/DL (ref 65–99)
POTASSIUM SERPL-SCNC: 5.3 MMOL/L (ref 3.5–5.3)
PROT SERPL-MCNC: 7.6 G/DL (ref 6.4–8.4)
SODIUM SERPL-SCNC: 137 MMOL/L (ref 135–147)

## 2024-06-27 NOTE — PROGRESS NOTES
[facility] calling with an urgent request for orders for Hospice care -    Daily Note     Today's date: 2022  Patient name: Nurys Bailey  : 1965  MRN: [id number]  Referring provider: Carolyn Pineda DO  Dx:   Encounter Diagnosis     ICD-10-CM    1  Chronic left shoulder pain  M25 512     G89 29    2  Right shoulder pain, unspecified chronicity  M25 511    3  Tendinitis of right shoulder  M77 8    4  Tendinitis of left shoulder  M77 8        Start Time: 728  Stop Time: 815  Total time in clinic (min): 47 minutes    Subjective: Patient reports that his shoulders have been gradually getting better with less pain so he has had to take less pain medication  Objective: See treatment diary below      Assessment: Tolerated treatment fair  Patient demonstrated fatigue post treatment and would benefit from continued PT  Patient reported pain-free cracking in left shoulder during scapular strengthening exercises  Patient with significant soreness at end of session  Patient continues to demonstrate mild UT compensation with shoulder FF and abduction  Plan: Continue per plan of care  Progress treatment as tolerated  Precautions: standard    Manuals        Rhythmic stabilization: 60, 90, 120 deg FF b/l  NV   90 deg 3x30" ea GR all 3                                              Neuro Re-Ed             Prone shoulder ext  2x10 3" ea  2x10 ea 2x12 ea  Prone h abd     2x10 3" ea  2x10x3" ea 2x12 3" ea  Prone scaption    2x10 3" ea  2x10x3" ea 2x12 3" ea  Ther Ex             Patient education: pathophysiology, HEP review GR            UBE   3' / 3' 3' / 3' 3'/3' 3' / 3'       Webslide: M row RTB 2x10 3" RTB 2x10 3"  L and M  GTB 2x10 3" ea  GTB 2x10x3"        TB IR RTB 2x10 RTB 2x 10 RTB 2x10          ER isometric walk-outs RTB 2x10 5" RTB 2x 10 5"  Isotonics YTB 2x10 ea  Isometrics YTB 2x10 5" ea  Jacob YTB 2x10 5" L 16x5" R, p!         Prone row  2x 10  2x10 3" 3#          ER - s/l  2x 10  R: 2x10 ; L: 2x10 1# 2x10 1# ea  2x10 ea, 1#        Scaption wall slides  2x 10 RTB  2x10 RTB  2x10 RTB        Bilateral ER with scapular retraction - isometrics   Deferred P!   2x10 5"       Front, lateral DB raise      2x10 2# ea         Ther Activity                                       Gait Training                                       Modalities

## 2024-07-02 ENCOUNTER — OFFICE VISIT (OUTPATIENT)
Dept: FAMILY MEDICINE CLINIC | Facility: CLINIC | Age: 59
End: 2024-07-02
Payer: COMMERCIAL

## 2024-07-02 ENCOUNTER — APPOINTMENT (OUTPATIENT)
Dept: LAB | Facility: CLINIC | Age: 59
End: 2024-07-02
Payer: COMMERCIAL

## 2024-07-02 VITALS
HEART RATE: 94 BPM | BODY MASS INDEX: 38.22 KG/M2 | DIASTOLIC BLOOD PRESSURE: 98 MMHG | HEIGHT: 71 IN | WEIGHT: 273 LBS | SYSTOLIC BLOOD PRESSURE: 144 MMHG | OXYGEN SATURATION: 97 % | TEMPERATURE: 98.5 F

## 2024-07-02 DIAGNOSIS — Z12.5 SCREENING FOR PROSTATE CANCER: ICD-10-CM

## 2024-07-02 DIAGNOSIS — I10 ESSENTIAL HYPERTENSION: ICD-10-CM

## 2024-07-02 DIAGNOSIS — Z00.00 HEALTH MAINTENANCE EXAMINATION: ICD-10-CM

## 2024-07-02 DIAGNOSIS — R97.20 ELEVATED PSA: ICD-10-CM

## 2024-07-02 DIAGNOSIS — I10 ESSENTIAL HYPERTENSION: Primary | ICD-10-CM

## 2024-07-02 DIAGNOSIS — E78.2 MODERATE MIXED HYPERLIPIDEMIA NOT REQUIRING STATIN THERAPY: ICD-10-CM

## 2024-07-02 DIAGNOSIS — G47.33 OSA (OBSTRUCTIVE SLEEP APNEA): ICD-10-CM

## 2024-07-02 DIAGNOSIS — E66.01 CLASS 2 SEVERE OBESITY DUE TO EXCESS CALORIES WITH SERIOUS COMORBIDITY AND BODY MASS INDEX (BMI) OF 38.0 TO 38.9 IN ADULT (HCC): ICD-10-CM

## 2024-07-02 DIAGNOSIS — E78.2 HYPERLIPEMIA, MIXED: ICD-10-CM

## 2024-07-02 PROBLEM — E66.09 OBESITY DUE TO EXCESS CALORIES WITH SERIOUS COMORBIDITY: Status: ACTIVE | Noted: 2019-10-04

## 2024-07-02 LAB
CHOLEST SERPL-MCNC: 172 MG/DL
HDLC SERPL-MCNC: 46 MG/DL
LDLC SERPL CALC-MCNC: 108 MG/DL (ref 0–100)
NONHDLC SERPL-MCNC: 126 MG/DL
PSA SERPL-MCNC: 2.39 NG/ML (ref 0–4)
TRIGL SERPL-MCNC: 88 MG/DL

## 2024-07-02 PROCEDURE — 36415 COLL VENOUS BLD VENIPUNCTURE: CPT

## 2024-07-02 PROCEDURE — G0103 PSA SCREENING: HCPCS

## 2024-07-02 PROCEDURE — 80061 LIPID PANEL: CPT

## 2024-07-02 PROCEDURE — 99214 OFFICE O/P EST MOD 30 MIN: CPT | Performed by: PHYSICIAN ASSISTANT

## 2024-07-02 PROCEDURE — 99396 PREV VISIT EST AGE 40-64: CPT | Performed by: PHYSICIAN ASSISTANT

## 2024-07-02 RX ORDER — LOSARTAN POTASSIUM 25 MG/1
25 TABLET ORAL DAILY
Qty: 90 TABLET | Refills: 1 | Status: SHIPPED | OUTPATIENT
Start: 2024-07-02

## 2024-07-02 NOTE — ASSESSMENT & PLAN NOTE
BMI Counseling: Body mass index is 38.08 kg/m². The BMI is above normal. Nutrition recommendations include reducing portion sizes, decreasing overall calorie intake, moderation in carbohydrate intake, increasing intake of lean protein, reducing intake of saturated fat and trans fat, and reducing intake of cholesterol. Exercise recommendations include moderate aerobic physical activity for 150 minutes/week.

## 2024-07-02 NOTE — ASSESSMENT & PLAN NOTE
Begin losartan 25mg and recheck in office in 2 weeks. Ambulatory monitoring. Call office regarding other BP medication he is taking to assure no interaction/duplication

## 2024-07-02 NOTE — PROGRESS NOTES
"Ambulatory Visit  Name: Joao Ibarra      : 1965      MRN: [id number]  Encounter Provider: Lawrence Aguilera PA-C  Encounter Date: 2024   Encounter department: [facility]    Assessment & Plan   1. Essential hypertension  Assessment & Plan:  Begin losartan 25mg and recheck in office in 2 weeks. Ambulatory monitoring. Call office regarding other BP medication he is taking to assure no interaction/duplication  Orders:  -     losartan (COZAAR) 25 mg tablet; Take 1 tablet (25 mg total) by mouth daily  2. Screening for prostate cancer  -     PSA, Total Screen; Future  3. ALEXANDRE (obstructive sleep apnea)  Assessment & Plan:  Refer to sleep medicine  Orders:  -     Ambulatory Referral to Sleep Medicine; Future  4. Class 2 severe obesity due to excess calories with serious comorbidity and body mass index (BMI) of 38.0 to 38.9 in adult ([facility])  Assessment & Plan:  BMI Counseling: Body mass index is 38.08 kg/m². The BMI is above normal. Nutrition recommendations include reducing portion sizes, decreasing overall calorie intake, moderation in carbohydrate intake, increasing intake of lean protein, reducing intake of saturated fat and trans fat, and reducing intake of cholesterol. Exercise recommendations include moderate aerobic physical activity for 150 minutes/week.    5. Health maintenance examination  6. Moderate mixed hyperlipidemia not requiring statin therapy  Assessment & Plan:  Diet/lifestyle modifications discussed, continue to monitor lipids          History of Present Illness     Pt presents for annual physical, follow up.     HTN: 150s/100s on my recheck, not taking losartan 25mg. He shares he beelives he is taking a \"5mg pill\" for his BP but isnt sure what it is. No end organ complaints  HLD: as below, no statin  Lab Results       Component                Value               Date                       CHOLESTEROL              208 (H)             2024                 " CHOLESTEROL              159                 07/01/2023                 CHOLESTEROL              164                 05/12/2022            Lab Results       Component                Value               Date                       HDL                      49                  05/01/2024                 HDL                      41                  07/01/2023                 HDL                      39 (L)              05/12/2022            Lab Results       Component                Value               Date                       TRIG                     124                 05/01/2024                 TRIG                     88                  07/01/2023                 TRIG                     330 (H)             05/12/2022            Lab Results       Component                Value               Date                       NONHDLC                  159                 05/01/2024                 NONHDLC                  118                 07/01/2023                 NONHDLC                  125                 05/12/2022              Lab Results       Component                Value               Date                       LDLCALC                  134 (H)             05/01/2024            ALEXANDRE: not wearing CPAP, BMI 38  Utd with CRC screening  Due for PSA  Shares he quit smoking marijuana 2 days ago and does not smoke cigarettes  Gave up drinking 9 months ago  No changes in FH  Meds/allergies reviewed       Review of Systems   Constitutional:  Negative for chills, fatigue and fever.   HENT:  Negative for congestion, ear pain, hearing loss, nosebleeds, postnasal drip, rhinorrhea, sinus pressure, sinus pain, sneezing and sore throat.    Eyes:  Negative for pain, discharge, itching and visual disturbance.   Respiratory:  Negative for cough, chest tightness, shortness of breath and wheezing.    Cardiovascular:  Negative for chest pain, palpitations and leg swelling.   Gastrointestinal:  Negative for abdominal pain, blood in  stool, constipation, diarrhea, nausea and vomiting.   Genitourinary:  Negative for frequency and urgency.   Musculoskeletal: Negative.    Skin: Negative.    Neurological:  Negative for dizziness, light-headedness and numbness.   Psychiatric/Behavioral: Negative.       Past Medical History:   Diagnosis Date    Acute kidney injury (nontraumatic) ([facility])     Alcohol abuse     Anxiety     Chronic pain syndrome 02/17/2021    Colon polyp     COVID 09/2020    Diverticulitis     Erectile dysfunction     Essential hypertension     GERD (gastroesophageal reflux disease)     History of transfusion 2015    Hypertension     Lumbar radiculopathy 05/13/2019    Major depression     Mild cognitive disorder 05/06/2016    Mood disorder ([facility]) 05/02/2016    Multiple nevi     Obesity (BMI 30-39.9)     ALEXANDRE (obstructive sleep apnea)     Psychosis ([facility])     Snoring     Suspicious nevus     Tremor of right hand     Weakness of right upper extremity      Past Surgical History:   Procedure Laterality Date    CARDIAC CATHETERIZATION Left 11/16/2022    Procedure: Cardiac Left Heart Cath;  Surgeon: Paresh Street MD;  [facility]: [facility];  Service: Cardiology    CARDIAC CATHETERIZATION N/A 11/16/2022    Procedure: Cardiac Coronary Angiogram;  Surgeon: Paresh Street MD;  [facility];  Service: Cardiology    COLON SURGERY      COLONOSCOPY N/A 03/09/2019    Procedure: COLONOSCOPY;  Surgeon: Keaton Begum MD;  [facility];  Service: Gastroenterology    LIPOMA RESECTION      back    [place] ESOPHAGOGASTRODUODENOSCOPY TRANSORAL DIAGNOSTIC N/A 03/09/2019    Procedure: ESOPHAGOGASTRODUODENOSCOPY (EGD);  Surgeon: Keaton Begum MD;  [facility];  Service: Gastroenterology    [place] SURGICAL ARTHROSCOPY SHOULDER W/ROTATOR CUFF RPR Right 09/16/2022    Procedure: RIGHT SHOULDER ARTHROSCOPY, REPAIR ROTATOR CUFF, SUPRASPINATUS REPAIR, SUBSCAPULARIS REPAIR, BICEPS TENOTOMY, ACROMIOPLASTY, AND EXTENSIVE  DEBRIDEMENT;  Surgeon: Luciano Hamilton DO;  [facility];  Service: Orthopedics    [place] TRURL ELECTROSURG RESCJ PROSTATE BLEED COMPLETE N/A 7/6/2023    Procedure: TRANSURETHRAL RESECTION OF PROSTATE (TURP);  Surgeon: Chavez Olivera MD;  [facility];  Service: Urology    REVISION COLOSTOMY      SHOULDER SURGERY       Family History   Problem Relation Age of Onset    Heart disease Father     Stroke Mother     Aneurysm Sister     Ovarian cancer Sister     Drug abuse Sister         overdose    No Known Problems Sister     No Known Problems Sister     No Known Problems Sister     Drug abuse Brother     Drug abuse Brother     No Known Problems Brother     No Known Problems Daughter     No Known Problems Daughter     No Known Problems Daughter     No Known Problems Daughter     No Known Problems Son     No Known Problems Son      Social History     Tobacco Use    Smoking status: Never     Passive exposure: Current    Smokeless tobacco: Never   Vaping Use    Vaping status: Former   Substance and Sexual Activity    Alcohol use: Yes     Alcohol/week: 1.0 standard drink of alcohol     Types: 1 Cans of beer per week     Comment: occasionally 1-2 x a month    Drug use: Yes     Frequency: 4.0 times per week     Types: Marijuana     Comment: medical    Sexual activity: Yes     Partners: Female     Current Outpatient Medications on File Prior to Visit   Medication Sig    albuterol (PROVENTIL HFA,VENTOLIN HFA) 90 mcg/act inhaler INHALE 2 PUFFS EVERY 6 HOURS AS NEEDED FOR WHEEZING    amoxicillin (AMOXIL) 500 MG tablet TAKE ONE TABLET BY MOUTH EVERY 8 HOURS UNTIL COMPLETE (Patient not taking: Reported on 3/21/2024)    [DISCONTINUED] losartan (COZAAR) 25 mg tablet TAKE 1 TABLET (25 MG TOTAL) BY MOUTH DAILY.     Allergies   Allergen Reactions    Sulfa Antibiotics GI Intolerance     HA, vomiting, sweating     Amoxicillin GI Intolerance     Immunization History   Administered Date(s) Administered    COVID-19 PFIZER  "VACCINE 0.3 ML IM 05/12/2021, 06/02/2021    Tdap 12/16/2015     Objective     /98   Pulse 94   Temp 98.5 °F (36.9 °C)   Ht 5' 11\" (1.803 m)   Wt 124 kg (273 lb)   SpO2 97%   BMI 38.08 kg/m²     Physical Exam  Vitals and nursing note reviewed.   Constitutional:       General: He is not in acute distress.     Appearance: He is well-developed.   HENT:      Head: Normocephalic and atraumatic.   Eyes:      Conjunctiva/sclera: Conjunctivae normal.   Cardiovascular:      Rate and Rhythm: Normal rate and regular rhythm.      Heart sounds: No murmur heard.  Pulmonary:      Effort: Pulmonary effort is normal. No respiratory distress.      Breath sounds: Normal breath sounds. No wheezing, rhonchi or rales.   Abdominal:      Palpations: Abdomen is soft.      Tenderness: There is no abdominal tenderness.   Musculoskeletal:         General: No swelling.      Cervical back: Neck supple.      Right lower leg: No edema.      Left lower leg: No edema.   Skin:     General: Skin is warm and dry.      Capillary Refill: Capillary refill takes less than 2 seconds.   Neurological:      Mental Status: He is alert.   Psychiatric:         Mood and Affect: Mood normal.       Administrative Statements         "

## 2024-07-03 ENCOUNTER — TELEPHONE (OUTPATIENT)
Dept: UROLOGY | Facility: CLINIC | Age: 59
End: 2024-07-03

## 2024-07-03 NOTE — TELEPHONE ENCOUNTER
LVM per cc relaying Dinora MCCARTHY message   PSA is normal. Provided office number    ----- Message from Dinora Correa PA-C sent at 7/3/2024  9:32 AM EDT -----  Please inform patient his PSA is normal

## 2024-07-16 DIAGNOSIS — R05.1 ACUTE COUGH: ICD-10-CM

## 2024-07-17 RX ORDER — ALBUTEROL SULFATE 90 UG/1
2 AEROSOL, METERED RESPIRATORY (INHALATION) EVERY 6 HOURS PRN
Qty: 54 G | Refills: 1 | Status: SHIPPED | OUTPATIENT
Start: 2024-07-17

## 2024-07-29 DIAGNOSIS — I10 ESSENTIAL HYPERTENSION: ICD-10-CM

## 2024-07-30 RX ORDER — LOSARTAN POTASSIUM 25 MG/1
25 TABLET ORAL DAILY
Qty: 90 TABLET | Refills: 1 | Status: SHIPPED | OUTPATIENT
Start: 2024-07-30

## 2024-08-14 DIAGNOSIS — R05.1 ACUTE COUGH: ICD-10-CM

## 2024-08-14 RX ORDER — ALBUTEROL SULFATE 90 UG/1
2 AEROSOL, METERED RESPIRATORY (INHALATION) EVERY 6 HOURS PRN
Qty: 54 G | Refills: 0 | Status: SHIPPED | OUTPATIENT
Start: 2024-08-14

## 2024-09-02 ENCOUNTER — OFFICE VISIT (OUTPATIENT)
Dept: URGENT CARE | Facility: CLINIC | Age: 59
End: 2024-09-02
Payer: COMMERCIAL

## 2024-09-02 VITALS
RESPIRATION RATE: 18 BRPM | DIASTOLIC BLOOD PRESSURE: 88 MMHG | SYSTOLIC BLOOD PRESSURE: 160 MMHG | OXYGEN SATURATION: 98 % | HEART RATE: 95 BPM | TEMPERATURE: 97.3 F

## 2024-09-02 DIAGNOSIS — N39.0 URINARY TRACT INFECTION WITH HEMATURIA, SITE UNSPECIFIED: Primary | ICD-10-CM

## 2024-09-02 DIAGNOSIS — R31.9 URINARY TRACT INFECTION WITH HEMATURIA, SITE UNSPECIFIED: Primary | ICD-10-CM

## 2024-09-02 DIAGNOSIS — R30.0 DYSURIA: ICD-10-CM

## 2024-09-02 LAB
SL AMB  POCT GLUCOSE, UA: NEGATIVE
SL AMB LEUKOCYTE ESTERASE,UA: ABNORMAL
SL AMB POCT BILIRUBIN,UA: ABNORMAL
SL AMB POCT BLOOD,UA: ABNORMAL
SL AMB POCT CLARITY,UA: CLEAR
SL AMB POCT COLOR,UA: ABNORMAL
SL AMB POCT KETONES,UA: NEGATIVE
SL AMB POCT NITRITE,UA: NEGATIVE
SL AMB POCT PH,UA: 6.5
SL AMB POCT SPECIFIC GRAVITY,UA: 1.02
SL AMB POCT URINE PROTEIN: 30
SL AMB POCT UROBILINOGEN: 0.2

## 2024-09-02 PROCEDURE — 81002 URINALYSIS NONAUTO W/O SCOPE: CPT

## 2024-09-02 PROCEDURE — 87086 URINE CULTURE/COLONY COUNT: CPT

## 2024-09-02 PROCEDURE — 99213 OFFICE O/P EST LOW 20 MIN: CPT

## 2024-09-02 RX ORDER — CEPHALEXIN 500 MG/1
500 CAPSULE ORAL EVERY 6 HOURS SCHEDULED
Qty: 28 CAPSULE | Refills: 0 | Status: SHIPPED | OUTPATIENT
Start: 2024-09-02 | End: 2024-09-09

## 2024-09-02 NOTE — PROGRESS NOTES
[facility]        NAME: Joao Ibarra is a 59 y.o. male  : 1965    MRN: [id number]  DATE: 2024  TIME: 12:02 PM    Assessment and Plan   Urinary tract infection with hematuria, site unspecified [N39.0, R31.9]  1. Urinary tract infection with hematuria, site unspecified  Urine culture    Urine culture    cephalexin (KEFLEX) 500 mg capsule      2. Dysuria  POCT urine dip            Patient Instructions     Check my chart for urine culture results.     Start antibiotic and take as directed. Take probiotic.    Drink plenty of fluids, water or cranberry juice.   Avoid caffeine and alcohol. Tylenol or Motrin for pain or fever.    Azo for bladder spasms.      Follow up with PCP in 3-5 days.   If you develop fever, flank pain, vomiting, abdominal pain, or any new or concerning symptoms please return or proceed to ER.     Chief Complaint     Chief Complaint   Patient presents with    uti symptoms     Patient with burning and frequency of urination x2 days.         History of Present Illness       The patient presents today with complaints of dysuria, frequency, and urgency x 2 days. Denies fever/chills, flank/abdominal pain, n/v/d. He has a history of a TURP a few years ago. He also reports that he has had increased sexual intercourse recently with his partner. Denies new partners or concerns for STDs. Is circumcised.         Review of Systems   Review of Systems   Constitutional:  Negative for chills and fever.   Genitourinary:  Positive for dysuria, frequency and urgency. Negative for flank pain, hematuria, penile discharge, penile pain, penile swelling, scrotal swelling and testicular pain.         Current Medications       Current Outpatient Medications:     albuterol (PROVENTIL HFA,VENTOLIN HFA) 90 mcg/act inhaler, Inhale 2 puffs every 6 (six) hours as needed for wheezing, Disp: 54 g, Rfl: 0    cephalexin (KEFLEX) 500 mg capsule, Take 1 capsule (500 mg total) by mouth every 6 (six)  hours for 7 days, Disp: 28 capsule, Rfl: 0    losartan (COZAAR) 25 mg tablet, Take 1 tablet (25 mg total) by mouth daily, Disp: 90 tablet, Rfl: 1    amoxicillin (AMOXIL) 500 MG tablet, TAKE ONE TABLET BY MOUTH EVERY 8 HOURS UNTIL COMPLETE (Patient not taking: Reported on 3/21/2024), Disp: , Rfl:     Current Allergies     Allergies as of 09/02/2024 - Reviewed 09/02/2024   Allergen Reaction Noted    Sulfa antibiotics GI Intolerance 03/16/2021    Amoxicillin GI Intolerance 05/15/2019            The following portions of the patient's history were reviewed and updated as appropriate: allergies, current medications, past family history, past medical history, past social history, past surgical history and problem list.     Past Medical History:   Diagnosis Date    Acute kidney injury (nontraumatic) ([facility])     Alcohol abuse     Anxiety     Chronic pain syndrome 02/17/2021    Colon polyp     COVID 09/2020    Diverticulitis     Erectile dysfunction     Essential hypertension     GERD (gastroesophageal reflux disease)     History of transfusion 2015    Hypertension     Lumbar radiculopathy 05/13/2019    Major depression     Mild cognitive disorder 05/06/2016    Mood disorder (HCC) 05/02/2016    Multiple nevi     Obesity (BMI 30-39.9)     ALEXANDRE (obstructive sleep apnea)     Psychosis ([facility])     Snoring     Suspicious nevus     Tremor of right hand     Weakness of right upper extremity        Past Surgical History:   Procedure Laterality Date    CARDIAC CATHETERIZATION Left 11/16/2022    Procedure: Cardiac Left Heart Cath;  Surgeon: Paresh Street MD;  Location: [facility];  Service: Cardiology    CARDIAC CATHETERIZATION N/A 11/16/2022    Procedure: Cardiac Coronary Angiogram;  Surgeon: Paresh Street MD;  [facility]: [facility];  Service: Cardiology    COLON SURGERY      COLONOSCOPY N/A 03/09/2019    Procedure: COLONOSCOPY;  Surgeon: Keaton Begum MD;  Location: [facility];  Service: Gastroenterology     LIPOMA RESECTION      back    MA ESOPHAGOGASTRODUODENOSCOPY TRANSORAL DIAGNOSTIC N/A 03/09/2019    Procedure: ESOPHAGOGASTRODUODENOSCOPY (EGD);  Surgeon: Keaton Begum MD;  Location: [facility];  Service: Gastroenterology    MA SURGICAL ARTHROSCOPY SHOULDER W/ROTATOR CUFF RPR Right 09/16/2022    Procedure: RIGHT SHOULDER ARTHROSCOPY, REPAIR ROTATOR CUFF, SUPRASPINATUS REPAIR, SUBSCAPULARIS REPAIR, BICEPS TENOTOMY, ACROMIOPLASTY, AND EXTENSIVE DEBRIDEMENT;  Surgeon: Luciano Hamilton DO;  Location: [facility];  Service: Orthopedics    MA TRURL ELECTROSURG RESCJ PROSTATE BLEED COMPLETE N/A 7/6/2023    Procedure: TRANSURETHRAL RESECTION OF PROSTATE (TURP);  Surgeon: Chavez Olivera MD;  [facility]: [facility];  Service: Urology    REVISION COLOSTOMY      SHOULDER SURGERY         Family History   Problem Relation Age of Onset    Heart disease Father     Stroke Mother     Aneurysm Sister     Ovarian cancer Sister     Drug abuse Sister         overdose    No Known Problems Sister     No Known Problems Sister     No Known Problems Sister     Drug abuse Brother     Drug abuse Brother     No Known Problems Brother     No Known Problems Daughter     No Known Problems Daughter     No Known Problems Daughter     No Known Problems Daughter     No Known Problems Son     No Known Problems Son          Medications have been verified.        Objective   /88   Pulse 95   Temp (!) 97.3 °F (36.3 °C)   Resp 18   SpO2 98%        Physical Exam     Physical Exam  Vitals and nursing note reviewed.   Constitutional:       General: He is not in acute distress.     Appearance: Normal appearance. He is obese.   HENT:      Head: Normocephalic and atraumatic.      Right Ear: External ear normal.      Left Ear: External ear normal.      Mouth/Throat:      Mouth: Mucous membranes are moist.   Eyes:      Pupils: Pupils are equal, round, and reactive to light.   Cardiovascular:      Rate and Rhythm: Normal rate and regular rhythm.       Heart sounds: Normal heart sounds. No murmur heard.  Pulmonary:      Effort: Pulmonary effort is normal. No respiratory distress.      Breath sounds: Normal breath sounds. No decreased air movement. No decreased breath sounds, wheezing, rhonchi or rales.   Abdominal:      General: There is no distension.      Palpations: Abdomen is soft.      Tenderness: There is no abdominal tenderness. There is no right CVA tenderness or left CVA tenderness.   Skin:     General: Skin is warm and dry.   Neurological:      Mental Status: He is alert and oriented to person, place, and time. Mental status is at baseline.   Psychiatric:         Mood and Affect: Mood normal.         Behavior: Behavior normal.

## 2024-09-02 NOTE — PATIENT INSTRUCTIONS
Check my chart for urine culture results.     Start antibiotic and take as directed. Take probiotic.    Drink plenty of fluids, water or cranberry juice.   Avoid caffeine and alcohol. Tylenol or Motrin for pain or fever.    Azo for bladder spasms.      Follow up with PCP in 3-5 days.   If you develop fever, flank pain, vomiting, abdominal pain, or any new or concerning symptoms please return or proceed to ER.

## 2024-09-04 ENCOUNTER — TELEPHONE (OUTPATIENT)
Dept: URGENT CARE | Facility: CLINIC | Age: 59
End: 2024-09-04

## 2024-09-04 LAB — BACTERIA UR CULT: NORMAL

## 2024-09-04 NOTE — TELEPHONE ENCOUNTER
Updated pt on urine culture results. Symptoms improving, continues with frequency of urination. Told to stop antibiotics and to follow up with urology. All questions answered.

## 2024-09-09 ENCOUNTER — TELEPHONE (OUTPATIENT)
Age: 59
End: 2024-09-09

## 2024-09-09 NOTE — TELEPHONE ENCOUNTER
Called to speak to patient and triage his symptoms. Left a voicemail to have patient call back to the office for further triage and assistance with symptoms. Office phone number given.

## 2024-09-09 NOTE — TELEPHONE ENCOUNTER
Patient seen by Dinora at [facility]      Patient's wife called stating patient was at Urgent care with frequency and he continue to have painful urination for the last week.  She would like to know how to proceed.       Patient can be reached at [phone]

## 2024-10-17 ENCOUNTER — OFFICE VISIT (OUTPATIENT)
Dept: UROLOGY | Facility: CLINIC | Age: 59
End: 2024-10-17
Payer: COMMERCIAL

## 2024-10-17 VITALS
OXYGEN SATURATION: 96 % | RESPIRATION RATE: 16 BRPM | HEIGHT: 70 IN | SYSTOLIC BLOOD PRESSURE: 146 MMHG | TEMPERATURE: 96.9 F | BODY MASS INDEX: 38.08 KG/M2 | HEART RATE: 105 BPM | WEIGHT: 266 LBS | DIASTOLIC BLOOD PRESSURE: 100 MMHG

## 2024-10-17 DIAGNOSIS — R35.0 BENIGN PROSTATIC HYPERPLASIA WITH URINARY FREQUENCY: Primary | ICD-10-CM

## 2024-10-17 DIAGNOSIS — N40.1 BENIGN PROSTATIC HYPERPLASIA WITH URINARY FREQUENCY: Primary | ICD-10-CM

## 2024-10-17 DIAGNOSIS — Z12.5 PROSTATE CANCER SCREENING: ICD-10-CM

## 2024-10-17 LAB — POST-VOID RESIDUAL VOLUME, ML POC: 53 ML

## 2024-10-17 PROCEDURE — 99213 OFFICE O/P EST LOW 20 MIN: CPT | Performed by: PHYSICIAN ASSISTANT

## 2024-10-17 PROCEDURE — 51798 US URINE CAPACITY MEASURE: CPT | Performed by: PHYSICIAN ASSISTANT

## 2024-10-17 NOTE — PROGRESS NOTES
10/17/2024      Chief Complaint   Patient presents with    Follow-up     BPH with LUTS         Assessment and Plan    1. BPH with LUTS s/p TURP on 7/6/23   - PVR 53 mL  - Doing well, no complaints    2. Prostate cancer screening/history of elevated PSA  - Most recent PSA from 7/2/24 was 2.393   - TURP tissue pathology negative as above  - Continue yearly screening    - Follow up in 1 year with PSA    History of Present Illness  Joao Ibarra is a 59 y.o. male here for follow up evaluation of BPH.  He was noted to have transient elevated PSA of 4.11. Repeat PSA within normal range at 2.393 . He has TURP last year and continues to do well from a urinary standpoint. No complaints.       AUA SYMPTOM SCORE      Flowsheet Row Most Recent Value   AUA SYMPTOM SCORE    How often have you had a sensation of not emptying your bladder completely after you finished urinating? 2   How often have you had to urinate again less than two hours after you finished urinating? 2   How often have you found you stopped and started again several times when you urinate? 0   How often have you found it difficult to postpone urination? 0   How often have you had a weak urinary stream? 0   How often have you had to push or strain to begin urination? 0   How many times did you most typically get up to urinate from the time you went to bed at night until the time you got up in the morning? 2   Quality of Life: If you were to spend the rest of your life with your urinary condition just the way it is now, how would you feel about that? 0   AUA SYMPTOM SCORE 6              Review of Systems   Constitutional:  Negative for chills and fever.   Respiratory:  Negative for shortness of breath.    Cardiovascular:  Negative for chest pain.   Gastrointestinal:  Negative for abdominal pain.   Genitourinary:  Negative for difficulty urinating, dysuria, flank pain, frequency, hematuria and urgency.   Neurological:  Negative for dizziness.                   Past Medical History  Past Medical History:   Diagnosis Date    Acute kidney injury (nontraumatic) (HCC)     Alcohol abuse     Anxiety     Chronic pain syndrome 02/17/2021    Colon polyp     COVID 09/2020    Diverticulitis     Erectile dysfunction     Essential hypertension     GERD (gastroesophageal reflux disease)     History of transfusion 2015    Hypertension     Lumbar radiculopathy 05/13/2019    Major depression     Mild cognitive disorder 05/06/2016    Mood disorder (HCC) 05/02/2016    Multiple nevi     Obesity (BMI 30-39.9)     ALEXANDRE (obstructive sleep apnea)     Psychosis (HCC)     Snoring     Suspicious nevus     Tremor of right hand     Weakness of right upper extremity        Past Social History  Past Surgical History:   Procedure Laterality Date    CARDIAC CATHETERIZATION Left 11/16/2022    Procedure: Cardiac Left Heart Cath;  Surgeon: Paresh Street MD;  [facility];  Service: Cardiology    CARDIAC CATHETERIZATION N/A 11/16/2022    Procedure: Cardiac Coronary Angiogram;  Surgeon: Paresh Street MD;  [facility];  Service: Cardiology    COLON SURGERY      COLONOSCOPY N/A 03/09/2019    Procedure: COLONOSCOPY;  Surgeon: Keaton Begum MD;  [facility];  Service: Gastroenterology    LIPOMA RESECTION      back    AK ESOPHAGOGASTRODUODENOSCOPY TRANSORAL DIAGNOSTIC N/A 03/09/2019    Procedure: ESOPHAGOGASTRODUODENOSCOPY (EGD);  Surgeon: Keaton Begum MD;  [facility];  Service: Gastroenterology    AK SURGICAL ARTHROSCOPY SHOULDER W/ROTATOR CUFF RPR Right 09/16/2022    Procedure: RIGHT SHOULDER ARTHROSCOPY, REPAIR ROTATOR CUFF, SUPRASPINATUS REPAIR, SUBSCAPULARIS REPAIR, BICEPS TENOTOMY, ACROMIOPLASTY, AND EXTENSIVE DEBRIDEMENT;  Surgeon: Luciano Hamilton DO;  [facility];  Service: Orthopedics    AK TRURL ELECTROSURG RESCJ PROSTATE BLEED COMPLETE N/A 7/6/2023    Procedure: TRANSURETHRAL RESECTION OF PROSTATE (TURP);  Surgeon:  Chavez Olivera MD;  Location: [facility] OR;  Service: Urology    REVISION COLOSTOMY      SHOULDER SURGERY       Social History     Tobacco Use   Smoking Status Never    Passive exposure: Current   Smokeless Tobacco Never       Past Family History  Family History   Problem Relation Age of Onset    Heart disease Father     Stroke Mother     Aneurysm Sister     Ovarian cancer Sister     Drug abuse Sister         overdose    No Known Problems Sister     No Known Problems Sister     No Known Problems Sister     Drug abuse Brother     Drug abuse Brother     No Known Problems Brother     No Known Problems Daughter     No Known Problems Daughter     No Known Problems Daughter     No Known Problems Daughter     No Known Problems Son     No Known Problems Son        Past Social history  Social History     Socioeconomic History    Marital status: /Civil Union     Spouse name: Not on file    Number of children: Not on file    Years of education: Not on file    Highest education level: Not on file   Occupational History    Occupation: CONSTRUCTION   Tobacco Use    Smoking status: Never     Passive exposure: Current    Smokeless tobacco: Never   Vaping Use    Vaping status: Former   Substance and Sexual Activity    Alcohol use: Yes     Alcohol/week: 1.0 standard drink of alcohol     Types: 1 Cans of beer per week     Comment: occasionally 1-2 x a month    Drug use: Yes     Frequency: 4.0 times per week     Types: Marijuana     Comment: medical    Sexual activity: Yes     Partners: Female   Other Topics Concern    Not on file   Social History Narrative    Not on file     Social Determinants of Health     Financial Resource Strain: Not on file   Food Insecurity: Not on file   Transportation Needs: Not on file   Physical Activity: Not on file   Stress: Not on file   Social Connections: Unknown (6/18/2024)    Received from [facility]    Social [employer]     How often do you feel lonely or isolated from those around you?  "(Adult - for ages 18 years and over): Not on file   Intimate Partner Violence: Not on file   Housing Stability: Not on file       Current Medications  Current Outpatient Medications   Medication Sig Dispense Refill    albuterol (PROVENTIL HFA,VENTOLIN HFA) 90 mcg/act inhaler Inhale 2 puffs every 6 (six) hours as needed for wheezing 54 g 0    losartan (COZAAR) 25 mg tablet Take 1 tablet (25 mg total) by mouth daily 90 tablet 1    amoxicillin (AMOXIL) 500 MG tablet TAKE ONE TABLET BY MOUTH EVERY 8 HOURS UNTIL COMPLETE (Patient not taking: Reported on 3/21/2024)       No current facility-administered medications for this visit.       Allergies  Allergies   Allergen Reactions    Sulfa Antibiotics GI Intolerance     HA, vomiting, sweating     Amoxicillin GI Intolerance         The following portions of the patient's history were reviewed and updated as appropriate: allergies, current medications, past medical history, past social history, past surgical history and problem list.      Vitals  Vitals:    10/17/24 1015   BP: 146/100   Pulse: 105   Resp: 16   Temp: (!) 96.9 °F (36.1 °C)   TempSrc: Temporal   SpO2: 96%   Weight: 121 kg (266 lb)   Height: 5' 10\" (1.778 m)           Physical Exam  Physical Exam  Constitutional:       Appearance: Normal appearance.   HENT:      Head: Normocephalic and atraumatic.      Right Ear: External ear normal.      Left Ear: External ear normal.      Nose: Nose normal.   Eyes:      General: No scleral icterus.     Conjunctiva/sclera: Conjunctivae normal.   Cardiovascular:      Pulses: Normal pulses.   Pulmonary:      Effort: Pulmonary effort is normal.   Musculoskeletal:         General: Normal range of motion.      Cervical back: Normal range of motion.   Neurological:      General: No focal deficit present.      Mental Status: He is alert and oriented to person, place, and time.   Psychiatric:         Mood and Affect: Mood normal.         Behavior: Behavior normal.         Thought " Content: Thought content normal.         Judgment: Judgment normal.           Results  Recent Results (from the past 1 hour(s))   POCT Measure PVR    Collection Time: 10/17/24 10:18 AM   Result Value Ref Range    POST-VOID RESIDUAL VOLUME, ML POC 53 mL   ]  Lab Results   Component Value Date    PSA 2.393 07/02/2024    PSA 4.11 (H) 07/01/2023    PSA 3.8 11/08/2022     Lab Results   Component Value Date    GLUCOSE 125 04/02/2016    CALCIUM 9.5 05/01/2024     01/06/2016    K 5.3 05/01/2024    CO2 23 05/01/2024     (H) 05/01/2024    BUN 24 05/01/2024    CREATININE 0.80 05/01/2024     Lab Results   Component Value Date    WBC 5.22 05/01/2024    HGB 15.6 05/01/2024    HCT 48.6 05/01/2024    MCV 89 05/01/2024     05/01/2024           Orders  Orders Placed This Encounter   Procedures    POCT Measure PVR       Dinora Correa

## 2024-11-22 ENCOUNTER — OFFICE VISIT (OUTPATIENT)
Dept: CARDIOLOGY CLINIC | Facility: CLINIC | Age: 59
End: 2024-11-22
Payer: COMMERCIAL

## 2024-11-22 VITALS
SYSTOLIC BLOOD PRESSURE: 156 MMHG | OXYGEN SATURATION: 98 % | HEART RATE: 85 BPM | BODY MASS INDEX: 38.37 KG/M2 | WEIGHT: 268 LBS | DIASTOLIC BLOOD PRESSURE: 96 MMHG | RESPIRATION RATE: 16 BRPM | HEIGHT: 70 IN

## 2024-11-22 DIAGNOSIS — I10 ESSENTIAL HYPERTENSION: ICD-10-CM

## 2024-11-22 DIAGNOSIS — R07.9 CHEST PAIN, UNSPECIFIED TYPE: Primary | ICD-10-CM

## 2024-11-22 PROCEDURE — 93000 ELECTROCARDIOGRAM COMPLETE: CPT | Performed by: INTERNAL MEDICINE

## 2024-11-22 PROCEDURE — 99214 OFFICE O/P EST MOD 30 MIN: CPT | Performed by: INTERNAL MEDICINE

## 2024-11-22 RX ORDER — LOSARTAN POTASSIUM 25 MG/1
50 TABLET ORAL DAILY
Qty: 90 TABLET | Refills: 1 | Status: SHIPPED | OUTPATIENT
Start: 2024-11-22

## 2024-11-22 NOTE — PROGRESS NOTES
[facility] [place] [address] [address] [address]  Cardiology Follow Up    Joao Ibarra  1965  [id number]    1. Chest pain, unspecified type  POCT ECG      2. Essential hypertension            Discussion/Summary:  Hypertension, patient admitted to periodically not taking medications or blood pressure readings for the last 1 week.   Advised about importance of taking medications regularly.  Advised to monitor blood pressure at home report any readings more than 140/90.  Plan for routine blood work    2.  Hyperlipidemia, last .  Plan for routine blood work.    3. Chest pain as described below. Check nuc stress test.     Increase losartan to 50 mg a day.  He had dry cough on lisinopril.  Reviewed the importance of taking all medications as prescribed.  He does admit to periodic noncompliance.   Follow with PCP.  All questions answered.  Continue all medications. Previous studies reviewed with patient, medications reviewed and possible side effects discussed. Continue risk factor modification. Optimize weight, regular exercise and follow up with appropriate specialists and primary care physician as discussed.  All questions answered. Patient advised to report any problems prompting to medical attention. Return for follow up visit in 6 months or earlier if needed    EKG today- NSR LAE Borderline.      Chief Complaint   Patient presents with    Follow-up       Interval History: Patient presents for routine follow-up visit with history of hypertension.  Patient is compliant with losartan but still hypertensive today.  Patient has 2 day h/o moderate midsternal chest pressure at rest, not related to exercise. He thinks it may be due to shoulder pain.  Denies any shortness of breath, dizziness, palpitations.  Patient states otherwise feeling well with no complaints.  Follows with PCP closely.  Due for blood work.    Last echo July 2023--EF 55%, G1 DD  Cardiac cath November  2022--angiographically normal coronary arteries    Patient Active Problem List   Diagnosis    Essential hypertension    Mild cognitive disorder    Finney's esophagus with dysplasia    Obesity due to excess calories with serious comorbidity    Chronic pain syndrome    Chronic right-sided low back pain with right-sided sciatica    Optic nerve edema    ALEXANDRE (obstructive sleep apnea)    Erectile dysfunction    Multiple nevi    Complete tear of right rotator cuff    Class II obesity    Benign localized prostatic hyperplasia with lower urinary tract symptoms (LUTS)    Encounter to discuss test results    Moderate mixed hyperlipidemia not requiring statin therapy     Past Medical History:   Diagnosis Date    Acute kidney injury (nontraumatic) ([facility])     Alcohol abuse     Anxiety     Chronic pain syndrome 02/17/2021    Colon polyp     COVID 09/2020    Diverticulitis     Erectile dysfunction     Essential hypertension     GERD (gastroesophageal reflux disease)     History of transfusion 2015    Hypertension     Lumbar radiculopathy 05/13/2019    Major depression     Mild cognitive disorder 05/06/2016    Mood disorder (HCC) 05/02/2016    Multiple nevi     Obesity (BMI 30-39.9)     ALEXANDRE (obstructive sleep apnea)     Psychosis ([facility])     Snoring     Suspicious nevus     Tremor of right hand     Weakness of right upper extremity      Social History     Socioeconomic History    Marital status: /Civil Union     Spouse name: Not on file    Number of children: Not on file    Years of education: Not on file    Highest education level: Not on file   Occupational History    Occupation: CONSTRUCTION   Tobacco Use    Smoking status: Never     Passive exposure: Current    Smokeless tobacco: Never   Vaping Use    Vaping status: Former   Substance and Sexual Activity    Alcohol use: Yes     Alcohol/week: 1.0 standard drink of alcohol     Types: 1 Cans of beer per week     Comment: occasionally 1-2 x a month    Drug use: Yes      Frequency: 4.0 times per week     Types: Marijuana     Comment: medical    Sexual activity: Yes     Partners: Female   Other Topics Concern    Not on file   Social History Narrative    Not on file     Social Drivers of Health     Financial Resource Strain: Not on file   Food Insecurity: Not on file   Transportation Needs: Not on file   Physical Activity: Not on file   Stress: Not on file   Social Connections: Unknown (6/18/2024)    Received from [employer]    Social Connections     How often do you feel lonely or isolated from those around you? (Adult - for ages 18 years and over): Not on file   Intimate Partner Violence: Not on file   Housing Stability: Not on file      Family History   Problem Relation Age of Onset    Heart disease Father     Stroke Mother     Aneurysm Sister     Ovarian cancer Sister     Drug abuse Sister         overdose    No Known Problems Sister     No Known Problems Sister     No Known Problems Sister     Drug abuse Brother     Drug abuse Brother     No Known Problems Brother     No Known Problems Daughter     No Known Problems Daughter     No Known Problems Daughter     No Known Problems Daughter     No Known Problems Son     No Known Problems Son      Past Surgical History:   Procedure Laterality Date    CARDIAC CATHETERIZATION Left 11/16/2022    Procedure: Cardiac Left Heart Cath;  Surgeon: Paresh Street MD;  [facility];  Service: Cardiology    CARDIAC CATHETERIZATION N/A 11/16/2022    Procedure: Cardiac Coronary Angiogram;  Surgeon: Paresh Street MD;  [facility];  Service: Cardiology    COLON SURGERY      COLONOSCOPY N/A 03/09/2019    Procedure: COLONOSCOPY;  Surgeon: Keaton Begum MD;  [facility]: [facility];  Service: Gastroenterology    LIPOMA RESECTION      back    [place] ESOPHAGOGASTRODUODENOSCOPY TRANSORAL DIAGNOSTIC N/A 03/09/2019    Procedure: ESOPHAGOGASTRODUODENOSCOPY (EGD);  Surgeon: Keaton Begum MD;  [facility];   "Service: Gastroenterology    CA SURGICAL ARTHROSCOPY SHOULDER W/ROTATOR CUFF RPR Right 09/16/2022    Procedure: RIGHT SHOULDER ARTHROSCOPY, REPAIR ROTATOR CUFF, SUPRASPINATUS REPAIR, SUBSCAPULARIS REPAIR, BICEPS TENOTOMY, ACROMIOPLASTY, AND EXTENSIVE DEBRIDEMENT;  Surgeon: Luciano Hamilton DO;  [facility];  Service: Orthopedics    CA TRURL ELECTROSURG RESCJ PROSTATE BLEED COMPLETE N/A 7/6/2023    Procedure: TRANSURETHRAL RESECTION OF PROSTATE (TURP);  Surgeon: Chavez Olivera MD;  [facility];  Service: Urology    REVISION COLOSTOMY      SHOULDER SURGERY         Current Outpatient Medications:     albuterol (PROVENTIL HFA,VENTOLIN HFA) 90 mcg/act inhaler, Inhale 2 puffs every 6 (six) hours as needed for wheezing, Disp: 54 g, Rfl: 0    losartan (COZAAR) 25 mg tablet, Take 1 tablet (25 mg total) by mouth daily, Disp: 90 tablet, Rfl: 1  Allergies   Allergen Reactions    Sulfa Antibiotics GI Intolerance     HA, vomiting, sweating     Amoxicillin GI Intolerance         Imaging: No results found.    Review of Systems:  Review of Systems   Constitutional: Negative.    Respiratory: Negative.     Cardiovascular: Negative.    Musculoskeletal: Negative.    Neurological: Negative.    Hematological: Negative.    Psychiatric/Behavioral: Negative.     All other systems reviewed and are negative.        /96 (BP Location: Left arm, Patient Position: Sitting, Cuff Size: Standard)   Pulse 85   Resp 16   Ht 5' 10\" (1.778 m)   Wt 122 kg (268 lb)   SpO2 98%   BMI 38.45 kg/m²     Physical Exam:  Physical Exam  Vitals and nursing note reviewed.   Constitutional:       Appearance: Normal appearance.   HENT:      Head: Normocephalic and atraumatic.   Cardiovascular:      Rate and Rhythm: Normal rate and regular rhythm.      Pulses: Normal pulses.      Heart sounds: Normal heart sounds.   Pulmonary:      Effort: Pulmonary effort is normal.      Breath sounds: Normal breath sounds.   Musculoskeletal:         " General: Normal range of motion.      Cervical back: Normal range of motion and neck supple.   Skin:     General: Skin is warm and dry.   Neurological:      General: No focal deficit present.      Mental Status: He is alert and oriented to person, place, and time.   Psychiatric:         Mood and Affect: Mood normal.         Behavior: Behavior normal.         Thought Content: Thought content normal.         Judgment: Judgment normal.

## 2025-01-03 ENCOUNTER — HOSPITAL ENCOUNTER (OUTPATIENT)
Dept: NON INVASIVE DIAGNOSTICS | Facility: CLINIC | Age: 60
Discharge: HOME | End: 2025-01-03
Payer: COMMERCIAL

## 2025-01-03 VITALS
WEIGHT: 268 LBS | DIASTOLIC BLOOD PRESSURE: 96 MMHG | OXYGEN SATURATION: 96 % | HEIGHT: 70 IN | BODY MASS INDEX: 38.37 KG/M2 | HEART RATE: 85 BPM | SYSTOLIC BLOOD PRESSURE: 154 MMHG

## 2025-01-03 DIAGNOSIS — R07.9 CHEST PAIN, UNSPECIFIED TYPE: ICD-10-CM

## 2025-01-03 LAB
CHEST PAIN STATEMENT: NORMAL
MAX DIASTOLIC BP: 96 MMHG
MAX HR PERCENT: 93 %
MAX HR: 148 BPM
MAX PREDICTED HEART RATE: 159 BPM
PROTOCOL NAME: NORMAL
RATE PRESSURE PRODUCT: NORMAL
REASON FOR TERMINATION: NORMAL
SL CV REST NUCLEAR ISOTOPE DOSE: 15.95 MCI
SL CV STRESS NUCLEAR ISOTOPE DOSE: 48.6 MCI
SL CV STRESS RECOVERY BP: NORMAL MMHG
SL CV STRESS RECOVERY HR: 112 BPM
SL CV STRESS RECOVERY O2 SAT: 96 %
SL CV STRESS STAGE REACHED: 2
STRESS ANGINA INDEX: 0
STRESS BASELINE BP: NORMAL MMHG
STRESS BASELINE HR: 85 BPM
STRESS O2 SAT REST: 96 %
STRESS PEAK HR: 148 BPM
STRESS POST ESTIMATED WORKLOAD: 7 METS
STRESS POST EXERCISE DUR MIN: 6 MIN
STRESS POST EXERCISE DUR MIN: 6 MIN
STRESS POST EXERCISE DUR SEC: 0 SEC
STRESS POST O2 SAT PEAK: 96 %
STRESS POST PEAK BP: 192 MMHG
STRESS POST PEAK HR: 148 BPM
STRESS POST PEAK SYSTOLIC BP: 192 MMHG
TARGET HR FORMULA: NORMAL
TEST INDICATION: NORMAL

## 2025-01-03 PROCEDURE — 93016 CV STRESS TEST SUPVJ ONLY: CPT | Performed by: INTERNAL MEDICINE

## 2025-01-03 PROCEDURE — A9502 TC99M TETROFOSMIN: HCPCS

## 2025-01-03 PROCEDURE — 78452 HT MUSCLE IMAGE SPECT MULT: CPT | Performed by: INTERNAL MEDICINE

## 2025-01-03 PROCEDURE — 93017 CV STRESS TEST TRACING ONLY: CPT

## 2025-01-03 PROCEDURE — 78452 HT MUSCLE IMAGE SPECT MULT: CPT

## 2025-01-03 PROCEDURE — 93018 CV STRESS TEST I&R ONLY: CPT | Performed by: INTERNAL MEDICINE

## 2025-01-03 RX ORDER — REGADENOSON 0.08 MG/ML
0.4 INJECTION, SOLUTION INTRAVENOUS ONCE
Status: DISCONTINUED | OUTPATIENT
Start: 2025-01-03 | End: 2025-01-04 | Stop reason: HOSPADM

## 2025-01-06 ENCOUNTER — OFFICE VISIT (OUTPATIENT)
Dept: URGENT CARE | Facility: CLINIC | Age: 60
End: 2025-01-06
Payer: COMMERCIAL

## 2025-01-06 ENCOUNTER — NURSE TRIAGE (OUTPATIENT)
Age: 60
End: 2025-01-06

## 2025-01-06 ENCOUNTER — DOCUMENTATION (OUTPATIENT)
Dept: ADMINISTRATIVE | Facility: OTHER | Age: 60
End: 2025-01-06

## 2025-01-06 VITALS
SYSTOLIC BLOOD PRESSURE: 160 MMHG | DIASTOLIC BLOOD PRESSURE: 96 MMHG | TEMPERATURE: 97.9 F | OXYGEN SATURATION: 99 % | HEART RATE: 86 BPM | RESPIRATION RATE: 18 BRPM

## 2025-01-06 DIAGNOSIS — R30.0 DYSURIA: Primary | ICD-10-CM

## 2025-01-06 LAB
CHEST PAIN STATEMENT: NORMAL
MAX DIASTOLIC BP: 96 MMHG
MAX PREDICTED HEART RATE: 159 BPM
PROTOCOL NAME: NORMAL
REASON FOR TERMINATION: NORMAL
SL AMB  POCT GLUCOSE, UA: NEGATIVE
SL AMB LEUKOCYTE ESTERASE,UA: NEGATIVE
SL AMB POCT BILIRUBIN,UA: NEGATIVE
SL AMB POCT BLOOD,UA: NORMAL
SL AMB POCT CLARITY,UA: CLEAR
SL AMB POCT COLOR,UA: NORMAL
SL AMB POCT KETONES,UA: NEGATIVE
SL AMB POCT NITRITE,UA: NEGATIVE
SL AMB POCT PH,UA: 5
SL AMB POCT SPECIFIC GRAVITY,UA: NEGATIVE
SL AMB POCT URINE PROTEIN: NEGATIVE
SL AMB POCT UROBILINOGEN: 0.2
STRESS POST EXERCISE DUR MIN: 6 MIN
STRESS POST EXERCISE DUR SEC: 0 SEC
STRESS POST PEAK HR: 148 BPM
STRESS POST PEAK SYSTOLIC BP: 192 MMHG
TARGET HR FORMULA: NORMAL
TEST INDICATION: NORMAL

## 2025-01-06 PROCEDURE — 99213 OFFICE O/P EST LOW 20 MIN: CPT | Performed by: PHYSICIAN ASSISTANT

## 2025-01-06 PROCEDURE — 81002 URINALYSIS NONAUTO W/O SCOPE: CPT | Performed by: PHYSICIAN ASSISTANT

## 2025-01-06 PROCEDURE — 87086 URINE CULTURE/COLONY COUNT: CPT | Performed by: PHYSICIAN ASSISTANT

## 2025-01-06 RX ORDER — CEPHALEXIN 500 MG/1
500 CAPSULE ORAL EVERY 6 HOURS SCHEDULED
Qty: 40 CAPSULE | Refills: 0 | Status: SHIPPED | OUTPATIENT
Start: 2025-01-06 | End: 2025-01-16

## 2025-01-06 NOTE — TELEPHONE ENCOUNTER
Patient wife, Amalia, called asking to schedule an appointment as the patient continues to have recurrent UTIs. I scheduled a follow up on 1/28/2025 with Susu. Patient was seen at the urgent care today for dysuria and mild back pain. Urine culture currently pending.  I advised to call the office if his symptoms aren't any better after completing the 10 day course of Keflex. Please advise.

## 2025-01-06 NOTE — PROGRESS NOTES
Julita Villalpando RN  P Patient Reported Team         Blood pressure elevated  Appointment department: [facility]  Appointment provider: * No providers found *  Blood pressure  01/06/25 0902 160/96  01/06/25 0900 168/99  01/06/25 2:17 PM    Patient was called after the Urgent Care visit ; a message was left for the patient to return the call. MSG sent to clin bin for bp re ck.    Thank you.  Samantha Mcdonald MA  [facility]

## 2025-01-06 NOTE — PROGRESS NOTES
[facility]        NAME: Joao Ibarra is a 59 y.o. male  : 1965    MRN: [id number]  DATE: 2025  TIME: 9:23 AM    Assessment and Plan   Dysuria [R30.0]  1. Dysuria  cephalexin (KEFLEX) 500 mg capsule        Physical exam unremarkable.  UA shows trace blood.  No flank pain with percussion.  No abdominal tenderness.  PMH prostate surgery.  Will start on abx and told pt to call urologist today and schedule F/u appt.  He states he understands and agrees to plan.    Patient Instructions       Follow up with PCP in 3-5 days.  Proceed to  ER if symptoms worsen.    If tests are performed, our office will contact you with results only if changes need to made to the care plan discussed with you at the visit. [facility]    Chief Complaint     Chief Complaint   Patient presents with    Possible UTI     Co on increased frequency that started  a weeks ago. C/o mild back pain. Increased water intake. Does follow with urology.          History of Present Illness       Difficulty Urinating   Episode onset: Pt had prostate surgery a few years ago.  Has had no sx since then.  New onset dysuria about a week ago. The problem occurs every urination. The problem has been unchanged. The quality of the pain is described as burning. The pain is mild. There has been no fever. Sexually active: No new or concerning sexual contacts. Associated symptoms include frequency and urgency. Pertinent negatives include no chills, discharge, flank pain, hematuria, hesitancy, nausea, sweats or vomiting. He has tried nothing for the symptoms. The treatment provided no relief. Pt states that he had imaging done and was told that he had build up in his kidneys but no defintitve stones.  Pt has no PMH kidney stones in past.       Review of Systems   Review of Systems   Constitutional: Negative.  Negative for chills.   HENT: Negative.     Eyes: Negative.    Respiratory: Negative.   Negative for chest tightness, shortness of breath and wheezing.    Cardiovascular: Negative.  Negative for chest pain and palpitations.   Gastrointestinal: Negative.  Negative for nausea and vomiting.   Endocrine: Negative.    Genitourinary:  Positive for dysuria, frequency and urgency. Negative for difficulty urinating, flank pain, hematuria, hesitancy, penile discharge, penile pain, penile swelling, scrotal swelling and testicular pain.   Musculoskeletal:  Negative for back pain, neck pain and neck stiffness.   Skin: Negative.  Negative for color change, rash and wound.   Neurological:  Negative for dizziness, weakness, light-headedness, numbness and headaches.   Hematological: Negative.    Psychiatric/Behavioral: Negative.           Current Medications       Current Outpatient Medications:     albuterol (PROVENTIL HFA,VENTOLIN HFA) 90 mcg/act inhaler, Inhale 2 puffs every 6 (six) hours as needed for wheezing, Disp: 54 g, Rfl: 0    cephalexin (KEFLEX) 500 mg capsule, Take 1 capsule (500 mg total) by mouth every 6 (six) hours for 10 days, Disp: 40 capsule, Rfl: 0    losartan (COZAAR) 25 mg tablet, Take 2 tablets (50 mg total) by mouth daily, Disp: 90 tablet, Rfl: 1    Current Allergies     Allergies as of 01/06/2025 - Reviewed 01/06/2025   Allergen Reaction Noted    Sulfa antibiotics GI Intolerance 03/16/2021    Amoxicillin GI Intolerance 05/15/2019            The following portions of the patient's history were reviewed and updated as appropriate: allergies, current medications, past family history, past medical history, past social history, past surgical history and problem list.     Past Medical History:   Diagnosis Date    Acute kidney injury (nontraumatic) ([facility])     Alcohol abuse     Anxiety     Chronic pain syndrome 02/17/2021    Colon polyp     COVID 09/2020    Diverticulitis     Erectile dysfunction     Essential hypertension     GERD (gastroesophageal reflux disease)     History of transfusion 2015     Hypertension     Lumbar radiculopathy 05/13/2019    Major depression     Mild cognitive disorder 05/06/2016    Mood disorder (HCC) 05/02/2016    Multiple nevi     Obesity (BMI 30-39.9)     ALEXANDRE (obstructive sleep apnea)     Psychosis (HCC)     Snoring     Suspicious nevus     Tremor of right hand     Weakness of right upper extremity        Past Surgical History:   Procedure Laterality Date    CARDIAC CATHETERIZATION Left 11/16/2022    Procedure: Cardiac Left Heart Cath;  Surgeon: Paresh Street MD;  [facility];  Service: Cardiology    CARDIAC CATHETERIZATION N/A 11/16/2022    Procedure: Cardiac Coronary Angiogram;  Surgeon: Paresh Street MD;  [facility];  Service: Cardiology    COLON SURGERY      COLONOSCOPY N/A 03/09/2019    Procedure: COLONOSCOPY;  Surgeon: Keaton Begum MD;  [facility];  Service: Gastroenterology    LIPOMA RESECTION      back    IA ESOPHAGOGASTRODUODENOSCOPY TRANSORAL DIAGNOSTIC N/A 03/09/2019    Procedure: ESOPHAGOGASTRODUODENOSCOPY (EGD);  Surgeon: Keaton Begum MD;  [facility];  Service: Gastroenterology    IA SURGICAL ARTHROSCOPY SHOULDER W/ROTATOR CUFF RPR Right 09/16/2022    Procedure: RIGHT SHOULDER ARTHROSCOPY, REPAIR ROTATOR CUFF, SUPRASPINATUS REPAIR, SUBSCAPULARIS REPAIR, BICEPS TENOTOMY, ACROMIOPLASTY, AND EXTENSIVE DEBRIDEMENT;  Surgeon: Luciano Hamilton DO;  [facility];  Service: Orthopedics    IA TRURL ELECTROSURG RESCJ PROSTATE BLEED COMPLETE N/A 7/6/2023    Procedure: TRANSURETHRAL RESECTION OF PROSTATE (TURP);  Surgeon: Chavez Olivera MD;  [facility];  Service: Urology    REVISION COLOSTOMY      SHOULDER SURGERY         Family History   Problem Relation Age of Onset    Heart disease Father     Stroke Mother     Aneurysm Sister     Ovarian cancer Sister     Drug abuse Sister         overdose    No Known Problems Sister     No Known Problems Sister     No Known Problems Sister      Drug abuse Brother     Drug abuse Brother     No Known Problems Brother     No Known Problems Daughter     No Known Problems Daughter     No Known Problems Daughter     No Known Problems Daughter     No Known Problems Son     No Known Problems Son          Medications have been verified.        Objective   /96   Pulse 86   Temp 97.9 °F (36.6 °C)   Resp 18   SpO2 99%        Physical Exam     Physical Exam  Vitals and nursing note reviewed.   Constitutional:       General: He is not in acute distress.     Appearance: Normal appearance. He is well-developed. He is not ill-appearing or diaphoretic.   HENT:      Head: Normocephalic and atraumatic.   Cardiovascular:      Rate and Rhythm: Normal rate and regular rhythm.      Heart sounds: Normal heart sounds.   Pulmonary:      Effort: Pulmonary effort is normal. No respiratory distress.      Breath sounds: Normal breath sounds. No wheezing, rhonchi or rales.   Abdominal:      General: Abdomen is flat. Bowel sounds are normal. There is no distension.      Palpations: Abdomen is soft.      Tenderness: There is no abdominal tenderness. There is no right CVA tenderness, left CVA tenderness or guarding.   Musculoskeletal:      Cervical back: Normal range of motion and neck supple.   Lymphadenopathy:      Cervical: No cervical adenopathy.   Skin:     General: Skin is warm.      Capillary Refill: Capillary refill takes less than 2 seconds.      Findings: No rash.   Neurological:      Mental Status: He is alert.

## 2025-01-06 NOTE — PATIENT INSTRUCTIONS
"Continue to monitor symptoms.  If new or worsening symptoms develop, go immediately to Er. Drink plenty of fluids.  Follow up with Family Doctor this week.    If tests are performed, our office will contact you with results only if changes need to made to the care plan discussed with you at the visit. You can review your full results on [facility].     Patient Education     Urinary tract infection - Discharge instructions   The Basics   Written by the doctors and editors at [facility]   What are discharge instructions? -- Discharge instructions are information about how to take care of yourself after getting medical care for a health problem.  What is a urinary tract infection? -- A urinary tract infection (\"UTI\") is an infection that affects either the bladder or the kidneys (figure 1). A kidney infection is more serious, and can lead to other serious problems if it is not treated properly.  You need to take antibiotics to treat a UTI. It is important to take all of your antibiotics, even if you start to feel better.  How do I care for myself at home? -- Ask the doctor or nurse what you should do when you go home. Make sure that you understand exactly what you need to do to care for yourself. Ask questions if there is anything you do not understand.  You should also:   Take all of your medicines as instructed.   Take phenazopyridine (sample brand name: AZO Urinary Pain Relief) for the first day or so, if you choose. This is an over-the-counter medicine. It will help numb your bladder and decrease the urge to urinate. This medicine causes your urine and tears to look orange.   Take acetaminophen (sample brand name: Tylenol) if needed for pain.   Drink extra fluids. This can help prevent more bladder infections. If you have sex, these things might also help:   Urinate right afterward.   If you use birth control, use a form that does not contain spermicide.  When should I call the doctor? -- Call for advice " if:   You have pain in your back, shoulder, or belly.   You have a fever, shaking chills, or sweats even though you are taking antibiotics.   You notice more blood in your urine.   Your symptoms get worse or do not get better within 24 hours of starting antibiotics.   Your symptoms come back after finishing treatment.   You have any new or worrying symptoms.  All topics are updated as new evidence becomes available and our peer review process is complete.  This topic retrieved from [employer] on: Feb 26, 2024.  Topic 435656 Version 1.0  Release: 32.2.4 - C32.56  © 2024 UpToDate, Inc. and/or its affiliates. All rights reserved.  figure 1: Anatomy of the urinary tract     Urine is made by the kidneys. It passes from the kidneys into the bladder through 2 tubes called the ureters. Then, it leaves the bladder through another tube called the urethra.  Graphic 07428 Version 8.0  Consumer Information Use and Disclaimer   Disclaimer: This generalized information is a limited summary of diagnosis, treatment, and/or medication information. It is not meant to be comprehensive and should be used as a tool to help the user understand and/or assess potential diagnostic and treatment options. It does NOT include all information about conditions, treatments, medications, side effects, or risks that may apply to a specific patient. It is not intended to be medical advice or a substitute for the medical advice, diagnosis, or treatment of a health care provider based on the health care provider's examination and assessment of a patient's specific and unique circumstances. Patients must speak with a health care provider for complete information about their health, medical questions, and treatment options, including any risks or benefits regarding use of medications. This information does not endorse any treatments or medications as safe, effective, or approved for treating a specific patient. UpToDate, Inc. and its affiliates disclaim  any warranty or liability relating to this information or the use thereof.The use of this information is governed by the Terms of Use, available at https://www.wolterskluwer.com/en/know/clinical-effectiveness-terms. 2024© UpToDate, Inc. and its affiliates and/or licensors. All rights reserved.  Copyright   © 2024 [employer] and/or its affiliates. All rights reserved.

## 2025-01-07 LAB — BACTERIA UR CULT: NORMAL

## 2025-01-14 ENCOUNTER — RESULTS FOLLOW-UP (OUTPATIENT)
Dept: CARDIOLOGY CLINIC | Facility: CLINIC | Age: 60
End: 2025-01-14

## 2025-01-14 NOTE — TELEPHONE ENCOUNTER
"Patient called back. Relayed following message from Dr. Street:  \"Paresh Street MD to Card[facility] Regarding result: NM myocardial perfusion spect (stress and/or rest)   1/14/25  2:51 PM  Result Note  Please call.  Stress test shows an inferior defect that is likely due to soft tissue, not blockage.\"  Patient verbalized understanding. Pt Would like clinical team to reach out if Dr. Street recommending any changes to his treatment.   "

## 2025-01-22 ENCOUNTER — TELEPHONE (OUTPATIENT)
Age: 60
End: 2025-01-22

## 2025-01-27 ENCOUNTER — PREP FOR PROCEDURE (OUTPATIENT)
Age: 60
End: 2025-01-27

## 2025-01-27 DIAGNOSIS — K22.719 BARRETT'S ESOPHAGUS WITH DYSPLASIA: Primary | ICD-10-CM

## 2025-01-27 NOTE — PROGRESS NOTES
Name: Joao Ibarra      : 1965      MRN: [id number]  Encounter Provider: Susu Montoya PA-C  Encounter Date: 2025   Encounter department: [facility] UROLOGY [place]  :  Assessment & Plan  Benign prostatic hyperplasia with urinary frequency  S/p TURP 23  Doing well  Orders:    POCT urine dip    Urine culture    POCT Measure PVR    Urethritis  Resolved  Urine culture  negative  Was treated with course of antibiotics by urgent care with benefit  Denies symptoms today        Screening for prostate cancer  PSA 24 2.393  TURP pathology negative  Continue yearly screening            History of Present Illness   Joao Ibarra is a 59 y.o. male who presents for urinary symptoms.    Patient had dysuria and frequency a few weeks ago. Went to urgent care and was given course of antibiotics. States symptoms have resolved. Urine culture at that time was negative. Denies new symptoms today.     AUA SYMPTOM SCORE      Flowsheet Row Most Recent Value   AUA SYMPTOM SCORE    How often have you had a sensation of not emptying your bladder completely after you finished urinating? 0 (P)     How often have you had to urinate again less than two hours after you finished urinating? 3 (P)     How often have you found you stopped and started again several times when you urinate? 0 (P)     How often have you found it difficult to postpone urination? 0 (P)     How often have you had a weak urinary stream? 0 (P)     How often have you had to push or strain to begin urination? 0 (P)     How many times did you most typically get up to urinate from the time you went to bed at night until the time you got up in the morning? 1 (P)     Quality of Life: If you were to spend the rest of your life with your urinary condition just the way it is now, how would you feel about that? 1 (P)     AUA SYMPTOM SCORE 4 (P)            Review of Systems   Constitutional:  Negative for activity change, appetite change,  chills and fever.   HENT:  Negative for congestion and trouble swallowing.    Respiratory:  Negative for cough and shortness of breath.    Cardiovascular:  Negative for chest pain, palpitations and leg swelling.   Gastrointestinal:  Negative for abdominal pain, constipation, diarrhea, nausea and vomiting.   Genitourinary:  Negative for difficulty urinating, dysuria, flank pain, frequency, hematuria and urgency.   Musculoskeletal:  Negative for back pain and gait problem.   Skin:  Negative for wound.   Allergic/Immunologic: Negative for immunocompromised state.   Neurological:  Negative for dizziness and syncope.   Hematological:  Does not bruise/bleed easily.   Psychiatric/Behavioral:  Negative for confusion.    All other systems reviewed and are negative.         Objective   There were no vitals taken for this visit.    Physical Exam  Constitutional:       Appearance: Normal appearance.   HENT:      Head: Normocephalic.      Nose: Nose normal.      Mouth/Throat:      Pharynx: Oropharynx is clear.   Eyes:      Extraocular Movements: Extraocular movements intact.      Pupils: Pupils are equal, round, and reactive to light.   Pulmonary:      Effort: Pulmonary effort is normal.   Musculoskeletal:         General: Normal range of motion.      Cervical back: Normal range of motion.   Skin:     General: Skin is warm.   Neurological:      General: No focal deficit present.      Mental Status: He is alert and oriented to person, place, and time. Mental status is at baseline.   Psychiatric:         Mood and Affect: Mood normal.         Behavior: Behavior normal.         Thought Content: Thought content normal.         Judgment: Judgment normal.          Results  Lab Results   Component Value Date    PSA 2.393 07/02/2024    PSA 4.11 (H) 07/01/2023    PSA 3.8 11/08/2022     Lab Results   Component Value Date    GLUCOSE 125 04/02/2016    CALCIUM 9.5 05/01/2024     01/06/2016    K 5.3 05/01/2024    CO2 23 05/01/2024    CL  109 (H) 05/01/2024    BUN 24 05/01/2024    CREATININE 0.80 05/01/2024     Lab Results   Component Value Date    WBC 5.22 05/01/2024    HGB 15.6 05/01/2024    HCT 48.6 05/01/2024    MCV 89 05/01/2024     05/01/2024       Office Urine Dip  No results found for this or any previous visit (from the past hour).]

## 2025-01-27 NOTE — TELEPHONE ENCOUNTER
[facility]      OA Questions for EGD     Referring Provider: Kel      Pre-Screening: BMI 38.45  Past EGD? If yes - Date: [ 3/5/22  ]  Physician/Facility: [ Kel ]  Reason: [ Finney's esophagus   ]     SCHEDULING STAFF:   If the patient is over 75 years old, please schedule an office visit.   Does the patient want to see a gastroenterologist prior to their procedure to discuss any GI symptoms? NO  Has the patient been hospitalized or had abdominal surgery in the past 6 months?  Does the patient use supplemental oxygen? NO   Does the patient take [Coumadin], [Lovenox], [Plavix], [Eliquis], [Xarelto], or other blood thinning medication? NO   Has the patient had a stroke, cardiac event, or stent placed in the past year? NO     SCHEDULING STAFF: If patient answers NO to the above questions, then schedule the procedure. If patient answers YES to any of the above questions, then schedule an office appointment.  If a repeat EGD is belated and patient declines procedure à notify provider.

## 2025-01-27 NOTE — TELEPHONE ENCOUNTER
Scheduled date of EGD(as of today): 3/24   Physician performing EGD: Kel   Location of EGD: [place]   Instructions reviewed with patient by: svetlana  Clearances: none

## 2025-01-28 ENCOUNTER — OFFICE VISIT (OUTPATIENT)
Dept: UROLOGY | Facility: CLINIC | Age: 60
End: 2025-01-28
Payer: COMMERCIAL

## 2025-01-28 VITALS
SYSTOLIC BLOOD PRESSURE: 140 MMHG | HEIGHT: 70 IN | TEMPERATURE: 96.1 F | HEART RATE: 99 BPM | DIASTOLIC BLOOD PRESSURE: 80 MMHG | RESPIRATION RATE: 18 BRPM | BODY MASS INDEX: 39.05 KG/M2 | WEIGHT: 272.8 LBS | OXYGEN SATURATION: 98 %

## 2025-01-28 DIAGNOSIS — N40.1 BENIGN PROSTATIC HYPERPLASIA WITH URINARY FREQUENCY: Primary | ICD-10-CM

## 2025-01-28 DIAGNOSIS — Z12.5 SCREENING FOR PROSTATE CANCER: ICD-10-CM

## 2025-01-28 DIAGNOSIS — R35.0 BENIGN PROSTATIC HYPERPLASIA WITH URINARY FREQUENCY: Primary | ICD-10-CM

## 2025-01-28 DIAGNOSIS — N34.2 URETHRITIS: ICD-10-CM

## 2025-01-28 LAB
POST-VOID RESIDUAL VOLUME, ML POC: 9 ML
SL AMB  POCT GLUCOSE, UA: NORMAL
SL AMB LEUKOCYTE ESTERASE,UA: NORMAL
SL AMB POCT BILIRUBIN,UA: NORMAL
SL AMB POCT BLOOD,UA: NORMAL
SL AMB POCT CLARITY,UA: YELLOW
SL AMB POCT COLOR,UA: NORMAL
SL AMB POCT KETONES,UA: NORMAL
SL AMB POCT NITRITE,UA: NORMAL
SL AMB POCT PH,UA: 5
SL AMB POCT SPECIFIC GRAVITY,UA: 1.01
SL AMB POCT URINE PROTEIN: NORMAL
SL AMB POCT UROBILINOGEN: 0.2

## 2025-01-28 PROCEDURE — 81002 URINALYSIS NONAUTO W/O SCOPE: CPT | Performed by: PHYSICIAN ASSISTANT

## 2025-01-28 PROCEDURE — 51798 US URINE CAPACITY MEASURE: CPT | Performed by: PHYSICIAN ASSISTANT

## 2025-01-28 PROCEDURE — 99213 OFFICE O/P EST LOW 20 MIN: CPT | Performed by: PHYSICIAN ASSISTANT

## 2025-02-10 ENCOUNTER — OFFICE VISIT (OUTPATIENT)
Dept: SLEEP CENTER | Facility: CLINIC | Age: 60
End: 2025-02-10
Payer: COMMERCIAL

## 2025-02-10 VITALS
BODY MASS INDEX: 39.37 KG/M2 | SYSTOLIC BLOOD PRESSURE: 145 MMHG | WEIGHT: 275 LBS | HEIGHT: 70 IN | DIASTOLIC BLOOD PRESSURE: 85 MMHG

## 2025-02-10 DIAGNOSIS — G47.33 OSA (OBSTRUCTIVE SLEEP APNEA): ICD-10-CM

## 2025-02-10 DIAGNOSIS — E66.01 CLASS 2 SEVERE OBESITY DUE TO EXCESS CALORIES WITH SERIOUS COMORBIDITY AND BODY MASS INDEX (BMI) OF 38.0 TO 38.9 IN ADULT (HCC): ICD-10-CM

## 2025-02-10 DIAGNOSIS — E66.812 CLASS 2 SEVERE OBESITY DUE TO EXCESS CALORIES WITH SERIOUS COMORBIDITY AND BODY MASS INDEX (BMI) OF 38.0 TO 38.9 IN ADULT (HCC): ICD-10-CM

## 2025-02-10 DIAGNOSIS — R06.83 SNORING: Primary | ICD-10-CM

## 2025-02-10 DIAGNOSIS — I10 ESSENTIAL HYPERTENSION: ICD-10-CM

## 2025-02-10 PROCEDURE — 99204 OFFICE O/P NEW MOD 45 MIN: CPT | Performed by: INTERNAL MEDICINE

## 2025-02-10 NOTE — ASSESSMENT & PLAN NOTE
Patient presenting for reevaluation of sleep apnea. Previous sleep study done 2021 showed mild sleep apnea AHI 7.1. Patient was ordered for a machine at the time but decided not to get the machine because of concern for mask and noise. Patient had loud snoring but potentially improving but states having some weight loss. Patient does have high pretest probability of sleep apnea currently with Mallampati score 4, elevated BMI, loud snoring. Patient is ok with CPAP if needed after sleep study.    Plan:  Discussed with Dr. Moncada  Discussed with patient the pathophysiology of OSAS and medical conditions associated with OSAS such as DM, HTN, CAD, Depression, Stroke, Headache.  Treatment options including surgery, Dental appliances, positional therapy, and CPAP were discussed.  I have ordered a Home Sleep Apnea Test (HSAT) to evaluate for sleep-disordered breathing. Should this be negative/inconclusive, due to the known limitations of HSAT, I will order an in lab polysomnogram (PSG) to ensure that sleep apnea is not present.  Advised patient to avoid activities that could harm self or others when tired/sleepy, including driving.  Discussed importance of good sleep hygiene.  We will reach out to the patient via phone with the results of the sleep study and next steps (Patient ok with CPAP first if needed)  Followup after home study   Patient agreeable with above.  Orders:    Ambulatory Referral to Sleep Medicine

## 2025-02-10 NOTE — ASSESSMENT & PLAN NOTE
If patient have sleep apnea, CPAP can help with htn outcomes but also can be helped with further blood pressure control

## 2025-02-10 NOTE — PROGRESS NOTES
"Name: Joao Ibarra      : 1965      MRN: [id number]  Encounter Provider: SLEEP FELLOW Cynthia SOLOMONILAN  Encounter Date: 2/10/2025   Encounter department: [facility] [place]  :  Assessment & Plan  ALEXANDRE (obstructive sleep apnea)  Patient presenting for reevaluation of sleep apnea. Previous sleep study done  showed mild sleep apnea AHI 7.1. Patient was ordered for a machine at the time but decided not to get the machine because of concern for mask and noise. Patient had loud snoring but potentially improving but states having some weight loss. Patient does have high pretest probability of sleep apnea currently with Mallampati score 4, elevated BMI, loud snoring. Patient is ok with CPAP if needed after sleep study.    Plan:  Discussed with Dr. Moncada  Discussed with patient the pathophysiology of OSAS and medical conditions associated with OSAS such as DM, HTN, CAD, Depression, Stroke, Headache.  Treatment options including surgery, Dental appliances, positional therapy, and CPAP were discussed.  I have ordered a Home Sleep Apnea Test (HSAT) to evaluate for sleep-disordered breathing. Should this be negative/inconclusive, due to the known limitations of HSAT, I will order an in lab polysomnogram (PSG) to ensure that sleep apnea is not present.  Advised patient to avoid activities that could harm self or others when tired/sleepy, including driving.  Discussed importance of good sleep hygiene.  We will reach out to the patient via phone with the results of the sleep study and next steps (Patient ok with CPAP first if needed)  Followup after home study   Patient agreeable with above.  Orders:    Ambulatory Referral to Sleep Medicine    Snoring  As above with \"ALEXANDRE\"  Orders:    Home Study; Future    Essential hypertension  If patient have sleep apnea, CPAP can help with htn outcomes but also can be helped with further blood pressure control       Class 2 severe obesity due to excess calories with " serious comorbidity and body mass index (BMI) of 38.0 to 38.9 in adult (HCC)  Weight loss can improve sleep apnea.           History of Present Illness   HPI  Joao Ibarra is a 59 y.o. male with PMHx of ALEXANDRE, HTN, GERD, diverticulitis, obesity who presents for sleep apnea. Patient had sleep study in 2021 and had mild sleep apnea. Patient was not able to followup for CPAP study. Patient did not pick it up because concern of having foreign object on face and concerns about noise. Patient said having intermittent loud snoring, no witnessed apneas. Patient sleeps beside wife. Patient feels refreshed in the morning, No dry mouth, no dry nose, no headaches. Gained around 1lb since last year. Snoring tends to be in the setting of drinking beer.      SLEEP-WAKE SCHEDULE  Sleep Schedule:  Weekdays:  Bedtime: Weekdays 8-9:30PM/ Weekends 10-11PM   Asleep in 15 minutes   Nighttime awakenings: 1 a night     Wake: Weekdays5:30AM/ Weekends 5:30AM    Naps: No    Average total sleep time (in a 24 hour period): 8-10 hours.    Sleep-Related Details:  Preferred Sleep Position: side(s)  SDB Symptoms: snoring and multiple awakenings  Bruxism: No  Nocturnal GERD: No  Nocturnal Palpitations: No  Nocturnal Anxiety or Rumination: No  Sleep-Related Hallucinations: No   Sleep Paralysis: No   Cataplexy: No     RLS Symptoms: No  PLM Symptoms: No  History of iron deficiency: No    He denies any parasomnia activity.    Wake-Related Details:  Daytime Sleepiness: No   Work Schedule: Retired  Drowsy Driving: No  Caffeine: Yes, Coffee in the morning 1 cup in the morning or lunch  Alcohol Use: Yes, social drinker  Substance Use: Yes, smoke marijuana  for sciatica back pain  Tobacco Use: No  Weight Change: fluctuating    He does not have difficulty with memory or concentration.      PRIOR SLEEP STUDIES:  Diagnostic sleep study 7/22/2021: AHI 7.1. O2 jossy 84%. 39.0BMI    OTHER RELEVANT LABS AND STUDIES:  CO2 5/1/2024 23    Sitting and reading: Would  never doze  Watching TV: Would never doze  Sitting, inactive in a public place (e.g. a theatre or a meeting): Would never doze  As a passenger in a car for an hour without a break: Would never doze  Lying down to rest in the afternoon when circumstances permit: Would never doze  Sitting and talking to someone: Would never doze  Sitting quietly after a lunch without alcohol: Would never doze  In a car, while stopped for a few minutes in traffic: Would never doze  Total score: 0     Review of Systems   Respiratory:  Negative for chest tightness and shortness of breath.    Cardiovascular:  Negative for chest pain and palpitations.   Gastrointestinal:  Negative for constipation and diarrhea.   Endocrine: Negative for polyuria.   Genitourinary:  Negative for hematuria and urgency.   Musculoskeletal:  Positive for arthralgias and back pain (sciatica).   Skin:  Negative for rash.   Neurological:  Negative for weakness and numbness.   Psychiatric/Behavioral:  Negative for dysphoric mood. The patient is not nervous/anxious.      Pertinent positives/negatives included in HPI and also as noted:     Pertinent Medical History     Medical History Reviewed by provider this encounter:     .  Past Medical History   Past Medical History:   Diagnosis Date    Acute kidney injury (nontraumatic) ([facility])     Alcohol abuse     Anxiety     Chronic pain syndrome 02/17/2021    Colon polyp     COVID 09/2020    Diverticulitis     Erectile dysfunction     Essential hypertension     GERD (gastroesophageal reflux disease)     History of transfusion 2015    Hypertension     Lumbar radiculopathy 05/13/2019    Major depression     Mild cognitive disorder 05/06/2016    Mood disorder (HCC) 05/02/2016    Multiple nevi     Obesity (BMI 30-39.9)     ALEXANDRE (obstructive sleep apnea)     Psychosis (HCC)     Snoring     Suspicious nevus     Tremor of right hand     Weakness of right upper extremity      Past Surgical History:   Procedure Laterality Date     CARDIAC CATHETERIZATION Left 11/16/2022    Procedure: Cardiac Left Heart Cath;  Surgeon: Paresh Street MD;  Location: [facility];  Service: Cardiology    CARDIAC CATHETERIZATION N/A 11/16/2022    Procedure: Cardiac Coronary Angiogram;  Surgeon: Paresh Street MD;  Location: [facility];  Service: Cardiology    COLON SURGERY      COLONOSCOPY N/A 03/09/2019    Procedure: COLONOSCOPY;  Surgeon: Keaton Begum MD;  Location: [facility];  Service: Gastroenterology    LIPOMA RESECTION      back    CO ESOPHAGOGASTRODUODENOSCOPY TRANSORAL DIAGNOSTIC N/A 03/09/2019    Procedure: ESOPHAGOGASTRODUODENOSCOPY (EGD);  Surgeon: Keaton Begum MD;  Location: [facility];  Service: Gastroenterology    CO SURGICAL ARTHROSCOPY SHOULDER W/ROTATOR CUFF RPR Right 09/16/2022    Procedure: RIGHT SHOULDER ARTHROSCOPY, REPAIR ROTATOR CUFF, SUPRASPINATUS REPAIR, SUBSCAPULARIS REPAIR, BICEPS TENOTOMY, ACROMIOPLASTY, AND EXTENSIVE DEBRIDEMENT;  Surgeon: Luciano Hamilton DO;  Location: [facility];  Service: Orthopedics    CO TRURL ELECTROSURG RESCJ PROSTATE BLEED COMPLETE N/A 7/6/2023    Procedure: TRANSURETHRAL RESECTION OF PROSTATE (TURP);  Surgeon: Chavez Olivera MD;  Location: [facility];  Service: Urology    REVISION COLOSTOMY      SHOULDER SURGERY       Family History   Problem Relation Age of Onset    Heart disease Father     Stroke Mother     Aneurysm Sister     Ovarian cancer Sister     Drug abuse Sister         overdose    No Known Problems Sister     No Known Problems Sister     No Known Problems Sister     Drug abuse Brother     Drug abuse Brother     No Known Problems Brother     No Known Problems Daughter     No Known Problems Daughter     No Known Problems Daughter     No Known Problems Daughter     No Known Problems Son     No Known Problems Son       reports that he has never smoked. He has been exposed to tobacco smoke. He has never used smokeless tobacco. He reports current alcohol use of  "about 1.0 standard drink of alcohol per week. He reports current drug use. Frequency: 4.00 times per week. Drug: Marijuana.  Current Outpatient Medications on File Prior to Visit   Medication Sig Dispense Refill    albuterol (PROVENTIL HFA,VENTOLIN HFA) 90 mcg/act inhaler Inhale 2 puffs every 6 (six) hours as needed for wheezing 54 g 0    losartan (COZAAR) 25 mg tablet Take 2 tablets (50 mg total) by mouth daily 90 tablet 1     No current facility-administered medications on file prior to visit.     Allergies   Allergen Reactions    Sulfa Antibiotics GI Intolerance     HA, vomiting, sweating     Amoxicillin GI Intolerance      Current Outpatient Medications on File Prior to Visit   Medication Sig Dispense Refill    albuterol (PROVENTIL HFA,VENTOLIN HFA) 90 mcg/act inhaler Inhale 2 puffs every 6 (six) hours as needed for wheezing 54 g 0    losartan (COZAAR) 25 mg tablet Take 2 tablets (50 mg total) by mouth daily 90 tablet 1     No current facility-administered medications on file prior to visit.      Social History     Tobacco Use    Smoking status: Never     Passive exposure: Current    Smokeless tobacco: Never   Vaping Use    Vaping status: Former   Substance and Sexual Activity    Alcohol use: Yes     Alcohol/week: 1.0 standard drink of alcohol     Types: 1 Cans of beer per week     Comment: occasionally 1-2 x a month    Drug use: Yes     Frequency: 4.0 times per week     Types: Marijuana     Comment: medical    Sexual activity: Yes     Partners: Female     Objective   /85   Ht 5' 10\" (1.778 m)   Wt 125 kg (275 lb)   BMI 39.46 kg/m²     Neck Circumference: 17.5  Mallampati Grade : Mallampati 4  Oropharynx : crowded  Tongue : Large tongue  Soft palate and uvula : Normal soft palate  Hard Palate : normal   Neck Circumference : Neck Circumference 17.5\"  Neck : is thick  Nose : Normal nasal structure  Craniofacial anatomy : normal  Dental Stucture and Dentition : No overbite       Appearance : no distress, " "alert, cooperative  Mental Status. Memory, and Affect : alert and oriented, normal affect, makes good eye contact, provides a detailed history  Cardiac- : regular rate and rhythm, S1, S2 normal, no murmur, click, rub or gallop  Pulmonary : clear to auscultation bilaterally  Cranial Nerves: CN II-XII grossly intact  No peripheral edema       Data  Lab Results   Component Value Date    HGB 15.6 05/01/2024    HCT 48.6 05/01/2024    MCV 89 05/01/2024      Lab Results   Component Value Date    GLUCOSE 125 04/02/2016    CALCIUM 9.5 05/01/2024     01/06/2016    K 5.3 05/01/2024    CO2 23 05/01/2024     (H) 05/01/2024    BUN 24 05/01/2024    CREATININE 0.80 05/01/2024     No results found for: \"IRON\", \"TIBC\", \"FERRITIN\"  Lab Results   Component Value Date    AST 22 05/01/2024    ALT 18 05/01/2024       "

## 2025-02-10 NOTE — PATIENT INSTRUCTIONS
ALEXANDRE Evaluation:    I suspect you may have sleep apnea.  Sleep apnea is a serious sleep disorder that occurs when a person's breathing is interrupted during sleep. People with untreated sleep apnea stop breathing repeatedly during their sleep, sometimes hundreds of times during the night.  The most common type of sleep apnea is obstructive sleep apnea.  If left untreated, obstructive sleep apnea can result in a number of health problems including hypertension, stroke, arrhythmias, cardiomyopathy (enlargement of the muscle tissue of the heart), heart failure, diabetes, obesity, and heart attacks. It has also been found to make chronic medical conditions (such as high blood pressure, migraine headaches, and seizures (more difficult to manage.  Treatment options for obstructive sleep apnea may include positive airway pressure (PAP) therapy, oral appliance, hypoglossal nerve stimulator, nose/throat surgery, weight loss, side sleeping, or avoidance of medications or substances that can relax the airway muscles (alcohol, benzodiazepines, and opioids).  I have ordered a Home Sleep Apnea Test (HSAT) to evaluate for sleep apnea. If this is negative, I will order an in-lab polysomnogram (PSG) as discussed to be sure that sleep apnea is not present.  This test should be scheduled at your earliest convenience.   Sleep Clinic: This should be scheduled at the  before you leave today.  Avoid driving if drowsy. We recommend that if you are dozing off while driving, that you do not drive until your sleepiness is appropriately treated.  We encourage a healthy lifestyle with adequate sleep (7-9 hours per night), diet and exercise.  Recommend good sleep hygiene, as outlined below    Myself and/or my team will reach out to you via MyChart and/or phone call with the results and next steps.      Good Sleep Hygiene  Wake up at the same time every day, even on the weekends.  Use your bed for sleep and intimacy only.  If you have  been in bed awake for 30 minutes, get up and leave the bedroom. Choose a dull activity not involving a blue screen (TV, computer, handheld devices). Go back to bed when you feel sleepy.  Avoid caffeine, nicotine and alcohol before you go to bed.  Avoid large meals before you go to bed.  Avoid using screens (computers, tablets, smartphones, etc.) for at least 1 hour before bedtime  Exercise regularly, but do not exercise right before you go to bed.  Avoid daytime naps. If you do take a nap, sleep for 20-40 minutes, and not after 2pm.

## 2025-02-27 PROBLEM — Z12.5 SCREENING FOR PROSTATE CANCER: Status: RESOLVED | Noted: 2023-05-01 | Resolved: 2025-02-27

## 2025-03-05 ENCOUNTER — HOSPITAL ENCOUNTER (OUTPATIENT)
Facility: CLINIC | Age: 60
Discharge: HOME | End: 2025-03-05
Payer: COMMERCIAL

## 2025-03-05 DIAGNOSIS — R06.83 SNORING: ICD-10-CM

## 2025-03-05 DIAGNOSIS — G47.33 OSA (OBSTRUCTIVE SLEEP APNEA): ICD-10-CM

## 2025-03-05 PROCEDURE — G0399 HOME SLEEP TEST/TYPE 3 PORTA: HCPCS

## 2025-03-10 NOTE — PROGRESS NOTES
Home Sleep Study Documentation    HOME STUDY DEVICE: Noxturnal yes                                           Elizabeth G3 no      Pre-Sleep Home Study:    Set-up and instructions performed by: T Tech    Technician performed demonstration for Patient: yes    Return demonstration performed by Patient: yes    Written instructions provided to Patient: yes    Patient signed consent form: yes        Post-Sleep Home Study:    Additional comments by Patient: Patient did not fill out sleep diary    Home Sleep Study Failed:no:    Failure reason: N/A    Reported or Detected: N/A    Scored by: FRANCIA Rincon

## 2025-03-12 ENCOUNTER — TELEPHONE (OUTPATIENT)
Dept: SLEEP CENTER | Facility: CLINIC | Age: 60
End: 2025-03-12

## 2025-03-12 DIAGNOSIS — G47.33 OSA (OBSTRUCTIVE SLEEP APNEA): Primary | ICD-10-CM

## 2025-03-12 PROCEDURE — 95806 SLEEP STUDY UNATT&RESP EFFT: CPT | Performed by: INTERNAL MEDICINE

## 2025-03-12 NOTE — TELEPHONE ENCOUNTER
----- Message from Dejan Moncada MD sent at 3/12/2025  8:47 AM EDT -----  Jaspreet Dunlap,     The home study on this pt shows a lot of possible central events although I'm not sure why he would have those.  Could be error related to the home testing equipment.      But without knowing for sure - I recommend a CPAP study since I can't rule out that these abnormalities are actually central apneas     Please let Mr. Ibarra know, thank you.    Tereso

## 2025-03-12 NOTE — TELEPHONE ENCOUNTER
Home sleep study resulted and shows moderate sleep apnea with central apneas seen in almost 50% of events. Mild event related desaturations. CPAP titration study ordered.     Called patient home and cell phone,  message left  advising I will send a [employer] message with the sleep study results and next steps.  Provided sleep center number([phone]) to call if any questions.

## 2025-03-14 ENCOUNTER — TELEPHONE (OUTPATIENT)
Dept: SLEEP CENTER | Facility: CLINIC | Age: 60
End: 2025-03-14

## 2025-03-14 NOTE — TELEPHONE ENCOUNTER
Attempted to contact patient and left a message to look at [employer] Message. Will send patient message concerning home study.

## 2025-03-15 ENCOUNTER — TELEPHONE (OUTPATIENT)
Dept: SLEEP CENTER | Facility: CLINIC | Age: 60
End: 2025-03-15

## 2025-03-15 NOTE — TELEPHONE ENCOUNTER
----- Message from Germán Valle MD sent at 3/14/2025  6:33 PM EDT -----  Absolutely, Dr. Moncada!  ----- Message -----  From: Dejan Moncada MD  Sent: 3/12/2025   8:48 AM EDT  To: Germán Valle MD; #    Hi Germán,     The home study on this pt shows a lot of possible central events although I'm not sure why he would have those.  Could be error related to the home testing equipment.      But without knowing for sure - I recommend a CPAP study since I can't rule out that these abnormalities are actually central apneas     Please let Mr. Ibarra know, thank you.    Tereso  
Patient

## 2025-03-15 NOTE — TELEPHONE ENCOUNTER
Call to patient. Reviewed results and recommendation for CPAP titration study.      CPAP titration study scheduled for 7/23/2025 [place] sleep lab (pref). Added to wait list.   Reviewed instructions and cancellation policy, patient understands.

## 2025-03-21 ENCOUNTER — PREP FOR PROCEDURE (OUTPATIENT)
Age: 60
End: 2025-03-21

## 2025-03-24 ENCOUNTER — ANESTHESIA EVENT (OUTPATIENT)
Dept: GASTROENTEROLOGY | Facility: HOSPITAL | Age: 60
End: 2025-03-24
Payer: COMMERCIAL

## 2025-03-24 ENCOUNTER — ANESTHESIA (OUTPATIENT)
Dept: GASTROENTEROLOGY | Facility: HOSPITAL | Age: 60
End: 2025-03-24
Payer: COMMERCIAL

## 2025-03-24 ENCOUNTER — HOSPITAL ENCOUNTER (OUTPATIENT)
Dept: GASTROENTEROLOGY | Facility: HOSPITAL | Age: 60
Setting detail: OUTPATIENT SURGERY
Discharge: HOME | End: 2025-03-24
Attending: INTERNAL MEDICINE
Payer: COMMERCIAL

## 2025-03-24 VITALS
OXYGEN SATURATION: 98 % | TEMPERATURE: 97 F | HEART RATE: 77 BPM | DIASTOLIC BLOOD PRESSURE: 65 MMHG | BODY MASS INDEX: 41.01 KG/M2 | WEIGHT: 276.9 LBS | SYSTOLIC BLOOD PRESSURE: 115 MMHG | HEIGHT: 69 IN | RESPIRATION RATE: 16 BRPM

## 2025-03-24 DIAGNOSIS — K22.719 BARRETT'S ESOPHAGUS WITH DYSPLASIA: Primary | ICD-10-CM

## 2025-03-24 DIAGNOSIS — K22.719 BARRETT'S ESOPHAGUS WITH DYSPLASIA: ICD-10-CM

## 2025-03-24 PROCEDURE — 88360 TUMOR IMMUNOHISTOCHEM/MANUAL: CPT | Performed by: PATHOLOGY

## 2025-03-24 PROCEDURE — 88342 IMHCHEM/IMCYTCHM 1ST ANTB: CPT | Performed by: PATHOLOGY

## 2025-03-24 PROCEDURE — 88305 TISSUE EXAM BY PATHOLOGIST: CPT | Performed by: PATHOLOGY

## 2025-03-24 PROCEDURE — 88313 SPECIAL STAINS GROUP 2: CPT | Performed by: PATHOLOGY

## 2025-03-24 RX ORDER — SODIUM CHLORIDE, SODIUM LACTATE, POTASSIUM CHLORIDE, CALCIUM CHLORIDE 600; 310; 30; 20 MG/100ML; MG/100ML; MG/100ML; MG/100ML
INJECTION, SOLUTION INTRAVENOUS CONTINUOUS PRN
Status: DISCONTINUED | OUTPATIENT
Start: 2025-03-24 | End: 2025-03-24

## 2025-03-24 RX ORDER — PANTOPRAZOLE SODIUM 40 MG/1
40 TABLET, DELAYED RELEASE ORAL
Qty: 60 TABLET | Refills: 2 | Status: SHIPPED | OUTPATIENT
Start: 2025-03-24

## 2025-03-24 RX ORDER — PROPOFOL 10 MG/ML
INJECTION, EMULSION INTRAVENOUS AS NEEDED
Status: DISCONTINUED | OUTPATIENT
Start: 2025-03-24 | End: 2025-03-24

## 2025-03-24 RX ORDER — LIDOCAINE HYDROCHLORIDE 20 MG/ML
INJECTION, SOLUTION EPIDURAL; INFILTRATION; INTRACAUDAL; PERINEURAL AS NEEDED
Status: DISCONTINUED | OUTPATIENT
Start: 2025-03-24 | End: 2025-03-24

## 2025-03-24 RX ADMIN — PROPOFOL 50 MG: 10 INJECTION, EMULSION INTRAVENOUS at 07:21

## 2025-03-24 RX ADMIN — SODIUM CHLORIDE, SODIUM LACTATE, POTASSIUM CHLORIDE, AND CALCIUM CHLORIDE: .6; .31; .03; .02 INJECTION, SOLUTION INTRAVENOUS at 07:18

## 2025-03-24 RX ADMIN — PROPOFOL 100 MG: 10 INJECTION, EMULSION INTRAVENOUS at 07:19

## 2025-03-24 RX ADMIN — PROPOFOL 50 MG: 10 INJECTION, EMULSION INTRAVENOUS at 07:20

## 2025-03-24 RX ADMIN — LIDOCAINE HYDROCHLORIDE 100 MG: 20 INJECTION, SOLUTION EPIDURAL; INFILTRATION; INTRACAUDAL; PERINEURAL at 07:19

## 2025-03-24 RX ADMIN — PROPOFOL 50 MG: 10 INJECTION, EMULSION INTRAVENOUS at 07:23

## 2025-03-24 NOTE — ANESTHESIA PREPROCEDURE EVALUATION
Procedure:  EGD    Relevant Problems   CARDIO   (+) Essential hypertension   (+) Moderate mixed hyperlipidemia not requiring statin therapy      MUSCULOSKELETAL   (+) Chronic right-sided low back pain with right-sided sciatica      NEURO/PSYCH   (+) Chronic right-sided low back pain with right-sided sciatica      PULMONARY   (+) ALEXANDRE (obstructive sleep apnea)        Physical Exam    Airway    Mallampati score: III  TM Distance: >3 FB  Neck ROM: full     Dental   Comment: Denies loose teeth     Cardiovascular  Cardiovascular exam normal    Pulmonary  Pulmonary exam normal     Other Findings  Portions of exam deferred due to low yield and/or unknown COVID status      Anesthesia Plan  ASA Score- 3     Anesthesia Type- IV sedation with anesthesia with ASA Monitors.         Additional Monitors:     Airway Plan:            Plan Factors-Exercise tolerance (METS): >4 METS.    Chart reviewed.   Existing labs reviewed. Patient summary reviewed.    Patient is not a current smoker.              Induction- intravenous.    Postoperative Plan-         Informed Consent- Anesthetic plan and risks discussed with patient.  I personally reviewed this patient with the CRNA. Discussed and agreed on the Anesthesia Plan with the CRNA..      NPO Status:  Vitals Value Taken Time   Date of last liquid 03/23/25 03/24/25 0637   Time of last liquid 1900 03/24/25 0637   Date of last solid 03/23/25 03/24/25 0637   Time of last solid 1900 03/24/25 0637

## 2025-03-24 NOTE — ANESTHESIA POSTPROCEDURE EVALUATION
Post-Op Assessment Note    CV Status:  Stable    Pain management: adequate       Mental Status:  Alert and awake   Hydration Status:  Euvolemic   PONV Controlled:  Controlled   Airway Patency:  Patent     Post Op Vitals Reviewed: Yes    No anethesia notable event occurred.    Staff: CRNA           Last Filed PACU Vitals:  Vitals Value Taken Time   Temp     Pulse     BP     Resp     SpO2         Modified Ashvin:     Vitals Value Taken Time   Activity 2 03/24/25 0747   Respiration 2 03/24/25 0747   Circulation 2 03/24/25 0747   Consciousness 2 03/24/25 0747   Oxygen Saturation 2 03/24/25 0747     Modified Ashvin Score: 10

## 2025-03-24 NOTE — H&P
History and Physical - [facility]  Joao Ibarra 59 y.o. male MRN: [id number]                  HPI: Joao Ibarra is a 59 y.o. year old male who presents for EGD for short segment Finnye's esophagus.  Last EGD 3 years ago      REVIEW OF SYSTEMS: Per the HPI, and otherwise unremarkable.    Historical Information   Past Medical History:   Diagnosis Date    Acute kidney injury (nontraumatic) ([facility])     Alcohol abuse     Anxiety     Finney's esophagus     Chronic pain syndrome 02/17/2021    Colon polyp     COVID 09/2020    Diverticulitis     Erectile dysfunction     Essential hypertension     GERD (gastroesophageal reflux disease)     History of transfusion 2015    Hypertension     Lumbar radiculopathy 05/13/2019    Major depression     Mild cognitive disorder 05/06/2016    Mood disorder ([facility]) 05/02/2016    Multiple nevi     Obesity (BMI 30-39.9)     ALEXANDRE (obstructive sleep apnea)     Psychosis ([facility])     Snoring     Suspicious nevus     Tremor of right hand     Weakness of right upper extremity      Past Surgical History:   Procedure Laterality Date    CARDIAC CATHETERIZATION Left 11/16/2022    Procedure: Cardiac Left Heart Cath;  Surgeon: Paresh Street MD;  Location: [facility];  Service: Cardiology    CARDIAC CATHETERIZATION N/A 11/16/2022    Procedure: Cardiac Coronary Angiogram;  Surgeon: Paresh Street MD;  [facility]: [facility];  Service: Cardiology    COLON SURGERY      COLONOSCOPY N/A 03/09/2019    Procedure: COLONOSCOPY;  Surgeon: Keaton Begum MD;  Location: [facility];  Service: Gastroenterology    LIPOMA RESECTION      back    IL ESOPHAGOGASTRODUODENOSCOPY TRANSORAL DIAGNOSTIC N/A 03/09/2019    Procedure: ESOPHAGOGASTRODUODENOSCOPY (EGD);  Surgeon: Keaton Begum MD;  Location: [facility];  Service: Gastroenterology    [place] SURGICAL ARTHROSCOPY SHOULDER W/ROTATOR CUFF RPR Right 09/16/2022    Procedure: RIGHT SHOULDER ARTHROSCOPY, REPAIR ROTATOR CUFF,  "SUPRASPINATUS REPAIR, SUBSCAPULARIS REPAIR, BICEPS TENOTOMY, ACROMIOPLASTY, AND EXTENSIVE DEBRIDEMENT;  Surgeon: Luciano Hamilton DO;  Location: [facility];  Service: Orthopedics    RI TRURL ELECTROSURG RESCJ PROSTATE BLEED COMPLETE N/A 7/6/2023    Procedure: TRANSURETHRAL RESECTION OF PROSTATE (TURP);  Surgeon: Chavez Olivera MD;  Location: [facility];  Service: Urology    REVISION COLOSTOMY      SHOULDER SURGERY       Social History   Social History     Substance and Sexual Activity   Alcohol Use Yes    Alcohol/week: 1.0 standard drink of alcohol    Types: 1 Cans of beer per week    Comment: occasionally 1-2 x a month     Social History     Substance and Sexual Activity   Drug Use Yes    Frequency: 4.0 times per week    Types: Marijuana    Comment: medical/ smoking     Social History     Tobacco Use   Smoking Status Never    Passive exposure: Current   Smokeless Tobacco Never     Family History   Problem Relation Age of Onset    Heart disease Father     Stroke Mother     Aneurysm Sister     Ovarian cancer Sister     Drug abuse Sister         overdose    No Known Problems Sister     No Known Problems Sister     No Known Problems Sister     Drug abuse Brother     Drug abuse Brother     No Known Problems Brother     No Known Problems Daughter     No Known Problems Daughter     No Known Problems Daughter     No Known Problems Daughter     No Known Problems Son     No Known Problems Son        Meds/Allergies     Not in a hospital admission.    Allergies   Allergen Reactions    Sulfa Antibiotics GI Intolerance     HA, vomiting, sweating     Amoxicillin GI Intolerance       Objective     Blood pressure 141/97, pulse 74, temperature (!) 97.1 °F (36.2 °C), temperature source Temporal, resp. rate (!) 11, height 5' 9\" (1.753 m), weight 126 kg (276 lb 14.4 oz), SpO2 99%.      PHYSICAL EXAM    Gen: NAD  CV: RRR  CHEST: Clear  ABD: soft, NT/ND  EXT: no edema  Neuro: AAO      ASSESSMENT/PLAN:  This is a 59 y.o. year old male " here for short segment Finney's    PLAN:   Procedure: EGD

## 2025-03-28 ENCOUNTER — TELEPHONE (OUTPATIENT)
Age: 60
End: 2025-03-28

## 2025-03-28 DIAGNOSIS — A04.8 H. PYLORI INFECTION: Primary | ICD-10-CM

## 2025-03-28 PROCEDURE — 88360 TUMOR IMMUNOHISTOCHEM/MANUAL: CPT | Performed by: PATHOLOGY

## 2025-03-28 PROCEDURE — 88305 TISSUE EXAM BY PATHOLOGIST: CPT | Performed by: PATHOLOGY

## 2025-03-28 PROCEDURE — 88342 IMHCHEM/IMCYTCHM 1ST ANTB: CPT | Performed by: PATHOLOGY

## 2025-03-28 PROCEDURE — 88313 SPECIAL STAINS GROUP 2: CPT | Performed by: PATHOLOGY

## 2025-03-28 RX ORDER — METRONIDAZOLE 500 MG/1
500 TABLET ORAL 2 TIMES DAILY
Qty: 28 TABLET | Refills: 0 | Status: SHIPPED | OUTPATIENT
Start: 2025-03-28 | End: 2025-04-11

## 2025-03-28 RX ORDER — CLARITHROMYCIN 500 MG/1
500 TABLET ORAL 2 TIMES DAILY
Qty: 28 TABLET | Refills: 0 | Status: SHIPPED | OUTPATIENT
Start: 2025-03-28 | End: 2025-04-11

## 2025-03-28 NOTE — TELEPHONE ENCOUNTER
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

## 2025-05-02 ENCOUNTER — HOSPITAL ENCOUNTER (EMERGENCY)
Facility: HOSPITAL | Age: 60
Discharge: HOME | End: 2025-05-02
Attending: EMERGENCY MEDICINE
Payer: COMMERCIAL

## 2025-05-02 VITALS
HEART RATE: 90 BPM | OXYGEN SATURATION: 100 % | RESPIRATION RATE: 20 BRPM | TEMPERATURE: 97.4 F | SYSTOLIC BLOOD PRESSURE: 151 MMHG | DIASTOLIC BLOOD PRESSURE: 77 MMHG

## 2025-05-02 DIAGNOSIS — R31.9 PAINLESS HEMATURIA: Primary | ICD-10-CM

## 2025-05-02 LAB
BACTERIA UR QL AUTO: ABNORMAL /HPF
BILIRUB UR QL STRIP: NEGATIVE
CLARITY UR: CLEAR
COLOR UR: YELLOW
GLUCOSE UR STRIP-MCNC: NEGATIVE MG/DL
HGB UR QL STRIP.AUTO: ABNORMAL
KETONES UR STRIP-MCNC: NEGATIVE MG/DL
LEUKOCYTE ESTERASE UR QL STRIP: ABNORMAL
NITRITE UR QL STRIP: NEGATIVE
NON-SQ EPI CELLS URNS QL MICRO: ABNORMAL /HPF
PH UR STRIP.AUTO: 5.5 [PH]
PROT UR STRIP-MCNC: ABNORMAL MG/DL
RBC #/AREA URNS AUTO: ABNORMAL /HPF
SP GR UR STRIP.AUTO: 1.03 (ref 1–1.03)
UROBILINOGEN UR STRIP-ACNC: <2 MG/DL
WBC #/AREA URNS AUTO: ABNORMAL /HPF

## 2025-05-02 PROCEDURE — 99283 EMERGENCY DEPT VISIT LOW MDM: CPT

## 2025-05-02 PROCEDURE — 99284 EMERGENCY DEPT VISIT MOD MDM: CPT | Performed by: EMERGENCY MEDICINE

## 2025-05-02 PROCEDURE — 87086 URINE CULTURE/COLONY COUNT: CPT | Performed by: EMERGENCY MEDICINE

## 2025-05-02 PROCEDURE — 76775 US EXAM ABDO BACK WALL LIM: CPT | Performed by: EMERGENCY MEDICINE

## 2025-05-02 PROCEDURE — 81001 URINALYSIS AUTO W/SCOPE: CPT | Performed by: EMERGENCY MEDICINE

## 2025-05-03 LAB — BACTERIA UR CULT: NORMAL

## 2025-05-05 ENCOUNTER — APPOINTMENT (OUTPATIENT)
Dept: LAB | Facility: HOSPITAL | Age: 60
End: 2025-05-05
Payer: COMMERCIAL

## 2025-05-05 ENCOUNTER — TELEPHONE (OUTPATIENT)
Dept: UROLOGY | Facility: CLINIC | Age: 60
End: 2025-05-05

## 2025-05-05 ENCOUNTER — TELEPHONE (OUTPATIENT)
Age: 60
End: 2025-05-05

## 2025-05-05 DIAGNOSIS — R31.0 GROSS HEMATURIA: Primary | ICD-10-CM

## 2025-05-05 DIAGNOSIS — R31.0 GROSS HEMATURIA: ICD-10-CM

## 2025-05-05 LAB
ANION GAP SERPL CALCULATED.3IONS-SCNC: 10 MMOL/L (ref 4–13)
BUN SERPL-MCNC: 26 MG/DL (ref 5–25)
CALCIUM SERPL-MCNC: 9.9 MG/DL (ref 8.4–10.2)
CHLORIDE SERPL-SCNC: 105 MMOL/L (ref 96–108)
CO2 SERPL-SCNC: 23 MMOL/L (ref 21–32)
CREAT SERPL-MCNC: 0.93 MG/DL (ref 0.6–1.3)
GFR SERPL CREATININE-BSD FRML MDRD: 88 ML/MIN/1.73SQ M
GLUCOSE SERPL-MCNC: 110 MG/DL (ref 65–140)
POTASSIUM SERPL-SCNC: 4.1 MMOL/L (ref 3.5–5.3)
SODIUM SERPL-SCNC: 138 MMOL/L (ref 135–147)

## 2025-05-05 PROCEDURE — 88112 CYTOPATH CELL ENHANCE TECH: CPT | Performed by: PATHOLOGY

## 2025-05-05 PROCEDURE — 80048 BASIC METABOLIC PNL TOTAL CA: CPT

## 2025-05-05 PROCEDURE — 36415 COLL VENOUS BLD VENIPUNCTURE: CPT

## 2025-05-05 NOTE — TELEPHONE ENCOUNTER
Pt called in returning missed call.     Relayed the following message to pt:  Dinora Correa PA-C to [facility]     5/5/25  1:31 PM  Note     Urine testing negative for UTI. Should have work-up with CT urogram and urine cytology. Will need to schedule full work-up with cystoscopy as well.         Pt understood & states he schedule an appt with Dinora for: 05/21 - 2:00 PM.    Pt will get his orders done..

## 2025-05-05 NOTE — TELEPHONE ENCOUNTER
Pt under care of: Susu Montoya    Office Location: [facility]    Insurance   Current Insurance?[employer]   Insurance E-verified? yes      History  Last Seen (include Follow Up recommendations of last visit- see Office Visit - Instructions): 1/28/25 and recommended for yearly f/u    Pt calling due to: schedule ED f/u     Active Symptoms?  Explain: Gross hematuria    Appointment Details   Date:  5/21/25    Time:  2pm    Location:  [facility]    Provider:  Dinora Correa    Does the appointment need further review?       Pt can be reached at: [phone]

## 2025-05-05 NOTE — TELEPHONE ENCOUNTER
Patient called in again. He did call earlier and scheduled f/u appt d/t he was in ED recently with hematuria. Had urine testing. Appt is 5/21. Patient inquiring if he should have CT scan prior to appt. States no hematuria today. When he had it it was between cranberry juice and red wine color. Reviewed Hydration, avoidance of bladder irritants and ED precautions including severe pain, persistent nausea and vomiting, inability to urinate, fever over 101

## 2025-05-05 NOTE — TELEPHONE ENCOUNTER
Urine testing negative for UTI. Should have work-up with CT urogram and urine cytology. Will need to schedule full work-up with cystoscopy as well.

## 2025-05-06 LAB — BACTERIA UR CULT: NORMAL

## 2025-05-07 ENCOUNTER — TELEPHONE (OUTPATIENT)
Age: 60
End: 2025-05-07

## 2025-05-07 PROCEDURE — 88112 CYTOPATH CELL ENHANCE TECH: CPT | Performed by: PATHOLOGY

## 2025-05-07 NOTE — TELEPHONE ENCOUNTER
Pt called to schedule a cysto per message in [employer]    PT scheduled with Dr Silverio on 11/5/25

## 2025-05-07 NOTE — TELEPHONE ENCOUNTER
Patient called to get auth # for CT scan. Advised patient CT scan is approved.      No further assistance needed at this time.

## 2025-05-08 ENCOUNTER — HOSPITAL ENCOUNTER (OUTPATIENT)
Dept: RADIOLOGY | Facility: MEDICAL CENTER | Age: 60
Discharge: HOME | End: 2025-05-08
Attending: PHYSICIAN ASSISTANT
Payer: COMMERCIAL

## 2025-05-08 DIAGNOSIS — R31.0 GROSS HEMATURIA: ICD-10-CM

## 2025-05-08 PROCEDURE — 74178 CT ABD&PLV WO CNTR FLWD CNTR: CPT

## 2025-05-08 RX ADMIN — IOHEXOL 100 ML: 350 INJECTION, SOLUTION INTRAVENOUS at 10:54

## 2025-05-08 NOTE — ED PROVIDER NOTES
Time reflects when diagnosis was documented in both MDM as applicable and the Disposition within this note       Time User Action Codes Description Comment    5/2/2025 10:51 AM Leandro Mcintosh Add [R31.9] Painless hematuria           ED Disposition       ED Disposition   Discharge    Condition   Stable    Date/Time   Fri May 2, 2025 10:51 AM    Comment   Joao ANTONINO Ibarra discharge to home/self care.                   Assessment & Plan       Medical Decision Making  This is a 59 y.o. old male who presents to the ED for evaluation of hematuria. We will check UA, evaluate for infection. Bedside US obtained, no evidence of retention.     There was a  normal US examination, no evidence of obstruction. UA was returned as insufficient sample, will give water and resend.    UA wo evidence of infection. Will send to OP urology. I personally discussed return precautions with this patient. I provided the patient with written discharge instructions and particularly highlighted specific areas of interest to this patient, including but not limited to: medications for symptom managment, follow up recommendations, and return precautions. Patient is in agreement with this plan as outlined above.      Amount and/or Complexity of Data Reviewed  Labs: ordered. Decision-making details documented in ED Course.      ED Course as of 05/08/25 0816   Fri May 02, 2025   1051 UA w Reflex to Microscopic w Reflex to Culture(!)  No evidence of infection. Needs OP Urology Follow up.     Medications - No data to display    ED Risk Strat Scores        No data recorded        SBIRT 20yo+      Flowsheet Row Most Recent Value   Initial Alcohol Screen: US AUDIT-C     1. How often do you have a drink containing alcohol? 0 Filed at: 05/02/2025 0833   2. How many drinks containing alcohol do you have on a typical day you are drinking?  0 Filed at: 05/02/2025 0833   3a. Male UNDER 65: How often do you have five or more drinks on one occasion? 0 Filed  at: 05/02/2025 0833   Audit-C Score 0 Filed at: 05/02/2025 0833   JODEE: How many times in the past year have you...    Used an illegal drug or used a prescription medication for non-medical reasons? Never Filed at: 05/02/2025 0833                            History of Present Illness       Chief Complaint   Patient presents with    Blood in Urine     Blood in urine that started this morning. Prostate surgery about 2 yrs ago. No pain with urination and no urine retention.        Past Medical History:   Diagnosis Date    Acute kidney injury (nontraumatic) ([facility])     Alcohol abuse     Anxiety     Finney's esophagus     Chronic pain syndrome 02/17/2021    Colon polyp     COVID 09/2020    Diverticulitis     Erectile dysfunction     Essential hypertension     GERD (gastroesophageal reflux disease)     History of transfusion 2015    Hypertension     Lumbar radiculopathy 05/13/2019    Major depression     Mild cognitive disorder 05/06/2016    Mood disorder ([facility]) 05/02/2016    Multiple nevi     Obesity (BMI 30-39.9)     ALEXANDRE (obstructive sleep apnea)     Psychosis ([facility])     Snoring     Suspicious nevus     Tremor of right hand     Weakness of right upper extremity       Past Surgical History:   Procedure Laterality Date    CARDIAC CATHETERIZATION Left 11/16/2022    Procedure: Cardiac Left Heart Cath;  Surgeon: Paresh Street MD;  [facility]: [facility];  Service: Cardiology    CARDIAC CATHETERIZATION N/A 11/16/2022    Procedure: Cardiac Coronary Angiogram;  Surgeon: Paresh Street MD;  [facility];  Service: Cardiology    COLON SURGERY      COLONOSCOPY N/A 03/09/2019    Procedure: COLONOSCOPY;  Surgeon: Keaton Begum MD;  Location: [facility];  Service: Gastroenterology    LIPOMA RESECTION      back    [place] ESOPHAGOGASTRODUODENOSCOPY TRANSORAL DIAGNOSTIC N/A 03/09/2019    Procedure: ESOPHAGOGASTRODUODENOSCOPY (EGD);  Surgeon: Keaton Begum MD;  Location: [facility];  Service:  Gastroenterology    UT SURGICAL ARTHROSCOPY SHOULDER W/ROTATOR CUFF RPR Right 09/16/2022    Procedure: RIGHT SHOULDER ARTHROSCOPY, REPAIR ROTATOR CUFF, SUPRASPINATUS REPAIR, SUBSCAPULARIS REPAIR, BICEPS TENOTOMY, ACROMIOPLASTY, AND EXTENSIVE DEBRIDEMENT;  Surgeon: Luciano Hamilton DO;  [facility];  Service: Orthopedics    UT TRURL ELECTROSURG RESCJ PROSTATE BLEED COMPLETE N/A 7/6/2023    Procedure: TRANSURETHRAL RESECTION OF PROSTATE (TURP);  Surgeon: Chavez Olivera MD;  [facility];  Service: Urology    REVISION COLOSTOMY      SHOULDER SURGERY        Family History   Problem Relation Age of Onset    Heart disease Father     Stroke Mother     Aneurysm Sister     Ovarian cancer Sister     Drug abuse Sister         overdose    No Known Problems Sister     No Known Problems Sister     No Known Problems Sister     Drug abuse Brother     Drug abuse Brother     No Known Problems Brother     No Known Problems Daughter     No Known Problems Daughter     No Known Problems Daughter     No Known Problems Daughter     No Known Problems Son     No Known Problems Son       Social History     Tobacco Use    Smoking status: Never     Passive exposure: Current    Smokeless tobacco: Never   Vaping Use    Vaping status: Former   Substance Use Topics    Alcohol use: Yes     Alcohol/week: 1.0 standard drink of alcohol     Types: 1 Cans of beer per week     Comment: occasionally 1-2 x a month    Drug use: Yes     Frequency: 4.0 times per week     Types: Marijuana     Comment: medical/ smoking      E-Cigarette/Vaping    E-Cigarette Use Former User     Comments quit 2016       E-Cigarette/Vaping Substances    Nicotine No     THC No     CBD No     Flavoring No     Other No     Unknown No       I have reviewed and agree with the history as documented.     This is a 59 y.o. old male who presents to the ED for evaluation of hematuria. Noted this AM to have blood in urine. No pain or other symptoms. No flank pain, fevers,  chills. No clots, no AC. No trauma. Has never happened before.      Review of Systems   Constitutional:  Negative for chills, fatigue, fever and unexpected weight change.   HENT:  Negative for congestion, rhinorrhea and sore throat.    Eyes:  Negative for redness and visual disturbance.   Respiratory:  Negative for cough and shortness of breath.    Cardiovascular:  Negative for chest pain and leg swelling.   Gastrointestinal:  Negative for abdominal pain, constipation, diarrhea, nausea and vomiting.   Endocrine: Negative for cold intolerance and heat intolerance.   Genitourinary:  Positive for hematuria. Negative for dysuria, frequency and urgency.   Musculoskeletal:  Negative for back pain.   Skin:  Negative for rash.   Neurological:  Negative for dizziness, syncope and numbness.   All other systems reviewed and are negative.          Objective       ED Triage Vitals   Temperature Pulse Blood Pressure Respirations SpO2 Patient Position - Orthostatic VS   05/02/25 0815 05/02/25 0814 05/02/25 0815 05/02/25 0814 05/02/25 0814 05/02/25 1030   (!) 97.4 °F (36.3 °C) 94 165/87 18 100 % Lying      Temp Source Heart Rate Source BP Location FiO2 (%) Pain Score    05/02/25 0815 05/02/25 0814 05/02/25 0814 -- --    Oral Monitor Right arm        Vitals      Date and Time Temp Pulse SpO2 Resp BP Pain Score FACES Pain Rating User   05/02/25 1030 -- 90 100 % 20 151/77 -- -- KO   05/02/25 0815 97.4 °F (36.3 °C) -- -- -- 165/87 -- -- LD   05/02/25 0814 -- 94 100 % 18 -- -- -- LD            Physical Exam  Vitals and nursing note reviewed.   Constitutional:       General: He is not in acute distress.     Appearance: He is well-developed. He is not diaphoretic.   HENT:      Head: Normocephalic and atraumatic.      Right Ear: External ear normal.      Left Ear: External ear normal.      Nose: Nose normal.      Mouth/Throat:      Mouth: Mucous membranes are moist.   Eyes:      Conjunctiva/sclera: Conjunctivae normal.      Pupils: Pupils  are equal, round, and reactive to light.   Cardiovascular:      Rate and Rhythm: Normal rate and regular rhythm.      Heart sounds: Normal heart sounds. No murmur heard.     No gallop.   Pulmonary:      Effort: Pulmonary effort is normal. No respiratory distress.      Breath sounds: Normal breath sounds. No wheezing or rales.   Abdominal:      General: Bowel sounds are normal. There is no distension.      Palpations: Abdomen is soft. There is no mass.      Tenderness: There is no abdominal tenderness. There is no guarding or rebound.   Musculoskeletal:         General: No deformity. Normal range of motion.      Cervical back: Normal range of motion and neck supple.   Lymphadenopathy:      Cervical: No cervical adenopathy.   Skin:     General: Skin is warm and dry.      Findings: No erythema or rash.   Neurological:      Mental Status: He is alert and oriented to person, place, and time.      Cranial Nerves: No cranial nerve deficit.         Results Reviewed       Procedure Component Value Units Date/Time    Urine culture [[id number]] Collected: 05/02/25 0908    Lab Status: Final result Specimen: Urine, Clean Catch Updated: 05/03/25 0823     Urine Culture No Growth <1000 cfu/mL    Urine Microscopic [[id number]]  (Abnormal) Collected: 05/02/25 0908    Lab Status: Final result Specimen: Urine, Clean Catch Updated: 05/02/25 1034     RBC, UA Innumerable /hpf      WBC, UA 10-20 /hpf      Epithelial Cells Occasional /hpf      Bacteria, UA Occasional /hpf     UA w Reflex to Microscopic w Reflex to Culture [[id number]]  (Abnormal) Collected: 05/02/25 0908    Lab Status: Final result Specimen: Urine, Clean Catch Updated: 05/02/25 1012     Color, UA Yellow     Clarity, UA Clear     Specific Gravity, UA 1.027     pH, UA 5.5     Leukocytes, UA Trace     Nitrite, UA Negative     Protein, UA 50 (1+) mg/dl      Glucose, UA Negative mg/dl      Ketones, UA Negative mg/dl      Urobilinogen, UA <2.0 mg/dl      Bilirubin, UA Negative      Occult Blood, UA Large            No orders to display       POC Renal US    Date/Time: 5/2/2025 8:40 AM    Performed by: Leandro Mcintosh MD  Authorized by: Leandro Mcintosh MD    Patient location:  ED  Performed by:  Attending  Other Assisting Provider: No    Procedure details:     Exam Type:  Diagnostic    Indications: hematuria      Assessment for:  Bladder volume    Views obtained: bladder (transverse and sagittal)      Image quality: diagnostic      Image availability:  Images available in PACS  Findings:     Bladder:  Not visualized  Interpretation:     Renal ultrasound impressions: other (comment)      Renal ultrasound impressions comment:  No evidence of urinary retention      ED Medication and Procedure Management   Prior to Admission Medications   Prescriptions Last Dose Informant Patient Reported? Taking?   albuterol (PROVENTIL HFA,VENTOLIN HFA) 90 mcg/act inhaler  Self No No   Sig: Inhale 2 puffs every 6 (six) hours as needed for wheezing   losartan (COZAAR) 25 mg tablet   No No   Sig: Take 2 tablets (50 mg total) by mouth daily   pantoprazole (PROTONIX) 40 mg tablet   No No   Sig: Take 1 tablet (40 mg total) by mouth daily before breakfast      Facility-Administered Medications: None     Discharge Medication List as of 5/2/2025 10:52 AM        CONTINUE these medications which have NOT CHANGED    Details   albuterol (PROVENTIL HFA,VENTOLIN HFA) 90 mcg/act inhaler Inhale 2 puffs every 6 (six) hours as needed for wheezing, Starting Wed 8/14/2024, Normal      losartan (COZAAR) 25 mg tablet Take 2 tablets (50 mg total) by mouth daily, Starting Fri 11/22/2024, Normal      pantoprazole (PROTONIX) 40 mg tablet Take 1 tablet (40 mg total) by mouth daily before breakfast, Starting Mon 3/24/2025, Normal             ED SEPSIS DOCUMENTATION   Time reflects when diagnosis was documented in both MDM as applicable and the Disposition within this note       Time User Action Codes Description Comment    5/2/2025 10:51 AM  Leandro Mcintosh Add [R31.9] Painless hematuria                  Leandro Mcintosh MD  05/08/25 0817

## 2025-05-20 ENCOUNTER — OFFICE VISIT (OUTPATIENT)
Dept: CARDIOLOGY CLINIC | Facility: CLINIC | Age: 60
End: 2025-05-20
Payer: COMMERCIAL

## 2025-05-20 VITALS
HEIGHT: 69 IN | DIASTOLIC BLOOD PRESSURE: 80 MMHG | WEIGHT: 264 LBS | BODY MASS INDEX: 39.1 KG/M2 | RESPIRATION RATE: 16 BRPM | SYSTOLIC BLOOD PRESSURE: 142 MMHG

## 2025-05-20 DIAGNOSIS — E78.00 PURE HYPERCHOLESTEROLEMIA: ICD-10-CM

## 2025-05-20 DIAGNOSIS — I10 ESSENTIAL HYPERTENSION: Primary | ICD-10-CM

## 2025-05-20 PROCEDURE — 99214 OFFICE O/P EST MOD 30 MIN: CPT | Performed by: PHYSICIAN ASSISTANT

## 2025-05-20 RX ORDER — LOSARTAN POTASSIUM 25 MG/1
75 TABLET ORAL DAILY
Qty: 270 TABLET | Refills: 3 | Status: SHIPPED | OUTPATIENT
Start: 2025-05-20

## 2025-05-20 NOTE — ASSESSMENT & PLAN NOTE
Initially elevated 160/90 came down to 142/80  High BP readings at home in the 160s  Increase losartan to 75 mg daily

## 2025-05-20 NOTE — PATIENT INSTRUCTIONS
Continue medications.  Report any symptoms.  Reviewed stress test.  Report any exertional symptoms.  Follow with primary care physician.  Increase losartan to 75 daily.  Report any high BP readings at home.  Continue all medications. Previous studies reviewed with patient, medications reviewed and possible side effects discussed. Continue risk factor modification. Optimize weight, regular exercise and follow up with appropriate specialists and primary care physician as discussed.  All questions answered. Patient advised to report any problems prompting to medical attention. Return for follow up visit in 6 months or earlier if needed

## 2025-05-20 NOTE — PROGRESS NOTES
[facility] [place]  [address]  [place] [address]  Cardiology Follow Up    Joao Ibarra  1965  [id number]    Assessment & Plan  Essential hypertension  Initially elevated 160/90 came down to 142/80  High BP readings at home in the 160s  Increase losartan to 75 mg daily  Pure hypercholesterolemia  Continue with diet control  Follow with primary care    Continue medications.  Report any symptoms.  Reviewed stress test.  Report any exertional symptoms.  Follow with primary care physician.  Increase losartan to 75 daily.  Report any high BP readings at home.  Continue all medications. Previous studies reviewed with patient, medications reviewed and possible side effects discussed. Continue risk factor modification. Optimize weight, regular exercise and follow up with appropriate specialists and primary care physician as discussed.  All questions answered. Patient advised to report any problems prompting to medical attention. Return for follow up visit in 6 months or earlier if needed    Chief Complaint   Patient presents with    Follow-up       Interval History: Since the office today for routine follow-up visit with history of hypertension, hyperlipidemia.  Last time patient was seen about 6 months ago he was complaining of chest pain, only had stress test done January 2025 which showed no ischemia.  Patient states he has had no further symptoms.  Denies chest pain, chest pressure, chest heaviness per denies any shortness of breath, dizziness, palpitations.  Patient states he is taking all medications as prescribed.  Took his losartan about 1 hour prior to today's visit.  Patient states he monitors BP at home and gets around the 160s systolic.  Follows a low-salt diet.      Test done January 2025: Perfusion defect in the inferior wall seen in rest and supine significantly improved with prone indicating diaphragmatic attenuation artifact, walked for 6 minutes achieving 7  METS    Problem List[1]  Past Medical History:   Diagnosis Date    Acute kidney injury (nontraumatic) ([facility])     Alcohol abuse     Anxiety     Finney's esophagus     Chronic pain syndrome 02/17/2021    Colon polyp     COVID 09/2020    Diverticulitis     Erectile dysfunction     Essential hypertension     GERD (gastroesophageal reflux disease)     History of transfusion 2015    Hypertension     Lumbar radiculopathy 05/13/2019    Major depression     Mild cognitive disorder 05/06/2016    Mood disorder (HCC) 05/02/2016    Multiple nevi     Obesity (BMI 30-39.9)     ALEXANDRE (obstructive sleep apnea)     Psychosis ([facility])     Snoring     Suspicious nevus     Tremor of right hand     Weakness of right upper extremity      Social History     Socioeconomic History    Marital status: /Civil Union     Spouse name: Not on file    Number of children: Not on file    Years of education: Not on file    Highest education level: Not on file   Occupational History    Occupation: CONSTRUCTION   Tobacco Use    Smoking status: Never     Passive exposure: Current    Smokeless tobacco: Never   Vaping Use    Vaping status: Former   Substance and Sexual Activity    Alcohol use: Yes     Alcohol/week: 1.0 standard drink of alcohol     Types: 1 Cans of beer per week     Comment: occasionally 1-2 x a month    Drug use: Yes     Frequency: 4.0 times per week     Types: Marijuana     Comment: medical/ smoking    Sexual activity: Yes     Partners: Female   Other Topics Concern    Not on file   Social History Narrative    Not on file     Social Drivers of Health     Financial Resource Strain: Not on file   Food Insecurity: Not on file   Transportation Needs: Not on file   Physical Activity: Not on file   Stress: Not on file   Social Connections: Unknown (6/18/2024)    Received from [employer]    Social [employer]     How often do you feel lonely or isolated from those around you? (Adult - for ages 18 years and over): Not on file   Intimate Partner  Violence: Not on file   Housing Stability: Not on file      Family History   Problem Relation Age of Onset    Heart disease Father     Stroke Mother     Aneurysm Sister     Ovarian cancer Sister     Drug abuse Sister         overdose    No Known Problems Sister     No Known Problems Sister     No Known Problems Sister     Drug abuse Brother     Drug abuse Brother     No Known Problems Brother     No Known Problems Daughter     No Known Problems Daughter     No Known Problems Daughter     No Known Problems Daughter     No Known Problems Son     No Known Problems Son      Past Surgical History:   Procedure Laterality Date    CARDIAC CATHETERIZATION Left 11/16/2022    Procedure: Cardiac Left Heart Cath;  Surgeon: Paresh Street MD;  Location: [facility];  Service: Cardiology    CARDIAC CATHETERIZATION N/A 11/16/2022    Procedure: Cardiac Coronary Angiogram;  Surgeon: Paresh Street MD;  [facility];  Service: Cardiology    COLON SURGERY      COLONOSCOPY N/A 03/09/2019    Procedure: COLONOSCOPY;  Surgeon: Keaton Begum MD;  Location: [facility];  Service: Gastroenterology    LIPOMA RESECTION      back    VA ESOPHAGOGASTRODUODENOSCOPY TRANSORAL DIAGNOSTIC N/A 03/09/2019    Procedure: ESOPHAGOGASTRODUODENOSCOPY (EGD);  Surgeon: Keaton Begum MD;  Location: [facility];  Service: Gastroenterology    VA SURGICAL ARTHROSCOPY SHOULDER W/ROTATOR CUFF RPR Right 09/16/2022    Procedure: RIGHT SHOULDER ARTHROSCOPY, REPAIR ROTATOR CUFF, SUPRASPINATUS REPAIR, SUBSCAPULARIS REPAIR, BICEPS TENOTOMY, ACROMIOPLASTY, AND EXTENSIVE DEBRIDEMENT;  Surgeon: Luciano Hamilton DO;  Location: [facility];  Service: Orthopedics    VA TRURL ELECTROSURG RESCJ PROSTATE BLEED COMPLETE N/A 7/6/2023    Procedure: TRANSURETHRAL RESECTION OF PROSTATE (TURP);  Surgeon: Chavez Olivera MD;  Location: [facility];  Service: Urology    REVISION COLOSTOMY      SHOULDER SURGERY       Current Medications[2]  Allergies    Allergen Reactions    Sulfa Antibiotics GI Intolerance     HA, vomiting, sweating     Amoxicillin GI Intolerance         Imaging: CT renal protocol  Result Date: 5/8/2025  Narrative: CT ABDOMEN AND PELVIS WITH AND WITHOUT IV CONTRAST INDICATION: R31.0: Gross hematuria. COMPARISON: Prior CT study dated 10/18/2022. TECHNIQUE: Initial CT of the abdomen and pelvis was performed without intravenous contrast. Subsequent dynamic CT evaluation of the abdomen and pelvis was performed after the administration of contrast in both nephrographic and delayed phases.  Multiplanar 2D reformatted images were created from the source data. This examination, like all CT scans performed in the [facility], was performed utilizing techniques to minimize radiation dose exposure, including the use of iterative reconstruction and automated exposure control. Radiation dose length product (DLP) for this visit: 3709.03 mGy-cm. IV Contrast: 100 mL of iohexol (OMNIPAQUE) Enteric Contrast: Not administered. FINDINGS: ABDOMEN RIGHT KIDNEY AND URETER: No solid renal mass or detectable urothelial mass. There is a 7 mm hypodense lesion anteriorly in the right kidney that is too small to accurately characterize but statistically likely reflects a cyst and is not significantly changed. No hydronephrosis or hydroureter. No urinary tract calculi. No perinephric collection. LEFT KIDNEY AND URETER: No solid renal mass or detectable urothelial mass. There are a few tiny subcentimeter hypodensities lesions that are too small to accurately characterize but statistically likely reflect tiny cysts. No hydronephrosis or hydroureter. No urinary tract calculi. No perinephric collection. URINARY BLADDER: No bladder wall mass. The bladder is not well distended but there is mild bladder wall thickening. Correlation for cystitis advised. Alternatively, this may be due to chronic bladder outlet obstruction. No calculi. LOWER CHEST: No clinically  significant abnormality in the visualized lower chest. LIVER/BILIARY TREE: Unremarkable. GALLBLADDER: There are tiny gallstones. No pericholecystic inflammation SPLEEN: Unremarkable. PANCREAS: Unremarkable. ADRENAL GLANDS: There is a stable 1.3 cm left adrenal gland nodule compatible with an adenoma. STOMACH AND BOWEL: Tiny hiatal hernia. Anastomotic sutures in the sigmoid colon. Colonic diverticulosis without acute diverticulitis. Anastomotic sutures also seen in the small bowel in the right lower quadrant. APPENDIX: Normal. ABDOMINOPELVIC CAVITY: No ascites. No pneumoperitoneum. No lymphadenopathy. VESSELS: Scattered abdominal aortic calcifications. PELVIS REPRODUCTIVE ORGANS: The prostate is enlarged ABDOMINAL WALL/INGUINAL REGIONS: There is a small fat-containing right inguinal hernia as before. There is a small supraumbilical ventral hernia containing small bowel loops as before without evidence for obstruction or inflammatory stranding. Stable intramuscular lipoma within the left tensor fascia oliva. BONES: No acute fracture or suspicious osseous lesion. Degenerative changes seen in the thoracolumbar spine and bilateral hips. Partial ankylosis right sacroiliac joint.     Impression: No CT evidence of nephrolithiasis, solid renal mass, or obstructive uropathy. Tiny bilateral renal cysts. Mild bladder wall thickening. Correlation for cystitis advised. Alternatively, this may be due to chronic bladder outlet obstruction. Enlarged prostate. Cholelithiasis. Stable left adrenal gland adenoma. Small fat-containing right inguinal hernia. Small supraumbilical ventral hernia containing small bowel loops without evidence for obstruction or inflammatory stranding. Stable intramuscular lipoma within the left tensor fascia oliva. Workstation performed: [id number]      bedside procedure  Result Date: 5/2/2025  Narrative: [id number]      Review of Systems:  Review of Systems   Constitutional:  "Negative.    Respiratory: Negative.     Cardiovascular: Negative.    Musculoskeletal: Negative.    Neurological: Negative.    Hematological: Negative.    Psychiatric/Behavioral: Negative.     All other systems reviewed and are negative.        /80   Resp 16   Ht 5' 9\" (1.753 m)   Wt 120 kg (264 lb)   BMI 38.99 kg/m²     Physical Exam:  Physical Exam  Vitals and nursing note reviewed.   Constitutional:       Appearance: Normal appearance.   HENT:      Head: Normocephalic and atraumatic.     Cardiovascular:      Rate and Rhythm: Normal rate and regular rhythm.      Pulses: Normal pulses.      Heart sounds: Normal heart sounds.   Pulmonary:      Effort: Pulmonary effort is normal.      Breath sounds: Normal breath sounds.     Musculoskeletal:         General: Normal range of motion.      Cervical back: Normal range of motion and neck supple.     Skin:     General: Skin is warm and dry.     Neurological:      General: No focal deficit present.      Mental Status: He is alert and oriented to person, place, and time.     Psychiatric:         Mood and Affect: Mood normal.         Behavior: Behavior normal.         Thought Content: Thought content normal.         Judgment: Judgment normal.              [1]   Patient Active Problem List  Diagnosis    Essential hypertension    Mild cognitive disorder    Finney's esophagus with dysplasia    Obesity due to excess calories with serious comorbidity    Chronic pain syndrome    Chronic right-sided low back pain with right-sided sciatica    Optic nerve edema    ALEXANDRE (obstructive sleep apnea)    Erectile dysfunction    Multiple nevi    Complete tear of right rotator cuff    Class II obesity    Benign prostatic hyperplasia with urinary frequency    Moderate mixed hyperlipidemia not requiring statin therapy   [2]   Current Outpatient Medications:     albuterol (PROVENTIL HFA,VENTOLIN HFA) 90 mcg/act inhaler, Inhale 2 puffs every 6 (six) hours as needed for wheezing, Disp: 54 " g, Rfl: 0    losartan (COZAAR) 25 mg tablet, Take 3 tablets (75 mg total) by mouth daily, Disp: 270 tablet, Rfl: 3    pantoprazole (PROTONIX) 40 mg tablet, Take 1 tablet (40 mg total) by mouth daily before breakfast, Disp: 60 tablet, Rfl: 2

## 2025-05-21 ENCOUNTER — OFFICE VISIT (OUTPATIENT)
Dept: UROLOGY | Facility: CLINIC | Age: 60
End: 2025-05-21
Attending: EMERGENCY MEDICINE
Payer: COMMERCIAL

## 2025-05-21 VITALS
HEART RATE: 82 BPM | WEIGHT: 261 LBS | SYSTOLIC BLOOD PRESSURE: 152 MMHG | TEMPERATURE: 97.6 F | DIASTOLIC BLOOD PRESSURE: 84 MMHG | BODY MASS INDEX: 38.66 KG/M2 | HEIGHT: 69 IN | OXYGEN SATURATION: 98 %

## 2025-05-21 DIAGNOSIS — Z12.5 SCREENING FOR PROSTATE CANCER: ICD-10-CM

## 2025-05-21 DIAGNOSIS — N40.1 BENIGN PROSTATIC HYPERPLASIA WITH URINARY FREQUENCY: ICD-10-CM

## 2025-05-21 DIAGNOSIS — R31.0 GROSS HEMATURIA: Primary | ICD-10-CM

## 2025-05-21 DIAGNOSIS — D35.02 ADRENAL ADENOMA, LEFT: ICD-10-CM

## 2025-05-21 DIAGNOSIS — R35.0 BENIGN PROSTATIC HYPERPLASIA WITH URINARY FREQUENCY: ICD-10-CM

## 2025-05-21 LAB
POST-VOID RESIDUAL VOLUME, ML POC: 17 ML
SL AMB  POCT GLUCOSE, UA: NORMAL
SL AMB LEUKOCYTE ESTERASE,UA: NORMAL
SL AMB POCT BILIRUBIN,UA: NORMAL
SL AMB POCT BLOOD,UA: NORMAL
SL AMB POCT CLARITY,UA: NORMAL
SL AMB POCT COLOR,UA: NORMAL
SL AMB POCT KETONES,UA: NORMAL
SL AMB POCT NITRITE,UA: NORMAL
SL AMB POCT PH,UA: 5
SL AMB POCT SPECIFIC GRAVITY,UA: 1.02
SL AMB POCT URINE PROTEIN: NORMAL
SL AMB POCT UROBILINOGEN: 0.2

## 2025-05-21 PROCEDURE — 81002 URINALYSIS NONAUTO W/O SCOPE: CPT | Performed by: PHYSICIAN ASSISTANT

## 2025-05-21 PROCEDURE — 51798 US URINE CAPACITY MEASURE: CPT | Performed by: PHYSICIAN ASSISTANT

## 2025-05-21 PROCEDURE — 99214 OFFICE O/P EST MOD 30 MIN: CPT | Performed by: PHYSICIAN ASSISTANT

## 2025-05-21 RX ORDER — TOLTERODINE 2 MG/1
2 CAPSULE, EXTENDED RELEASE ORAL DAILY
Qty: 30 CAPSULE | Refills: 2 | Status: SHIPPED | OUTPATIENT
Start: 2025-05-21 | End: 2025-05-21

## 2025-05-21 RX ORDER — TOLTERODINE 2 MG/1
2 CAPSULE, EXTENDED RELEASE ORAL DAILY
Qty: 90 CAPSULE | Refills: 1 | Status: SHIPPED | OUTPATIENT
Start: 2025-05-21

## 2025-05-21 NOTE — ASSESSMENT & PLAN NOTE
S/p TURP 7/6/23   PVR 17 mL   C/o bothersome urinary urgency and frequency  Wishes to trial OAB medication. Will start Detrol 2 mg daily   Orders:    tolterodine (DETROL LA) 2 mg 24 hr capsule; Take 1 capsule (2 mg total) by mouth daily

## 2025-05-21 NOTE — PROGRESS NOTES
Name: Joao Ibarra      : 1965      MRN: [id number]  Encounter Provider: Dinora Correa PA-C  Encounter Date: 2025   Encounter department: [facility] [place]  :  Assessment & Plan  Gross hematuria  - Urine cultures negative for UTI  - Urine cytology 25 - negative  - Urine dip today negative for leuks, nitrites or blood.   - CT urogram from 25 - negative for upper tract lesion or acute findings.   - Cystoscopy as scheduled for full work-up   Orders:    POCT urine dip    POCT Measure PVR    Benign prostatic hyperplasia with urinary frequency  S/p TURP 23   PVR 17 mL   C/o bothersome urinary urgency and frequency  Wishes to trial OAB medication. Will start Detrol 2 mg daily   Orders:    tolterodine (DETROL LA) 2 mg 24 hr capsule; Take 1 capsule (2 mg total) by mouth daily    Screening for prostate cancer  PSA 24 2.393  TURP pathology negative  LALITO negative   Continue yearly screening       Adrenal adenoma, left  Stable left adrenal gland adenoma on imaging measuring 1.3 cm. Referral to endo for biochemical evaluation   Orders:    Ambulatory Referral to Endocrinology; Future        History of Present Illness   Joao Ibarra is a 60 y.o. male who presents for new issue of gross hematuria. Was seen in ED for this on 25. Urine culture negative for UTI. Presents today for imaging review. Has cystoscopy scheduled. History of TURP in 2023. He denies any recurrent hematuria since the ED visit. He does have some worsening urinary urgency and frequency. No dysuria or flank pain.         Review of Systems   Constitutional:  Negative for chills and fever.   Respiratory:  Negative for shortness of breath.    Cardiovascular:  Negative for chest pain.   Gastrointestinal:  Negative for abdominal pain.   Genitourinary:  Positive for frequency and urgency. Negative for difficulty urinating, dysuria, flank pain and hematuria.   Neurological:  Negative for dizziness.  "         Objective   /84 (BP Location: Right arm, Patient Position: Standing, Cuff Size: Standard)   Pulse 82   Temp 97.6 °F (36.4 °C)   Ht 5' 9\" (1.753 m)   Wt 118 kg (261 lb)   SpO2 98%   BMI 38.54 kg/m²     Physical Exam  Constitutional:       Appearance: Normal appearance.   HENT:      Head: Normocephalic and atraumatic.      Right Ear: External ear normal.      Left Ear: External ear normal.      Nose: Nose normal.     Eyes:      General: No scleral icterus.     Conjunctiva/sclera: Conjunctivae normal.       Cardiovascular:      Pulses: Normal pulses.   Pulmonary:      Effort: Pulmonary effort is normal.   Genitourinary:     Prostate: Not tender and no nodules present.     Musculoskeletal:         General: Normal range of motion.      Cervical back: Normal range of motion.     Skin:     General: Skin is warm and dry.     Neurological:      General: No focal deficit present.      Mental Status: He is alert and oriented to person, place, and time.     Psychiatric:         Mood and Affect: Mood normal.         Behavior: Behavior normal.         Thought Content: Thought content normal.         Judgment: Judgment normal.          Results   Lab Results   Component Value Date    PSA 2.393 07/02/2024    PSA 4.11 (H) 07/01/2023    PSA 3.8 11/08/2022     Lab Results   Component Value Date    GLUCOSE 125 04/02/2016    CALCIUM 9.9 05/05/2025     01/06/2016    K 4.1 05/05/2025    CO2 23 05/05/2025     05/05/2025    BUN 26 (H) 05/05/2025    CREATININE 0.93 05/05/2025     Lab Results   Component Value Date    WBC 5.22 05/01/2024    HGB 15.6 05/01/2024    HCT 48.6 05/01/2024    MCV 89 05/01/2024     05/01/2024       Office Urine Dip  Recent Results (from the past hour)   POCT Measure PVR    Collection Time: 05/21/25  1:44 PM   Result Value Ref Range    POST-VOID RESIDUAL VOLUME, ML POC 17 mL   POCT urine dip    Collection Time: 05/21/25  1:45 PM   Result Value Ref Range    LEUKOCYTE ESTERASE,UA - "     NITRITE,UA -     SL AMB POCT UROBILINOGEN 0.2     POCT URINE PROTEIN +      PH,UA 5.0     BLOOD,UA -     SPECIFIC GRAVITY,UA 1.020     KETONES,UA -     BILIRUBIN,UA +     GLUCOSE, UA -      COLOR,UA orange     CLARITY,UA cler

## 2025-05-23 ENCOUNTER — PREP FOR PROCEDURE (OUTPATIENT)
Age: 60
End: 2025-05-23

## 2025-05-23 DIAGNOSIS — K22.719 BARRETT'S ESOPHAGUS WITH DYSPLASIA: ICD-10-CM

## 2025-05-23 DIAGNOSIS — A04.8 H. PYLORI INFECTION: Primary | ICD-10-CM

## 2025-07-02 ENCOUNTER — CONSULT (OUTPATIENT)
Dept: ENDOCRINOLOGY | Facility: CLINIC | Age: 60
End: 2025-07-02
Payer: COMMERCIAL

## 2025-07-02 VITALS
HEART RATE: 76 BPM | SYSTOLIC BLOOD PRESSURE: 148 MMHG | OXYGEN SATURATION: 96 % | HEIGHT: 69 IN | BODY MASS INDEX: 39.96 KG/M2 | WEIGHT: 269.8 LBS | TEMPERATURE: 97.9 F | RESPIRATION RATE: 18 BRPM | DIASTOLIC BLOOD PRESSURE: 90 MMHG

## 2025-07-02 DIAGNOSIS — D35.02 ADENOMA OF LEFT ADRENAL GLAND: Primary | ICD-10-CM

## 2025-07-02 DIAGNOSIS — R73.03 PRE-DIABETES: ICD-10-CM

## 2025-07-02 PROCEDURE — 99244 OFF/OP CNSLTJ NEW/EST MOD 40: CPT | Performed by: STUDENT IN AN ORGANIZED HEALTH CARE EDUCATION/TRAINING PROGRAM

## 2025-07-02 NOTE — ASSESSMENT & PLAN NOTE
- His A1c level from last year was 5.7%, indicating prediabetes.  - Lifestyle modifications including weight loss, regular exercise, and dietary changes such as reducing sugar intake and simple carbs are recommended.  - A repeat A1c test will be conducted  to monitor his condition.      Orders:  •  Hemoglobin A1C; Future

## 2025-07-02 NOTE — PROGRESS NOTES
Name: Joao Ibarra      : 1965      MRN: [id number]  Encounter Provider: Kina Barlow MD  Encounter Date: 2025   Encounter department: [facility] [place]    Chief Complaint   Patient presents with   • Advice Only   :  Assessment & Plan  Adenoma of left adrenal gland  - The patient has a left adrenal adenoma measuring 1.3 cm, which has been stable since 2016 which consistent with benign adenoma.  0 a comprehensive discussion conducted regarding the physiology of adrenal adenoma, hormonal overproduction syndrome including mild autonomous cortisol secretion, hyperaldosteronism and pheochromocytoma.  -Salivary cortisol test was ordered to rule out MACS,  -Aldosterone, renin activity and aldosterone/renin ratio was ordered to rule out hyperaldosteronism given history of hypertension.  -Given the size of the nodule, pheochromocytoma is less likely, although plasma metanephrine was ordered.  -If hormonal overproduction confirmed, he will be referred for left adrenalectomy otherwise given the stability of the nodule and benign nature of it, no further evaluation required.  -I defer further imaging at this time.    Orders:  •  Aldosterone/Renin Ratio  •  Metanephrine, Fractionated Plasma Free  •  SALIVARY CORTISOL, TWO SPECIMENS    Pre-diabetes  - His A1c level from last year was 5.7%, indicating prediabetes.  - Lifestyle modifications including weight loss, regular exercise, and dietary changes such as reducing sugar intake and simple carbs are recommended.  - A repeat A1c test will be conducted  to monitor his condition.      Orders:  •  Hemoglobin A1C; Future          Pertinent Medical History            History of Present Illness   History of Present Illness    Joao Ibarra is a 60 y.o. male who presents for evaluation of a left sided adrenal adenoma.    He was unaware of the presence of a nodule under his adrenal gland. The growth has been present on the left  "adrenal gland since at least 2016. He reports no sudden onset of headaches, shakiness, sweating, or palpitations. His weight has remained stable, with no new stretch marks on his abdomen or thighs. He does not bruise easily and reports no changes in muscle mass. He has no history of diabetes or prediabetes, although his last A1c was 5.7, indicating prediabetes. He has never experienced any fractures.    He has a history of high blood pressure, managed with losartan 25 mg, taken as two tablets in the morning and one at night. He has never been diagnosed with low potassium levels. He once sought emergency care due to high blood pressure. He is under the care of a cardiologist for his hypertension and regularly monitors his blood pressure at home.      Review of Systems as per HPI  Medical History Reviewed by provider this encounter:     .  Medications Ordered Prior to Encounter[1]      Medical History Reviewed by provider this encounter:     .    Objective   /90 (BP Location: Left arm, Patient Position: Sitting, Cuff Size: Standard)   Pulse 76   Temp 97.9 °F (36.6 °C) (Temporal)   Resp 18   Ht 5' 9\" (1.753 m)   Wt 122 kg (269 lb 12.8 oz)   SpO2 96%   BMI 39.84 kg/m²      Body mass index is 39.84 kg/m².  Wt Readings from Last 3 Encounters:   07/02/25 122 kg (269 lb 12.8 oz)   06/30/25 118 kg (261 lb)   05/21/25 118 kg (261 lb)     Physical Exam  Constitutional:       General: He is not in acute distress.     Appearance: He is not ill-appearing.   HENT:      Head: Normocephalic and atraumatic.   Pulmonary:      Effort: Pulmonary effort is normal. No respiratory distress.     Neurological:      Mental Status: He is oriented to person, place, and time.       Physical Exam  Vital Signs: Blood pressure reading is 148/90 mmHg.    Results  Labs:  A1c: 5.7% (last year)  Labs: I have reviewed pertinent labs including:     CT ABDOMEN AND PELVIS WITH AND WITHOUT IV CONTRAST     INDICATION: R31.0: Gross hematuria.   "   COMPARISON: Prior CT study dated 10/18/2022.     TECHNIQUE: Initial CT of the abdomen and pelvis was performed without intravenous contrast. Subsequent dynamic CT evaluation of the abdomen and pelvis was performed after the administration of contrast in both nephrographic and delayed phases.    Multiplanar 2D reformatted images were created from the source data.     This examination, like all CT scans performed in the [facility], was performed utilizing techniques to minimize radiation dose exposure, including the use of iterative reconstruction and automated exposure control. Radiation dose length   product (DLP) for this visit: 3709.03 mGy-cm.     IV Contrast: 100 mL of iohexol (OMNIPAQUE)  Enteric Contrast: Not administered.     FINDINGS:     ABDOMEN     RIGHT KIDNEY AND URETER:  No solid renal mass or detectable urothelial mass. There is a 7 mm hypodense lesion anteriorly in the right kidney that is too small to accurately characterize but statistically likely reflects a cyst and is not significantly changed.  No hydronephrosis or hydroureter.  No urinary tract calculi.  No perinephric collection.     LEFT KIDNEY AND URETER:  No solid renal mass or detectable urothelial mass. There are a few tiny subcentimeter hypodensities lesions that are too small to accurately characterize but statistically likely reflect tiny cysts.  No hydronephrosis or hydroureter.  No urinary tract calculi.  No perinephric collection.     URINARY BLADDER:  No bladder wall mass. The bladder is not well distended but there is mild bladder wall thickening. Correlation for cystitis advised. Alternatively, this may be due to chronic bladder outlet obstruction.  No calculi.        LOWER CHEST: No clinically significant abnormality in the visualized lower chest.     LIVER/BILIARY TREE: Unremarkable.     GALLBLADDER: There are tiny gallstones. No pericholecystic inflammation     SPLEEN: Unremarkable.     PANCREAS:  Unremarkable.     ADRENAL GLANDS: There is a stable 1.3 cm left adrenal gland nodule compatible with an adenoma.     STOMACH AND BOWEL: Tiny hiatal hernia. Anastomotic sutures in the sigmoid colon. Colonic diverticulosis without acute diverticulitis. Anastomotic sutures also seen in the small bowel in the right lower quadrant.     APPENDIX: Normal.     ABDOMINOPELVIC CAVITY: No ascites. No pneumoperitoneum. No lymphadenopathy.     VESSELS: Scattered abdominal aortic calcifications.     PELVIS     REPRODUCTIVE ORGANS: The prostate is enlarged     ABDOMINAL WALL/INGUINAL REGIONS: There is a small fat-containing right inguinal hernia as before. There is a small supraumbilical ventral hernia containing small bowel loops as before without evidence for obstruction or inflammatory stranding.     Stable intramuscular lipoma within the left tensor fascia oliva.     BONES: No acute fracture or suspicious osseous lesion. Degenerative changes seen in the thoracolumbar spine and bilateral hips. Partial ankylosis right sacroiliac joint.     IMPRESSION:     No CT evidence of nephrolithiasis, solid renal mass, or obstructive uropathy. Tiny bilateral renal cysts.     Mild bladder wall thickening. Correlation for cystitis advised. Alternatively, this may be due to chronic bladder outlet obstruction.     Enlarged prostate.     Cholelithiasis.     Stable left adrenal gland adenoma.     Small fat-containing right inguinal hernia. Small supraumbilical ventral hernia containing small bowel loops without evidence for obstruction or inflammatory stranding.     Stable intramuscular lipoma within the left tensor fascia oliva.       Patient Instructions    late-night salivary gland cortisol for 2 nights in a row, you are to  the test kit from the lab, and you will collect sample between 11 PM and midnight there are detailed instruction how to collect your salivary cortisol, there should be a swab, which you will keep sponge into your  mouth, gently chew and roll it for 2 minutes, and return it to the tube without touching it, close the tube and refrigerate the sample before returning to the lab, and this will be done in 2 nights in a row, and you not supposed to brush or eat or drink 15 minutes prior to sample collections.    Blood work should be done in the morning  between  7 - 9 am, and fasting after mid night     Discussed with the patient and all questioned fully answered. He will call me if any problems arise.             [1]  Current Outpatient Medications on File Prior to Visit   Medication Sig Dispense Refill   • albuterol (PROVENTIL HFA,VENTOLIN HFA) 90 mcg/act inhaler Inhale 2 puffs every 6 (six) hours as needed for wheezing 54 g 0   • losartan (COZAAR) 25 mg tablet Take 3 tablets (75 mg total) by mouth daily 270 tablet 3     No current facility-administered medications on file prior to visit.

## 2025-07-02 NOTE — PATIENT INSTRUCTIONS
late-night salivary gland cortisol for 2 nights in a row, you are to  the test kit from the lab, and you will collect sample between 11 PM and midnight there are detailed instruction how to collect your salivary cortisol, there should be a swab, which you will keep sponge into your mouth, gently chew and roll it for 2 minutes, and return it to the tube without touching it, close the tube and refrigerate the sample before returning to the lab, and this will be done in 2 nights in a row, and you not supposed to brush or eat or drink 15 minutes prior to sample collections.    Blood work should be done in the morning  between  7 - 9 am, and fasting after mid night

## 2025-07-02 NOTE — ASSESSMENT & PLAN NOTE
- The patient has a left adrenal adenoma measuring 1.3 cm, which has been stable since 2016 which consistent with benign adenoma.  0 a comprehensive discussion conducted regarding the physiology of adrenal adenoma, hormonal overproduction syndrome including mild autonomous cortisol secretion, hyperaldosteronism and pheochromocytoma.  -Salivary cortisol test was ordered to rule out MACS,  -Aldosterone, renin activity and aldosterone/renin ratio was ordered to rule out hyperaldosteronism given history of hypertension.  -Given the size of the nodule, pheochromocytoma is less likely, although plasma metanephrine was ordered.  -If hormonal overproduction confirmed, he will be referred for left adrenalectomy otherwise given the stability of the nodule and benign nature of it, no further evaluation required.  -I defer further imaging at this time.    Orders:  •  Aldosterone/Renin Ratio  •  Metanephrine, Fractionated Plasma Free  •  SALIVARY CORTISOL, TWO SPECIMENS

## 2025-07-10 ENCOUNTER — HOSPITAL ENCOUNTER (OUTPATIENT)
Dept: GASTROENTEROLOGY | Facility: HOSPITAL | Age: 60
Setting detail: OUTPATIENT SURGERY
End: 2025-07-10
Attending: INTERNAL MEDICINE
Payer: COMMERCIAL

## 2025-07-10 ENCOUNTER — TELEPHONE (OUTPATIENT)
Age: 60
End: 2025-07-10

## 2025-07-10 ENCOUNTER — ANESTHESIA EVENT (OUTPATIENT)
Dept: GASTROENTEROLOGY | Facility: HOSPITAL | Age: 60
End: 2025-07-10
Payer: COMMERCIAL

## 2025-07-10 ENCOUNTER — ANESTHESIA (OUTPATIENT)
Dept: GASTROENTEROLOGY | Facility: HOSPITAL | Age: 60
End: 2025-07-10
Payer: COMMERCIAL

## 2025-07-10 VITALS
OXYGEN SATURATION: 96 % | HEIGHT: 71 IN | SYSTOLIC BLOOD PRESSURE: 137 MMHG | HEART RATE: 77 BPM | TEMPERATURE: 97.7 F | RESPIRATION RATE: 12 BRPM | WEIGHT: 268.08 LBS | DIASTOLIC BLOOD PRESSURE: 93 MMHG | BODY MASS INDEX: 37.53 KG/M2

## 2025-07-10 DIAGNOSIS — K22.719 BARRETT'S ESOPHAGUS WITH DYSPLASIA: ICD-10-CM

## 2025-07-10 DIAGNOSIS — A04.8 H. PYLORI INFECTION: Primary | ICD-10-CM

## 2025-07-10 DIAGNOSIS — A04.8 H. PYLORI INFECTION: ICD-10-CM

## 2025-07-10 PROCEDURE — 43239 EGD BIOPSY SINGLE/MULTIPLE: CPT | Performed by: INTERNAL MEDICINE

## 2025-07-10 PROCEDURE — 88305 TISSUE EXAM BY PATHOLOGIST: CPT | Performed by: PATHOLOGY

## 2025-07-10 PROCEDURE — 88342 IMHCHEM/IMCYTCHM 1ST ANTB: CPT | Performed by: PATHOLOGY

## 2025-07-10 RX ORDER — PANTOPRAZOLE SODIUM 20 MG/1
20 TABLET, DELAYED RELEASE ORAL DAILY
COMMUNITY

## 2025-07-10 RX ORDER — SODIUM CHLORIDE, SODIUM LACTATE, POTASSIUM CHLORIDE, CALCIUM CHLORIDE 600; 310; 30; 20 MG/100ML; MG/100ML; MG/100ML; MG/100ML
INJECTION, SOLUTION INTRAVENOUS CONTINUOUS PRN
Status: DISCONTINUED | OUTPATIENT
Start: 2025-07-10 | End: 2025-07-10

## 2025-07-10 RX ORDER — LIDOCAINE HYDROCHLORIDE 20 MG/ML
INJECTION, SOLUTION EPIDURAL; INFILTRATION; INTRACAUDAL; PERINEURAL AS NEEDED
Status: DISCONTINUED | OUTPATIENT
Start: 2025-07-10 | End: 2025-07-10

## 2025-07-10 RX ORDER — PROPOFOL 10 MG/ML
INJECTION, EMULSION INTRAVENOUS AS NEEDED
Status: DISCONTINUED | OUTPATIENT
Start: 2025-07-10 | End: 2025-07-10

## 2025-07-10 RX ADMIN — SODIUM CHLORIDE, SODIUM LACTATE, POTASSIUM CHLORIDE, AND CALCIUM CHLORIDE: .6; .31; .03; .02 INJECTION, SOLUTION INTRAVENOUS at 07:11

## 2025-07-10 RX ADMIN — PROPOFOL 40 MG: 10 INJECTION, EMULSION INTRAVENOUS at 07:18

## 2025-07-10 RX ADMIN — PROPOFOL 50 MG: 10 INJECTION, EMULSION INTRAVENOUS at 07:16

## 2025-07-10 RX ADMIN — PROPOFOL 150 MG: 10 INJECTION, EMULSION INTRAVENOUS at 07:14

## 2025-07-10 RX ADMIN — LIDOCAINE HYDROCHLORIDE 100 MG: 20 INJECTION, SOLUTION EPIDURAL; INFILTRATION; INTRACAUDAL; PERINEURAL at 07:14

## 2025-07-10 NOTE — INTERVAL H&P NOTE
H&P reviewed. After examining the patient I find no changes in the patients condition since the H&P had been written.    Vitals:    07/10/25 0638   BP: 144/83   Pulse: 80   Resp: 16   Temp: 97.8 °F (36.6 °C)   SpO2: 98%

## 2025-07-10 NOTE — ANESTHESIA PREPROCEDURE EVALUATION
Procedure:  EGD    Relevant Problems   ANESTHESIA (within normal limits)      CARDIO   (+) Essential hypertension   (+) Moderate mixed hyperlipidemia not requiring statin therapy      ENDO (within normal limits)      GI/HEPATIC (within normal limits)      /RENAL (within normal limits)      GYN (within normal limits)      HEMATOLOGY (within normal limits)      MUSCULOSKELETAL   (+) Chronic right-sided low back pain with right-sided sciatica      NEURO/PSYCH   (+) Chronic pain syndrome   (+) Chronic right-sided low back pain with right-sided sciatica      PULMONARY   (+) ALEXANDRE (obstructive sleep apnea)        Physical Exam    Airway     Mallampati score: II  TM Distance: >3 FB  Neck ROM: full      Cardiovascular  Cardiovascular exam normal    Dental        Pulmonary  Pulmonary exam normal     Neurological  - normal exam    Other Findings        Anesthesia Plan  ASA Score- 3     Anesthesia Type- IV sedation with anesthesia with ASA Monitors.         Additional Monitors:     Airway Plan:            Plan Factors-Exercise tolerance (METS): >4 METS.    Chart reviewed. EKG reviewed. Imaging results reviewed. Existing labs reviewed. Patient summary reviewed.    Patient is not a current smoker.              Induction- intravenous.    Postoperative Plan- .   Monitoring Plan - Monitoring plan - standard ASA monitoring  Post Operative Pain Plan - non-opiod analgesics    Perioperative Resuscitation Plan - Level 1 - Full Code.       Informed Consent- Anesthetic plan and risks discussed with patient.        NPO Status:  Vitals Value Taken Time   Date of last liquid 07/09/25 07/10/25 06:37   Time of last liquid 2000 07/10/25 06:37   Date of last solid 07/09/25 07/10/25 06:37   Time of last solid 2000 07/10/25 06:37     Discussed IV Sedation with General Anesthesia as backup with patient including but not limited to risk of cardiac insult, pulmonary complication, stroke, reaction to medications and death. All questions answered and  consent was obtained.

## 2025-07-10 NOTE — H&P
History and Physical -  [facility]  Joao Ibarra 60 y.o. male MRN: [id number]                  HPI: Joao Ibarra is a 60 y.o. year old male who presents for EGD for Finney's esophagus indefinite for dysplasia      REVIEW OF SYSTEMS: Per the HPI, and otherwise unremarkable.    Historical Information   Past Medical History[1]  Past Surgical History[2]  Social History   Social History     Substance and Sexual Activity   Alcohol Use Yes    Alcohol/week: 1.0 standard drink of alcohol    Types: 1 Cans of beer per week    Comment: occasionally 1-2 x a month     Social History     Substance and Sexual Activity   Drug Use Yes    Frequency: 4.0 times per week    Types: Marijuana    Comment: medical/ smoking     Tobacco Use History[3]  Family History[4]    Meds/Allergies     Not in a hospital admission.    Allergies[5]    Objective     There were no vitals taken for this visit.      PHYSICAL EXAM    Gen: NAD  CV: RRR  CHEST: Clear  ABD: soft, NT/ND  EXT: no edema  Neuro: AAO      ASSESSMENT/PLAN:  This is a 60 y.o. year old male here for Finney's esophagus indefinite for dysplasia    PLAN:   Procedure: EGD               [1]   Past Medical History:  Diagnosis Date    Acute kidney injury (nontraumatic) (HCC)     Alcohol abuse     Anxiety     Finney's esophagus     Chronic pain syndrome 02/17/2021    Colon polyp     COVID 09/2020    Diverticulitis     Erectile dysfunction     Essential hypertension     GERD (gastroesophageal reflux disease)     History of transfusion 2015    Hypertension     Lumbar radiculopathy 05/13/2019    Major depression     Mild cognitive disorder 05/06/2016    Mood disorder (HCC) 05/02/2016    Multiple nevi     Obesity     Obesity (BMI 30-39.9)     ALEXANDRE (obstructive sleep apnea)     Psychosis (HCC)     Snoring     Suspicious nevus     Tremor of right hand     Weakness of right upper extremity    [2]   Past Surgical History:  Procedure Laterality Date    CARDIAC CATHETERIZATION  Left 11/16/2022    Procedure: Cardiac Left Heart Cath;  Surgeon: Paresh Street MD;  Location: [facility];  Service: Cardiology    CARDIAC CATHETERIZATION N/A 11/16/2022    Procedure: Cardiac Coronary Angiogram;  Surgeon: Paresh Street MD;  Location: [facility];  Service: Cardiology    COLON SURGERY      COLONOSCOPY N/A 03/09/2019    Procedure: COLONOSCOPY;  Surgeon: Keaton Begum MD;  Location: [facility];  Service: Gastroenterology    LIPOMA RESECTION      back    IA ESOPHAGOGASTRODUODENOSCOPY TRANSORAL DIAGNOSTIC N/A 03/09/2019    Procedure: ESOPHAGOGASTRODUODENOSCOPY (EGD);  Surgeon: Keaton Begum MD;  Location: [facility];  Service: Gastroenterology    IA SURGICAL ARTHROSCOPY SHOULDER W/ROTATOR CUFF RPR Right 09/16/2022    Procedure: RIGHT SHOULDER ARTHROSCOPY, REPAIR ROTATOR CUFF, SUPRASPINATUS REPAIR, SUBSCAPULARIS REPAIR, BICEPS TENOTOMY, ACROMIOPLASTY, AND EXTENSIVE DEBRIDEMENT;  Surgeon: Luciano Hamilton DO;  Location: [facility];  Service: Orthopedics    IA TRURL ELECTROSURG RESCJ PROSTATE BLEED COMPLETE N/A 7/6/2023    Procedure: TRANSURETHRAL RESECTION OF PROSTATE (TURP);  Surgeon: Chavez Olivera MD;  Location: [facility];  Service: Urology    REVISION COLOSTOMY      SHOULDER SURGERY     [3]   Social History  Tobacco Use   Smoking Status Never    Passive exposure: Current   Smokeless Tobacco Never   [4]   Family History  Problem Relation Name Age of Onset    Heart disease Father Floyd Ibarra     Stroke Mother Jenny     Aneurysm Sister      Ovarian cancer Sister      Drug abuse Sister          overdose    No Known Problems Sister      No Known Problems Sister      No Known Problems Sister      Drug abuse Brother      Drug abuse Brother      No Known Problems Brother      No Known Problems Daughter      No Known Problems Daughter      No Known Problems Daughter      No Known Problems Daughter      No Known Problems Son      No Known Problems Son     [5]    Allergies  Allergen Reactions    Sulfa Antibiotics GI Intolerance     HA, vomiting, sweating     Amoxicillin GI Intolerance

## 2025-07-10 NOTE — TELEPHONE ENCOUNTER
----- Message from Keaton Begum MD sent at 7/10/2025  7:23 AM EDT -----  Please have patient do stool for H. pylori in 1 month

## 2025-07-10 NOTE — ANESTHESIA POSTPROCEDURE EVALUATION
Post-Op Assessment Note    CV Status:  Stable  Pain Score: 0    Pain management: adequate       Mental Status:  Sleepy   Hydration Status:  Euvolemic   PONV Controlled:  Controlled   Airway Patency:  Patent     Post Op Vitals Reviewed: Yes    No anethesia notable event occurred.    Staff: CRNA           Last Filed PACU Vitals:  Vitals Value Taken Time   Temp 97.7 °F (36.5 °C) 07/10/25 07:22   Pulse 80 07/10/25 07:22   /77 07/10/25 07:22   Resp 23 07/10/25 07:22   SpO2 95 % 07/10/25 07:22

## 2025-07-16 ENCOUNTER — TELEPHONE (OUTPATIENT)
Dept: FAMILY MEDICINE CLINIC | Facility: CLINIC | Age: 60
End: 2025-07-16

## 2025-07-16 PROCEDURE — 88305 TISSUE EXAM BY PATHOLOGIST: CPT | Performed by: PATHOLOGY

## 2025-07-16 PROCEDURE — 88342 IMHCHEM/IMCYTCHM 1ST ANTB: CPT | Performed by: PATHOLOGY

## 2025-07-23 ENCOUNTER — HOSPITAL ENCOUNTER (OUTPATIENT)
Facility: HOSPITAL | Age: 60
Discharge: HOME | End: 2025-07-23
Attending: INTERNAL MEDICINE
Payer: COMMERCIAL

## 2025-07-23 DIAGNOSIS — G47.33 OSA (OBSTRUCTIVE SLEEP APNEA): ICD-10-CM

## 2025-07-23 PROCEDURE — 95811 POLYSOM 6/>YRS CPAP 4/> PARM: CPT | Performed by: INTERNAL MEDICINE

## 2025-07-23 PROCEDURE — 95811 POLYSOM 6/>YRS CPAP 4/> PARM: CPT

## 2025-07-24 NOTE — PROGRESS NOTES
Sleep Study Documentation    Pre-Sleep Study       Sleep testing procedure explained to patient:YES    Patient napped prior to study:NO    Caffeine:Dayshift worker after 12PM.  Caffeine use:YES- coffee  6 ounces    Alcohol:Dayshift workers after 5PM: Alcohol use:NO    Typical day for patient:YES         Study Documentation    Sleep Study Indications: Home Study with RADHA > 5, Hypertension, BMI >= 30, ALEXANDRE    Montage used: Standard    Transcutaneous CO2 used: NO    Sleep Study Type:     CPAP Study     Treatment: Mode of Therapy:CPAP  CPAP changed to BiPAP:No    Optimal PAP pressure: 10 cm h20   Leak:Small  Snore:Eliminated  PAP pressure at which snoring was eliminated 10 cm h20       PAP Masks  PAP mask tried Resmed Airfit F20  PAP mask choice Resmed Airfit F20  PAP mask type:full face      PAP- Post-Study  PAP treatment:yes: Post PAP treatment patient reports feeling unchanged and would wear PAP mask at home.      Oxygen Data  Supplemental O2 used: No      EKG/EEG/Abnormal Behaviors     EKG abnormalities: no None    EEG abnormalities: no    Were abnormal behaviors in sleep observed:NO  No    Snoring    Character:  Eliminated with PAP    Frequency: Intermittent      Study Length    Is Total Sleep Study Recording Time < 2 hours: N/A    Is Total Sleep Study Recording Time > 2 hours but study is incomplete: N/A    Is Total Sleep Study Recording Time 6 hours or more but sleep was not obtained: NO        Post-Sleep Study    Medication used at bedtime or during sleep study:YES other prescription medications    Patient reports time it took to fall asleep:20 to 30 minutes    Patient reports waking up during study:3 or more times.  Patient reports returning to sleep without difficulty.    Patient reports sleeping 6 to 8 hours with dreaming.    Does the Patient feel this is a typical night of sleep:typical    Patient rated sleepiness: Not sleepy or tired

## 2025-07-25 ENCOUNTER — RESULTS FOLLOW-UP (OUTPATIENT)
Dept: SLEEP CENTER | Facility: CLINIC | Age: 60
End: 2025-07-25

## 2025-07-25 DIAGNOSIS — G47.33 OSA (OBSTRUCTIVE SLEEP APNEA): Primary | ICD-10-CM

## 2025-07-25 NOTE — RESULT ENCOUNTER NOTE
I am ordering CPAP for this pt previously of Dr. Valle our former fellow  Please inform pt of results and process CPAP rx, compliance visit    Nonurgent  Thank you

## 2025-07-29 ENCOUNTER — OFFICE VISIT (OUTPATIENT)
Dept: FAMILY MEDICINE CLINIC | Facility: CLINIC | Age: 60
End: 2025-07-29
Payer: COMMERCIAL

## 2025-07-29 ENCOUNTER — APPOINTMENT (OUTPATIENT)
Dept: LAB | Facility: CLINIC | Age: 60
End: 2025-07-29
Payer: COMMERCIAL

## 2025-07-29 VITALS
DIASTOLIC BLOOD PRESSURE: 84 MMHG | TEMPERATURE: 97.8 F | OXYGEN SATURATION: 99 % | HEIGHT: 71 IN | WEIGHT: 271 LBS | SYSTOLIC BLOOD PRESSURE: 126 MMHG | BODY MASS INDEX: 37.94 KG/M2 | HEART RATE: 84 BPM

## 2025-07-29 DIAGNOSIS — Z12.5 SCREENING FOR PROSTATE CANCER: ICD-10-CM

## 2025-07-29 DIAGNOSIS — F09 MILD COGNITIVE DISORDER: ICD-10-CM

## 2025-07-29 DIAGNOSIS — R73.03 PRE-DIABETES: ICD-10-CM

## 2025-07-29 DIAGNOSIS — Z23 ENCOUNTER FOR IMMUNIZATION: ICD-10-CM

## 2025-07-29 DIAGNOSIS — E78.2 MODERATE MIXED HYPERLIPIDEMIA NOT REQUIRING STATIN THERAPY: ICD-10-CM

## 2025-07-29 DIAGNOSIS — R35.0 BENIGN PROSTATIC HYPERPLASIA WITH URINARY FREQUENCY: ICD-10-CM

## 2025-07-29 DIAGNOSIS — K22.719 BARRETT'S ESOPHAGUS WITH DYSPLASIA: ICD-10-CM

## 2025-07-29 DIAGNOSIS — E66.01 CLASS 2 SEVERE OBESITY DUE TO EXCESS CALORIES WITH SERIOUS COMORBIDITY AND BODY MASS INDEX (BMI) OF 38.0 TO 38.9 IN ADULT (HCC): ICD-10-CM

## 2025-07-29 DIAGNOSIS — N40.1 BENIGN PROSTATIC HYPERPLASIA WITH URINARY FREQUENCY: ICD-10-CM

## 2025-07-29 DIAGNOSIS — A04.8 H. PYLORI INFECTION: ICD-10-CM

## 2025-07-29 DIAGNOSIS — E66.812 CLASS 2 SEVERE OBESITY DUE TO EXCESS CALORIES WITH SERIOUS COMORBIDITY AND BODY MASS INDEX (BMI) OF 38.0 TO 38.9 IN ADULT (HCC): ICD-10-CM

## 2025-07-29 DIAGNOSIS — I10 ESSENTIAL HYPERTENSION: ICD-10-CM

## 2025-07-29 DIAGNOSIS — G47.33 OSA (OBSTRUCTIVE SLEEP APNEA): ICD-10-CM

## 2025-07-29 DIAGNOSIS — Z00.00 ANNUAL PHYSICAL EXAM: Primary | ICD-10-CM

## 2025-07-29 PROBLEM — H47.10 OPTIC NERVE EDEMA: Status: RESOLVED | Noted: 2021-04-12 | Resolved: 2025-07-29

## 2025-07-29 LAB
ALBUMIN SERPL BCG-MCNC: 4.3 G/DL (ref 3.5–5)
ALP SERPL-CCNC: 75 U/L (ref 34–104)
ALT SERPL W P-5'-P-CCNC: 16 U/L (ref 7–52)
ANION GAP SERPL CALCULATED.3IONS-SCNC: 8 MMOL/L (ref 4–13)
AST SERPL W P-5'-P-CCNC: 18 U/L (ref 13–39)
BASOPHILS # BLD AUTO: 0.04 THOUSANDS/ÂΜL (ref 0–0.1)
BASOPHILS NFR BLD AUTO: 1 % (ref 0–1)
BILIRUB SERPL-MCNC: 0.26 MG/DL (ref 0.2–1)
BUN SERPL-MCNC: 25 MG/DL (ref 5–25)
CALCIUM SERPL-MCNC: 9.4 MG/DL (ref 8.4–10.2)
CHLORIDE SERPL-SCNC: 108 MMOL/L (ref 96–108)
CHOLEST SERPL-MCNC: 157 MG/DL (ref ?–200)
CO2 SERPL-SCNC: 24 MMOL/L (ref 21–32)
CREAT SERPL-MCNC: 0.81 MG/DL (ref 0.6–1.3)
EOSINOPHIL # BLD AUTO: 0.18 THOUSAND/ÂΜL (ref 0–0.61)
EOSINOPHIL NFR BLD AUTO: 3 % (ref 0–6)
ERYTHROCYTE [DISTWIDTH] IN BLOOD BY AUTOMATED COUNT: 13.6 % (ref 11.6–15.1)
GFR SERPL CREATININE-BSD FRML MDRD: 95 ML/MIN/1.73SQ M
GLUCOSE P FAST SERPL-MCNC: 102 MG/DL (ref 65–99)
HCT VFR BLD AUTO: 46 % (ref 36.5–49.3)
HDLC SERPL-MCNC: 42 MG/DL
HGB BLD-MCNC: 14.8 G/DL (ref 12–17)
IMM GRANULOCYTES # BLD AUTO: 0.02 THOUSAND/UL (ref 0–0.2)
IMM GRANULOCYTES NFR BLD AUTO: 0 % (ref 0–2)
LDLC SERPL CALC-MCNC: 85 MG/DL (ref 0–100)
LYMPHOCYTES # BLD AUTO: 1.02 THOUSANDS/ÂΜL (ref 0.6–4.47)
LYMPHOCYTES NFR BLD AUTO: 15 % (ref 14–44)
MCH RBC QN AUTO: 29.3 PG (ref 26.8–34.3)
MCHC RBC AUTO-ENTMCNC: 32.2 G/DL (ref 31.4–37.4)
MCV RBC AUTO: 91 FL (ref 82–98)
MONOCYTES # BLD AUTO: 0.59 THOUSAND/ÂΜL (ref 0.17–1.22)
MONOCYTES NFR BLD AUTO: 9 % (ref 4–12)
NEUTROPHILS # BLD AUTO: 4.93 THOUSANDS/ÂΜL (ref 1.85–7.62)
NEUTS SEG NFR BLD AUTO: 72 % (ref 43–75)
NRBC BLD AUTO-RTO: 0 /100 WBCS
PLATELET # BLD AUTO: 277 THOUSANDS/UL (ref 149–390)
PMV BLD AUTO: 10.2 FL (ref 8.9–12.7)
POTASSIUM SERPL-SCNC: 4.5 MMOL/L (ref 3.5–5.3)
PROT SERPL-MCNC: 7.3 G/DL (ref 6.4–8.4)
PSA SERPL-MCNC: 2.74 NG/ML (ref 0–4)
RBC # BLD AUTO: 5.05 MILLION/UL (ref 3.88–5.62)
SODIUM SERPL-SCNC: 140 MMOL/L (ref 135–147)
TRIGL SERPL-MCNC: 151 MG/DL (ref ?–150)
WBC # BLD AUTO: 6.78 THOUSAND/UL (ref 4.31–10.16)

## 2025-07-29 PROCEDURE — G0103 PSA SCREENING: HCPCS

## 2025-07-29 PROCEDURE — 80053 COMPREHEN METABOLIC PANEL: CPT

## 2025-07-29 PROCEDURE — 90750 HZV VACC RECOMBINANT IM: CPT

## 2025-07-29 PROCEDURE — 83036 HEMOGLOBIN GLYCOSYLATED A1C: CPT

## 2025-07-29 PROCEDURE — 80061 LIPID PANEL: CPT

## 2025-07-29 PROCEDURE — 90471 IMMUNIZATION ADMIN: CPT

## 2025-07-29 PROCEDURE — 85025 COMPLETE CBC W/AUTO DIFF WBC: CPT

## 2025-07-29 PROCEDURE — 99396 PREV VISIT EST AGE 40-64: CPT | Performed by: NURSE PRACTITIONER

## 2025-07-30 ENCOUNTER — RESULTS FOLLOW-UP (OUTPATIENT)
Dept: FAMILY MEDICINE CLINIC | Facility: CLINIC | Age: 60
End: 2025-07-30

## 2025-07-30 LAB
EST. AVERAGE GLUCOSE BLD GHB EST-MCNC: 117 MG/DL
HBA1C MFR BLD: 5.7 %

## 2025-08-04 LAB
ALDOST SERPL-MCNC: 1.5 NG/DL (ref 0–30)
ALDOST/RENIN PLAS-RTO: 1.5 {RATIO} (ref 0–30)
RENIN PLAS-CCNC: 1.01 NG/ML/HR (ref 0.17–5.38)

## 2025-08-05 LAB
METANEPH FREE SERPL-MCNC: <25 PG/ML (ref 0–88)
NORMETANEPHRINE SERPL-MCNC: 36.8 PG/ML (ref 0–285.2)

## 2025-08-11 ENCOUNTER — TELEPHONE (OUTPATIENT)
Dept: SLEEP CENTER | Facility: CLINIC | Age: 60
End: 2025-08-11

## 2025-08-20 DIAGNOSIS — R05.1 ACUTE COUGH: ICD-10-CM

## 2025-08-21 RX ORDER — ALBUTEROL SULFATE 90 UG/1
2 AEROSOL, METERED RESPIRATORY (INHALATION) EVERY 6 HOURS PRN
Qty: 18 G | Refills: 1 | Status: SHIPPED | OUTPATIENT
Start: 2025-08-21

## (undated) DEVICE — NEEDLE SUT SCORPION MULTIFIRE

## (undated) DEVICE — LIGHT HANDLE COVER SLEEVE DISP BLUE STELLAR

## (undated) DEVICE — PROBE ABLATION  APOLLO RF 90 DEG MULTI PORT

## (undated) DEVICE — DGW .035 FC J3MM 260CM TEF: Brand: EMERALD

## (undated) DEVICE — TUBING SUCTION 5MM X 12 FT

## (undated) DEVICE — SUT ETHILON 3-0 PS-1 18 IN 1663H

## (undated) DEVICE — SPONGE 4 X 4 XRAY 16 PLY STRL LF RFD

## (undated) DEVICE — RADIFOCUS OPTITORQUE ANGIOGRAPHIC CATHETER: Brand: OPTITORQUE

## (undated) DEVICE — CATH FOLEY COUDE HEMATURIA 24FR 30ML 3 WAY LUBRICATH

## (undated) DEVICE — OCCLUSIVE GAUZE STRIP,3% BISMUTH TRIBROMOPHENATE IN PETROLATUM BLEND: Brand: XEROFORM

## (undated) DEVICE — U-DRAPE: Brand: CONVERTORS

## (undated) DEVICE — Device

## (undated) DEVICE — CHLORAPREP HI-LITE 26ML ORANGE

## (undated) DEVICE — SCD SEQUENTIAL COMPRESSION COMFORT SLEEVE MEDIUM KNEE LENGTH: Brand: KENDALL SCD

## (undated) DEVICE — EVACUATOR BLADDER ELLIK DISP STRL

## (undated) DEVICE — SHOULDER SUSPENSION KIT 6 PER BOX

## (undated) DEVICE — BURR  OVAL 4MM 13CM 8 FLUTE COOLCUT

## (undated) DEVICE — DRAPE EQUIPMENT RF WAND

## (undated) DEVICE — INVIEW CLEAR LEGGINGS: Brand: CONVERTORS

## (undated) DEVICE — PAD GROUNDING ADULT

## (undated) DEVICE — TUBING ARTHROSCOPIC WAVE  MAIN PUMP

## (undated) DEVICE — BETHLEHEM UNIVERSAL  ARTHRO PK: Brand: CARDINAL HEALTH

## (undated) DEVICE — TR BAND RADIAL ARTERY COMPRESSION DEVICE: Brand: TR BAND

## (undated) DEVICE — GLOVE SRG BIOGEL ORTHOPEDIC 7

## (undated) DEVICE — DRAPE SHEET THREE QUARTER

## (undated) DEVICE — GLOVE INDICATOR PI UNDERGLOVE SZ 8 BLUE

## (undated) DEVICE — CATH SECURE FOLEY

## (undated) DEVICE — ABDOMINAL PAD: Brand: DERMACEA

## (undated) DEVICE — GLOVE INDICATOR PI UNDERGLOVE SZ 7 BLUE

## (undated) DEVICE — CYSTO TUBING TUR Y IRRIGATION

## (undated) DEVICE — GLOVE INDICATOR PI UNDERGLOVE SZ 7.5 BLUE

## (undated) DEVICE — HF-RESECTION ELECTRODE PLASMALOOP LOOP, LARGE, 24 FR., 12°/16°, ESG TURIS: Brand: OLYMPUS

## (undated) DEVICE — GAUZE SPONGES,16 PLY: Brand: CURITY

## (undated) DEVICE — HF-RESECTION ELECTRODE PLASMABUTTON BUTTON, 24 FR., 12°-30°, ESG TURIS: Brand: OLYMPUS

## (undated) DEVICE — PACK TUR

## (undated) DEVICE — BLADE SHAVER DISSECTOR 4MM 13CM COOLCUT

## (undated) DEVICE — BASIC SINGLE BASIN 2-LF: Brand: MEDLINE INDUSTRIES, INC.

## (undated) DEVICE — GLIDESHEATH SLENDER STAINLESS STEEL KIT: Brand: GLIDESHEATH SLENDER

## (undated) DEVICE — THREADED CLEAR CANNULA WITH OBTURATOR 7MM X 75MM

## (undated) DEVICE — INTENDED FOR TISSUE SEPARATION, AND OTHER PROCEDURES THAT REQUIRE A SHARP SURGICAL BLADE TO PUNCTURE OR CUT.: Brand: BARD-PARKER ® CARBON RIB-BACK BLADES

## (undated) DEVICE — THREADED CLEAR CANNULA WITH OBTURATOR 8.5MM X 75MM

## (undated) DEVICE — 3M™ MICROFOAM™ SURGICAL TAPE 4 ROLLS/CARTON 6 CARTONS/CASE 1528-3: Brand: 3M™ MICROFOAM™

## (undated) DEVICE — UROLOGIC DRAIN BAG: Brand: UNBRANDED

## (undated) DEVICE — 4-PORT MANIFOLD: Brand: NEPTUNE 2

## (undated) DEVICE — GLOVE SRG BIOGEL ECLIPSE 7.5

## (undated) DEVICE — MEDI-VAC TUBING CONNECTOR 6-IN-1 STRAIGHT: Brand: CARDINAL HEALTH

## (undated) DEVICE — CHLORHEXIDINE 4PCT 4 OZ

## (undated) DEVICE — BAG URINE DRAINAGE 4000ML CONTINUOUS IRR